# Patient Record
Sex: FEMALE | Race: WHITE | NOT HISPANIC OR LATINO
[De-identification: names, ages, dates, MRNs, and addresses within clinical notes are randomized per-mention and may not be internally consistent; named-entity substitution may affect disease eponyms.]

---

## 2017-01-11 ENCOUNTER — CLINICAL ADVICE (OUTPATIENT)
Age: 62
End: 2017-01-11

## 2017-01-12 ENCOUNTER — APPOINTMENT (OUTPATIENT)
Dept: INFECTIOUS DISEASE | Facility: CLINIC | Age: 62
End: 2017-01-12

## 2017-01-12 VITALS
WEIGHT: 293 LBS | SYSTOLIC BLOOD PRESSURE: 184 MMHG | TEMPERATURE: 98.1 F | HEIGHT: 69 IN | DIASTOLIC BLOOD PRESSURE: 90 MMHG | BODY MASS INDEX: 43.4 KG/M2 | HEART RATE: 79 BPM | OXYGEN SATURATION: 95 %

## 2017-03-23 ENCOUNTER — APPOINTMENT (OUTPATIENT)
Dept: INFECTIOUS DISEASE | Facility: CLINIC | Age: 62
End: 2017-03-23

## 2017-03-23 VITALS
OXYGEN SATURATION: 96 % | DIASTOLIC BLOOD PRESSURE: 86 MMHG | WEIGHT: 293 LBS | HEART RATE: 82 BPM | SYSTOLIC BLOOD PRESSURE: 191 MMHG | BODY MASS INDEX: 43.4 KG/M2 | TEMPERATURE: 97.8 F | HEIGHT: 69 IN

## 2017-03-23 RX ORDER — INSULIN GLARGINE 100 [IU]/ML
100 INJECTION, SOLUTION SUBCUTANEOUS
Qty: 45 | Refills: 0 | Status: COMPLETED | COMMUNITY
Start: 2016-12-02 | End: 2017-03-23

## 2017-06-07 ENCOUNTER — APPOINTMENT (OUTPATIENT)
Dept: INFECTIOUS DISEASE | Facility: CLINIC | Age: 62
End: 2017-06-07

## 2018-11-20 ENCOUNTER — APPOINTMENT (OUTPATIENT)
Dept: NEPHROLOGY | Facility: CLINIC | Age: 63
End: 2018-11-20
Payer: COMMERCIAL

## 2018-11-20 ENCOUNTER — LABORATORY RESULT (OUTPATIENT)
Age: 63
End: 2018-11-20

## 2018-11-20 VITALS
OXYGEN SATURATION: 98 % | HEART RATE: 55 BPM | WEIGHT: 275.57 LBS | HEIGHT: 69 IN | DIASTOLIC BLOOD PRESSURE: 82 MMHG | BODY MASS INDEX: 40.82 KG/M2 | SYSTOLIC BLOOD PRESSURE: 173 MMHG

## 2018-11-20 DIAGNOSIS — R80.9 PROTEINURIA, UNSPECIFIED: ICD-10-CM

## 2018-11-20 PROCEDURE — 99244 OFF/OP CNSLTJ NEW/EST MOD 40: CPT

## 2018-11-20 RX ORDER — INSULIN DEGLUDEC INJECTION 100 U/ML
100 INJECTION, SOLUTION SUBCUTANEOUS
Refills: 0 | Status: DISCONTINUED | COMMUNITY
Start: 2017-03-23 | End: 2018-11-20

## 2018-11-20 RX ORDER — INSULIN DEGLUDEC INJECTION 100 U/ML
100 INJECTION, SOLUTION SUBCUTANEOUS
Qty: 90 | Refills: 0 | Status: DISCONTINUED | COMMUNITY
Start: 2017-01-20 | End: 2018-11-20

## 2018-11-21 LAB
ALBUMIN SERPL ELPH-MCNC: 4.2 G/DL
ALDOSTERONE SERUM: 8.5 NG/DL
ANION GAP SERPL CALC-SCNC: 14 MMOL/L
BASOPHILS # BLD AUTO: 0.02 K/UL
BASOPHILS NFR BLD AUTO: 0.3 %
BUN SERPL-MCNC: 15 MG/DL
C3 SERPL-MCNC: 158 MG/DL
C4 SERPL-MCNC: 28 MG/DL
CALCIUM SERPL-MCNC: 9.9 MG/DL
CHLORIDE SERPL-SCNC: 106 MMOL/L
CO2 SERPL-SCNC: 24 MMOL/L
CREAT SERPL-MCNC: 0.76 MG/DL
CREAT SPEC-SCNC: 147 MG/DL
CREAT/PROT UR: 0.9 RATIO
DEPRECATED KAPPA LC FREE/LAMBDA SER: 1.43 RATIO
DSDNA AB SER-ACNC: 12 IU/ML
ENA RNP AB SER IA-ACNC: <0.2 AL
ENA SM AB SER IA-ACNC: <0.2 AL
EOSINOPHIL # BLD AUTO: 0.25 K/UL
EOSINOPHIL NFR BLD AUTO: 3.4 %
GLUCOSE SERPL-MCNC: 139 MG/DL
HBV CORE IGG+IGM SER QL: NONREACTIVE
HBV CORE IGM SER QL: NONREACTIVE
HBV SURFACE AB SER QL: NONREACTIVE
HBV SURFACE AB SERPL IA-ACNC: <3 MIU/ML
HBV SURFACE AG SER QL: NONREACTIVE
HCT VFR BLD CALC: 34.8 %
HGB BLD-MCNC: 11.2 G/DL
IMM GRANULOCYTES NFR BLD AUTO: 0.3 %
KAPPA LC CSF-MCNC: 1.58 MG/DL
KAPPA LC SERPL-MCNC: 2.26 MG/DL
LYMPHOCYTES # BLD AUTO: 1.94 K/UL
LYMPHOCYTES NFR BLD AUTO: 26.6 %
MAN DIFF?: NORMAL
MCHC RBC-ENTMCNC: 27.8 PG
MCHC RBC-ENTMCNC: 32.2 GM/DL
MCV RBC AUTO: 86.4 FL
MONOCYTES # BLD AUTO: 0.47 K/UL
MONOCYTES NFR BLD AUTO: 6.4 %
MPO AB + PR3 PNL SER: NORMAL
NEUTROPHILS # BLD AUTO: 4.59 K/UL
NEUTROPHILS NFR BLD AUTO: 63 %
PHOSPHATE SERPL-MCNC: 3 MG/DL
PLATELET # BLD AUTO: 249 K/UL
POTASSIUM SERPL-SCNC: 4.6 MMOL/L
PROT UR-MCNC: 132 MG/DL
RBC # BLD: 4.03 M/UL
RBC # FLD: 14 %
SODIUM SERPL-SCNC: 144 MMOL/L
T PALLIDUM AB SER QL IA: NEGATIVE
WBC # FLD AUTO: 7.29 K/UL

## 2018-11-22 LAB — M PROTEIN SPEC IFE-MCNC: NORMAL

## 2018-11-24 LAB
PHOSPHOLIPASE A2 RECEPTOR ELISA: <2 RU/ML
PHOSPHOLIPASE A2 RECEPTOR IFA: NEGATIVE

## 2018-11-27 LAB — ANA SER IF-ACNC: NEGATIVE

## 2018-11-28 LAB — RENIN ACTIVITY, PLASMA: 0.65 NG/ML/HR

## 2019-04-09 ENCOUNTER — APPOINTMENT (OUTPATIENT)
Dept: NEPHROLOGY | Facility: CLINIC | Age: 64
End: 2019-04-09

## 2019-11-11 ENCOUNTER — APPOINTMENT (OUTPATIENT)
Dept: MRI IMAGING | Facility: HOSPITAL | Age: 64
End: 2019-11-11
Payer: COMMERCIAL

## 2019-11-11 ENCOUNTER — OUTPATIENT (OUTPATIENT)
Dept: OUTPATIENT SERVICES | Facility: HOSPITAL | Age: 64
LOS: 1 days | End: 2019-11-11
Payer: COMMERCIAL

## 2019-11-11 DIAGNOSIS — Z00.8 ENCOUNTER FOR OTHER GENERAL EXAMINATION: ICD-10-CM

## 2019-11-11 PROCEDURE — 73720 MRI LWR EXTREMITY W/O&W/DYE: CPT | Mod: 26,RT

## 2019-11-11 PROCEDURE — A9579: CPT

## 2019-11-11 PROCEDURE — 73720 MRI LWR EXTREMITY W/O&W/DYE: CPT

## 2019-11-15 ENCOUNTER — OUTPATIENT (OUTPATIENT)
Dept: OUTPATIENT SERVICES | Facility: HOSPITAL | Age: 64
LOS: 1 days | End: 2019-11-15
Payer: COMMERCIAL

## 2019-11-15 ENCOUNTER — APPOINTMENT (OUTPATIENT)
Dept: MRI IMAGING | Facility: HOSPITAL | Age: 64
End: 2019-11-15
Payer: COMMERCIAL

## 2019-11-15 DIAGNOSIS — Z00.8 ENCOUNTER FOR OTHER GENERAL EXAMINATION: ICD-10-CM

## 2019-11-15 PROCEDURE — 73720 MRI LWR EXTREMITY W/O&W/DYE: CPT

## 2019-11-15 PROCEDURE — A9579: CPT

## 2019-11-15 PROCEDURE — 73720 MRI LWR EXTREMITY W/O&W/DYE: CPT | Mod: 26,LT

## 2020-01-01 ENCOUNTER — APPOINTMENT (OUTPATIENT)
Dept: INFUSION THERAPY | Facility: HOSPITAL | Age: 65
End: 2020-01-01

## 2020-01-01 ENCOUNTER — OUTPATIENT (OUTPATIENT)
Dept: OUTPATIENT SERVICES | Facility: HOSPITAL | Age: 65
LOS: 1 days | Discharge: HOME | End: 2020-01-01

## 2020-01-01 ENCOUNTER — OUTPATIENT (OUTPATIENT)
Dept: OUTPATIENT SERVICES | Facility: HOSPITAL | Age: 65
LOS: 1 days | End: 2020-01-01
Payer: MEDICARE

## 2020-01-01 ENCOUNTER — RESULT REVIEW (OUTPATIENT)
Age: 65
End: 2020-01-01

## 2020-01-01 ENCOUNTER — APPOINTMENT (OUTPATIENT)
Dept: HEMATOLOGY ONCOLOGY | Facility: CLINIC | Age: 65
End: 2020-01-01
Payer: MEDICARE

## 2020-01-01 ENCOUNTER — TRANSCRIPTION ENCOUNTER (OUTPATIENT)
Age: 65
End: 2020-01-01

## 2020-01-01 ENCOUNTER — LABORATORY RESULT (OUTPATIENT)
Age: 65
End: 2020-01-01

## 2020-01-01 ENCOUNTER — APPOINTMENT (OUTPATIENT)
Dept: CT IMAGING | Facility: IMAGING CENTER | Age: 65
End: 2020-01-01
Payer: MEDICARE

## 2020-01-01 VITALS
TEMPERATURE: 97.2 F | HEIGHT: 66.54 IN | BODY MASS INDEX: 42.23 KG/M2 | WEIGHT: 265.88 LBS | OXYGEN SATURATION: 100 % | RESPIRATION RATE: 14 BRPM | DIASTOLIC BLOOD PRESSURE: 80 MMHG | SYSTOLIC BLOOD PRESSURE: 136 MMHG | HEART RATE: 72 BPM

## 2020-01-01 VITALS
HEART RATE: 96 BPM | HEIGHT: 67.91 IN | TEMPERATURE: 97.5 F | SYSTOLIC BLOOD PRESSURE: 138 MMHG | OXYGEN SATURATION: 95 % | DIASTOLIC BLOOD PRESSURE: 81 MMHG | WEIGHT: 266.98 LBS | RESPIRATION RATE: 14 BRPM | BODY MASS INDEX: 40.93 KG/M2

## 2020-01-01 DIAGNOSIS — C50.919 MALIGNANT NEOPLASM OF UNSPECIFIED SITE OF UNSPECIFIED FEMALE BREAST: ICD-10-CM

## 2020-01-01 DIAGNOSIS — Z15.09 PERSONAL HISTORY OF MALIGNANT NEOPLASM OF BREAST: ICD-10-CM

## 2020-01-01 DIAGNOSIS — Z85.3 PERSONAL HISTORY OF MALIGNANT NEOPLASM OF BREAST: ICD-10-CM

## 2020-01-01 DIAGNOSIS — I73.9 PERIPHERAL VASCULAR DISEASE, UNSPECIFIED: ICD-10-CM

## 2020-01-01 LAB
ALBUMIN SERPL ELPH-MCNC: 4.4 G/DL
ALBUMIN SERPL ELPH-MCNC: 4.6 G/DL
ALBUMIN SERPL ELPH-MCNC: 4.8 G/DL
ALP BLD-CCNC: 192 U/L
ALP BLD-CCNC: 255 U/L
ALP BLD-CCNC: 277 U/L
ALT SERPL-CCNC: 8 U/L
ALT SERPL-CCNC: 8 U/L
ALT SERPL-CCNC: 9 U/L
ANION GAP SERPL CALC-SCNC: 13 MMOL/L
ANION GAP SERPL CALC-SCNC: 13 MMOL/L
ANION GAP SERPL CALC-SCNC: 14 MMOL/L
AST SERPL-CCNC: 12 U/L
AST SERPL-CCNC: 12 U/L
AST SERPL-CCNC: 13 U/L
BASOPHILS # BLD AUTO: 0.04 K/UL — SIGNIFICANT CHANGE UP (ref 0–0.2)
BASOPHILS # BLD AUTO: 0.06 K/UL — SIGNIFICANT CHANGE UP (ref 0–0.2)
BASOPHILS NFR BLD AUTO: 1.4 % — SIGNIFICANT CHANGE UP (ref 0–2)
BASOPHILS NFR BLD AUTO: 2 % — SIGNIFICANT CHANGE UP (ref 0–2)
BILIRUB SERPL-MCNC: 0.2 MG/DL
BUN SERPL-MCNC: 18 MG/DL
BUN SERPL-MCNC: 19 MG/DL
BUN SERPL-MCNC: 23 MG/DL
CALCIUM SERPL-MCNC: 10 MG/DL
CALCIUM SERPL-MCNC: 9.2 MG/DL
CALCIUM SERPL-MCNC: 9.8 MG/DL
CANCER AG27-29 SERPL-ACNC: 178 U/ML
CANCER AG27-29 SERPL-ACNC: 231.8 U/ML
CEA SERPL-MCNC: 2.7 NG/ML
CEA SERPL-MCNC: 2.9 NG/ML
CEA SERPL-MCNC: 3.2 NG/ML
CHLORIDE SERPL-SCNC: 107 MMOL/L
CHLORIDE SERPL-SCNC: 107 MMOL/L
CHLORIDE SERPL-SCNC: 108 MMOL/L
CO2 SERPL-SCNC: 21 MMOL/L
CO2 SERPL-SCNC: 22 MMOL/L
CO2 SERPL-SCNC: 23 MMOL/L
CREAT SERPL-MCNC: 0.85 MG/DL
CREAT SERPL-MCNC: 0.87 MG/DL
CREAT SERPL-MCNC: 1.09 MG/DL
EOSINOPHIL # BLD AUTO: 0.09 K/UL — SIGNIFICANT CHANGE UP (ref 0–0.5)
EOSINOPHIL # BLD AUTO: 0.11 K/UL — SIGNIFICANT CHANGE UP (ref 0–0.5)
EOSINOPHIL NFR BLD AUTO: 3 % — SIGNIFICANT CHANGE UP (ref 0–6)
EOSINOPHIL NFR BLD AUTO: 4 % — SIGNIFICANT CHANGE UP (ref 0–6)
GLUCOSE SERPL-MCNC: 124 MG/DL
GLUCOSE SERPL-MCNC: 159 MG/DL
GLUCOSE SERPL-MCNC: 171 MG/DL
HCT VFR BLD CALC: 29.1 % — LOW (ref 34.5–45)
HCT VFR BLD CALC: 32.2 % — LOW (ref 34.5–45)
HGB BLD-MCNC: 10.8 G/DL — LOW (ref 11.5–15.5)
HGB BLD-MCNC: 9.7 G/DL — LOW (ref 11.5–15.5)
IMM GRANULOCYTES NFR BLD AUTO: 0.4 % — SIGNIFICANT CHANGE UP (ref 0–1.5)
LYMPHOCYTES # BLD AUTO: 1.19 K/UL — SIGNIFICANT CHANGE UP (ref 1–3.3)
LYMPHOCYTES # BLD AUTO: 1.32 K/UL — SIGNIFICANT CHANGE UP (ref 1–3.3)
LYMPHOCYTES # BLD AUTO: 41 % — SIGNIFICANT CHANGE UP (ref 13–44)
LYMPHOCYTES # BLD AUTO: 47.7 % — HIGH (ref 13–44)
MCHC RBC-ENTMCNC: 31.8 PG — SIGNIFICANT CHANGE UP (ref 27–34)
MCHC RBC-ENTMCNC: 31.8 PG — SIGNIFICANT CHANGE UP (ref 27–34)
MCHC RBC-ENTMCNC: 33.3 G/DL — SIGNIFICANT CHANGE UP (ref 32–36)
MCHC RBC-ENTMCNC: 33.5 G/DL — SIGNIFICANT CHANGE UP (ref 32–36)
MCV RBC AUTO: 94.7 FL — SIGNIFICANT CHANGE UP (ref 80–100)
MCV RBC AUTO: 95.4 FL — SIGNIFICANT CHANGE UP (ref 80–100)
MISCELLANEOUS TEST: NORMAL
MONOCYTES # BLD AUTO: 0.2 K/UL — SIGNIFICANT CHANGE UP (ref 0–0.9)
MONOCYTES # BLD AUTO: 0.23 K/UL — SIGNIFICANT CHANGE UP (ref 0–0.9)
MONOCYTES NFR BLD AUTO: 7.2 % — SIGNIFICANT CHANGE UP (ref 2–14)
MONOCYTES NFR BLD AUTO: 8 % — SIGNIFICANT CHANGE UP (ref 2–14)
NEUTROPHILS # BLD AUTO: 1.09 K/UL — LOW (ref 1.8–7.4)
NEUTROPHILS # BLD AUTO: 1.33 K/UL — LOW (ref 1.8–7.4)
NEUTROPHILS NFR BLD AUTO: 39.3 % — LOW (ref 43–77)
NEUTROPHILS NFR BLD AUTO: 46 % — SIGNIFICANT CHANGE UP (ref 43–77)
NRBC # BLD: 0 /100 WBCS — SIGNIFICANT CHANGE UP (ref 0–0)
NRBC # BLD: 0 /100 — SIGNIFICANT CHANGE UP (ref 0–0)
NRBC # BLD: SIGNIFICANT CHANGE UP /100 WBCS (ref 0–0)
PLAT MORPH BLD: NORMAL — SIGNIFICANT CHANGE UP
PLATELET # BLD AUTO: 222 K/UL — SIGNIFICANT CHANGE UP (ref 150–400)
PLATELET # BLD AUTO: 291 K/UL — SIGNIFICANT CHANGE UP (ref 150–400)
POTASSIUM SERPL-SCNC: 4.7 MMOL/L
POTASSIUM SERPL-SCNC: 4.8 MMOL/L
POTASSIUM SERPL-SCNC: 5 MMOL/L
PROC NAME: NORMAL
PROT SERPL-MCNC: 6.8 G/DL
PROT SERPL-MCNC: 7 G/DL
PROT SERPL-MCNC: 7.4 G/DL
RBC # BLD: 3.05 M/UL — LOW (ref 3.8–5.2)
RBC # BLD: 3.4 M/UL — LOW (ref 3.8–5.2)
RBC # FLD: 13.3 % — SIGNIFICANT CHANGE UP (ref 10.3–14.5)
RBC # FLD: 13.7 % — SIGNIFICANT CHANGE UP (ref 10.3–14.5)
RBC BLD AUTO: SIGNIFICANT CHANGE UP
SODIUM SERPL-SCNC: 142 MMOL/L
SODIUM SERPL-SCNC: 143 MMOL/L
SODIUM SERPL-SCNC: 143 MMOL/L
WBC # BLD: 2.77 K/UL — LOW (ref 3.8–10.5)
WBC # BLD: 2.9 K/UL — LOW (ref 3.8–10.5)
WBC # FLD AUTO: 2.77 K/UL — LOW (ref 3.8–10.5)
WBC # FLD AUTO: 2.9 K/UL — LOW (ref 3.8–10.5)

## 2020-01-01 PROCEDURE — 74177 CT ABD & PELVIS W/CONTRAST: CPT | Mod: 26

## 2020-01-01 PROCEDURE — 99215 OFFICE O/P EST HI 40 MIN: CPT

## 2020-01-01 PROCEDURE — 99214 OFFICE O/P EST MOD 30 MIN: CPT | Mod: 95

## 2020-01-01 PROCEDURE — 71260 CT THORAX DX C+: CPT | Mod: 26

## 2020-01-01 PROCEDURE — 71260 CT THORAX DX C+: CPT

## 2020-01-01 PROCEDURE — 74177 CT ABD & PELVIS W/CONTRAST: CPT

## 2020-01-01 RX ORDER — TRAMADOL HYDROCHLORIDE AND ACETAMINOPHEN 37.5; 325 MG/1; MG/1
37.5-325 TABLET, FILM COATED ORAL 3 TIMES DAILY
Qty: 90 | Refills: 0 | Status: ACTIVE | COMMUNITY
Start: 2020-09-10 | End: 1900-01-01

## 2020-01-01 RX ORDER — PALBOCICLIB 100 MG/1
100 CAPSULE ORAL
Qty: 21 | Refills: 2 | Status: DISCONTINUED | COMMUNITY
Start: 2020-04-15 | End: 2020-01-01

## 2020-01-01 RX ORDER — METFORMIN HYDROCHLORIDE 500 MG/1
500 TABLET, COATED ORAL
Qty: 360 | Refills: 0 | Status: ACTIVE | COMMUNITY
Start: 2020-01-01

## 2020-02-19 ENCOUNTER — TRANSCRIPTION ENCOUNTER (OUTPATIENT)
Age: 65
End: 2020-02-19

## 2020-04-06 ENCOUNTER — APPOINTMENT (OUTPATIENT)
Dept: SURGICAL ONCOLOGY | Facility: CLINIC | Age: 65
End: 2020-04-06
Payer: MEDICARE

## 2020-04-06 PROCEDURE — 99205 OFFICE O/P NEW HI 60 MIN: CPT | Mod: 95

## 2020-04-08 ENCOUNTER — RESULT REVIEW (OUTPATIENT)
Age: 65
End: 2020-04-08

## 2020-04-10 NOTE — CONSULT LETTER
[Dear  ___] : Dear  [unfilled], [Courtesy Letter:] : I had the pleasure of seeing your patient, [unfilled], in my office today. [FreeTextEntry2] : Urbano Sherwood MD [FreeTextEntry1] : Roman Mccullough M.D.\par

## 2020-04-10 NOTE — HISTORY OF PRESENT ILLNESS
[de-identified] : 65 yo woman with fam hx. breast ca in her mother age 70 who underwent recent chest CT for pain identified left axillary adenopathy and findings c/w diffuse lytic osseous metasses. This was thought to be c/w left sided breast cancer. MM/ssono performed jan 2020 were negative. Pt. states pain started in the rib cages below both breasts. She does not feel and breast masses or notice any nipple discharge.

## 2020-04-10 NOTE — ASSESSMENT
[FreeTextEntry1] : Impression;\par \par Chest CT for rib pain reveals diffuse osseous lytic lesions and enlarged left axillary nodes. There are some right rib fractures. Also small non-specific lung nodules bilaterally.\par \par Plan : recommend repeat left breast imaging with possible Left axillary us guided core biopsy as first step. \par

## 2020-04-10 NOTE — PHYSICAL EXAM
[FreeTextEntry1] : This consultation was done as a telehealth consultation. No PE could be performed at this fabrizio

## 2020-04-14 ENCOUNTER — OUTPATIENT (OUTPATIENT)
Dept: OUTPATIENT SERVICES | Facility: HOSPITAL | Age: 65
LOS: 1 days | Discharge: HOME | End: 2020-04-14

## 2020-04-14 DIAGNOSIS — C50.919 MALIGNANT NEOPLASM OF UNSPECIFIED SITE OF UNSPECIFIED FEMALE BREAST: ICD-10-CM

## 2020-04-15 ENCOUNTER — APPOINTMENT (OUTPATIENT)
Dept: HEMATOLOGY ONCOLOGY | Facility: CLINIC | Age: 65
End: 2020-04-15
Payer: MEDICARE

## 2020-04-15 VITALS — BODY MASS INDEX: 35.44 KG/M2 | WEIGHT: 240 LBS

## 2020-04-15 DIAGNOSIS — L97.509 NON-PRESSURE CHRONIC ULCER OF OTHER PART OF UNSPECIFIED FOOT WITH UNSPECIFIED SEVERITY: ICD-10-CM

## 2020-04-15 DIAGNOSIS — M20.40 OTHER HAMMER TOE(S) (ACQUIRED), UNSPECIFIED FOOT: ICD-10-CM

## 2020-04-15 DIAGNOSIS — E78.5 HYPERLIPIDEMIA, UNSPECIFIED: ICD-10-CM

## 2020-04-15 DIAGNOSIS — I10 ESSENTIAL (PRIMARY) HYPERTENSION: ICD-10-CM

## 2020-04-15 DIAGNOSIS — Z87.898 PERSONAL HISTORY OF OTHER SPECIFIED CONDITIONS: ICD-10-CM

## 2020-04-15 DIAGNOSIS — H40.9 UNSPECIFIED GLAUCOMA: ICD-10-CM

## 2020-04-15 PROCEDURE — 99205 OFFICE O/P NEW HI 60 MIN: CPT | Mod: 95

## 2020-04-15 RX ORDER — LOSARTAN POTASSIUM 50 MG/1
50 TABLET, FILM COATED ORAL
Qty: 90 | Refills: 3 | Status: DISCONTINUED | COMMUNITY
Start: 2018-11-20 | End: 2020-04-15

## 2020-04-15 RX ORDER — DICLOFENAC SODIUM 50 MG/1
50 TABLET, DELAYED RELEASE ORAL
Refills: 0 | Status: ACTIVE | COMMUNITY
Start: 2020-04-15

## 2020-04-15 RX ORDER — GLIMEPIRIDE 2 MG/1
2 TABLET ORAL DAILY
Refills: 0 | Status: ACTIVE | COMMUNITY
Start: 2020-04-15

## 2020-04-15 NOTE — HISTORY OF PRESENT ILLNESS
[Disease: _____________________] : Disease: [unfilled] [AJCC Stage: ____] : AJCC Stage: [unfilled] [Home] : at home, [unfilled] , at the time of the visit. [Medical Office: ([facility])___] : at the medical office located in  [Patient] : the patient [Self] : self [T: ___] : T[unfilled] [N: ___] : N[unfilled] [M: ___] : M[unfilled] [Spouse] : spouse [FreeTextEntry4] : Gabe Roberts [de-identified] : Left breast cancer diagnosed at the age of 64\par 01/19/20 Screening mammogram and breast ultrasound [facility] aside from scattered fibroglandular densities bilaterally.\par In mid February the patient developed pain her ribs and her PCP ordered a CXR and then a CT scan\par 04/02/20 CT scan of the chest showed a large mediastinal LN measuring 0.7 x 1.4 cm in the prevascular space. The heart is normal in size with trace pericardial effusion. Enlarged main pulmonary artery possibly due to pulmonary hypertension with the main artery measuring 3.9 cm, left pulmonary artery measures 2 cm and the right pulmonary artery measures 2.7 cm. There are multiple 1 to 3 mm nodules scattered throughout both lungs, which are nonspecific. Representative nodules include a 3 mm nodule in the left lower lobe and a 2 mm nodule in the right lower lobe. A few small calcified granulomas are also present in the right lung. Small lobulated left pleural effusion. There are diffuse lytic osseous metastases throughout the chest. Pathologic non displaced rib fracture present in the right seventh through ninth lateral ribs. Several enlarged left axillary lymph nodes, the largest of which demonstrate a fatty hilum but is enlarged, measuring 3.5 x 3 cm\par 04/08/20 Left breast mammogram showed a new ill defined mass like area in the outer central left breast extending over approximately 2.0 cm. \par                Left breast ultrasound showed a vague 2.0 cm ill defined mass in 1 o'clock axis of the left breast 5 cm FN. \par 04/08/20 Left breast 1 o'clock axis 5 cm FN sonoguided core biopsy showed a 0.9 cm invasive lobular carcinoma, SBR 6/9, ER >90%, [place] >90% and HER-2 negative.\par                Left axillary sonoguided core biopsy showed a lymph node with metastatic carcinoma with similar histology to the core biopsy of the breast. [de-identified] : Invasive lobular carcinoma, SBR 6/9, ER >90%, [place] >90%, HER-2 negative [de-identified] : Sylvia is being seen via Telehealth due to the COVID-19 pandemic, to discuss the medical management of her newly diagnosed metastatic breast cancer. \par She was in her usual state of health until 2020 when she developed rib pain which has increasingly worsened and prevents her from sleeping.  She has tried Advil and diclofenac which have given her minimal relief.  Her  has Tramadol which she will try.\par More recently she has developed significant fatigue for the last 3-4 weeks.\par She had a lap band about 10 years ago which did not help with weight loss but she has lost about 100 pounds since 2018 on Weight [employer]. Current weight 239 lbs.\par Her mother had breast cancer and her identical twin sister  from gastric cancer at 47 so will discuss genetic testing in the future as she has two children who could benefit.\par \par \par \par

## 2020-04-15 NOTE — OB HISTORY
[Menarche Age: ____] : age at menarche was [unfilled] [Menopause Age: ____] : age at menopause was [unfilled] [___] :  Induced: [unfilled]

## 2020-04-15 NOTE — CONSULT LETTER
[Dear  ___] : Dear  [unfilled], [( Thank you for referring [unfilled] for consultation for _____ )] : Thank you for referring [unfilled] for consultation for [unfilled] [Please see my note below.] : Please see my note below. [Consult Closing:] : Thank you very much for allowing me to participate in the care of this patient.  If you have any questions, please do not hesitate to contact me. [Sincerely,] : Sincerely, [DrThao  ___] : Dr. COBIAN [DrThao ___] : Dr. COBIAN [FreeTextEntry2] : Roman Mccullough MD\[address]\[place] [address] [FreeTextEntry3] : Kay Blanc MD\par Associate Chief \par [facility]\par [facility]\par  of Medicine\par [employer] [facility] [employer]\par \par

## 2020-04-15 NOTE — REASON FOR VISIT
[Initial Consultation] : an initial consultation [Spouse] : spouse [FreeTextEntry2] : Metastatic Breast Cancer

## 2020-04-17 LAB
24R-OH-CALCIDIOL SERPL-MCNC: 10.4 PG/ML
ALBUMIN SERPL ELPH-MCNC: 4.2 G/DL
ALP BLD-CCNC: 277 U/L
ALT SERPL-CCNC: 19 U/L
ANION GAP SERPL CALC-SCNC: 22 MMOL/L
AST SERPL-CCNC: 24 U/L
BASOPHILS # BLD AUTO: 0.04 K/UL
BASOPHILS NFR BLD AUTO: 0.7 %
BILIRUB SERPL-MCNC: 0.2 MG/DL
BUN SERPL-MCNC: 26 MG/DL
CALCIUM SERPL-MCNC: 10.4 MG/DL
CANCER AG27-29 SERPL-ACNC: 341.8 U/ML
CEA SERPL-MCNC: 7.6 NG/ML
CHLORIDE SERPL-SCNC: 102 MMOL/L
CO2 SERPL-SCNC: 20 MMOL/L
CREAT SERPL-MCNC: 0.93 MG/DL
EOSINOPHIL # BLD AUTO: 0.18 K/UL
EOSINOPHIL NFR BLD AUTO: 3 %
GLUCOSE SERPL-MCNC: 114 MG/DL
HCT VFR BLD CALC: 31.9 %
HGB BLD-MCNC: 9.9 G/DL
IMM GRANULOCYTES NFR BLD AUTO: 1.3 %
LYMPHOCYTES # BLD AUTO: 2.44 K/UL
LYMPHOCYTES NFR BLD AUTO: 40.7 %
MAN DIFF?: NORMAL
MCHC RBC-ENTMCNC: 26.3 PG
MCHC RBC-ENTMCNC: 31 GM/DL
MCV RBC AUTO: 84.8 FL
MONOCYTES # BLD AUTO: 0.56 K/UL
MONOCYTES NFR BLD AUTO: 9.3 %
NEUTROPHILS # BLD AUTO: 2.7 K/UL
NEUTROPHILS NFR BLD AUTO: 45 %
PLATELET # BLD AUTO: 344 K/UL
POTASSIUM SERPL-SCNC: 4.8 MMOL/L
PROT SERPL-MCNC: 6.9 G/DL
RBC # BLD: 3.76 M/UL
RBC # FLD: 14.8 %
SODIUM SERPL-SCNC: 144 MMOL/L
WBC # FLD AUTO: 6 K/UL

## 2020-05-01 ENCOUNTER — RESULT REVIEW (OUTPATIENT)
Age: 65
End: 2020-05-01

## 2020-05-01 ENCOUNTER — APPOINTMENT (OUTPATIENT)
Dept: INFUSION THERAPY | Facility: HOSPITAL | Age: 65
End: 2020-05-01

## 2020-05-01 ENCOUNTER — APPOINTMENT (OUTPATIENT)
Dept: HEMATOLOGY ONCOLOGY | Facility: CLINIC | Age: 65
End: 2020-05-01
Payer: MEDICARE

## 2020-05-01 VITALS
OXYGEN SATURATION: 98 % | TEMPERATURE: 98 F | HEART RATE: 82 BPM | SYSTOLIC BLOOD PRESSURE: 140 MMHG | WEIGHT: 249.12 LBS | DIASTOLIC BLOOD PRESSURE: 88 MMHG | BODY MASS INDEX: 36.79 KG/M2 | RESPIRATION RATE: 18 BRPM

## 2020-05-01 DIAGNOSIS — E66.01 MORBID (SEVERE) OBESITY DUE TO EXCESS CALORIES: ICD-10-CM

## 2020-05-01 LAB
BASOPHILS # BLD AUTO: 0.03 K/UL — SIGNIFICANT CHANGE UP (ref 0–0.2)
BASOPHILS NFR BLD AUTO: 1 % — SIGNIFICANT CHANGE UP (ref 0–2)
EOSINOPHIL # BLD AUTO: 0.03 K/UL — SIGNIFICANT CHANGE UP (ref 0–0.5)
EOSINOPHIL NFR BLD AUTO: 1 % — SIGNIFICANT CHANGE UP (ref 0–6)
HCT VFR BLD CALC: 29.3 % — LOW (ref 34.5–45)
HGB BLD-MCNC: 9.2 G/DL — LOW (ref 11.5–15.5)
LYMPHOCYTES # BLD AUTO: 2.17 K/UL — SIGNIFICANT CHANGE UP (ref 1–3.3)
LYMPHOCYTES # BLD AUTO: 71 % — HIGH (ref 13–44)
MCHC RBC-ENTMCNC: 26.7 PG — LOW (ref 27–34)
MCHC RBC-ENTMCNC: 31.4 GM/DL — LOW (ref 32–36)
MCV RBC AUTO: 85.2 FL — SIGNIFICANT CHANGE UP (ref 80–100)
MONOCYTES # BLD AUTO: 0.12 K/UL — SIGNIFICANT CHANGE UP (ref 0–0.9)
MONOCYTES NFR BLD AUTO: 4 % — SIGNIFICANT CHANGE UP (ref 2–14)
NEUTROPHILS # BLD AUTO: 0.7 K/UL — LOW (ref 1.8–7.4)
NEUTROPHILS NFR BLD AUTO: 23 % — LOW (ref 43–77)
NRBC # BLD: 0 /100 — SIGNIFICANT CHANGE UP (ref 0–0)
NRBC # BLD: SIGNIFICANT CHANGE UP /100 WBCS (ref 0–0)
PLAT MORPH BLD: NORMAL — SIGNIFICANT CHANGE UP
PLATELET # BLD AUTO: 252 K/UL — SIGNIFICANT CHANGE UP (ref 150–400)
RBC # BLD: 3.44 M/UL — LOW (ref 3.8–5.2)
RBC # FLD: 15.3 % — HIGH (ref 10.3–14.5)
RBC BLD AUTO: SIGNIFICANT CHANGE UP
WBC # BLD: 3.06 K/UL — LOW (ref 3.8–10.5)
WBC # FLD AUTO: 3.06 K/UL — LOW (ref 3.8–10.5)

## 2020-05-01 PROCEDURE — 99215 OFFICE O/P EST HI 40 MIN: CPT

## 2020-05-04 DIAGNOSIS — C79.51 SECONDARY MALIGNANT NEOPLASM OF BONE: ICD-10-CM

## 2020-05-05 ENCOUNTER — RESULT REVIEW (OUTPATIENT)
Age: 65
End: 2020-05-05

## 2020-05-05 ENCOUNTER — APPOINTMENT (OUTPATIENT)
Dept: NUCLEAR MEDICINE | Facility: CLINIC | Age: 65
End: 2020-05-05
Payer: MEDICARE

## 2020-05-05 ENCOUNTER — APPOINTMENT (OUTPATIENT)
Dept: CT IMAGING | Facility: CLINIC | Age: 65
End: 2020-05-05
Payer: MEDICARE

## 2020-05-05 ENCOUNTER — OUTPATIENT (OUTPATIENT)
Dept: OUTPATIENT SERVICES | Facility: HOSPITAL | Age: 65
LOS: 1 days | End: 2020-05-05

## 2020-05-05 DIAGNOSIS — C50.919 MALIGNANT NEOPLASM OF UNSPECIFIED SITE OF UNSPECIFIED FEMALE BREAST: ICD-10-CM

## 2020-05-05 PROBLEM — E66.01 MORBID OBESITY WITH BMI OF 40.0-44.9, ADULT: Status: RESOLVED | Noted: 2020-04-15 | Resolved: 2020-05-05

## 2020-05-05 PROCEDURE — 78306 BONE IMAGING WHOLE BODY: CPT | Mod: 26

## 2020-05-05 PROCEDURE — 78830 RP LOCLZJ TUM SPECT W/CT 1: CPT | Mod: 26

## 2020-05-05 PROCEDURE — 74177 CT ABD & PELVIS W/CONTRAST: CPT | Mod: 26

## 2020-05-05 NOTE — HISTORY OF PRESENT ILLNESS
[Disease: _____________________] : Disease: [unfilled] [N: ___] : N[unfilled] [T: ___] : T[unfilled] [M: ___] : M[unfilled] [AJCC Stage: ____] : AJCC Stage: [unfilled] [de-identified] : Left breast cancer diagnosed at the age of 64\par 01/19/20 Screening mammogram and breast ultrasound [facility] aside from scattered fibroglandular densities bilaterally.\par In mid February the patient developed pain her ribs and her PCP ordered a CXR and then a CT scan\par 04/02/20 CT scan of the chest showed a large mediastinal LN measuring 0.7 x 1.4 cm in the prevascular space. The heart is normal in size with trace pericardial effusion. Enlarged main pulmonary artery possibly due to pulmonary hypertension with the main artery measuring 3.9 cm, left pulmonary artery measures 2 cm and the right pulmonary artery measures 2.7 cm. There are multiple 1 to 3 mm nodules scattered throughout both lungs, which are nonspecific. Representative nodules include a 3 mm nodule in the left lower lobe and a 2 mm nodule in the right lower lobe. A few small calcified granulomas are also present in the right lung. Small lobulated left pleural effusion. There are diffuse lytic osseous metastases throughout the chest. Pathologic non displaced rib fracture present in the right seventh through ninth lateral ribs. Several enlarged left axillary lymph nodes, the largest of which demonstrate a fatty hilum but is enlarged, measuring 3.5 x 3 cm\par 04/08/20 Left breast mammogram showed a new ill defined mass like area in the outer central left breast extending over approximately 2.0 cm. \par                Left breast ultrasound showed a vague 2.0 cm ill defined mass in 1 o'clock axis of the left breast 5 cm FN. \par 04/08/20 Left breast 1 o'clock axis 5 cm FN sonoguided core biopsy showed invasive lobular carcinoma, SBR 6/9, ER >90%, [place] >90% and HER-2 negative.\par                Left axillary sonoguided core biopsy showed a lymph node with metastatic carcinoma with similar histology to the core biopsy of the breast.\par 4/17/20 Letrozole and Ibrance started\par 5/1/20 Zometa started\par  [de-identified] : Invasive lobular carcinoma, SBR 6/9, ER >90%, [place] >90%, HER-2 negative [de-identified] : Sylvia presents to the office for an evaluation s/p starting Ibrance and Letrozole on 20. \par Sylvia is overall doing well on letrozole and Ibrance with no significant side effects but has been experiencing persistent rib pain with pain scale of 10/10.\par She has been very fatigued for a number of months but this has not worsened since starting treatment.\par She has been taking Tramadol with very little improvement in her pain and finds the side effects are troublesome as she feels dizzy and nauseous.\par She denies any fever, chills, night sweats, CP, SOB, vomiting, diarrhea, constipation, mucositis or GERD\par She had a lap band about 10 years ago which did not help with weight loss but she has lost about 100 pounds since 2018 on Weight Watchers. Current weight 248 lbs.\par Her mother had breast cancer and her identical twin sister  from gastric cancer at 47 so will discuss genetic testing in the future as she has two children who could benefit.\par \par \par \par

## 2020-05-05 NOTE — PHYSICAL EXAM
[Fully active, able to carry on all pre-disease performance without restriction] : Status 0 - Fully active, able to carry on all pre-disease performance without restriction [Obese] : obese [Normal] : normal appearance, no rash, nodules, vesicles, ulcers, erythema

## 2020-05-19 ENCOUNTER — OUTPATIENT (OUTPATIENT)
Dept: OUTPATIENT SERVICES | Facility: HOSPITAL | Age: 65
LOS: 1 days | Discharge: HOME | End: 2020-05-19

## 2020-05-19 DIAGNOSIS — C50.919 MALIGNANT NEOPLASM OF UNSPECIFIED SITE OF UNSPECIFIED FEMALE BREAST: ICD-10-CM

## 2020-05-22 ENCOUNTER — RESULT REVIEW (OUTPATIENT)
Age: 65
End: 2020-05-22

## 2020-05-22 ENCOUNTER — APPOINTMENT (OUTPATIENT)
Dept: HEMATOLOGY ONCOLOGY | Facility: CLINIC | Age: 65
End: 2020-05-22
Payer: MEDICARE

## 2020-05-22 VITALS
OXYGEN SATURATION: 98 % | BODY MASS INDEX: 36.14 KG/M2 | RESPIRATION RATE: 18 BRPM | HEART RATE: 88 BPM | SYSTOLIC BLOOD PRESSURE: 147 MMHG | TEMPERATURE: 99.3 F | WEIGHT: 244.71 LBS | DIASTOLIC BLOOD PRESSURE: 80 MMHG

## 2020-05-22 LAB
ALBUMIN SERPL ELPH-MCNC: 4.5 G/DL
ALP BLD-CCNC: 460 U/L
ALT SERPL-CCNC: 10 U/L
ANION GAP SERPL CALC-SCNC: 13 MMOL/L
AST SERPL-CCNC: 15 U/L
BASOPHILS # BLD AUTO: 0.06 K/UL — SIGNIFICANT CHANGE UP (ref 0–0.2)
BASOPHILS NFR BLD AUTO: 1.8 % — SIGNIFICANT CHANGE UP (ref 0–2)
BILIRUB SERPL-MCNC: 0.2 MG/DL
BUN SERPL-MCNC: 16 MG/DL
CALCIUM SERPL-MCNC: 9.6 MG/DL
CEA SERPL-MCNC: 6.4 NG/ML
CHLORIDE SERPL-SCNC: 108 MMOL/L
CO2 SERPL-SCNC: 22 MMOL/L
CREAT SERPL-MCNC: 0.8 MG/DL
EOSINOPHIL # BLD AUTO: 0.08 K/UL — SIGNIFICANT CHANGE UP (ref 0–0.5)
EOSINOPHIL NFR BLD AUTO: 2.4 % — SIGNIFICANT CHANGE UP (ref 0–6)
GLUCOSE SERPL-MCNC: 125 MG/DL
HCT VFR BLD CALC: 27.8 % — LOW (ref 34.5–45)
HGB BLD-MCNC: 9 G/DL — LOW (ref 11.5–15.5)
IMM GRANULOCYTES NFR BLD AUTO: 0.3 % — SIGNIFICANT CHANGE UP (ref 0–1.5)
LYMPHOCYTES # BLD AUTO: 1.48 K/UL — SIGNIFICANT CHANGE UP (ref 1–3.3)
LYMPHOCYTES # BLD AUTO: 44.4 % — HIGH (ref 13–44)
MCHC RBC-ENTMCNC: 27.5 PG — SIGNIFICANT CHANGE UP (ref 27–34)
MCHC RBC-ENTMCNC: 32.4 GM/DL — SIGNIFICANT CHANGE UP (ref 32–36)
MCV RBC AUTO: 85 FL — SIGNIFICANT CHANGE UP (ref 80–100)
MONOCYTES # BLD AUTO: 0.25 K/UL — SIGNIFICANT CHANGE UP (ref 0–0.9)
MONOCYTES NFR BLD AUTO: 7.5 % — SIGNIFICANT CHANGE UP (ref 2–14)
NEUTROPHILS # BLD AUTO: 1.45 K/UL — LOW (ref 1.8–7.4)
NEUTROPHILS NFR BLD AUTO: 43.6 % — SIGNIFICANT CHANGE UP (ref 43–77)
NRBC # BLD: 0 /100 WBCS — SIGNIFICANT CHANGE UP (ref 0–0)
PLATELET # BLD AUTO: 453 K/UL — HIGH (ref 150–400)
POTASSIUM SERPL-SCNC: 5.2 MMOL/L
PROT SERPL-MCNC: 7 G/DL
RBC # BLD: 3.27 M/UL — LOW (ref 3.8–5.2)
RBC # FLD: 17.4 % — HIGH (ref 10.3–14.5)
SODIUM SERPL-SCNC: 142 MMOL/L
WBC # BLD: 3.33 K/UL — LOW (ref 3.8–10.5)
WBC # FLD AUTO: 3.33 K/UL — LOW (ref 3.8–10.5)

## 2020-05-22 PROCEDURE — 99215 OFFICE O/P EST HI 40 MIN: CPT

## 2020-05-22 NOTE — HISTORY OF PRESENT ILLNESS
[Disease: _____________________] : Disease: [unfilled] [T: ___] : T[unfilled] [N: ___] : N[unfilled] [M: ___] : M[unfilled] [AJCC Stage: ____] : AJCC Stage: [unfilled] [de-identified] : Invasive lobular carcinoma, SBR 6/9, ER >90%, [place] >90%, HER-2 negative [de-identified] : Left breast cancer diagnosed at the age of 64\par 01/19/20 Screening mammogram and breast ultrasound [facility] aside from scattered fibroglandular densities bilaterally.\par In mid February the patient developed pain her ribs and her PCP ordered a CXR and then a CT scan\par 04/02/20 CT scan of the chest showed a large mediastinal LN measuring 0.7 x 1.4 cm in the prevascular space. The heart is normal in size with trace pericardial effusion. Enlarged main pulmonary artery possibly due to pulmonary hypertension with the main artery measuring 3.9 cm, left pulmonary artery measures 2 cm and the right pulmonary artery measures 2.7 cm. There are multiple 1 to 3 mm nodules scattered throughout both lungs, which are nonspecific. Representative nodules include a 3 mm nodule in the left lower lobe and a 2 mm nodule in the right lower lobe. A few small calcified granulomas are also present in the right lung. Small lobulated left pleural effusion. There are diffuse lytic osseous metastases throughout the chest. Pathologic non displaced rib fracture present in the right seventh through ninth lateral ribs. Several enlarged left axillary lymph nodes, the largest of which demonstrate a fatty hilum but is enlarged, measuring 3.5 x 3 cm\par 04/08/20 Left breast mammogram showed a new ill defined mass like area in the outer central left breast extending over approximately 2.0 cm. \par                Left breast ultrasound showed a vague 2.0 cm ill defined mass in 1 o'clock axis of the left breast 5 cm FN. \par 04/08/20 Left breast 1 o'clock axis 5 cm FN sonoguided core biopsy showed invasive lobular carcinoma, SBR 6/9, ER >90%, [place] >90% and HER-2 negative.\par                Left axillary sonoguided core biopsy showed a lymph node with metastatic carcinoma with similar histology to the core biopsy of the breast.\par 4/17/20 Letrozole and Ibrance started\par 5/1/20 Zometa started\par 5/1/20 Zometa started\par  [de-identified] : Sylvia started Ibrance and Letrozole on 20 and had her first cycle of Zometa on .\par She is overall doing well on letrozole and Ibrance with no significant side effects, including no fever, chills, mucositis, N/V, CP, SOB, diarrhea or constipation.\par On  her WBC was 3.06 and ANC was 0.7. She held the Ibrance for one week then completed cycle 1 taking it every other day. She is now in her week off\par She has been very fatigued for a number of months but this has started to improve since her pain came under control and she is now able to do more.\par After the first cycle of Zometa on  she had flu like symptoms for several days and a flare in her pain, but then the pain completely resolved within a week.\par This week she felt so much better she did some vacuuming and is now having some discomfort in her right chest and thinks she overexerted.\par Her mother had breast cancer and her identical twin sister  from gastric cancer at 47 so will discuss genetic testing in the future as she has two children who could benefit.\par \par 20 Bone scan showed focal abnormalities in the posteromedial aspect of the right parietal bone, anterior aspect of the left parietal bone parasagittally, sternum, anterior and posterior ribs bilaterally, and both femoral shafts. On the SPECT/CT component of the examination there is evidence of cortical thinning involving the left femoral lesions but not the right femoral lesions. IMPRESSION: Extensive osseous metastases in the axial and appendicular skeletons. Cortical thinning in the left femoral lesions, indicates that there maybe potential for pathologic fracture. \par 20 CT scan showed widespread bony metastases but no other sites of metastatic disease identified\par \par \par \par

## 2020-05-22 NOTE — HISTORY OF PRESENT ILLNESS
[Disease: _____________________] : Disease: [unfilled] [T: ___] : T[unfilled] [N: ___] : N[unfilled] [M: ___] : M[unfilled] [AJCC Stage: ____] : AJCC Stage: [unfilled] [de-identified] : Invasive lobular carcinoma, SBR 6/9, ER >90%, [place] >90%, HER-2 negative [de-identified] : Left breast cancer diagnosed at the age of 64\par 01/19/20 Screening mammogram and breast ultrasound [facility] aside from scattered fibroglandular densities bilaterally.\par In mid February the patient developed pain her ribs and her PCP ordered a CXR and then a CT scan\par 04/02/20 CT scan of the chest showed a large mediastinal LN measuring 0.7 x 1.4 cm in the prevascular space. The heart is normal in size with trace pericardial effusion. Enlarged main pulmonary artery possibly due to pulmonary hypertension with the main artery measuring 3.9 cm, left pulmonary artery measures 2 cm and the right pulmonary artery measures 2.7 cm. There are multiple 1 to 3 mm nodules scattered throughout both lungs, which are nonspecific. Representative nodules include a 3 mm nodule in the left lower lobe and a 2 mm nodule in the right lower lobe. A few small calcified granulomas are also present in the right lung. Small lobulated left pleural effusion. There are diffuse lytic osseous metastases throughout the chest. Pathologic non displaced rib fracture present in the right seventh through ninth lateral ribs. Several enlarged left axillary lymph nodes, the largest of which demonstrate a fatty hilum but is enlarged, measuring 3.5 x 3 cm\par 04/08/20 Left breast mammogram showed a new ill defined mass like area in the outer central left breast extending over approximately 2.0 cm. \par                Left breast ultrasound showed a vague 2.0 cm ill defined mass in 1 o'clock axis of the left breast 5 cm FN. \par 04/08/20 Left breast 1 o'clock axis 5 cm FN sonoguided core biopsy showed invasive lobular carcinoma, SBR 6/9, ER >90%, [place] >90% and HER-2 negative.\par                Left axillary sonoguided core biopsy showed a lymph node with metastatic carcinoma with similar histology to the core biopsy of the breast.\par 4/17/20 Letrozole and Ibrance started\par 5/1/20 Zometa started\par 5/1/20 Zometa started\par  [de-identified] : Sylvia started Ibrance and Letrozole on 20 and had her first cycle of Zometa on .\par She is overall doing well on letrozole and Ibrance with no significant side effects, including no fever, chills, mucositis, N/V, CP, SOB, diarrhea or constipation.\par On  her WBC was 3.06 and ANC was 0.7. She held the Ibrance for one week then completed cycle 1 taking it every other day. She is now in her week off\par She has been very fatigued for a number of months but this has started to improve since her pain came under control and she is now able to do more.\par After the first cycle of Zometa on  she had flu like symptoms for several days and a flare in her pain, but then the pain completely resolved within a week.\par This week she felt so much better she did some vacuuming and is now having some discomfort in her right chest and thinks she overexerted.\par Her mother had breast cancer and her identical twin sister  from gastric cancer at 47 so will discuss genetic testing in the future as she has two children who could benefit.\par \par 20 Bone scan showed focal abnormalities in the posteromedial aspect of the right parietal bone, anterior aspect of the left parietal bone parasagittally, sternum, anterior and posterior ribs bilaterally, and both femoral shafts. On the SPECT/CT component of the examination there is evidence of cortical thinning involving the left femoral lesions but not the right femoral lesions. IMPRESSION: Extensive osseous metastases in the axial and appendicular skeletons. Cortical thinning in the left femoral lesions, indicates that there maybe potential for pathologic fracture. \par 20 CT scan showed widespread bony metastases but no other sites of metastatic disease identified\par \par \par \par

## 2020-05-26 LAB — CANCER AG27-29 SERPL-ACNC: 432.7 U/ML

## 2020-06-05 ENCOUNTER — RESULT REVIEW (OUTPATIENT)
Age: 65
End: 2020-06-05

## 2020-06-05 ENCOUNTER — APPOINTMENT (OUTPATIENT)
Dept: HEMATOLOGY ONCOLOGY | Facility: CLINIC | Age: 65
End: 2020-06-05
Payer: MEDICARE

## 2020-06-05 VITALS
BODY MASS INDEX: 36.66 KG/M2 | DIASTOLIC BLOOD PRESSURE: 79 MMHG | TEMPERATURE: 99.6 F | HEART RATE: 83 BPM | WEIGHT: 248.24 LBS | OXYGEN SATURATION: 99 % | RESPIRATION RATE: 17 BRPM | SYSTOLIC BLOOD PRESSURE: 123 MMHG

## 2020-06-05 LAB
BASOPHILS # BLD AUTO: 0.06 K/UL — SIGNIFICANT CHANGE UP (ref 0–0.2)
BASOPHILS NFR BLD AUTO: 2 % — SIGNIFICANT CHANGE UP (ref 0–2)
EOSINOPHIL # BLD AUTO: 0.06 K/UL — SIGNIFICANT CHANGE UP (ref 0–0.5)
EOSINOPHIL NFR BLD AUTO: 2 % — SIGNIFICANT CHANGE UP (ref 0–6)
HCT VFR BLD CALC: 28.4 % — LOW (ref 34.5–45)
HGB BLD-MCNC: 8.9 G/DL — LOW (ref 11.5–15.5)
LYMPHOCYTES # BLD AUTO: 1.57 K/UL — SIGNIFICANT CHANGE UP (ref 1–3.3)
LYMPHOCYTES # BLD AUTO: 51 % — HIGH (ref 13–44)
MCHC RBC-ENTMCNC: 27.8 PG — SIGNIFICANT CHANGE UP (ref 27–34)
MCHC RBC-ENTMCNC: 31.3 GM/DL — LOW (ref 32–36)
MCV RBC AUTO: 88.8 FL — SIGNIFICANT CHANGE UP (ref 80–100)
MONOCYTES # BLD AUTO: 0.03 K/UL — SIGNIFICANT CHANGE UP (ref 0–0.9)
MONOCYTES NFR BLD AUTO: 1 % — LOW (ref 2–14)
NEUTROPHILS # BLD AUTO: 1.35 K/UL — LOW (ref 1.8–7.4)
NEUTROPHILS NFR BLD AUTO: 44 % — SIGNIFICANT CHANGE UP (ref 43–77)
NRBC # BLD: 0 /100 — SIGNIFICANT CHANGE UP (ref 0–0)
NRBC # BLD: SIGNIFICANT CHANGE UP /100 WBCS (ref 0–0)
PLAT MORPH BLD: NORMAL — SIGNIFICANT CHANGE UP
PLATELET # BLD AUTO: 307 K/UL — SIGNIFICANT CHANGE UP (ref 150–400)
RBC # BLD: 3.2 M/UL — LOW (ref 3.8–5.2)
RBC # FLD: 18.3 % — HIGH (ref 10.3–14.5)
RBC BLD AUTO: SIGNIFICANT CHANGE UP
WBC # BLD: 3.07 K/UL — LOW (ref 3.8–10.5)
WBC # FLD AUTO: 3.07 K/UL — LOW (ref 3.8–10.5)

## 2020-06-05 PROCEDURE — 99215 OFFICE O/P EST HI 40 MIN: CPT

## 2020-06-05 NOTE — HISTORY OF PRESENT ILLNESS
[Disease: _____________________] : Disease: [unfilled] [T: ___] : T[unfilled] [N: ___] : N[unfilled] [M: ___] : M[unfilled] [AJCC Stage: ____] : AJCC Stage: [unfilled] [de-identified] : Left breast cancer diagnosed at the age of 64\par 01/19/20 Screening mammogram and breast ultrasound [facility] aside from scattered fibroglandular densities bilaterally.\par In mid February the patient developed pain her ribs and her PCP ordered a CXR and then a CT scan\par 04/02/20 CT scan of the chest showed a large mediastinal LN measuring 0.7 x 1.4 cm in the prevascular space. The heart is normal in size with trace pericardial effusion. Enlarged main pulmonary artery possibly due to pulmonary hypertension with the main artery measuring 3.9 cm, left pulmonary artery measures 2 cm and the right pulmonary artery measures 2.7 cm. There are multiple 1 to 3 mm nodules scattered throughout both lungs, which are nonspecific. Representative nodules include a 3 mm nodule in the left lower lobe and a 2 mm nodule in the right lower lobe. A few small calcified granulomas are also present in the right lung. Small lobulated left pleural effusion. There are diffuse lytic osseous metastases throughout the chest. Pathologic non displaced rib fracture present in the right seventh through ninth lateral ribs. Several enlarged left axillary lymph nodes, the largest of which demonstrate a fatty hilum but is enlarged, measuring 3.5 x 3 cm\par 04/08/20 Left breast mammogram showed a new ill defined mass like area in the outer central left breast extending over approximately 2.0 cm. \par                Left breast ultrasound showed a vague 2.0 cm ill defined mass in 1 o'clock axis of the left breast 5 cm FN. \par 04/08/20 Left breast 1 o'clock axis 5 cm FN sonoguided core biopsy showed invasive lobular carcinoma, SBR 6/9, ER >90%, [place] >90% and HER-2 negative.\par                Left axillary sonoguided core biopsy showed a lymph node with metastatic carcinoma with similar histology to the core biopsy of the breast.\par 4/17/20 Letrozole and Ibrance started with dose reduction from 125 mg to 100 mg starting cycle 2 due to neutropenia\par 5/1/20 Zometa started\par \par  [de-identified] : Invasive lobular carcinoma, SBR 6/9, ER >90%, [place] >90%, HER-2 negative [de-identified] : Sylvia started Ibrance and Letrozole on 20 and had her first cycle of Zometa on . She is in her second week of her second cycle of Ibrance at 100 mg.\par She is overall doing well on letrozole and Ibrance with no significant side effects, including no fever, chills, mucositis, N/V, CP, SOB, diarrhea or constipation.\par She continues to feel better with more strength, no chest pain, and able to do much more. She is sleeping well with Advil PM.\par The only thing she has noticed with the treatment is that she gets tired in the late afternoon and early evening.\par Her mother had breast cancer and her identical twin sister  from gastric cancer at 47 so will discuss genetic testing in the future as she has two children who could benefit.\par \par 20 Bone scan showed focal abnormalities in the posteromedial aspect of the right parietal bone, anterior aspect of the left parietal bone parasagittally, sternum, anterior and posterior ribs bilaterally, and both femoral shafts. On the SPECT/CT component of the examination there is evidence of cortical thinning involving the left femoral lesions but not the right femoral lesions. IMPRESSION: Extensive osseous metastases in the axial and appendicular skeletons. Cortical thinning in the left femoral lesions, indicates that there maybe potential for pathologic fracture. \par 20 CT scan showed widespread bony metastases but no other sites of metastatic disease identified\par \par \par \par

## 2020-06-15 ENCOUNTER — OUTPATIENT (OUTPATIENT)
Dept: OUTPATIENT SERVICES | Facility: HOSPITAL | Age: 65
LOS: 1 days | Discharge: HOME | End: 2020-06-15

## 2020-06-15 DIAGNOSIS — C50.919 MALIGNANT NEOPLASM OF UNSPECIFIED SITE OF UNSPECIFIED FEMALE BREAST: ICD-10-CM

## 2020-06-19 ENCOUNTER — APPOINTMENT (OUTPATIENT)
Dept: INFUSION THERAPY | Facility: HOSPITAL | Age: 65
End: 2020-06-19

## 2020-06-19 ENCOUNTER — APPOINTMENT (OUTPATIENT)
Dept: HEMATOLOGY ONCOLOGY | Facility: CLINIC | Age: 65
End: 2020-06-19
Payer: MEDICARE

## 2020-06-19 ENCOUNTER — RESULT REVIEW (OUTPATIENT)
Age: 65
End: 2020-06-19

## 2020-06-19 VITALS
RESPIRATION RATE: 17 BRPM | SYSTOLIC BLOOD PRESSURE: 145 MMHG | WEIGHT: 251.32 LBS | OXYGEN SATURATION: 98 % | BODY MASS INDEX: 37.11 KG/M2 | HEART RATE: 75 BPM | TEMPERATURE: 98 F | DIASTOLIC BLOOD PRESSURE: 83 MMHG

## 2020-06-19 LAB
ALBUMIN SERPL ELPH-MCNC: 4.4 G/DL
ALP BLD-CCNC: 427 U/L
ALT SERPL-CCNC: 9 U/L
ANION GAP SERPL CALC-SCNC: 15 MMOL/L
ANISOCYTOSIS BLD QL: SLIGHT — SIGNIFICANT CHANGE UP
AST SERPL-CCNC: 19 U/L
BASOPHILS # BLD AUTO: 0 K/UL — SIGNIFICANT CHANGE UP (ref 0–0.2)
BASOPHILS NFR BLD AUTO: 0 % — SIGNIFICANT CHANGE UP (ref 0–2)
BILIRUB SERPL-MCNC: 0.2 MG/DL
BUN SERPL-MCNC: 22 MG/DL
CALCIUM SERPL-MCNC: 9.8 MG/DL
CANCER AG27-29 SERPL-ACNC: 348.6 U/ML
CEA SERPL-MCNC: 3.7 NG/ML
CHLORIDE SERPL-SCNC: 107 MMOL/L
CO2 SERPL-SCNC: 19 MMOL/L
CREAT SERPL-MCNC: 0.86 MG/DL
ELLIPTOCYTES BLD QL SMEAR: SLIGHT — SIGNIFICANT CHANGE UP
EOSINOPHIL # BLD AUTO: 0.06 K/UL — SIGNIFICANT CHANGE UP (ref 0–0.5)
EOSINOPHIL NFR BLD AUTO: 2 % — SIGNIFICANT CHANGE UP (ref 0–6)
GLUCOSE SERPL-MCNC: 115 MG/DL
HCT VFR BLD CALC: 26.4 % — LOW (ref 34.5–45)
HGB BLD-MCNC: 8.4 G/DL — LOW (ref 11.5–15.5)
HYPOCHROMIA BLD QL: SLIGHT — SIGNIFICANT CHANGE UP
LYMPHOCYTES # BLD AUTO: 1.09 K/UL — SIGNIFICANT CHANGE UP (ref 1–3.3)
LYMPHOCYTES # BLD AUTO: 39 % — SIGNIFICANT CHANGE UP (ref 13–44)
MACROCYTES BLD QL: SLIGHT — SIGNIFICANT CHANGE UP
MCHC RBC-ENTMCNC: 29 PG — SIGNIFICANT CHANGE UP (ref 27–34)
MCHC RBC-ENTMCNC: 31.8 GM/DL — LOW (ref 32–36)
MCV RBC AUTO: 91 FL — SIGNIFICANT CHANGE UP (ref 80–100)
MONOCYTES # BLD AUTO: 0.14 K/UL — SIGNIFICANT CHANGE UP (ref 0–0.9)
MONOCYTES NFR BLD AUTO: 5 % — SIGNIFICANT CHANGE UP (ref 2–14)
NEUTROPHILS # BLD AUTO: 1.51 K/UL — LOW (ref 1.8–7.4)
NEUTROPHILS NFR BLD AUTO: 54 % — SIGNIFICANT CHANGE UP (ref 43–77)
NRBC # BLD: 0 /100 — SIGNIFICANT CHANGE UP (ref 0–0)
NRBC # BLD: SIGNIFICANT CHANGE UP /100 WBCS (ref 0–0)
PLAT MORPH BLD: NORMAL — SIGNIFICANT CHANGE UP
PLATELET # BLD AUTO: 298 K/UL — SIGNIFICANT CHANGE UP (ref 150–400)
POIKILOCYTOSIS BLD QL AUTO: SLIGHT — SIGNIFICANT CHANGE UP
POTASSIUM SERPL-SCNC: 5.5 MMOL/L
PROT SERPL-MCNC: 6.7 G/DL
RBC # BLD: 2.9 M/UL — LOW (ref 3.8–5.2)
RBC # FLD: 19 % — HIGH (ref 10.3–14.5)
RBC BLD AUTO: ABNORMAL
SODIUM SERPL-SCNC: 142 MMOL/L
WBC # BLD: 2.79 K/UL — LOW (ref 3.8–10.5)
WBC # FLD AUTO: 2.79 K/UL — LOW (ref 3.8–10.5)

## 2020-06-19 PROCEDURE — 99215 OFFICE O/P EST HI 40 MIN: CPT

## 2020-06-19 RX ORDER — OXYCODONE 5 MG/1
5 TABLET ORAL
Qty: 20 | Refills: 0 | Status: DISCONTINUED | COMMUNITY
Start: 2020-04-27 | End: 2020-06-19

## 2020-06-19 NOTE — HISTORY OF PRESENT ILLNESS
[de-identified] : Left breast cancer diagnosed at the age of 64\par 01/19/20 Screening mammogram and breast ultrasound [facility] aside from scattered fibroglandular densities bilaterally.\par In mid February the patient developed pain her ribs and her PCP ordered a CXR and then a CT scan\par 04/02/20 CT scan of the chest showed a large mediastinal LN measuring 0.7 x 1.4 cm in the prevascular space. The heart is normal in size with trace pericardial effusion. Enlarged main pulmonary artery possibly due to pulmonary hypertension with the main artery measuring 3.9 cm, left pulmonary artery measures 2 cm and the right pulmonary artery measures 2.7 cm. There are multiple 1 to 3 mm nodules scattered throughout both lungs, which are nonspecific. Representative nodules include a 3 mm nodule in the left lower lobe and a 2 mm nodule in the right lower lobe. A few small calcified granulomas are also present in the right lung. Small lobulated left pleural effusion. There are diffuse lytic osseous metastases throughout the chest. Pathologic non displaced rib fracture present in the right seventh through ninth lateral ribs. Several enlarged left axillary lymph nodes, the largest of which demonstrate a fatty hilum but is enlarged, measuring 3.5 x 3 cm\par 04/08/20 Left breast mammogram showed a new ill defined mass like area in the outer central left breast extending over approximately 2.0 cm. \par                Left breast ultrasound showed a vague 2.0 cm ill defined mass in 1 o'clock axis of the left breast 5 cm FN. \par 04/08/20 Left breast 1 o'clock axis 5 cm FN sonoguided core biopsy showed invasive lobular carcinoma, SBR 6/9, ER >90%, [place] >90% and HER-2 negative.\par                Left axillary sonoguided core biopsy showed a lymph node with metastatic carcinoma with similar histology to the core biopsy of the breast.\par 4/17/20 Letrozole and Ibrance started with dose reduction from 125 mg to 100 mg starting cycle 2 due to neutropenia\par 5/1/20 Zometa started\par \par  [de-identified] : Sylvia started Ibrance and Letrozole on 20 and had her first cycle of Zometa on . \par She had a DR of Ibrance to 100 mg daily, which she started on 20 due to neutropenia and is expected to start cycle # 3 of Ibrance on 20.\par She reports feeling full of energy the first 2-3 weeks while on Ibrance and a little more fatigued on her week off the Ibrance.\par She has been feeling well overall except for she has been noticing more fatigue when walking. She mentions taking about 2-3 breaks/stop after walking for one block, which was not the case before. She has not noticed shortness of breath with walking but is aware of the fatigue.\par She had one mouth sore a few weeks ago which she managed with OTC ETOH free mouth wash.\par The bone pain is significantly better since she has been on Letrozole and Ibrance, she still gets some pain but overall very manageable with tramadol which she uses as needed.\par Sleeping is still an issue but taking Advil PM or Melatonin helps.\par She denies  fever, chills, mucositis, N/V, CP, SOB, diarrhea or constipation. \par Her mother had breast cancer and her identical twin sister  from gastric cancer at 47 so will discuss genetic testing in the future as she has two children who could benefit.\par \par 20 Bone scan showed focal abnormalities in the posteromedial aspect of the right parietal bone, anterior aspect of the left parietal bone parasagittally, sternum, anterior and posterior ribs bilaterally, and both femoral shafts. On the SPECT/CT component of the examination there is evidence of cortical thinning involving the left femoral lesions but not the right femoral lesions. \par IMPRESSION: Extensive osseous metastases in the axial and appendicular skeletons. Cortical thinning in the left femoral lesions, indicates that there maybe potential for pathologic fracture. \par 20 CT scan showed widespread bony metastases but no other sites of metastatic disease identified\par \par \par \par  [de-identified] : Invasive lobular carcinoma, SBR 6/9, ER >90%, [place] >90%, HER-2 negative

## 2020-07-09 ENCOUNTER — APPOINTMENT (OUTPATIENT)
Dept: NUCLEAR MEDICINE | Facility: IMAGING CENTER | Age: 65
End: 2020-07-09
Payer: MEDICARE

## 2020-07-09 ENCOUNTER — OUTPATIENT (OUTPATIENT)
Dept: OUTPATIENT SERVICES | Facility: HOSPITAL | Age: 65
LOS: 1 days | End: 2020-07-09
Payer: MEDICARE

## 2020-07-09 DIAGNOSIS — C50.919 MALIGNANT NEOPLASM OF UNSPECIFIED SITE OF UNSPECIFIED FEMALE BREAST: ICD-10-CM

## 2020-07-09 PROCEDURE — 78306 BONE IMAGING WHOLE BODY: CPT

## 2020-07-09 PROCEDURE — A9561: CPT

## 2020-07-09 PROCEDURE — 78306 BONE IMAGING WHOLE BODY: CPT | Mod: 26

## 2020-07-14 ENCOUNTER — OUTPATIENT (OUTPATIENT)
Dept: OUTPATIENT SERVICES | Facility: HOSPITAL | Age: 65
LOS: 1 days | Discharge: HOME | End: 2020-07-14

## 2020-07-14 DIAGNOSIS — C50.919 MALIGNANT NEOPLASM OF UNSPECIFIED SITE OF UNSPECIFIED FEMALE BREAST: ICD-10-CM

## 2020-07-16 ENCOUNTER — RESULT REVIEW (OUTPATIENT)
Age: 65
End: 2020-07-16

## 2020-07-16 ENCOUNTER — APPOINTMENT (OUTPATIENT)
Dept: HEMATOLOGY ONCOLOGY | Facility: CLINIC | Age: 65
End: 2020-07-16
Payer: MEDICARE

## 2020-07-16 VITALS
OXYGEN SATURATION: 100 % | DIASTOLIC BLOOD PRESSURE: 80 MMHG | BODY MASS INDEX: 37.44 KG/M2 | TEMPERATURE: 98.4 F | WEIGHT: 253.53 LBS | SYSTOLIC BLOOD PRESSURE: 145 MMHG | RESPIRATION RATE: 18 BRPM | HEART RATE: 75 BPM

## 2020-07-16 LAB
BASOPHILS # BLD AUTO: 0.05 K/UL — SIGNIFICANT CHANGE UP (ref 0–0.2)
BASOPHILS NFR BLD AUTO: 2 % — SIGNIFICANT CHANGE UP (ref 0–2)
EOSINOPHIL # BLD AUTO: 0.1 K/UL — SIGNIFICANT CHANGE UP (ref 0–0.5)
EOSINOPHIL NFR BLD AUTO: 4 % — SIGNIFICANT CHANGE UP (ref 0–6)
HCT VFR BLD CALC: 27.2 % — LOW (ref 34.5–45)
HGB BLD-MCNC: 8.7 G/DL — LOW (ref 11.5–15.5)
IMM GRANULOCYTES NFR BLD AUTO: 0.4 % — SIGNIFICANT CHANGE UP (ref 0–1.5)
LYMPHOCYTES # BLD AUTO: 1.1 K/UL — SIGNIFICANT CHANGE UP (ref 1–3.3)
LYMPHOCYTES # BLD AUTO: 43.8 % — SIGNIFICANT CHANGE UP (ref 13–44)
MCHC RBC-ENTMCNC: 29.9 PG — SIGNIFICANT CHANGE UP (ref 27–34)
MCHC RBC-ENTMCNC: 32 GM/DL — SIGNIFICANT CHANGE UP (ref 32–36)
MCV RBC AUTO: 93.5 FL — SIGNIFICANT CHANGE UP (ref 80–100)
MONOCYTES # BLD AUTO: 0.22 K/UL — SIGNIFICANT CHANGE UP (ref 0–0.9)
MONOCYTES NFR BLD AUTO: 8.8 % — SIGNIFICANT CHANGE UP (ref 2–14)
NEUTROPHILS # BLD AUTO: 1.03 K/UL — LOW (ref 1.8–7.4)
NEUTROPHILS NFR BLD AUTO: 41 % — LOW (ref 43–77)
NRBC # BLD: 0 /100 WBCS — SIGNIFICANT CHANGE UP (ref 0–0)
PLATELET # BLD AUTO: 252 K/UL — SIGNIFICANT CHANGE UP (ref 150–400)
RBC # BLD: 2.91 M/UL — LOW (ref 3.8–5.2)
RBC # FLD: 17.9 % — HIGH (ref 10.3–14.5)
WBC # BLD: 2.51 K/UL — LOW (ref 3.8–10.5)
WBC # FLD AUTO: 2.51 K/UL — LOW (ref 3.8–10.5)

## 2020-07-16 PROCEDURE — 99215 OFFICE O/P EST HI 40 MIN: CPT

## 2020-07-16 NOTE — HISTORY OF PRESENT ILLNESS
[Disease: _____________________] : Disease: [unfilled] [N: ___] : N[unfilled] [T: ___] : T[unfilled] [AJCC Stage: ____] : AJCC Stage: [unfilled] [M: ___] : M[unfilled] [de-identified] : Left breast cancer diagnosed at the age of 64\par 01/19/20 Screening mammogram and breast ultrasound [facility] aside from scattered fibroglandular densities bilaterally.\par In mid February the patient developed pain her ribs and her PCP ordered a CXR and then a CT scan\par 04/02/20 CT scan of the chest showed a large mediastinal LN measuring 0.7 x 1.4 cm in the prevascular space. The heart is normal in size with trace pericardial effusion. Enlarged main pulmonary artery possibly due to pulmonary hypertension with the main artery measuring 3.9 cm, left pulmonary artery measures 2 cm and the right pulmonary artery measures 2.7 cm. There are multiple 1 to 3 mm nodules scattered throughout both lungs, which are nonspecific. Representative nodules include a 3 mm nodule in the left lower lobe and a 2 mm nodule in the right lower lobe. A few small calcified granulomas are also present in the right lung. Small lobulated left pleural effusion. There are diffuse lytic osseous metastases throughout the chest. Pathologic non displaced rib fracture present in the right seventh through ninth lateral ribs. Several enlarged left axillary lymph nodes, the largest of which demonstrate a fatty hilum but is enlarged, measuring 3.5 x 3 cm\par 04/08/20 Left breast mammogram showed a new ill defined mass like area in the outer central left breast extending over approximately 2.0 cm. \par                Left breast ultrasound showed a vague 2.0 cm ill defined mass in 1 o'clock axis of the left breast 5 cm FN. \par 04/08/20 Left breast 1 o'clock axis 5 cm FN sonoguided core biopsy showed invasive lobular carcinoma, SBR 6/9, ER >90%, [place] >90% and HER-2 negative.\par                Left axillary sonoguided core biopsy showed a lymph node with metastatic carcinoma with similar histology to the core biopsy of the breast.\par 4/17/20 Letrozole and Ibrance started with dose reduction from 125 mg to 100 mg starting cycle 2 due to neutropenia\par 5/1/20 Zometa started and given monthly x 3 then continue every 3 months\par \par  [de-identified] : Invasive lobular carcinoma, SBR 6/9, ER >90%, [place] >90%, HER-2 negative [de-identified] : Sylvia started Ibrance and Letrozole on 20 and had her first cycle of Zometa on . \par She had a DR of Ibrance to 100 mg daily, which she started on 20 due to neutropenia and is expected to start cycle # 4 of Ibrance on 20\par She is overall doing well aside from mild to moderate nausea in the mornings, which resolves a few hours later.\par She continues to experience worsening fatigue and overall weakness. She has also been experiencing SOB with ambulating, leading her to stop 2-3 times of a one block radius, but feels that the fatigue/weakness in the legs makes her stop more frequently than the SOB.\par The bone pain is significantly better since she has been on Letrozole and Ibrance, she still gets some pain but overall very manageable with tramadol which she uses as needed.\par Sleeping is still an issue but taking Advil PM and  Melatonin helps. She has ag good appetite and has been eating well with no major issues.\par She denies  fever, chills, mucositis, N/V, CP, SOB, diarrhea , constipation or mouth sores.\par Her mother had breast cancer and her identical twin sister  from gastric cancer at 47 so will discuss genetic testing in the future as she has two children who could benefit.\par \par 20 Bone scan showed  numerous foci of increased tracer uptake in the left anterior and right posteromedial parietal calvarium, sternum, multiple ribs bilaterally, and femoral shafts. There is also heterogenous tracer distribution throughout the spine and pelvis. Tracer distribution and activity of osseous lesions are not significantly changed compared to the previous study. There is a left hip arthroplasty, unchanged. There are degenerative changes in the major joints. No new osseous lesions are identified. Both kidneys are visualized. IMPRESSION: Abnormal bone scan.Findings compatible with extensive osseous metastases in the axial and appendicular skeleton, not significantly changed from study of 2020.\par -------------------------------------------------------------------------------------------------------------------------------------------------------------------------------------------------------------------\par 20 Bone scan showed focal abnormalities in the posteromedial aspect of the right parietal bone, anterior aspect of the left parietal bone parasagittally, sternum, anterior and posterior ribs bilaterally, and both femoral shafts. On the SPECT/CT component of the examination there is evidence of cortical thinning involving the left femoral lesions but not the right femoral lesions. \par IMPRESSION: Extensive osseous metastases in the axial and appendicular skeletons. Cortical thinning in the left femoral lesions, indicates that there maybe potential for pathologic fracture. \par 20 CT scan showed widespread bony metastases but no other sites of metastatic disease identified\par \par \par \par

## 2020-07-17 ENCOUNTER — APPOINTMENT (OUTPATIENT)
Dept: INFUSION THERAPY | Facility: HOSPITAL | Age: 65
End: 2020-07-17

## 2020-07-20 DIAGNOSIS — C79.51 SECONDARY MALIGNANT NEOPLASM OF BONE: ICD-10-CM

## 2020-08-07 ENCOUNTER — RESULT REVIEW (OUTPATIENT)
Age: 65
End: 2020-08-07

## 2020-08-07 ENCOUNTER — APPOINTMENT (OUTPATIENT)
Dept: HEMATOLOGY ONCOLOGY | Facility: CLINIC | Age: 65
End: 2020-08-07
Payer: MEDICARE

## 2020-08-07 VITALS
SYSTOLIC BLOOD PRESSURE: 140 MMHG | TEMPERATURE: 98.5 F | RESPIRATION RATE: 18 BRPM | OXYGEN SATURATION: 99 % | DIASTOLIC BLOOD PRESSURE: 71 MMHG | BODY MASS INDEX: 38.55 KG/M2 | HEART RATE: 74 BPM | WEIGHT: 261.02 LBS

## 2020-08-07 DIAGNOSIS — E11.9 TYPE 2 DIABETES MELLITUS W/OUT COMPLICATIONS: ICD-10-CM

## 2020-08-07 DIAGNOSIS — Z80.0 FAMILY HISTORY OF MALIGNANT NEOPLASM OF DIGESTIVE ORGANS: ICD-10-CM

## 2020-08-07 LAB
ALBUMIN SERPL ELPH-MCNC: 4.7 G/DL
ALP BLD-CCNC: 337 U/L
ALT SERPL-CCNC: 8 U/L
ANION GAP SERPL CALC-SCNC: 15 MMOL/L
AST SERPL-CCNC: 14 U/L
BASOPHILS # BLD AUTO: 0.03 K/UL — SIGNIFICANT CHANGE UP (ref 0–0.2)
BASOPHILS NFR BLD AUTO: 1 % — SIGNIFICANT CHANGE UP (ref 0–2)
BILIRUB SERPL-MCNC: 0.2 MG/DL
BUN SERPL-MCNC: 20 MG/DL
CALCIUM SERPL-MCNC: 9.4 MG/DL
CEA SERPL-MCNC: 3.2 NG/ML
CHLORIDE SERPL-SCNC: 106 MMOL/L
CO2 SERPL-SCNC: 22 MMOL/L
CREAT SERPL-MCNC: 0.99 MG/DL
EOSINOPHIL # BLD AUTO: 0.14 K/UL — SIGNIFICANT CHANGE UP (ref 0–0.5)
EOSINOPHIL NFR BLD AUTO: 5 % — SIGNIFICANT CHANGE UP (ref 0–6)
GLUCOSE SERPL-MCNC: 128 MG/DL
HCT VFR BLD CALC: 27.3 % — LOW (ref 34.5–45)
HGB BLD-MCNC: 8.7 G/DL — LOW (ref 11.5–15.5)
LYMPHOCYTES # BLD AUTO: 1.39 K/UL — SIGNIFICANT CHANGE UP (ref 1–3.3)
LYMPHOCYTES # BLD AUTO: 49 % — HIGH (ref 13–44)
MCHC RBC-ENTMCNC: 30.3 PG — SIGNIFICANT CHANGE UP (ref 27–34)
MCHC RBC-ENTMCNC: 31.9 GM/DL — LOW (ref 32–36)
MCV RBC AUTO: 95.1 FL — SIGNIFICANT CHANGE UP (ref 80–100)
MONOCYTES # BLD AUTO: 0.11 K/UL — SIGNIFICANT CHANGE UP (ref 0–0.9)
MONOCYTES NFR BLD AUTO: 4 % — SIGNIFICANT CHANGE UP (ref 2–14)
NEUTROPHILS # BLD AUTO: 1.16 K/UL — LOW (ref 1.8–7.4)
NEUTROPHILS NFR BLD AUTO: 41 % — LOW (ref 43–77)
NRBC # BLD: 0 /100 — SIGNIFICANT CHANGE UP (ref 0–0)
NRBC # BLD: SIGNIFICANT CHANGE UP /100 WBCS (ref 0–0)
PLAT MORPH BLD: NORMAL — SIGNIFICANT CHANGE UP
PLATELET # BLD AUTO: 278 K/UL — SIGNIFICANT CHANGE UP (ref 150–400)
POTASSIUM SERPL-SCNC: 5.5 MMOL/L
PROT SERPL-MCNC: 6.9 G/DL
RBC # BLD: 2.87 M/UL — LOW (ref 3.8–5.2)
RBC # FLD: 16.3 % — HIGH (ref 10.3–14.5)
RBC BLD AUTO: SIGNIFICANT CHANGE UP
SODIUM SERPL-SCNC: 143 MMOL/L
WBC # BLD: 2.83 K/UL — LOW (ref 3.8–10.5)
WBC # FLD AUTO: 2.83 K/UL — LOW (ref 3.8–10.5)

## 2020-08-07 PROCEDURE — 99215 OFFICE O/P EST HI 40 MIN: CPT

## 2020-08-07 RX ORDER — ERGOCALCIFEROL 1.25 MG/1
1.25 MG CAPSULE, LIQUID FILLED ORAL
Qty: 12 | Refills: 0 | Status: DISCONTINUED | COMMUNITY
Start: 2020-05-01 | End: 2020-08-07

## 2020-08-07 RX ORDER — FERROUS GLUCONATE 324(37.5)
324 (37.5 FE) TABLET ORAL
Refills: 0 | Status: ACTIVE | COMMUNITY
Start: 2020-08-07

## 2020-08-07 NOTE — HISTORY OF PRESENT ILLNESS
[Disease: _____________________] : Disease: [unfilled] [T: ___] : T[unfilled] [N: ___] : N[unfilled] [AJCC Stage: ____] : AJCC Stage: [unfilled] [M: ___] : M[unfilled] [de-identified] : Left breast cancer diagnosed at the age of 64\par 01/19/20 Screening mammogram and breast ultrasound [facility] aside from scattered fibroglandular densities bilaterally.\par In mid February the patient developed pain her ribs and her PCP ordered a CXR and then a CT scan\par 04/02/20 CT scan of the chest showed a large mediastinal LN measuring 0.7 x 1.4 cm in the prevascular space. The heart is normal in size with trace pericardial effusion. Enlarged main pulmonary artery possibly due to pulmonary hypertension with the main artery measuring 3.9 cm, left pulmonary artery measures 2 cm and the right pulmonary artery measures 2.7 cm. There are multiple 1 to 3 mm nodules scattered throughout both lungs, which are nonspecific. Representative nodules include a 3 mm nodule in the left lower lobe and a 2 mm nodule in the right lower lobe. A few small calcified granulomas are also present in the right lung. Small lobulated left pleural effusion. There are diffuse lytic osseous metastases throughout the chest. Pathologic non displaced rib fracture present in the right seventh through ninth lateral ribs. Several enlarged left axillary lymph nodes, the largest of which demonstrate a fatty hilum but is enlarged, measuring 3.5 x 3 cm\par 04/08/20 Left breast mammogram showed a new ill defined mass like area in the outer central left breast extending over approximately 2.0 cm. \par                Left breast ultrasound showed a vague 2.0 cm ill defined mass in 1 o'clock axis of the left breast 5 cm FN. \par 04/08/20 Left breast 1 o'clock axis 5 cm FN sonoguided core biopsy showed invasive lobular carcinoma, SBR 6/9, ER >90%, [place] >90% and HER-2 negative.\par                Left axillary sonoguided core biopsy showed a lymph node with metastatic carcinoma with similar histology to the core biopsy of the breast.\par 4/17/20 Letrozole and Ibrance started with dose reduction from 125 mg to 100 mg starting cycle 2 due to neutropenia\par 5/1/20 Zometa started and given monthly x 3 then continue every 3 months\par \par  [de-identified] : Invasive lobular carcinoma, SBR 6/9, ER >90%, [place] >90%, HER-2 negative [de-identified] : Sylvia started Ibrance and Letrozole on 20 and had her first cycle of Zometa on . \par She had a DR of Ibrance to 100 mg daily, which she started on 20 due to neutropenia. She is in cycle 4 day 19.\par She is taking Iron and vitamin C in the am and vitamin D after dinner.\par She continues to note fatigue and mild dyspnea but this is not preventing her from doing things as she paces herself. She is not really feeling weakness in the legs any longer.\par She gets mild nausea in the mornings, which resolves a few hours later. She only occasionally need to take the Reglan.\par The bone pain is significantly better since she got the Zometa. She occasionally gets pain in her right back at the level of her waist when she lies down at night. It cracks and hurts a bit and then resolves. She occasionally takes Tramadol but does not need it often. \par Most of the time she is now out of bed all day, sitting up and doing things. She no longer sleeps during the day and is sleeping well at night now. She still takes Advil PM and melatonin 3 mg and then sleeps though the night. She feels great in the AM but gets tired late morning. She rarely naps now and is able to push though.\par She denies  fever, chills, mucositis, N/V, CP, SOB, diarrhea , constipation or mouth sores.\par Her mother had breast cancer and her identical twin sister  from gastric cancer at 47 so will discuss genetic testing in the future as she has two children who could benefit.\par \par 20 Bone scan showed  numerous foci of increased tracer uptake in the left anterior and right posteromedial parietal calvarium, sternum, multiple ribs bilaterally, and femoral shafts. There is also heterogenous tracer distribution throughout the spine and pelvis. Tracer distribution and activity of osseous lesions are not significantly changed compared to the previous study. There is a left hip arthroplasty, unchanged. There are degenerative changes in the major joints. No new osseous lesions are identified. Both kidneys are visualized. IMPRESSION: Abnormal bone scan.Findings compatible with extensive osseous metastases in the axial and appendicular skeleton, not significantly changed from study of 2020.\par -------------------------------------------------------------------------------------------------------------------------------------------------------------------------------------------------------------------\par 20 Bone scan showed focal abnormalities in the posteromedial aspect of the right parietal bone, anterior aspect of the left parietal bone parasagittally, sternum, anterior and posterior ribs bilaterally, and both femoral shafts. On the SPECT/CT component of the examination there is evidence of cortical thinning involving the left femoral lesions but not the right femoral lesions. \par IMPRESSION: Extensive osseous metastases in the axial and appendicular skeletons. Cortical thinning in the left femoral lesions, indicates that there maybe potential for pathologic fracture. \par 20 CT scan showed widespread bony metastases but no other sites of metastatic disease identified\par \par \par \par

## 2020-08-13 ENCOUNTER — APPOINTMENT (OUTPATIENT)
Dept: INFUSION THERAPY | Facility: HOSPITAL | Age: 65
End: 2020-08-13

## 2020-09-05 ENCOUNTER — OUTPATIENT (OUTPATIENT)
Dept: OUTPATIENT SERVICES | Facility: HOSPITAL | Age: 65
LOS: 1 days | Discharge: HOME | End: 2020-09-05

## 2020-09-05 DIAGNOSIS — C50.919 MALIGNANT NEOPLASM OF UNSPECIFIED SITE OF UNSPECIFIED FEMALE BREAST: ICD-10-CM

## 2020-09-10 ENCOUNTER — RESULT REVIEW (OUTPATIENT)
Age: 65
End: 2020-09-10

## 2020-09-10 ENCOUNTER — APPOINTMENT (OUTPATIENT)
Dept: HEMATOLOGY ONCOLOGY | Facility: CLINIC | Age: 65
End: 2020-09-10
Payer: MEDICARE

## 2020-09-10 VITALS
OXYGEN SATURATION: 97 % | DIASTOLIC BLOOD PRESSURE: 80 MMHG | RESPIRATION RATE: 19 BRPM | BODY MASS INDEX: 41.38 KG/M2 | HEIGHT: 67.32 IN | WEIGHT: 266.75 LBS | HEART RATE: 90 BPM | TEMPERATURE: 98.3 F | SYSTOLIC BLOOD PRESSURE: 144 MMHG

## 2020-09-10 DIAGNOSIS — B36.9 SUPERFICIAL MYCOSIS, UNSPECIFIED: ICD-10-CM

## 2020-09-10 LAB
BASOPHILS # BLD AUTO: 0.04 K/UL — SIGNIFICANT CHANGE UP (ref 0–0.2)
BASOPHILS NFR BLD AUTO: 1.6 % — SIGNIFICANT CHANGE UP (ref 0–2)
EOSINOPHIL # BLD AUTO: 0.09 K/UL — SIGNIFICANT CHANGE UP (ref 0–0.5)
EOSINOPHIL NFR BLD AUTO: 3.5 % — SIGNIFICANT CHANGE UP (ref 0–6)
HCT VFR BLD CALC: 26.6 % — LOW (ref 34.5–45)
HGB BLD-MCNC: 8.7 G/DL — LOW (ref 11.5–15.5)
IMM GRANULOCYTES NFR BLD AUTO: 0.4 % — SIGNIFICANT CHANGE UP (ref 0–1.5)
LYMPHOCYTES # BLD AUTO: 1.22 K/UL — SIGNIFICANT CHANGE UP (ref 1–3.3)
LYMPHOCYTES # BLD AUTO: 47.5 % — HIGH (ref 13–44)
MCHC RBC-ENTMCNC: 31 PG — SIGNIFICANT CHANGE UP (ref 27–34)
MCHC RBC-ENTMCNC: 32.7 G/DL — SIGNIFICANT CHANGE UP (ref 32–36)
MCV RBC AUTO: 94.7 FL — SIGNIFICANT CHANGE UP (ref 80–100)
MONOCYTES # BLD AUTO: 0.19 K/UL — SIGNIFICANT CHANGE UP (ref 0–0.9)
MONOCYTES NFR BLD AUTO: 7.4 % — SIGNIFICANT CHANGE UP (ref 2–14)
NEUTROPHILS # BLD AUTO: 1.02 K/UL — LOW (ref 1.8–7.4)
NEUTROPHILS NFR BLD AUTO: 39.6 % — LOW (ref 43–77)
NRBC # BLD: 0 /100 WBCS — SIGNIFICANT CHANGE UP (ref 0–0)
PLATELET # BLD AUTO: 247 K/UL — SIGNIFICANT CHANGE UP (ref 150–400)
RBC # BLD: 2.81 M/UL — LOW (ref 3.8–5.2)
RBC # FLD: 14.7 % — HIGH (ref 10.3–14.5)
WBC # BLD: 2.57 K/UL — LOW (ref 3.8–10.5)
WBC # FLD AUTO: 2.57 K/UL — LOW (ref 3.8–10.5)

## 2020-09-10 PROCEDURE — 99215 OFFICE O/P EST HI 40 MIN: CPT

## 2020-09-10 RX ORDER — CLOTRIMAZOLE AND BETAMETHASONE DIPROPIONATE 10; .5 MG/ML; MG/ML
1-0.05 LOTION TOPICAL TWICE DAILY
Qty: 1 | Refills: 1 | Status: ACTIVE | COMMUNITY
Start: 2020-09-10 | End: 1900-01-01

## 2020-09-10 RX ORDER — TRAMADOL HYDROCHLORIDE 50 MG/1
50 TABLET, COATED ORAL
Qty: 90 | Refills: 0 | Status: DISCONTINUED | COMMUNITY
Start: 2020-06-19 | End: 2020-09-10

## 2020-09-10 NOTE — PHYSICAL EXAM
[Obese] : obese [Fully active, able to carry on all pre-disease performance without restriction] : Status 0 - Fully active, able to carry on all pre-disease performance without restriction [Normal] : affect appropriate [de-identified] : + Right inframammary fold fungal infection with some skin peeling.

## 2020-09-10 NOTE — HISTORY OF PRESENT ILLNESS
[Disease: _____________________] : Disease: [unfilled] [T: ___] : T[unfilled] [N: ___] : N[unfilled] [M: ___] : M[unfilled] [AJCC Stage: ____] : AJCC Stage: [unfilled] [de-identified] : Left breast cancer diagnosed at the age of 64\par 01/19/20 Screening mammogram and breast ultrasound [facility] aside from scattered fibroglandular densities bilaterally.\par In mid February the patient developed pain her ribs and her PCP ordered a CXR and then a CT scan\par 04/02/20 CT scan of the chest showed a large mediastinal LN measuring 0.7 x 1.4 cm in the prevascular space. The heart is normal in size with trace pericardial effusion. Enlarged main pulmonary artery possibly due to pulmonary hypertension with the main artery measuring 3.9 cm, left pulmonary artery measures 2 cm and the right pulmonary artery measures 2.7 cm. There are multiple 1 to 3 mm nodules scattered throughout both lungs, which are nonspecific. Representative nodules include a 3 mm nodule in the left lower lobe and a 2 mm nodule in the right lower lobe. A few small calcified granulomas are also present in the right lung. Small lobulated left pleural effusion. There are diffuse lytic osseous metastases throughout the chest. Pathologic non displaced rib fracture present in the right seventh through ninth lateral ribs. Several enlarged left axillary lymph nodes, the largest of which demonstrate a fatty hilum but is enlarged, measuring 3.5 x 3 cm\par 04/08/20 Left breast mammogram showed a new ill defined mass like area in the outer central left breast extending over approximately 2.0 cm. \par                Left breast ultrasound showed a vague 2.0 cm ill defined mass in 1 o'clock axis of the left breast 5 cm FN. \par 04/08/20 Left breast 1 o'clock axis 5 cm FN sonoguided core biopsy showed invasive lobular carcinoma, SBR 6/9, ER >90%, [place] >90% and HER-2 negative.\par                Left axillary sonoguided core biopsy showed a lymph node with metastatic carcinoma with similar histology to the core biopsy of the breast.\par 4/17/20 Letrozole and Ibrance started with dose reduction from 125 mg to 100 mg starting cycle 2 due to neutropenia\par 5/1/20 Zometa started and given monthly x 3 then continue every 3 months\par \par  [de-identified] : Invasive lobular carcinoma, SBR 6/9, ER >90%, [place] >90%, HER-2 negative [de-identified] : Sylvia started Ibrance and Letrozole on 20 and had her third cycle of Zometa on 20. \par She had a DR of Ibrance to 100 mg daily, which she started on 20 due to neutropenia. She is on cycle 5 of Ibrance on her week off\par Starting cycle 6 on 20. She is overall doing well with slight improvement inn her fatigue over the past few weeks, but tends to experience more fatigue on the week she is off the Ibrance. She is taking Iron every other day with vitamin C in the am and vitamin D after dinner.\par She has mild dyspnea but this is not preventing her from doing things as she paces herself. She is not really feeling weakness in the legs any longer.\par She gets mild nausea in the mornings, which resolves a few hours later. She only occasionally needs to take the Reglan.\par She occasionally gets pain in her right back at the level of her waist when she lies down at night. It cracks and hurts a bit and then resolves. She occasionally takes Ultracet but does not need it often. She has also been taking Advil PM and Melatonin 3 mg to help her sleep at night .\par She denies  fever, chills, mucositis, N/V, CP, SOB, diarrhea , constipation or mouth sores.\par Her mother had breast cancer and her identical twin sister  from gastric cancer at 47 so discussed getting genetic testing today as she has two children who could benefit.\par \par 20 Bone scan showed  numerous foci of increased tracer uptake in the left anterior and right posteromedial parietal calvarium, sternum, multiple ribs bilaterally, and femoral shafts. There is also heterogenous tracer distribution throughout the spine and pelvis. Tracer distribution and activity of osseous lesions are not significantly changed compared to the previous study. There is a left hip arthroplasty, unchanged. There are degenerative changes in the major joints. No new osseous lesions are identified. Both kidneys are visualized. IMPRESSION: Abnormal bone scan.Findings compatible with extensive osseous metastases in the axial and appendicular skeleton, not significantly changed from study of 2020.\par -------------------------------------------------------------------------------------------------------------------------------------------------------------------------------------------------------------------\par 20 Bone scan showed focal abnormalities in the posteromedial aspect of the right parietal bone, anterior aspect of the left parietal bone parasagittally, sternum, anterior and posterior ribs bilaterally, and both femoral shafts. On the SPECT/CT component of the examination there is evidence of cortical thinning involving the left femoral lesions but not the right femoral lesions. \par IMPRESSION: Extensive osseous metastases in the axial and appendicular skeletons. Cortical thinning in the left femoral lesions, indicates that there maybe potential for pathologic fracture. \par 20 CT scan showed widespread bony metastases but no other sites of metastatic disease identified\par \par \par \par

## 2020-10-08 PROBLEM — Z85.3: Status: RESOLVED | Noted: 2020-01-01 | Resolved: 2020-01-01

## 2020-10-08 NOTE — PHYSICAL EXAM
[Fully active, able to carry on all pre-disease performance without restriction] : Status 0 - Fully active, able to carry on all pre-disease performance without restriction [Obese] : obese [Normal] : affect appropriate [de-identified] : + Right inframammary fold fungal infection with some skin peeling.

## 2020-10-08 NOTE — HISTORY OF PRESENT ILLNESS
[Disease: _____________________] : Disease: [unfilled] [T: ___] : T[unfilled] [N: ___] : N[unfilled] [M: ___] : M[unfilled] [AJCC Stage: ____] : AJCC Stage: [unfilled] [de-identified] : Left breast cancer diagnosed at the age of 64\par 01/19/20 Screening mammogram and breast ultrasound [facility] aside from scattered fibroglandular densities bilaterally.\par In mid February the patient developed pain her ribs and her PCP ordered a CXR and then a CT scan\par 04/02/20 CT scan of the chest showed a large mediastinal LN measuring 0.7 x 1.4 cm in the prevascular space. The heart is normal in size with trace pericardial effusion. Enlarged main pulmonary artery possibly due to pulmonary hypertension with the main artery measuring 3.9 cm, left pulmonary artery measures 2 cm and the right pulmonary artery measures 2.7 cm. There are multiple 1 to 3 mm nodules scattered throughout both lungs, which are nonspecific. Representative nodules include a 3 mm nodule in the left lower lobe and a 2 mm nodule in the right lower lobe. A few small calcified granulomas are also present in the right lung. Small lobulated left pleural effusion. There are diffuse lytic osseous metastases throughout the chest. Pathologic non displaced rib fracture present in the right seventh through ninth lateral ribs. Several enlarged left axillary lymph nodes, the largest of which demonstrate a fatty hilum but is enlarged, measuring 3.5 x 3 cm\par 04/08/20 Left breast mammogram showed a new ill defined mass like area in the outer central left breast extending over approximately 2.0 cm. \par                Left breast ultrasound showed a vague 2.0 cm ill defined mass in 1 o'clock axis of the left breast 5 cm FN. \par 04/08/20 Left breast 1 o'clock axis 5 cm FN sonoguided core biopsy showed invasive lobular carcinoma, SBR 6/9, ER >90%, [place] >90% and HER-2 negative.\par                Left axillary sonoguided core biopsy showed a lymph node with metastatic carcinoma with similar histology to the core biopsy of the breast.\par 4/17/20 Letrozole and Ibrance started with dose reduction from 125 mg to 100 mg starting cycle 2 due to neutropenia\par 5/1/20 Zometa started and given monthly x 3 then continue every 3 months\par 9/10/20 Genetic testing with [employer] gene panel showed a pathogenic mutation in BRCA2: c.5946del (p.[id number]), which is associated with an increased risk for breast cancer, ovarian cancer, pancreatic cancer, melanoma, prostate cancer and male breast cancer; and also CDH1: 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 \par She had a DR of Ibrance to 100 mg daily, which she started on 20 due to neutropenia. \par She continues to have anxiety and some trouble sleeping. She has trouble falling asleep and wakes up a few times.\par She has mild dyspnea but this is not preventing her from doing things as she paces herself. She is not really feeling weakness in the legs any longer.\par She gets mild nausea in the mornings, which resolves a few hours later. She only occasionally needs to take the Reglan.\par She occasionally gets pain in her right back at the level of her waist when she lies down at night. It cracks and hurts a bit and then resolves. She occasionally takes Ultracet but does not need it often. She has also been taking Advil PM and Melatonin 3 mg to help her sleep at night .\par She denies  fever, chills, mucositis, N/V, CP, SOB, diarrhea , constipation or mouth sores.\par Her mother had breast cancer and her identical twin sister  from gastric cancer at 47 so discussed getting genetic testing today as she has two children who could benefit.\par \par 10//74062 CT scan showed extensive osseous metastatic disease with interval increase in sclerosis compared with 20, consistent with treatment effect.\par 20 Bone scan showed numerous foci of increased tracer uptake in the left anterior and right posteromedial parietal calvarium, sternum, multiple ribs bilaterally, and femoral shafts. There is also heterogenous tracer distribution throughout the spine and pelvis. Tracer distribution and activity of osseous lesions are not significantly changed compared to the previous study. There is a left hip arthroplasty, unchanged. There are degenerative changes in the major joints. No new osseous lesions are identified. Both kidneys are visualized. IMPRESSION: Abnormal bone scan.Findings compatible with extensive osseous metastases in the axial and appendicular skeleton, not significantly changed from study of 2020.\par -------------------------------------------------------------------------------------------------------------------------------------------------------------------------------------------------------------------\par 20 Bone scan showed focal abnormalities in the posteromedial aspect of the right parietal bone, anterior aspect of the left parietal bone parasagittally, sternum, anterior and posterior ribs bilaterally, and both femoral shafts. On the SPECT/CT component of the examination there is evidence of cortical thinning involving the left femoral lesions but not the right femoral lesions. \par IMPRESSION: Extensive osseous metastases in the axial and appendicular skeletons. Cortical thinning in the left femoral lesions, indicates that there maybe potential for pathologic fracture. \par 20 CT scan showed widespread bony metastases but no other sites of metastatic disease identified\par \par \par \par

## 2020-11-05 NOTE — HISTORY OF PRESENT ILLNESS
[Disease: _____________________] : Disease: [unfilled] [T: ___] : T[unfilled] [N: ___] : N[unfilled] [M: ___] : M[unfilled] [AJCC Stage: ____] : AJCC Stage: [unfilled] [de-identified] : Left breast cancer diagnosed at the age of 64\par 01/19/20 Screening mammogram and breast ultrasound [facility] aside from scattered fibroglandular densities bilaterally.\par In mid February the patient developed pain her ribs and her PCP ordered a CXR and then a CT scan\par 04/02/20 CT scan of the chest showed a large mediastinal LN measuring 0.7 x 1.4 cm in the prevascular space. The heart is normal in size with trace pericardial effusion. Enlarged main pulmonary artery possibly due to pulmonary hypertension with the main artery measuring 3.9 cm, left pulmonary artery measures 2 cm and the right pulmonary artery measures 2.7 cm. There are multiple 1 to 3 mm nodules scattered throughout both lungs, which are nonspecific. Representative nodules include a 3 mm nodule in the left lower lobe and a 2 mm nodule in the right lower lobe. A few small calcified granulomas are also present in the right lung. Small lobulated left pleural effusion. There are diffuse lytic osseous metastases throughout the chest. Pathologic non displaced rib fracture present in the right seventh through ninth lateral ribs. Several enlarged left axillary lymph nodes, the largest of which demonstrate a fatty hilum but is enlarged, measuring 3.5 x 3 cm\par 04/08/20 Left breast mammogram showed a new ill defined mass like area in the outer central left breast extending over approximately 2.0 cm. \par                Left breast ultrasound showed a vague 2.0 cm ill defined mass in 1 o'clock axis of the left breast 5 cm FN. \par 04/08/20 Left breast 1 o'clock axis 5 cm FN sonoguided core biopsy showed invasive lobular carcinoma, SBR 6/9, ER >90%, [place] >90% and HER-2 negative.\par                Left axillary sonoguided core biopsy showed a lymph node with metastatic carcinoma with similar histology to the core biopsy of the breast.\par 4/17/20 Letrozole and Ibrance started with dose reduction from 125 mg to 100 mg starting cycle 2 due to neutropenia\par 5/1/20 Zometa started and given monthly x 3 then continue every 3 months\par 9/10/20 Genetic testing with [employer] gene panel showed a pathogenic mutation in BRCA2: c.5946del (p.[id number]), which is associated with an increased risk for breast cancer, ovarian cancer, pancreatic cancer, melanoma, prostate cancer and male breast cancer; and also CDH1: deletion of exons 15-16, which is associated with an increased risk of breast cancer and gastric cancer\par  [de-identified] : Invasive lobular carcinoma, SBR 6/9, ER >90%, [place] >90%, HER-2 negative [de-identified] : Sylvia started Ibrance and Letrozole on 4/17/20 and had her third cycle of Zometa on 07/17/20. \par She had a DR of Ibrance to 100 mg daily, which she started on 5/25/20 due to neutropenia. She will start cycle 8 on Monday 11/9/20.\par She continues to have anxiety and some trouble sleeping. This is better since started Xanax at bedtime.\par She has mild dyspnea but this is not preventing her from doing things as she paces herself. She is not really feeling weakness in the legs any longer.\par She gets mild nausea in the mornings, which resolves a few hours later. She only occasionally needs to take the Reglan.\par She occasionally gets pain in her right back at the level of her waist when she lies down at night. It cracks and hurts a bit and then resolves. She occasionally takes Ultracet but does not need it often. She has also been taking Advil PM and Melatonin 3 mg to help her sleep at night .\par She denies  fever, chills, mucositis, N/V, CP, SOB, diarrhea , constipation or mouth sores.\par \par [id number]//[id number] CT scan showed extensive osseous metastatic disease with interval increase in sclerosis compared with 5/5/20, consistent with treatment effect.\par 07/09/20 Bone scan showed numerous foci of increased tracer uptake in the left anterior and right posteromedial parietal calvarium, sternum, multiple ribs bilaterally, and femoral shafts. There is also heterogenous tracer distribution throughout the spine and pelvis. Tracer distribution and activity of osseous lesions are not significantly changed compared to the previous study. There is a left hip arthroplasty, unchanged. There are degenerative changes in the major joints. No new osseous lesions are identified. Both kidneys are visualized. IMPRESSION: Abnormal bone scan.Findings compatible with extensive osseous metastases in the axial and appendicular skeleton, not significantly changed from study of 5/5/2020.\par -------------------------------------------------------------------------------------------------------------------------------------------------------------------------------------------------------------------\par 5/5/20 Bone scan showed focal abnormalities in the posteromedial aspect of the right parietal bone, anterior aspect of the left parietal bone parasagittally, sternum, anterior and posterior ribs bilaterally, and both femoral shafts. On the SPECT/CT component of the examination there is evidence of cortical thinning involving the left femoral lesions but not the right femoral lesions. \par IMPRESSION: Extensive osseous metastases in the axial and appendicular skeletons. Cortical thinning in the left femoral lesions, indicates that there maybe potential for pathologic fracture. \par 5/5/20 CT scan showed widespread bony metastases but no other sites of metastatic disease identified\par \par \par \par

## 2020-12-01 NOTE — HISTORY OF PRESENT ILLNESS
[Home] : at home, [unfilled] , at the time of the visit. [Medical Office: ([facility])___] : at the medical office located in  [Verbal consent obtained from patient] : the patient, [unfilled] [Disease: _____________________] : Disease: [unfilled] [T: ___] : T[unfilled] [N: ___] : N[unfilled] [M: ___] : M[unfilled] [AJCC Stage: ____] : AJCC Stage: [unfilled] [Spouse] : spouse [de-identified] : Left breast cancer diagnosed at the age of 64\par 01/19/20 Screening mammogram and breast ultrasound [facility] aside from scattered fibroglandular densities bilaterally.\par In mid February the patient developed pain her ribs and her PCP ordered a CXR and then a CT scan\par 04/02/20 CT scan of the chest showed a large mediastinal LN measuring 0.7 x 1.4 cm in the prevascular space. The heart is normal in size with trace pericardial effusion. Enlarged main pulmonary artery possibly due to pulmonary hypertension with the main artery measuring 3.9 cm, left pulmonary artery measures 2 cm and the right pulmonary artery measures 2.7 cm. There are multiple 1 to 3 mm nodules scattered throughout both lungs, which are nonspecific. Representative nodules include a 3 mm nodule in the left lower lobe and a 2 mm nodule in the right lower lobe. A few small calcified granulomas are also present in the right lung. Small lobulated left pleural effusion. There are diffuse lytic osseous metastases throughout the chest. Pathologic non displaced rib fracture present in the right seventh through ninth lateral ribs. Several enlarged left axillary lymph nodes, the largest of which demonstrate a fatty hilum but is enlarged, measuring 3.5 x 3 cm\par 04/08/20 Left breast mammogram showed a new ill defined mass like area in the outer central left breast extending over approximately 2.0 cm. \par                Left breast ultrasound showed a vague 2.0 cm ill defined mass in 1 o'clock axis of the left breast 5 cm FN. \par 04/08/20 Left breast 1 o'clock axis 5 cm FN sonoguided core biopsy showed invasive lobular carcinoma, SBR 6/9, ER >90%, [place] >90% and HER-2 negative.\par                Left axillary sonoguided core biopsy showed a lymph node with metastatic carcinoma with similar histology to the core biopsy of the breast.\par 4/17/20 Letrozole and Ibrance started with dose reduction from 125 mg to 100 mg starting cycle 2 due to neutropenia\par 5/1/20 Zometa started and given monthly x 3 then continue every 3 months\par 9/10/20 Genetic testing with [employer] gene panel showed a pathogenic mutation in BRCA2: c.5946del (p.[id number]), which is associated with an increased risk for breast cancer, ovarian cancer, pancreatic cancer, melanoma, prostate cancer and male breast cancer; and also CDH1: deletion of exons 15-16, which is associated with an increased risk of breast cancer and gastric cancer\par  [de-identified] : Invasive lobular carcinoma, SBR 6/9, ER >90%, [place] >90%, HER-2 negative [de-identified] : Sylvia is seen today for a virtual follow up visit, in light of the COVID-19 pandemic crisis\par She continues to do well on Ibrance and Letrozole  which she started on  4/17/20 and had her third cycle of Zometa on 07/17/20. \par She had a DR of Ibrance to 100 mg daily, which she started on 5/25/20 due to neutropenia. She will start cycle 9 on Monday 12/7/20.\par She is overall doing well with no major complaints on Ibrance. She occasionally gets diffuse joint pain and stiffness for which she takes Ultracet and Voltaren pill as needed with significant improvement. \par She continues to have anxiety and some trouble sleeping. This is better since started Xanax at bedtime.\par She gets mild nausea in the mornings, which resolves a few hours later with intake of  Reglan.\par She denies  fever, chills, mucositis, N/V, CP, SOB, diarrhea , constipation or mouth sores.\par She is currently down in [place], but is planning to return by [place]. \par Patient states she gave the Lab a rx from her PCP to have her HgbA1c done but it was never done, so would like to have them done with her upcoming appt in 01/2021.\par \par [id number] CT scan showed extensive osseous metastatic disease with interval increase in sclerosis compared with 5/5/20, consistent with treatment effect.\par 07/09/20 Bone scan showed numerous foci of increased tracer uptake in the left anterior and right posteromedial parietal calvarium, sternum, multiple ribs bilaterally, and femoral shafts. There is also heterogenous tracer distribution throughout the spine and pelvis. Tracer distribution and activity of osseous lesions are not significantly changed compared to the previous study. There is a left hip arthroplasty, unchanged. There are degenerative changes in the major joints. No new osseous lesions are identified. Both kidneys are visualized. IMPRESSION: Abnormal bone scan.Findings compatible with extensive osseous metastases in the axial and appendicular skeleton, not significantly changed from study of 5/5/2020.\par -------------------------------------------------------------------------------------------------------------------------------------------------------------------------------------------------------------------\par 5/5/20 Bone scan showed focal abnormalities in the posteromedial aspect of the right parietal bone, anterior aspect of the left parietal bone parasagittally, sternum, anterior and posterior ribs bilaterally, and both femoral shafts. On the SPECT/CT component of the examination there is evidence of cortical thinning involving the left femoral lesions but not the right femoral lesions. \par IMPRESSION: Extensive osseous metastases in the axial and appendicular skeletons. Cortical thinning in the left femoral lesions, indicates that there maybe potential for pathologic fracture. \par 5/5/20 CT scan showed widespread bony metastases but no other sites of metastatic disease identified\par \par \par \par

## 2020-12-01 NOTE — PHYSICAL EXAM
[Fully active, able to carry on all pre-disease performance without restriction] : Status 0 - Fully active, able to carry on all pre-disease performance without restriction [Obese] : obese [Normal] : normal appearance, no rash, nodules, vesicles, ulcers, erythema [de-identified] : Normal respiratory effort. Speaking in full sentences without difficulty

## 2021-01-01 ENCOUNTER — APPOINTMENT (OUTPATIENT)
Dept: CARDIOLOGY | Facility: CLINIC | Age: 66
End: 2021-01-01
Payer: MEDICARE

## 2021-01-01 ENCOUNTER — LABORATORY RESULT (OUTPATIENT)
Age: 66
End: 2021-01-01

## 2021-01-01 ENCOUNTER — APPOINTMENT (OUTPATIENT)
Dept: CT IMAGING | Facility: IMAGING CENTER | Age: 66
End: 2021-01-01
Payer: MEDICARE

## 2021-01-01 ENCOUNTER — RESULT REVIEW (OUTPATIENT)
Age: 66
End: 2021-01-01

## 2021-01-01 ENCOUNTER — APPOINTMENT (OUTPATIENT)
Dept: INFUSION THERAPY | Facility: HOSPITAL | Age: 66
End: 2021-01-01

## 2021-01-01 ENCOUNTER — APPOINTMENT (OUTPATIENT)
Dept: PULMONOLOGY | Facility: CLINIC | Age: 66
End: 2021-01-01
Payer: MEDICARE

## 2021-01-01 ENCOUNTER — OUTPATIENT (OUTPATIENT)
Dept: OUTPATIENT SERVICES | Facility: HOSPITAL | Age: 66
LOS: 1 days | End: 2021-01-01
Payer: MEDICARE

## 2021-01-01 ENCOUNTER — OUTPATIENT (OUTPATIENT)
Dept: OUTPATIENT SERVICES | Facility: HOSPITAL | Age: 66
LOS: 1 days | Discharge: HOME | End: 2021-01-01

## 2021-01-01 ENCOUNTER — NON-APPOINTMENT (OUTPATIENT)
Age: 66
End: 2021-01-01

## 2021-01-01 ENCOUNTER — APPOINTMENT (OUTPATIENT)
Dept: DISASTER EMERGENCY | Facility: CLINIC | Age: 66
End: 2021-01-01

## 2021-01-01 ENCOUNTER — APPOINTMENT (OUTPATIENT)
Dept: GASTROENTEROLOGY | Facility: CLINIC | Age: 66
End: 2021-01-01

## 2021-01-01 ENCOUNTER — INPATIENT (INPATIENT)
Facility: HOSPITAL | Age: 66
LOS: 10 days | DRG: 871 | End: 2021-09-17
Attending: FAMILY MEDICINE | Admitting: HOSPITALIST
Payer: MEDICARE

## 2021-01-01 ENCOUNTER — APPOINTMENT (OUTPATIENT)
Dept: HEMATOLOGY ONCOLOGY | Facility: CLINIC | Age: 66
End: 2021-01-01
Payer: MEDICARE

## 2021-01-01 ENCOUNTER — APPOINTMENT (OUTPATIENT)
Dept: HEMATOLOGY ONCOLOGY | Facility: CLINIC | Age: 66
End: 2021-01-01

## 2021-01-01 ENCOUNTER — APPOINTMENT (OUTPATIENT)
Dept: ULTRASOUND IMAGING | Facility: IMAGING CENTER | Age: 66
End: 2021-01-01
Payer: MEDICARE

## 2021-01-01 ENCOUNTER — APPOINTMENT (OUTPATIENT)
Dept: NUCLEAR MEDICINE | Facility: IMAGING CENTER | Age: 66
End: 2021-01-01
Payer: MEDICARE

## 2021-01-01 ENCOUNTER — APPOINTMENT (OUTPATIENT)
Dept: GASTROENTEROLOGY | Facility: CLINIC | Age: 66
End: 2021-01-01
Payer: MEDICARE

## 2021-01-01 ENCOUNTER — APPOINTMENT (OUTPATIENT)
Dept: PULMONOLOGY | Facility: CLINIC | Age: 66
End: 2021-01-01

## 2021-01-01 ENCOUNTER — EMERGENCY (EMERGENCY)
Facility: HOSPITAL | Age: 66
LOS: 1 days | Discharge: HOME | End: 2021-01-01
Attending: STUDENT IN AN ORGANIZED HEALTH CARE EDUCATION/TRAINING PROGRAM
Payer: MEDICARE

## 2021-01-01 VITALS
BODY MASS INDEX: 43.26 KG/M2 | HEART RATE: 82 BPM | OXYGEN SATURATION: 98 % | SYSTOLIC BLOOD PRESSURE: 141 MMHG | HEIGHT: 67.32 IN | TEMPERATURE: 97.2 F | RESPIRATION RATE: 17 BRPM | DIASTOLIC BLOOD PRESSURE: 76 MMHG | WEIGHT: 278.88 LBS

## 2021-01-01 VITALS
RESPIRATION RATE: 17 BRPM | TEMPERATURE: 99 F | OXYGEN SATURATION: 100 % | HEART RATE: 78 BPM | DIASTOLIC BLOOD PRESSURE: 94 MMHG | SYSTOLIC BLOOD PRESSURE: 140 MMHG

## 2021-01-01 VITALS
DIASTOLIC BLOOD PRESSURE: 74 MMHG | SYSTOLIC BLOOD PRESSURE: 105 MMHG | TEMPERATURE: 97.2 F | RESPIRATION RATE: 18 BRPM | BODY MASS INDEX: 42.41 KG/M2 | HEIGHT: 67.32 IN | HEART RATE: 73 BPM | OXYGEN SATURATION: 96 % | WEIGHT: 273.37 LBS

## 2021-01-01 VITALS
DIASTOLIC BLOOD PRESSURE: 78 MMHG | HEIGHT: 67.72 IN | TEMPERATURE: 96.6 F | RESPIRATION RATE: 18 BRPM | OXYGEN SATURATION: 97 % | BODY MASS INDEX: 43.1 KG/M2 | SYSTOLIC BLOOD PRESSURE: 138 MMHG | HEART RATE: 86 BPM | WEIGHT: 281.08 LBS

## 2021-01-01 VITALS
HEIGHT: 68.11 IN | OXYGEN SATURATION: 98 % | RESPIRATION RATE: 20 BRPM | WEIGHT: 274.03 LBS | SYSTOLIC BLOOD PRESSURE: 167 MMHG | BODY MASS INDEX: 41.53 KG/M2 | DIASTOLIC BLOOD PRESSURE: 82 MMHG | HEART RATE: 95 BPM | TEMPERATURE: 97.9 F

## 2021-01-01 VITALS
HEART RATE: 134 BPM | WEIGHT: 199.96 LBS | DIASTOLIC BLOOD PRESSURE: 86 MMHG | OXYGEN SATURATION: 97 % | RESPIRATION RATE: 22 BRPM | TEMPERATURE: 98 F | SYSTOLIC BLOOD PRESSURE: 115 MMHG | HEIGHT: 67 IN

## 2021-01-01 VITALS
TEMPERATURE: 97.2 F | SYSTOLIC BLOOD PRESSURE: 151 MMHG | OXYGEN SATURATION: 99 % | RESPIRATION RATE: 16 BRPM | HEIGHT: 67.72 IN | DIASTOLIC BLOOD PRESSURE: 80 MMHG | HEART RATE: 76 BPM | WEIGHT: 283.73 LBS | BODY MASS INDEX: 43.5 KG/M2

## 2021-01-01 VITALS
OXYGEN SATURATION: 92 % | TEMPERATURE: 99 F | SYSTOLIC BLOOD PRESSURE: 68 MMHG | HEART RATE: 114 BPM | DIASTOLIC BLOOD PRESSURE: 51 MMHG | RESPIRATION RATE: 20 BRPM

## 2021-01-01 VITALS
HEIGHT: 67.32 IN | OXYGEN SATURATION: 98 % | RESPIRATION RATE: 16 BRPM | SYSTOLIC BLOOD PRESSURE: 110 MMHG | WEIGHT: 265 LBS | BODY MASS INDEX: 41.11 KG/M2 | HEART RATE: 119 BPM | TEMPERATURE: 97 F | DIASTOLIC BLOOD PRESSURE: 70 MMHG

## 2021-01-01 VITALS
BODY MASS INDEX: 43.44 KG/M2 | WEIGHT: 283.29 LBS | TEMPERATURE: 97 F | HEIGHT: 67.72 IN | RESPIRATION RATE: 18 BRPM | SYSTOLIC BLOOD PRESSURE: 139 MMHG | OXYGEN SATURATION: 97 % | DIASTOLIC BLOOD PRESSURE: 79 MMHG | HEART RATE: 91 BPM

## 2021-01-01 VITALS
HEART RATE: 91 BPM | SYSTOLIC BLOOD PRESSURE: 92 MMHG | RESPIRATION RATE: 19 BRPM | TEMPERATURE: 96.6 F | OXYGEN SATURATION: 98 % | WEIGHT: 271.16 LBS | DIASTOLIC BLOOD PRESSURE: 64 MMHG | BODY MASS INDEX: 42.06 KG/M2

## 2021-01-01 VITALS
RESPIRATION RATE: 19 BRPM | HEIGHT: 68.11 IN | DIASTOLIC BLOOD PRESSURE: 76 MMHG | OXYGEN SATURATION: 98 % | TEMPERATURE: 97.1 F | WEIGHT: 285.27 LBS | BODY MASS INDEX: 43.24 KG/M2 | SYSTOLIC BLOOD PRESSURE: 133 MMHG | HEART RATE: 91 BPM

## 2021-01-01 VITALS
TEMPERATURE: 97 F | HEIGHT: 68.11 IN | RESPIRATION RATE: 20 BRPM | SYSTOLIC BLOOD PRESSURE: 135 MMHG | OXYGEN SATURATION: 96 % | WEIGHT: 275.57 LBS | HEART RATE: 101 BPM | DIASTOLIC BLOOD PRESSURE: 82 MMHG | BODY MASS INDEX: 41.77 KG/M2

## 2021-01-01 VITALS
SYSTOLIC BLOOD PRESSURE: 148 MMHG | HEART RATE: 78 BPM | DIASTOLIC BLOOD PRESSURE: 69 MMHG | OXYGEN SATURATION: 97 % | TEMPERATURE: 99 F | RESPIRATION RATE: 18 BRPM | HEIGHT: 69 IN

## 2021-01-01 VITALS
DIASTOLIC BLOOD PRESSURE: 80 MMHG | RESPIRATION RATE: 18 BRPM | TEMPERATURE: 97.8 F | WEIGHT: 283 LBS | HEIGHT: 60 IN | BODY MASS INDEX: 55.56 KG/M2 | OXYGEN SATURATION: 98 % | HEART RATE: 95 BPM | SYSTOLIC BLOOD PRESSURE: 136 MMHG

## 2021-01-01 DIAGNOSIS — D64.9 ANEMIA, UNSPECIFIED: ICD-10-CM

## 2021-01-01 DIAGNOSIS — F41.9 ANXIETY DISORDER, UNSPECIFIED: ICD-10-CM

## 2021-01-01 DIAGNOSIS — Z01.812 ENCOUNTER FOR PREPROCEDURAL LABORATORY EXAMINATION: ICD-10-CM

## 2021-01-01 DIAGNOSIS — C50.919 MALIGNANT NEOPLASM OF UNSPECIFIED SITE OF UNSPECIFIED FEMALE BREAST: ICD-10-CM

## 2021-01-01 DIAGNOSIS — R06.83 SNORING: ICD-10-CM

## 2021-01-01 DIAGNOSIS — G93.41 METABOLIC ENCEPHALOPATHY: ICD-10-CM

## 2021-01-01 DIAGNOSIS — Z51.5 ENCOUNTER FOR PALLIATIVE CARE: ICD-10-CM

## 2021-01-01 DIAGNOSIS — G89.3 NEOPLASM RELATED PAIN (ACUTE) (CHRONIC): ICD-10-CM

## 2021-01-01 DIAGNOSIS — Z51.11 ENCOUNTER FOR ANTINEOPLASTIC CHEMOTHERAPY: ICD-10-CM

## 2021-01-01 DIAGNOSIS — Z15.89 GENETIC SUSCEPTIBILITY TO OTHER DISEASE: ICD-10-CM

## 2021-01-01 DIAGNOSIS — Z15.01 GENETIC SUSCEPTIBILITY TO MALIGNANT NEOPLASM OF BREAST: ICD-10-CM

## 2021-01-01 DIAGNOSIS — Z71.89 OTHER SPECIFIED COUNSELING: ICD-10-CM

## 2021-01-01 DIAGNOSIS — N17.9 ACUTE KIDNEY FAILURE, UNSPECIFIED: ICD-10-CM

## 2021-01-01 DIAGNOSIS — E66.9 OBESITY, UNSPECIFIED: ICD-10-CM

## 2021-01-01 DIAGNOSIS — Z29.9 ENCOUNTER FOR PROPHYLACTIC MEASURES, UNSPECIFIED: ICD-10-CM

## 2021-01-01 DIAGNOSIS — J45.30 MILD PERSISTENT ASTHMA, UNCOMPLICATED: ICD-10-CM

## 2021-01-01 DIAGNOSIS — E55.9 VITAMIN D DEFICIENCY, UNSPECIFIED: ICD-10-CM

## 2021-01-01 DIAGNOSIS — R74.8 ABNORMAL LEVELS OF OTHER SERUM ENZYMES: ICD-10-CM

## 2021-01-01 DIAGNOSIS — R06.02 SHORTNESS OF BREATH: ICD-10-CM

## 2021-01-01 DIAGNOSIS — R06.00 DYSPNEA, UNSPECIFIED: ICD-10-CM

## 2021-01-01 DIAGNOSIS — E11.40 TYPE 2 DIABETES MELLITUS WITH DIABETIC NEUROPATHY, UNSPECIFIED: ICD-10-CM

## 2021-01-01 DIAGNOSIS — R11.2 NAUSEA WITH VOMITING, UNSPECIFIED: ICD-10-CM

## 2021-01-01 DIAGNOSIS — A41.9 SEPSIS, UNSPECIFIED ORGANISM: ICD-10-CM

## 2021-01-01 DIAGNOSIS — E87.2 ACIDOSIS: ICD-10-CM

## 2021-01-01 DIAGNOSIS — Z87.09 PERSONAL HISTORY OF OTHER DISEASES OF THE RESPIRATORY SYSTEM: ICD-10-CM

## 2021-01-01 DIAGNOSIS — T45.1X5A AGRANULOCYTOSIS SECONDARY TO CANCER CHEMOTHERAPY: ICD-10-CM

## 2021-01-01 DIAGNOSIS — L89.309 PRESSURE ULCER OF UNSPECIFIED BUTTOCK, UNSPECIFIED STAGE: ICD-10-CM

## 2021-01-01 DIAGNOSIS — R26.89 OTHER ABNORMALITIES OF GAIT AND MOBILITY: ICD-10-CM

## 2021-01-01 DIAGNOSIS — Z15.01 GENETIC SUSCEPTIBILITY TO OTHER DISEASE: ICD-10-CM

## 2021-01-01 DIAGNOSIS — R65.10 SYSTEMIC INFLAMMATORY RESPONSE SYNDROME (SIRS) OF NON-INFECTIOUS ORIGIN WITHOUT ACUTE ORGAN DYSFUNCTION: ICD-10-CM

## 2021-01-01 DIAGNOSIS — K21.9 GASTRO-ESOPHAGEAL REFLUX DISEASE W/OUT ESOPHAGITIS: ICD-10-CM

## 2021-01-01 DIAGNOSIS — R53.81 OTHER MALAISE: ICD-10-CM

## 2021-01-01 DIAGNOSIS — R52 PAIN, UNSPECIFIED: ICD-10-CM

## 2021-01-01 DIAGNOSIS — Z83.3 FAMILY HISTORY OF DIABETES MELLITUS: ICD-10-CM

## 2021-01-01 DIAGNOSIS — R79.89 OTHER SPECIFIED ABNORMAL FINDINGS OF BLOOD CHEMISTRY: ICD-10-CM

## 2021-01-01 DIAGNOSIS — Z15.09 GENETIC SUSCEPTIBILITY TO OTHER DISEASE: ICD-10-CM

## 2021-01-01 DIAGNOSIS — K12.30 ORAL MUCOSITIS (ULCERATIVE), UNSPECIFIED: ICD-10-CM

## 2021-01-01 DIAGNOSIS — Z15.09 GENETIC SUSCEPTIBILITY TO MALIGNANT NEOPLASM OF BREAST: ICD-10-CM

## 2021-01-01 DIAGNOSIS — K72.00 ACUTE AND SUBACUTE HEPATIC FAILURE WITHOUT COMA: ICD-10-CM

## 2021-01-01 DIAGNOSIS — R07.0 PAIN IN THROAT: ICD-10-CM

## 2021-01-01 DIAGNOSIS — E11.9 TYPE 2 DIABETES MELLITUS W/OUT COMPLICATIONS: ICD-10-CM

## 2021-01-01 DIAGNOSIS — D70.1 AGRANULOCYTOSIS SECONDARY TO CANCER CHEMOTHERAPY: ICD-10-CM

## 2021-01-01 DIAGNOSIS — K83.09 OTHER CHOLANGITIS: ICD-10-CM

## 2021-01-01 LAB
A1C WITH ESTIMATED AVERAGE GLUCOSE RESULT: 7.6 % — HIGH (ref 4–5.6)
ALBUMIN SERPL ELPH-MCNC: 2.2 G/DL — LOW (ref 3.3–5)
ALBUMIN SERPL ELPH-MCNC: 2.4 G/DL — LOW (ref 3.3–5)
ALBUMIN SERPL ELPH-MCNC: 2.4 G/DL — LOW (ref 3.3–5)
ALBUMIN SERPL ELPH-MCNC: 2.7 G/DL — LOW (ref 3.3–5)
ALBUMIN SERPL ELPH-MCNC: 2.7 G/DL — LOW (ref 3.3–5)
ALBUMIN SERPL ELPH-MCNC: 2.9 G/DL — LOW (ref 3.3–5)
ALBUMIN SERPL ELPH-MCNC: 3.8 G/DL — SIGNIFICANT CHANGE UP (ref 3.3–5)
ALBUMIN SERPL ELPH-MCNC: 3.9 G/DL — SIGNIFICANT CHANGE UP (ref 3.3–5)
ALBUMIN SERPL ELPH-MCNC: 4 G/DL
ALBUMIN SERPL ELPH-MCNC: 4.1 G/DL
ALBUMIN SERPL ELPH-MCNC: 4.2 G/DL
ALBUMIN SERPL ELPH-MCNC: 4.3 G/DL
ALBUMIN SERPL ELPH-MCNC: 4.4 G/DL
ALBUMIN SERPL ELPH-MCNC: 4.5 G/DL
ALP BLD-CCNC: 104 U/L
ALP BLD-CCNC: 115 U/L
ALP BLD-CCNC: 151 U/L
ALP BLD-CCNC: 368 U/L
ALP BLD-CCNC: 646 U/L
ALP BLD-CCNC: 93 U/L
ALP SERPL-CCNC: 1148 U/L — HIGH (ref 40–120)
ALP SERPL-CCNC: 1285 U/L — HIGH (ref 40–120)
ALP SERPL-CCNC: 1295 U/L — HIGH (ref 40–120)
ALP SERPL-CCNC: 1304 U/L — HIGH (ref 40–120)
ALP SERPL-CCNC: 336 U/L — HIGH (ref 40–120)
ALP SERPL-CCNC: 359 U/L — HIGH (ref 40–120)
ALP SERPL-CCNC: 744 U/L — HIGH (ref 40–120)
ALP SERPL-CCNC: 753 U/L — HIGH (ref 40–120)
ALP SERPL-CCNC: 762 U/L — HIGH (ref 40–120)
ALP SERPL-CCNC: 897 U/L — HIGH (ref 40–120)
ALT FLD-CCNC: 104 U/L — HIGH (ref 10–45)
ALT FLD-CCNC: 117 U/L — HIGH (ref 10–45)
ALT FLD-CCNC: 54 U/L — HIGH (ref 10–45)
ALT FLD-CCNC: 55 U/L — HIGH (ref 10–45)
ALT FLD-CCNC: 57 U/L — HIGH (ref 10–45)
ALT FLD-CCNC: 64 U/L — HIGH (ref 10–45)
ALT FLD-CCNC: 68 U/L — HIGH (ref 10–45)
ALT FLD-CCNC: 75 U/L — HIGH (ref 10–45)
ALT FLD-CCNC: 84 U/L — HIGH (ref 10–45)
ALT FLD-CCNC: 91 U/L — HIGH (ref 10–45)
ALT SERPL-CCNC: 112 U/L
ALT SERPL-CCNC: 116 U/L
ALT SERPL-CCNC: 12 U/L
ALT SERPL-CCNC: 12 U/L
ALT SERPL-CCNC: 15 U/L
ALT SERPL-CCNC: 25 U/L
AMMONIA BLD-MCNC: 53 UMOL/L — SIGNIFICANT CHANGE UP (ref 11–55)
ANION GAP SERPL CALC-SCNC: 10 MMOL/L — SIGNIFICANT CHANGE UP (ref 5–17)
ANION GAP SERPL CALC-SCNC: 11 MMOL/L
ANION GAP SERPL CALC-SCNC: 13 MMOL/L — SIGNIFICANT CHANGE UP (ref 5–17)
ANION GAP SERPL CALC-SCNC: 15 MMOL/L — SIGNIFICANT CHANGE UP (ref 5–17)
ANION GAP SERPL CALC-SCNC: 16 MMOL/L
ANION GAP SERPL CALC-SCNC: 17 MMOL/L — SIGNIFICANT CHANGE UP (ref 5–17)
ANION GAP SERPL CALC-SCNC: 18 MMOL/L
ANION GAP SERPL CALC-SCNC: 18 MMOL/L — HIGH (ref 5–17)
ANION GAP SERPL CALC-SCNC: 19 MMOL/L
ANISOCYTOSIS BLD QL: SIGNIFICANT CHANGE UP
ANISOCYTOSIS BLD QL: SLIGHT — SIGNIFICANT CHANGE UP
ANISOCYTOSIS BLD QL: SLIGHT — SIGNIFICANT CHANGE UP
APPEARANCE UR: ABNORMAL
APTT BLD: 30 SEC — SIGNIFICANT CHANGE UP (ref 27.5–35.5)
APTT BLD: 38.4 SEC — HIGH (ref 27.5–35.5)
AST SERPL-CCNC: 118 U/L
AST SERPL-CCNC: 118 U/L — HIGH (ref 10–40)
AST SERPL-CCNC: 14 U/L
AST SERPL-CCNC: 15 U/L
AST SERPL-CCNC: 150 U/L
AST SERPL-CCNC: 19 U/L
AST SERPL-CCNC: 215 U/L — HIGH (ref 10–40)
AST SERPL-CCNC: 216 U/L — HIGH (ref 10–40)
AST SERPL-CCNC: 229 U/L — HIGH (ref 10–40)
AST SERPL-CCNC: 23 U/L
AST SERPL-CCNC: 233 U/L — HIGH (ref 10–40)
AST SERPL-CCNC: 243 U/L — HIGH (ref 10–40)
AST SERPL-CCNC: 243 U/L — HIGH (ref 10–40)
AST SERPL-CCNC: 244 U/L — HIGH (ref 10–40)
AST SERPL-CCNC: 248 U/L — HIGH (ref 10–40)
AST SERPL-CCNC: 72 U/L — HIGH (ref 10–40)
BACTERIA # UR AUTO: ABNORMAL
BACTERIA # UR AUTO: NEGATIVE — SIGNIFICANT CHANGE UP
BACTERIA # UR AUTO: NEGATIVE — SIGNIFICANT CHANGE UP
BASE EXCESS BLDV CALC-SCNC: -2.4 MMOL/L — LOW (ref -2–2)
BASE EXCESS BLDV CALC-SCNC: -2.9 MMOL/L — LOW (ref -2–2)
BASOPHILS # BLD AUTO: 0 K/UL — SIGNIFICANT CHANGE UP (ref 0–0.2)
BASOPHILS # BLD AUTO: 0.01 K/UL — SIGNIFICANT CHANGE UP (ref 0–0.2)
BASOPHILS # BLD AUTO: 0.03 K/UL
BASOPHILS # BLD AUTO: 0.03 K/UL — SIGNIFICANT CHANGE UP (ref 0–0.2)
BASOPHILS # BLD AUTO: 0.04 K/UL — SIGNIFICANT CHANGE UP (ref 0–0.2)
BASOPHILS # BLD AUTO: 0.05 K/UL
BASOPHILS # BLD AUTO: 0.05 K/UL — SIGNIFICANT CHANGE UP (ref 0–0.2)
BASOPHILS # BLD AUTO: 0.06 K/UL — SIGNIFICANT CHANGE UP (ref 0–0.2)
BASOPHILS # BLD AUTO: 0.06 K/UL — SIGNIFICANT CHANGE UP (ref 0–0.2)
BASOPHILS # BLD AUTO: 0.17 K/UL — SIGNIFICANT CHANGE UP (ref 0–0.2)
BASOPHILS NFR BLD AUTO: 0 % — SIGNIFICANT CHANGE UP (ref 0–2)
BASOPHILS NFR BLD AUTO: 0.7 % — SIGNIFICANT CHANGE UP (ref 0–2)
BASOPHILS NFR BLD AUTO: 0.9 % — SIGNIFICANT CHANGE UP (ref 0–2)
BASOPHILS NFR BLD AUTO: 0.9 % — SIGNIFICANT CHANGE UP (ref 0–2)
BASOPHILS NFR BLD AUTO: 1 % — SIGNIFICANT CHANGE UP (ref 0–2)
BASOPHILS NFR BLD AUTO: 1.2 % — SIGNIFICANT CHANGE UP (ref 0–2)
BASOPHILS NFR BLD AUTO: 1.3 %
BASOPHILS NFR BLD AUTO: 1.3 % — SIGNIFICANT CHANGE UP (ref 0–2)
BASOPHILS NFR BLD AUTO: 1.8 %
BASOPHILS NFR BLD AUTO: 2 % — SIGNIFICANT CHANGE UP (ref 0–2)
BASOPHILS NFR BLD AUTO: 2 % — SIGNIFICANT CHANGE UP (ref 0–2)
BASOPHILS NFR BLD AUTO: 5.2 % — HIGH (ref 0–2)
BILIRUB DIRECT SERPL-MCNC: 7.9 MG/DL — HIGH (ref 0–0.2)
BILIRUB DIRECT SERPL-MCNC: 9.2 MG/DL — HIGH (ref 0–0.2)
BILIRUB INDIRECT FLD-MCNC: 2.1 MG/DL — HIGH (ref 0.2–1)
BILIRUB INDIRECT FLD-MCNC: 2.3 MG/DL — HIGH (ref 0.2–1)
BILIRUB SERPL-MCNC: 0.2 MG/DL
BILIRUB SERPL-MCNC: 0.5 MG/DL
BILIRUB SERPL-MCNC: 0.6 MG/DL — SIGNIFICANT CHANGE UP (ref 0.2–1.2)
BILIRUB SERPL-MCNC: 0.7 MG/DL — SIGNIFICANT CHANGE UP (ref 0.2–1.2)
BILIRUB SERPL-MCNC: 1.9 MG/DL
BILIRUB SERPL-MCNC: 10 MG/DL — HIGH (ref 0.2–1.2)
BILIRUB SERPL-MCNC: 10 MG/DL — HIGH (ref 0.2–1.2)
BILIRUB SERPL-MCNC: 11.5 MG/DL — HIGH (ref 0.2–1.2)
BILIRUB SERPL-MCNC: 11.5 MG/DL — HIGH (ref 0.2–1.2)
BILIRUB SERPL-MCNC: 5.3 MG/DL — HIGH (ref 0.2–1.2)
BILIRUB SERPL-MCNC: 5.4 MG/DL — HIGH (ref 0.2–1.2)
BILIRUB SERPL-MCNC: 5.4 MG/DL — HIGH (ref 0.2–1.2)
BILIRUB SERPL-MCNC: 6.4 MG/DL — HIGH (ref 0.2–1.2)
BILIRUB SERPL-MCNC: 7.9 MG/DL — HIGH (ref 0.2–1.2)
BILIRUB SERPL-MCNC: 9.5 MG/DL — HIGH (ref 0.2–1.2)
BILIRUB UR-MCNC: ABNORMAL
BILIRUB UR-MCNC: ABNORMAL
BILIRUB UR-MCNC: NEGATIVE — SIGNIFICANT CHANGE UP
BLASTS # FLD: 1 % — HIGH (ref 0–0)
BLD GP AB SCN SERPL QL: NEGATIVE — SIGNIFICANT CHANGE UP
BUN SERPL-MCNC: 17 MG/DL — SIGNIFICANT CHANGE UP (ref 7–23)
BUN SERPL-MCNC: 17 MG/DL — SIGNIFICANT CHANGE UP (ref 7–23)
BUN SERPL-MCNC: 20 MG/DL — SIGNIFICANT CHANGE UP (ref 7–23)
BUN SERPL-MCNC: 20 MG/DL — SIGNIFICANT CHANGE UP (ref 7–23)
BUN SERPL-MCNC: 21 MG/DL
BUN SERPL-MCNC: 22 MG/DL
BUN SERPL-MCNC: 24 MG/DL
BUN SERPL-MCNC: 24 MG/DL — HIGH (ref 7–23)
BUN SERPL-MCNC: 24 MG/DL — HIGH (ref 7–23)
BUN SERPL-MCNC: 26 MG/DL — HIGH (ref 7–23)
BUN SERPL-MCNC: 27 MG/DL
BUN SERPL-MCNC: 29 MG/DL
BUN SERPL-MCNC: 29 MG/DL
BUN SERPL-MCNC: 36 MG/DL — HIGH (ref 7–23)
BUN SERPL-MCNC: 40 MG/DL — HIGH (ref 7–23)
BUN SERPL-MCNC: 41 MG/DL — HIGH (ref 7–23)
C DIFF BY PCR RESULT: SIGNIFICANT CHANGE UP
C DIFF GDH STL QL: SIGNIFICANT CHANGE UP
C DIFF GDH STL QL: SIGNIFICANT CHANGE UP
C DIFF TOX GENS STL QL NAA+PROBE: SIGNIFICANT CHANGE UP
CA-I SERPL-SCNC: 1.27 MMOL/L — SIGNIFICANT CHANGE UP (ref 1.15–1.33)
CA-I SERPL-SCNC: 1.29 MMOL/L — SIGNIFICANT CHANGE UP (ref 1.15–1.33)
CALCIUM SERPL-MCNC: 10 MG/DL — SIGNIFICANT CHANGE UP (ref 8.4–10.5)
CALCIUM SERPL-MCNC: 10.1 MG/DL
CALCIUM SERPL-MCNC: 10.1 MG/DL — SIGNIFICANT CHANGE UP (ref 8.4–10.5)
CALCIUM SERPL-MCNC: 10.6 MG/DL
CALCIUM SERPL-MCNC: 10.9 MG/DL — HIGH (ref 8.4–10.5)
CALCIUM SERPL-MCNC: 8.8 MG/DL — SIGNIFICANT CHANGE UP (ref 8.4–10.5)
CALCIUM SERPL-MCNC: 9 MG/DL — SIGNIFICANT CHANGE UP (ref 8.4–10.5)
CALCIUM SERPL-MCNC: 9.1 MG/DL — SIGNIFICANT CHANGE UP (ref 8.4–10.5)
CALCIUM SERPL-MCNC: 9.2 MG/DL — SIGNIFICANT CHANGE UP (ref 8.4–10.5)
CALCIUM SERPL-MCNC: 9.3 MG/DL — SIGNIFICANT CHANGE UP (ref 8.4–10.5)
CALCIUM SERPL-MCNC: 9.5 MG/DL
CALCIUM SERPL-MCNC: 9.5 MG/DL
CALCIUM SERPL-MCNC: 9.7 MG/DL — SIGNIFICANT CHANGE UP (ref 8.4–10.5)
CALCIUM SERPL-MCNC: 9.8 MG/DL
CALCIUM SERPL-MCNC: 9.8 MG/DL — SIGNIFICANT CHANGE UP (ref 8.4–10.5)
CALCIUM SERPL-MCNC: 9.9 MG/DL
CANCER AG27-29 SERPL-ACNC: 131.2 U/ML
CANCER AG27-29 SERPL-ACNC: 139.9 U/ML
CANCER AG27-29 SERPL-ACNC: 143.1 U/ML
CANCER AG27-29 SERPL-ACNC: 145.7 U/ML
CANCER AG27-29 SERPL-ACNC: 167 U/ML
CANCER AG27-29 SERPL-ACNC: 187.5 U/ML
CEA SERPL-MCNC: 2.7 NG/ML
CEA SERPL-MCNC: 2.7 NG/ML
CEA SERPL-MCNC: 2.8 NG/ML
CEA SERPL-MCNC: 2.9 NG/ML
CEA SERPL-MCNC: 2.9 NG/ML
CEA SERPL-MCNC: 3.6 NG/ML
CHLORIDE BLDV-SCNC: 108 MMOL/L — SIGNIFICANT CHANGE UP (ref 96–108)
CHLORIDE BLDV-SCNC: 109 MMOL/L — HIGH (ref 96–108)
CHLORIDE SERPL-SCNC: 100 MMOL/L — SIGNIFICANT CHANGE UP (ref 96–108)
CHLORIDE SERPL-SCNC: 103 MMOL/L
CHLORIDE SERPL-SCNC: 103 MMOL/L — SIGNIFICANT CHANGE UP (ref 96–108)
CHLORIDE SERPL-SCNC: 103 MMOL/L — SIGNIFICANT CHANGE UP (ref 96–108)
CHLORIDE SERPL-SCNC: 104 MMOL/L
CHLORIDE SERPL-SCNC: 104 MMOL/L — SIGNIFICANT CHANGE UP (ref 96–108)
CHLORIDE SERPL-SCNC: 104 MMOL/L — SIGNIFICANT CHANGE UP (ref 96–108)
CHLORIDE SERPL-SCNC: 105 MMOL/L — SIGNIFICANT CHANGE UP (ref 96–108)
CHLORIDE SERPL-SCNC: 107 MMOL/L
CHLORIDE SERPL-SCNC: 107 MMOL/L
CHLORIDE SERPL-SCNC: 108 MMOL/L
CHLORIDE SERPL-SCNC: 108 MMOL/L — SIGNIFICANT CHANGE UP (ref 96–108)
CHLORIDE SERPL-SCNC: 110 MMOL/L
CHLORIDE SERPL-SCNC: 111 MMOL/L — HIGH (ref 96–108)
CHLORIDE SERPL-SCNC: 99 MMOL/L — SIGNIFICANT CHANGE UP (ref 96–108)
CHLORIDE SERPL-SCNC: 99 MMOL/L — SIGNIFICANT CHANGE UP (ref 96–108)
CMV IGG AVIDITY SERPL IA-RTO: SIGNIFICANT CHANGE UP
CMV IGM FLD-ACNC: <8 AU/ML — SIGNIFICANT CHANGE UP
CMV IGM SERPL QL: NEGATIVE — SIGNIFICANT CHANGE UP
CO2 BLDV-SCNC: 22 MMOL/L — SIGNIFICANT CHANGE UP (ref 22–26)
CO2 BLDV-SCNC: 22 MMOL/L — SIGNIFICANT CHANGE UP (ref 22–26)
CO2 SERPL-SCNC: 15 MMOL/L — LOW (ref 22–31)
CO2 SERPL-SCNC: 16 MMOL/L — LOW (ref 22–31)
CO2 SERPL-SCNC: 16 MMOL/L — LOW (ref 22–31)
CO2 SERPL-SCNC: 17 MMOL/L — LOW (ref 22–31)
CO2 SERPL-SCNC: 18 MMOL/L
CO2 SERPL-SCNC: 18 MMOL/L
CO2 SERPL-SCNC: 18 MMOL/L — LOW (ref 22–31)
CO2 SERPL-SCNC: 19 MMOL/L
CO2 SERPL-SCNC: 19 MMOL/L — LOW (ref 22–31)
CO2 SERPL-SCNC: 19 MMOL/L — LOW (ref 22–31)
CO2 SERPL-SCNC: 20 MMOL/L — LOW (ref 22–31)
CO2 SERPL-SCNC: 23 MMOL/L
COLOR SPEC: ABNORMAL
COLOR SPEC: ABNORMAL
COLOR SPEC: YELLOW — SIGNIFICANT CHANGE UP
COMMENT - URINE: SIGNIFICANT CHANGE UP
COVID-19 SPIKE DOMAIN AB INTERP: POSITIVE
COVID-19 SPIKE DOMAIN ANTIBODY INTERPRETATION: POSITIVE
COVID-19 SPIKE DOMAIN ANTIBODY RESULT: >250 U/ML — HIGH
CREAT SERPL-MCNC: 0.99 MG/DL — SIGNIFICANT CHANGE UP (ref 0.5–1.3)
CREAT SERPL-MCNC: 1.02 MG/DL — SIGNIFICANT CHANGE UP (ref 0.5–1.3)
CREAT SERPL-MCNC: 1.06 MG/DL — SIGNIFICANT CHANGE UP (ref 0.5–1.3)
CREAT SERPL-MCNC: 1.09 MG/DL
CREAT SERPL-MCNC: 1.12 MG/DL — SIGNIFICANT CHANGE UP (ref 0.5–1.3)
CREAT SERPL-MCNC: 1.13 MG/DL
CREAT SERPL-MCNC: 1.21 MG/DL — SIGNIFICANT CHANGE UP (ref 0.5–1.3)
CREAT SERPL-MCNC: 1.22 MG/DL
CREAT SERPL-MCNC: 1.23 MG/DL
CREAT SERPL-MCNC: 1.39 MG/DL — HIGH (ref 0.5–1.3)
CREAT SERPL-MCNC: 1.45 MG/DL — HIGH (ref 0.5–1.3)
CREAT SERPL-MCNC: 1.48 MG/DL — HIGH (ref 0.5–1.3)
CREAT SERPL-MCNC: 1.6 MG/DL
CREAT SERPL-MCNC: 1.74 MG/DL
CREAT SERPL-MCNC: 1.8 MG/DL — HIGH (ref 0.5–1.3)
CREAT SERPL-MCNC: 1.8 MG/DL — HIGH (ref 0.5–1.3)
CRP SERPL-MCNC: 290 MG/L — HIGH (ref 0–4)
CULTURE RESULTS: SIGNIFICANT CHANGE UP
DACRYOCYTES BLD QL SMEAR: SLIGHT — SIGNIFICANT CHANGE UP
DACRYOCYTES BLD QL SMEAR: SLIGHT — SIGNIFICANT CHANGE UP
DIFF PNL FLD: ABNORMAL
EBV EA AB SER IA-ACNC: 83.7 U/ML — HIGH
EBV EA AB TITR SER IF: POSITIVE
EBV EA IGG SER-ACNC: POSITIVE
EBV NA IGG SER IA-ACNC: 131 U/ML — HIGH
EBV PATRN SPEC IB-IMP: SIGNIFICANT CHANGE UP
EBV VCA IGG AVIDITY SER QL IA: POSITIVE
EBV VCA IGM SER IA-ACNC: <10 U/ML — SIGNIFICANT CHANGE UP
EBV VCA IGM SER IA-ACNC: >750 U/ML — HIGH
EBV VCA IGM TITR FLD: NEGATIVE — SIGNIFICANT CHANGE UP
ELLIPTOCYTES BLD QL SMEAR: SLIGHT — SIGNIFICANT CHANGE UP
EOSINOPHIL # BLD AUTO: 0 K/UL — SIGNIFICANT CHANGE UP (ref 0–0.5)
EOSINOPHIL # BLD AUTO: 0 K/UL — SIGNIFICANT CHANGE UP (ref 0–0.5)
EOSINOPHIL # BLD AUTO: 0.02 K/UL — SIGNIFICANT CHANGE UP (ref 0–0.5)
EOSINOPHIL # BLD AUTO: 0.03 K/UL — SIGNIFICANT CHANGE UP (ref 0–0.5)
EOSINOPHIL # BLD AUTO: 0.03 K/UL — SIGNIFICANT CHANGE UP (ref 0–0.5)
EOSINOPHIL # BLD AUTO: 0.04 K/UL — SIGNIFICANT CHANGE UP (ref 0–0.5)
EOSINOPHIL # BLD AUTO: 0.05 K/UL
EOSINOPHIL # BLD AUTO: 0.05 K/UL — SIGNIFICANT CHANGE UP (ref 0–0.5)
EOSINOPHIL # BLD AUTO: 0.06 K/UL — SIGNIFICANT CHANGE UP (ref 0–0.5)
EOSINOPHIL # BLD AUTO: 0.07 K/UL
EOSINOPHIL # BLD AUTO: 0.07 K/UL — SIGNIFICANT CHANGE UP (ref 0–0.5)
EOSINOPHIL # BLD AUTO: 0.07 K/UL — SIGNIFICANT CHANGE UP (ref 0–0.5)
EOSINOPHIL # BLD AUTO: 0.08 K/UL — SIGNIFICANT CHANGE UP (ref 0–0.5)
EOSINOPHIL # BLD AUTO: 0.11 K/UL — SIGNIFICANT CHANGE UP (ref 0–0.5)
EOSINOPHIL # BLD AUTO: 0.12 K/UL — SIGNIFICANT CHANGE UP (ref 0–0.5)
EOSINOPHIL NFR BLD AUTO: 0 % — SIGNIFICANT CHANGE UP (ref 0–6)
EOSINOPHIL NFR BLD AUTO: 0 % — SIGNIFICANT CHANGE UP (ref 0–6)
EOSINOPHIL NFR BLD AUTO: 0.5 % — SIGNIFICANT CHANGE UP (ref 0–6)
EOSINOPHIL NFR BLD AUTO: 1 % — SIGNIFICANT CHANGE UP (ref 0–6)
EOSINOPHIL NFR BLD AUTO: 1 % — SIGNIFICANT CHANGE UP (ref 0–6)
EOSINOPHIL NFR BLD AUTO: 2 % — SIGNIFICANT CHANGE UP (ref 0–6)
EOSINOPHIL NFR BLD AUTO: 2.1 %
EOSINOPHIL NFR BLD AUTO: 2.2 % — SIGNIFICANT CHANGE UP (ref 0–6)
EOSINOPHIL NFR BLD AUTO: 2.6 %
EOSINOPHIL NFR BLD AUTO: 2.6 % — SIGNIFICANT CHANGE UP (ref 0–6)
EOSINOPHIL NFR BLD AUTO: 2.8 % — SIGNIFICANT CHANGE UP (ref 0–6)
EOSINOPHIL NFR BLD AUTO: 2.9 % — SIGNIFICANT CHANGE UP (ref 0–6)
EOSINOPHIL NFR BLD AUTO: 3 % — SIGNIFICANT CHANGE UP (ref 0–6)
EOSINOPHIL NFR BLD AUTO: 3.5 % — SIGNIFICANT CHANGE UP (ref 0–6)
EPI CELLS # UR: 1 /HPF — SIGNIFICANT CHANGE UP
EPI CELLS # UR: 11 /HPF — HIGH
EPI CELLS # UR: 2 /HPF — SIGNIFICANT CHANGE UP
ESTIMATED AVERAGE GLUCOSE: 171 MG/DL — HIGH (ref 68–114)
ESTIMATED AVERAGE GLUCOSE: 203 MG/DL
ESTIMATED AVERAGE GLUCOSE: 206 MG/DL
ESTIMATED AVERAGE GLUCOSE: 223 MG/DL
FERRITIN SERPL-MCNC: HIGH NG/ML (ref 15–150)
FIBRINOGEN PPP-MCNC: 874 MG/DL — HIGH (ref 290–520)
FOLATE SERPL-MCNC: 18.1 NG/ML — SIGNIFICANT CHANGE UP
GAS PNL BLDV: 135 MMOL/L — LOW (ref 136–145)
GAS PNL BLDV: 138 MMOL/L — SIGNIFICANT CHANGE UP (ref 136–145)
GAS PNL BLDV: SIGNIFICANT CHANGE UP
GIANT PLATELETS BLD QL SMEAR: PRESENT — SIGNIFICANT CHANGE UP
GLUCOSE BLDC GLUCOMTR-MCNC: 116 MG/DL — HIGH (ref 70–99)
GLUCOSE BLDC GLUCOMTR-MCNC: 120 MG/DL — HIGH (ref 70–99)
GLUCOSE BLDC GLUCOMTR-MCNC: 125 MG/DL — HIGH (ref 70–99)
GLUCOSE BLDC GLUCOMTR-MCNC: 128 MG/DL — HIGH (ref 70–99)
GLUCOSE BLDC GLUCOMTR-MCNC: 135 MG/DL — HIGH (ref 70–99)
GLUCOSE BLDC GLUCOMTR-MCNC: 137 MG/DL — HIGH (ref 70–99)
GLUCOSE BLDC GLUCOMTR-MCNC: 138 MG/DL — HIGH (ref 70–99)
GLUCOSE BLDC GLUCOMTR-MCNC: 139 MG/DL — HIGH (ref 70–99)
GLUCOSE BLDC GLUCOMTR-MCNC: 142 MG/DL — HIGH (ref 70–99)
GLUCOSE BLDC GLUCOMTR-MCNC: 142 MG/DL — HIGH (ref 70–99)
GLUCOSE BLDC GLUCOMTR-MCNC: 145 MG/DL — HIGH (ref 70–99)
GLUCOSE BLDC GLUCOMTR-MCNC: 148 MG/DL — HIGH (ref 70–99)
GLUCOSE BLDC GLUCOMTR-MCNC: 150 MG/DL — HIGH (ref 70–99)
GLUCOSE BLDC GLUCOMTR-MCNC: 151 MG/DL — HIGH (ref 70–99)
GLUCOSE BLDC GLUCOMTR-MCNC: 153 MG/DL — HIGH (ref 70–99)
GLUCOSE BLDC GLUCOMTR-MCNC: 153 MG/DL — HIGH (ref 70–99)
GLUCOSE BLDC GLUCOMTR-MCNC: 157 MG/DL — HIGH (ref 70–99)
GLUCOSE BLDC GLUCOMTR-MCNC: 163 MG/DL — HIGH (ref 70–99)
GLUCOSE BLDC GLUCOMTR-MCNC: 166 MG/DL — HIGH (ref 70–99)
GLUCOSE BLDC GLUCOMTR-MCNC: 170 MG/DL — HIGH (ref 70–99)
GLUCOSE BLDC GLUCOMTR-MCNC: 174 MG/DL — HIGH (ref 70–99)
GLUCOSE BLDC GLUCOMTR-MCNC: 187 MG/DL — HIGH (ref 70–99)
GLUCOSE BLDC GLUCOMTR-MCNC: 192 MG/DL — HIGH (ref 70–99)
GLUCOSE BLDC GLUCOMTR-MCNC: 216 MG/DL — HIGH (ref 70–99)
GLUCOSE BLDC GLUCOMTR-MCNC: 222 MG/DL — HIGH (ref 70–99)
GLUCOSE BLDC GLUCOMTR-MCNC: 248 MG/DL — HIGH (ref 70–99)
GLUCOSE BLDC GLUCOMTR-MCNC: 262 MG/DL — HIGH (ref 70–99)
GLUCOSE BLDC GLUCOMTR-MCNC: 95 MG/DL — SIGNIFICANT CHANGE UP (ref 70–99)
GLUCOSE BLDV-MCNC: 188 MG/DL — HIGH (ref 70–99)
GLUCOSE BLDV-MCNC: 210 MG/DL — HIGH (ref 70–99)
GLUCOSE SERPL-MCNC: 102 MG/DL
GLUCOSE SERPL-MCNC: 106 MG/DL — HIGH (ref 70–99)
GLUCOSE SERPL-MCNC: 110 MG/DL — HIGH (ref 70–99)
GLUCOSE SERPL-MCNC: 137 MG/DL — HIGH (ref 70–99)
GLUCOSE SERPL-MCNC: 139 MG/DL — HIGH (ref 70–99)
GLUCOSE SERPL-MCNC: 139 MG/DL — HIGH (ref 70–99)
GLUCOSE SERPL-MCNC: 143 MG/DL
GLUCOSE SERPL-MCNC: 155 MG/DL
GLUCOSE SERPL-MCNC: 158 MG/DL — HIGH (ref 70–99)
GLUCOSE SERPL-MCNC: 179 MG/DL
GLUCOSE SERPL-MCNC: 191 MG/DL
GLUCOSE SERPL-MCNC: 203 MG/DL — HIGH (ref 70–99)
GLUCOSE SERPL-MCNC: 215 MG/DL — HIGH (ref 70–99)
GLUCOSE SERPL-MCNC: 245 MG/DL
GLUCOSE SERPL-MCNC: 47 MG/DL — CRITICAL LOW (ref 70–99)
GLUCOSE SERPL-MCNC: 78 MG/DL — SIGNIFICANT CHANGE UP (ref 70–99)
GLUCOSE UR QL: NEGATIVE — SIGNIFICANT CHANGE UP
HAPTOGLOB SERPL-MCNC: 251 MG/DL — HIGH (ref 34–200)
HAV IGM SER-ACNC: SIGNIFICANT CHANGE UP
HBA1C MFR BLD HPLC: 8.7 %
HBA1C MFR BLD HPLC: 8.8 %
HBA1C MFR BLD HPLC: 9.4 %
HBV CORE IGM SER-ACNC: SIGNIFICANT CHANGE UP
HBV SURFACE AG SER-ACNC: SIGNIFICANT CHANGE UP
HCO3 BLDV-SCNC: 21 MMOL/L — LOW (ref 22–29)
HCO3 BLDV-SCNC: 21 MMOL/L — LOW (ref 22–29)
HCT VFR BLD CALC: 26.5 % — LOW (ref 34.5–45)
HCT VFR BLD CALC: 26.6 % — LOW (ref 34.5–45)
HCT VFR BLD CALC: 27.1 %
HCT VFR BLD CALC: 27.9 % — LOW (ref 34.5–45)
HCT VFR BLD CALC: 28 % — LOW (ref 34.5–45)
HCT VFR BLD CALC: 28 % — LOW (ref 34.5–45)
HCT VFR BLD CALC: 28.2 % — LOW (ref 34.5–45)
HCT VFR BLD CALC: 28.3 % — LOW (ref 34.5–45)
HCT VFR BLD CALC: 28.5 % — LOW (ref 34.5–45)
HCT VFR BLD CALC: 28.6 %
HCT VFR BLD CALC: 28.6 % — LOW (ref 34.5–45)
HCT VFR BLD CALC: 28.6 % — LOW (ref 34.5–45)
HCT VFR BLD CALC: 28.7 % — LOW (ref 34.5–45)
HCT VFR BLD CALC: 28.8 % — LOW (ref 34.5–45)
HCT VFR BLD CALC: 28.9 % — LOW (ref 34.5–45)
HCT VFR BLD CALC: 29.4 % — LOW (ref 34.5–45)
HCT VFR BLD CALC: 29.8 % — LOW (ref 34.5–45)
HCT VFR BLD CALC: 29.8 % — LOW (ref 34.5–45)
HCT VFR BLD CALC: 29.9 % — LOW (ref 34.5–45)
HCT VFR BLD CALC: 30 % — LOW (ref 34.5–45)
HCT VFR BLD CALC: 30.2 % — LOW (ref 34.5–45)
HCT VFR BLD CALC: 31.8 % — LOW (ref 34.5–45)
HCT VFR BLDA CALC: 30 % — LOW (ref 34.5–46.5)
HCT VFR BLDA CALC: SIGNIFICANT CHANGE UP % (ref 34.5–46.5)
HCV AB S/CO SERPL IA: 0.14 S/CO — SIGNIFICANT CHANGE UP (ref 0–0.99)
HCV AB SERPL-IMP: SIGNIFICANT CHANGE UP
HERPES SIMPLEX VIRUS 1/2 SURVEILLANCE PCR RESULT: SIGNIFICANT CHANGE UP
HERPES SIMPLEX VIRUS 1/2 SURVEILLANCE PCR SOURCE: SIGNIFICANT CHANGE UP
HGB BLD CALC-MCNC: 10.1 G/DL — LOW (ref 11.7–16.1)
HGB BLD CALC-MCNC: SIGNIFICANT CHANGE UP G/DL (ref 11.7–16.1)
HGB BLD-MCNC: 10 G/DL — LOW (ref 11.5–15.5)
HGB BLD-MCNC: 10 G/DL — LOW (ref 11.5–15.5)
HGB BLD-MCNC: 10.1 G/DL — LOW (ref 11.5–15.5)
HGB BLD-MCNC: 10.3 G/DL — LOW (ref 11.5–15.5)
HGB BLD-MCNC: 8.6 G/DL — LOW (ref 11.5–15.5)
HGB BLD-MCNC: 8.7 G/DL — LOW (ref 11.5–15.5)
HGB BLD-MCNC: 9 G/DL
HGB BLD-MCNC: 9 G/DL — LOW (ref 11.5–15.5)
HGB BLD-MCNC: 9.2 G/DL — LOW (ref 11.5–15.5)
HGB BLD-MCNC: 9.2 G/DL — LOW (ref 11.5–15.5)
HGB BLD-MCNC: 9.3 G/DL — LOW (ref 11.5–15.5)
HGB BLD-MCNC: 9.4 G/DL — LOW (ref 11.5–15.5)
HGB BLD-MCNC: 9.4 G/DL — LOW (ref 11.5–15.5)
HGB BLD-MCNC: 9.5 G/DL
HGB BLD-MCNC: 9.5 G/DL — LOW (ref 11.5–15.5)
HGB BLD-MCNC: 9.6 G/DL — LOW (ref 11.5–15.5)
HGB BLD-MCNC: 9.7 G/DL — LOW (ref 11.5–15.5)
HIV 1+2 AB+HIV1 P24 AG SERPL QL IA: SIGNIFICANT CHANGE UP
HSV+VZV DNA SPEC QL NAA+PROBE: SIGNIFICANT CHANGE UP
HSV1 AB FLD QL: NEGATIVE — SIGNIFICANT CHANGE UP
HSV1+2 DNA SPEC QL NAA+PROBE: SIGNIFICANT CHANGE UP
HSV2 AB FLD-ACNC: NEGATIVE — SIGNIFICANT CHANGE UP
HYALINE CASTS # UR AUTO: 0 /LPF — SIGNIFICANT CHANGE UP (ref 0–2)
HYALINE CASTS # UR AUTO: 7 /LPF — HIGH (ref 0–2)
HYPOCHROMIA BLD QL: SLIGHT — SIGNIFICANT CHANGE UP
HYPOSEGMENTATION: PRESENT — SIGNIFICANT CHANGE UP
HYPOSEGMENTATION: PRESENT — SIGNIFICANT CHANGE UP
IMM GRANULOCYTES NFR BLD AUTO: 0.4 %
IMM GRANULOCYTES NFR BLD AUTO: 0.4 %
IMM GRANULOCYTES NFR BLD AUTO: 0.4 % — SIGNIFICANT CHANGE UP (ref 0–1.5)
IMM GRANULOCYTES NFR BLD AUTO: 0.6 % — SIGNIFICANT CHANGE UP (ref 0–1.5)
IMM GRANULOCYTES NFR BLD AUTO: 1.4 % — SIGNIFICANT CHANGE UP (ref 0–1.5)
IMM GRANULOCYTES NFR BLD AUTO: 7.6 % — HIGH (ref 0–1.5)
INR BLD: 1.04 RATIO — SIGNIFICANT CHANGE UP (ref 0.88–1.16)
INR BLD: 1.51 RATIO — HIGH (ref 0.88–1.16)
INR BLD: 1.53 RATIO — HIGH (ref 0.88–1.16)
IRON SATN MFR SERPL: 36 % — SIGNIFICANT CHANGE UP (ref 14–50)
IRON SATN MFR SERPL: 76 UG/DL — SIGNIFICANT CHANGE UP (ref 30–160)
KETONES UR-MCNC: NEGATIVE — SIGNIFICANT CHANGE UP
LACTATE BLDV-MCNC: 3.5 MMOL/L — HIGH (ref 0.7–2)
LACTATE BLDV-MCNC: 5.3 MMOL/L — CRITICAL HIGH (ref 0.7–2)
LACTATE SERPL-SCNC: 2.4 MMOL/L — HIGH (ref 0.7–2)
LACTATE SERPL-SCNC: 2.6 MMOL/L — HIGH (ref 0.7–2)
LACTATE SERPL-SCNC: 2.7 MMOL/L — HIGH (ref 0.7–2)
LACTATE SERPL-SCNC: 3 MMOL/L — HIGH (ref 0.7–2)
LACTATE SERPL-SCNC: 3.6 MMOL/L — HIGH (ref 0.7–2)
LDH SERPL L TO P-CCNC: 842 U/L — HIGH (ref 50–242)
LEGIONELLA AG UR QL: NEGATIVE — SIGNIFICANT CHANGE UP
LEUKOCYTE ESTERASE UR-ACNC: ABNORMAL
LEUKOCYTE ESTERASE UR-ACNC: ABNORMAL
LEUKOCYTE ESTERASE UR-ACNC: NEGATIVE — SIGNIFICANT CHANGE UP
LIDOCAIN IGE QN: 22 U/L — SIGNIFICANT CHANGE UP (ref 7–60)
LIDOCAIN IGE QN: 29 U/L — SIGNIFICANT CHANGE UP (ref 7–60)
LYMPHOCYTES # BLD AUTO: 0.54 K/UL — LOW (ref 1–3.3)
LYMPHOCYTES # BLD AUTO: 0.56 K/UL — LOW (ref 1–3.3)
LYMPHOCYTES # BLD AUTO: 0.79 K/UL — LOW (ref 1–3.3)
LYMPHOCYTES # BLD AUTO: 0.83 K/UL — LOW (ref 1–3.3)
LYMPHOCYTES # BLD AUTO: 0.88 K/UL — LOW (ref 1–3.3)
LYMPHOCYTES # BLD AUTO: 0.92 K/UL
LYMPHOCYTES # BLD AUTO: 0.92 K/UL — LOW (ref 1–3.3)
LYMPHOCYTES # BLD AUTO: 0.94 K/UL — LOW (ref 1–3.3)
LYMPHOCYTES # BLD AUTO: 0.95 K/UL — LOW (ref 1–3.3)
LYMPHOCYTES # BLD AUTO: 1.02 K/UL — SIGNIFICANT CHANGE UP (ref 1–3.3)
LYMPHOCYTES # BLD AUTO: 1.04 K/UL — SIGNIFICANT CHANGE UP (ref 1–3.3)
LYMPHOCYTES # BLD AUTO: 1.06 K/UL — SIGNIFICANT CHANGE UP (ref 1–3.3)
LYMPHOCYTES # BLD AUTO: 1.14 K/UL — SIGNIFICANT CHANGE UP (ref 1–3.3)
LYMPHOCYTES # BLD AUTO: 1.15 K/UL
LYMPHOCYTES # BLD AUTO: 1.19 K/UL — SIGNIFICANT CHANGE UP (ref 1–3.3)
LYMPHOCYTES # BLD AUTO: 1.23 K/UL — SIGNIFICANT CHANGE UP (ref 1–3.3)
LYMPHOCYTES # BLD AUTO: 1.26 K/UL — SIGNIFICANT CHANGE UP (ref 1–3.3)
LYMPHOCYTES # BLD AUTO: 1.53 K/UL — SIGNIFICANT CHANGE UP (ref 1–3.3)
LYMPHOCYTES # BLD AUTO: 1.72 K/UL — SIGNIFICANT CHANGE UP (ref 1–3.3)
LYMPHOCYTES # BLD AUTO: 19 % — SIGNIFICANT CHANGE UP (ref 13–44)
LYMPHOCYTES # BLD AUTO: 20.4 % — SIGNIFICANT CHANGE UP (ref 13–44)
LYMPHOCYTES # BLD AUTO: 24 % — SIGNIFICANT CHANGE UP (ref 13–44)
LYMPHOCYTES # BLD AUTO: 26 % — SIGNIFICANT CHANGE UP (ref 13–44)
LYMPHOCYTES # BLD AUTO: 29.5 % — SIGNIFICANT CHANGE UP (ref 13–44)
LYMPHOCYTES # BLD AUTO: 30 % — SIGNIFICANT CHANGE UP (ref 13–44)
LYMPHOCYTES # BLD AUTO: 32 % — SIGNIFICANT CHANGE UP (ref 13–44)
LYMPHOCYTES # BLD AUTO: 36 % — SIGNIFICANT CHANGE UP (ref 13–44)
LYMPHOCYTES # BLD AUTO: 36.2 % — SIGNIFICANT CHANGE UP (ref 13–44)
LYMPHOCYTES # BLD AUTO: 36.3 % — SIGNIFICANT CHANGE UP (ref 13–44)
LYMPHOCYTES # BLD AUTO: 37 % — SIGNIFICANT CHANGE UP (ref 13–44)
LYMPHOCYTES # BLD AUTO: 38 % — SIGNIFICANT CHANGE UP (ref 13–44)
LYMPHOCYTES # BLD AUTO: 39.5 % — SIGNIFICANT CHANGE UP (ref 13–44)
LYMPHOCYTES # BLD AUTO: 48 % — HIGH (ref 13–44)
LYMPHOCYTES # BLD AUTO: 48 % — HIGH (ref 13–44)
LYMPHOCYTES # BLD AUTO: 56 % — HIGH (ref 13–44)
LYMPHOCYTES # BLD AUTO: 56 % — HIGH (ref 13–44)
LYMPHOCYTES # SPEC AUTO: 1 % — HIGH (ref 0–0)
LYMPHOCYTES NFR BLD AUTO: 38.5 %
LYMPHOCYTES NFR BLD AUTO: 42 %
MACROCYTES BLD QL: SIGNIFICANT CHANGE UP
MACROCYTES BLD QL: SLIGHT — SIGNIFICANT CHANGE UP
MACROCYTES OVAL BLD QL SMEAR: SLIGHT — SIGNIFICANT CHANGE UP
MAGNESIUM SERPL-MCNC: 1.6 MG/DL — SIGNIFICANT CHANGE UP (ref 1.6–2.6)
MAGNESIUM SERPL-MCNC: 1.7 MG/DL — SIGNIFICANT CHANGE UP (ref 1.6–2.6)
MAGNESIUM SERPL-MCNC: 1.8 MG/DL — SIGNIFICANT CHANGE UP (ref 1.6–2.6)
MAGNESIUM SERPL-MCNC: 1.9 MG/DL — SIGNIFICANT CHANGE UP (ref 1.6–2.6)
MAGNESIUM SERPL-MCNC: 1.9 MG/DL — SIGNIFICANT CHANGE UP (ref 1.6–2.6)
MAN DIFF?: NORMAL
MAN DIFF?: NORMAL
MANUAL DIF COMMENT BLD-IMP: SIGNIFICANT CHANGE UP
MANUAL SMEAR VERIFICATION: SIGNIFICANT CHANGE UP
MCHC RBC-ENTMCNC: 31.1 PG — SIGNIFICANT CHANGE UP (ref 27–34)
MCHC RBC-ENTMCNC: 31.5 PG — SIGNIFICANT CHANGE UP (ref 27–34)
MCHC RBC-ENTMCNC: 31.6 PG — SIGNIFICANT CHANGE UP (ref 27–34)
MCHC RBC-ENTMCNC: 31.7 PG — SIGNIFICANT CHANGE UP (ref 27–34)
MCHC RBC-ENTMCNC: 31.8 G/DL — LOW (ref 32–36)
MCHC RBC-ENTMCNC: 31.8 PG — SIGNIFICANT CHANGE UP (ref 27–34)
MCHC RBC-ENTMCNC: 31.9 PG — SIGNIFICANT CHANGE UP (ref 27–34)
MCHC RBC-ENTMCNC: 32 PG — SIGNIFICANT CHANGE UP (ref 27–34)
MCHC RBC-ENTMCNC: 32.1 PG — SIGNIFICANT CHANGE UP (ref 27–34)
MCHC RBC-ENTMCNC: 32.1 PG — SIGNIFICANT CHANGE UP (ref 27–34)
MCHC RBC-ENTMCNC: 32.3 G/DL — SIGNIFICANT CHANGE UP (ref 32–36)
MCHC RBC-ENTMCNC: 32.4 G/DL — SIGNIFICANT CHANGE UP (ref 32–36)
MCHC RBC-ENTMCNC: 32.4 PG — SIGNIFICANT CHANGE UP (ref 27–34)
MCHC RBC-ENTMCNC: 32.5 G/DL — SIGNIFICANT CHANGE UP (ref 32–36)
MCHC RBC-ENTMCNC: 32.5 GM/DL — SIGNIFICANT CHANGE UP (ref 32–36)
MCHC RBC-ENTMCNC: 32.5 PG — SIGNIFICANT CHANGE UP (ref 27–34)
MCHC RBC-ENTMCNC: 32.6 G/DL — SIGNIFICANT CHANGE UP (ref 32–36)
MCHC RBC-ENTMCNC: 32.6 GM/DL — SIGNIFICANT CHANGE UP (ref 32–36)
MCHC RBC-ENTMCNC: 32.6 PG — SIGNIFICANT CHANGE UP (ref 27–34)
MCHC RBC-ENTMCNC: 32.7 GM/DL — SIGNIFICANT CHANGE UP (ref 32–36)
MCHC RBC-ENTMCNC: 32.7 PG — SIGNIFICANT CHANGE UP (ref 27–34)
MCHC RBC-ENTMCNC: 32.8 PG — SIGNIFICANT CHANGE UP (ref 27–34)
MCHC RBC-ENTMCNC: 32.9 GM/DL — SIGNIFICANT CHANGE UP (ref 32–36)
MCHC RBC-ENTMCNC: 32.9 GM/DL — SIGNIFICANT CHANGE UP (ref 32–36)
MCHC RBC-ENTMCNC: 32.9 PG
MCHC RBC-ENTMCNC: 33 GM/DL — SIGNIFICANT CHANGE UP (ref 32–36)
MCHC RBC-ENTMCNC: 33 GM/DL — SIGNIFICANT CHANGE UP (ref 32–36)
MCHC RBC-ENTMCNC: 33 PG
MCHC RBC-ENTMCNC: 33.2 GM/DL
MCHC RBC-ENTMCNC: 33.2 GM/DL
MCHC RBC-ENTMCNC: 33.2 PG — SIGNIFICANT CHANGE UP (ref 27–34)
MCHC RBC-ENTMCNC: 33.3 GM/DL — SIGNIFICANT CHANGE UP (ref 32–36)
MCHC RBC-ENTMCNC: 33.3 PG — SIGNIFICANT CHANGE UP (ref 27–34)
MCHC RBC-ENTMCNC: 33.3 PG — SIGNIFICANT CHANGE UP (ref 27–34)
MCHC RBC-ENTMCNC: 33.4 G/DL — SIGNIFICANT CHANGE UP (ref 32–36)
MCHC RBC-ENTMCNC: 33.4 GM/DL — SIGNIFICANT CHANGE UP (ref 32–36)
MCHC RBC-ENTMCNC: 33.6 G/DL — SIGNIFICANT CHANGE UP (ref 32–36)
MCHC RBC-ENTMCNC: 33.6 G/DL — SIGNIFICANT CHANGE UP (ref 32–36)
MCHC RBC-ENTMCNC: 33.7 G/DL — SIGNIFICANT CHANGE UP (ref 32–36)
MCHC RBC-ENTMCNC: 33.7 G/DL — SIGNIFICANT CHANGE UP (ref 32–36)
MCHC RBC-ENTMCNC: 34.6 G/DL — SIGNIFICANT CHANGE UP (ref 32–36)
MCV RBC AUTO: 100 FL — SIGNIFICANT CHANGE UP (ref 80–100)
MCV RBC AUTO: 100 FL — SIGNIFICANT CHANGE UP (ref 80–100)
MCV RBC AUTO: 100.4 FL — HIGH (ref 80–100)
MCV RBC AUTO: 101 FL — HIGH (ref 80–100)
MCV RBC AUTO: 93.8 FL — SIGNIFICANT CHANGE UP (ref 80–100)
MCV RBC AUTO: 95.2 FL — SIGNIFICANT CHANGE UP (ref 80–100)
MCV RBC AUTO: 95.3 FL — SIGNIFICANT CHANGE UP (ref 80–100)
MCV RBC AUTO: 95.5 FL — SIGNIFICANT CHANGE UP (ref 80–100)
MCV RBC AUTO: 95.6 FL — SIGNIFICANT CHANGE UP (ref 80–100)
MCV RBC AUTO: 96.3 FL — SIGNIFICANT CHANGE UP (ref 80–100)
MCV RBC AUTO: 96.4 FL — SIGNIFICANT CHANGE UP (ref 80–100)
MCV RBC AUTO: 97.1 FL — SIGNIFICANT CHANGE UP (ref 80–100)
MCV RBC AUTO: 97.3 FL — SIGNIFICANT CHANGE UP (ref 80–100)
MCV RBC AUTO: 98.2 FL — SIGNIFICANT CHANGE UP (ref 80–100)
MCV RBC AUTO: 98.3 FL — SIGNIFICANT CHANGE UP (ref 80–100)
MCV RBC AUTO: 99 FL
MCV RBC AUTO: 99 FL — SIGNIFICANT CHANGE UP (ref 80–100)
MCV RBC AUTO: 99 FL — SIGNIFICANT CHANGE UP (ref 80–100)
MCV RBC AUTO: 99.3 FL
MCV RBC AUTO: 99.3 FL — SIGNIFICANT CHANGE UP (ref 80–100)
MCV RBC AUTO: 99.6 FL — SIGNIFICANT CHANGE UP (ref 80–100)
MCV RBC AUTO: 99.6 FL — SIGNIFICANT CHANGE UP (ref 80–100)
METAMYELOCYTES # FLD: 0.9 % — HIGH (ref 0–0)
METAMYELOCYTES # FLD: 1 % — HIGH (ref 0–0)
METAMYELOCYTES # FLD: 2 % — HIGH (ref 0–0)
METAMYELOCYTES # FLD: 2.7 % — HIGH (ref 0–0)
METAMYELOCYTES # FLD: 4 % — HIGH (ref 0–0)
MICROCYTES BLD QL: SLIGHT — SIGNIFICANT CHANGE UP
MONOCYTES # BLD AUTO: 0.08 K/UL — SIGNIFICANT CHANGE UP (ref 0–0.9)
MONOCYTES # BLD AUTO: 0.08 K/UL — SIGNIFICANT CHANGE UP (ref 0–0.9)
MONOCYTES # BLD AUTO: 0.11 K/UL — SIGNIFICANT CHANGE UP (ref 0–0.9)
MONOCYTES # BLD AUTO: 0.16 K/UL — SIGNIFICANT CHANGE UP (ref 0–0.9)
MONOCYTES # BLD AUTO: 0.18 K/UL — SIGNIFICANT CHANGE UP (ref 0–0.9)
MONOCYTES # BLD AUTO: 0.19 K/UL
MONOCYTES # BLD AUTO: 0.2 K/UL — SIGNIFICANT CHANGE UP (ref 0–0.9)
MONOCYTES # BLD AUTO: 0.21 K/UL — SIGNIFICANT CHANGE UP (ref 0–0.9)
MONOCYTES # BLD AUTO: 0.22 K/UL — SIGNIFICANT CHANGE UP (ref 0–0.9)
MONOCYTES # BLD AUTO: 0.25 K/UL — SIGNIFICANT CHANGE UP (ref 0–0.9)
MONOCYTES # BLD AUTO: 0.26 K/UL — SIGNIFICANT CHANGE UP (ref 0–0.9)
MONOCYTES # BLD AUTO: 0.28 K/UL
MONOCYTES # BLD AUTO: 0.29 K/UL — SIGNIFICANT CHANGE UP (ref 0–0.9)
MONOCYTES # BLD AUTO: 0.29 K/UL — SIGNIFICANT CHANGE UP (ref 0–0.9)
MONOCYTES # BLD AUTO: 0.35 K/UL — SIGNIFICANT CHANGE UP (ref 0–0.9)
MONOCYTES # BLD AUTO: 0.36 K/UL — SIGNIFICANT CHANGE UP (ref 0–0.9)
MONOCYTES # BLD AUTO: 0.4 K/UL — SIGNIFICANT CHANGE UP (ref 0–0.9)
MONOCYTES # BLD AUTO: 0.86 K/UL — SIGNIFICANT CHANGE UP (ref 0–0.9)
MONOCYTES # BLD AUTO: 1.07 K/UL — HIGH (ref 0–0.9)
MONOCYTES NFR BLD AUTO: 10 % — SIGNIFICANT CHANGE UP (ref 2–14)
MONOCYTES NFR BLD AUTO: 10.7 % — SIGNIFICANT CHANGE UP (ref 2–14)
MONOCYTES NFR BLD AUTO: 11 % — SIGNIFICANT CHANGE UP (ref 2–14)
MONOCYTES NFR BLD AUTO: 11.3 % — SIGNIFICANT CHANGE UP (ref 2–14)
MONOCYTES NFR BLD AUTO: 11.7 %
MONOCYTES NFR BLD AUTO: 12.7 % — SIGNIFICANT CHANGE UP (ref 2–14)
MONOCYTES NFR BLD AUTO: 27 % — HIGH (ref 2–14)
MONOCYTES NFR BLD AUTO: 5 % — SIGNIFICANT CHANGE UP (ref 2–14)
MONOCYTES NFR BLD AUTO: 5.3 % — SIGNIFICANT CHANGE UP (ref 2–14)
MONOCYTES NFR BLD AUTO: 6 % — SIGNIFICANT CHANGE UP (ref 2–14)
MONOCYTES NFR BLD AUTO: 6.1 % — SIGNIFICANT CHANGE UP (ref 2–14)
MONOCYTES NFR BLD AUTO: 6.9 %
MONOCYTES NFR BLD AUTO: 8 % — SIGNIFICANT CHANGE UP (ref 2–14)
MONOCYTES NFR BLD AUTO: 8 % — SIGNIFICANT CHANGE UP (ref 2–14)
MONOCYTES NFR BLD AUTO: 9 % — SIGNIFICANT CHANGE UP (ref 2–14)
MONOCYTES NFR BLD AUTO: 9 % — SIGNIFICANT CHANGE UP (ref 2–14)
MONOCYTES NFR BLD AUTO: 9.2 % — SIGNIFICANT CHANGE UP (ref 2–14)
MYELOCYTES NFR BLD: 0.9 % — HIGH (ref 0–0)
MYELOCYTES NFR BLD: 1 % — HIGH (ref 0–0)
MYELOCYTES NFR BLD: 2 % — HIGH (ref 0–0)
MYELOCYTES NFR BLD: 3 % — HIGH (ref 0–0)
NEUTROPHILS # BLD AUTO: 0.43 K/UL — LOW (ref 1.8–7.4)
NEUTROPHILS # BLD AUTO: 0.52 K/UL — LOW (ref 1.8–7.4)
NEUTROPHILS # BLD AUTO: 0.78 K/UL — LOW (ref 1.8–7.4)
NEUTROPHILS # BLD AUTO: 0.88 K/UL — LOW (ref 1.8–7.4)
NEUTROPHILS # BLD AUTO: 1.08 K/UL — LOW (ref 1.8–7.4)
NEUTROPHILS # BLD AUTO: 1.1 K/UL
NEUTROPHILS # BLD AUTO: 1.18 K/UL — LOW (ref 1.8–7.4)
NEUTROPHILS # BLD AUTO: 1.27 K/UL
NEUTROPHILS # BLD AUTO: 1.31 K/UL — LOW (ref 1.8–7.4)
NEUTROPHILS # BLD AUTO: 1.4 K/UL — LOW (ref 1.8–7.4)
NEUTROPHILS # BLD AUTO: 1.44 K/UL — LOW (ref 1.8–7.4)
NEUTROPHILS # BLD AUTO: 1.46 K/UL — LOW (ref 1.8–7.4)
NEUTROPHILS # BLD AUTO: 1.64 K/UL — LOW (ref 1.8–7.4)
NEUTROPHILS # BLD AUTO: 1.65 K/UL — LOW (ref 1.8–7.4)
NEUTROPHILS # BLD AUTO: 1.78 K/UL — LOW (ref 1.8–7.4)
NEUTROPHILS # BLD AUTO: 1.84 K/UL — SIGNIFICANT CHANGE UP (ref 1.8–7.4)
NEUTROPHILS # BLD AUTO: 2.67 K/UL — SIGNIFICANT CHANGE UP (ref 1.8–7.4)
NEUTROPHILS # BLD AUTO: 2.88 K/UL — SIGNIFICANT CHANGE UP (ref 1.8–7.4)
NEUTROPHILS # BLD AUTO: 4.9 K/UL — SIGNIFICANT CHANGE UP (ref 1.8–7.4)
NEUTROPHILS NFR BLD AUTO: 30 % — LOW (ref 43–77)
NEUTROPHILS NFR BLD AUTO: 32 % — LOW (ref 43–77)
NEUTROPHILS NFR BLD AUTO: 34 % — LOW (ref 43–77)
NEUTROPHILS NFR BLD AUTO: 38 % — LOW (ref 43–77)
NEUTROPHILS NFR BLD AUTO: 42 % — LOW (ref 43–77)
NEUTROPHILS NFR BLD AUTO: 43.5 % — SIGNIFICANT CHANGE UP (ref 43–77)
NEUTROPHILS NFR BLD AUTO: 45.1 % — SIGNIFICANT CHANGE UP (ref 43–77)
NEUTROPHILS NFR BLD AUTO: 46 %
NEUTROPHILS NFR BLD AUTO: 46.3 %
NEUTROPHILS NFR BLD AUTO: 48.6 % — SIGNIFICANT CHANGE UP (ref 43–77)
NEUTROPHILS NFR BLD AUTO: 51 % — SIGNIFICANT CHANGE UP (ref 43–77)
NEUTROPHILS NFR BLD AUTO: 51.3 % — SIGNIFICANT CHANGE UP (ref 43–77)
NEUTROPHILS NFR BLD AUTO: 52 % — SIGNIFICANT CHANGE UP (ref 43–77)
NEUTROPHILS NFR BLD AUTO: 52 % — SIGNIFICANT CHANGE UP (ref 43–77)
NEUTROPHILS NFR BLD AUTO: 56 % — SIGNIFICANT CHANGE UP (ref 43–77)
NEUTROPHILS NFR BLD AUTO: 58.1 % — SIGNIFICANT CHANGE UP (ref 43–77)
NEUTROPHILS NFR BLD AUTO: 60 % — SIGNIFICANT CHANGE UP (ref 43–77)
NEUTROPHILS NFR BLD AUTO: 61.7 % — SIGNIFICANT CHANGE UP (ref 43–77)
NEUTROPHILS NFR BLD AUTO: 69 % — SIGNIFICANT CHANGE UP (ref 43–77)
NEUTS BAND # BLD: 1 % — SIGNIFICANT CHANGE UP (ref 0–8)
NEUTS BAND # BLD: 2 % — SIGNIFICANT CHANGE UP (ref 0–8)
NEUTS BAND # BLD: 3 % — SIGNIFICANT CHANGE UP (ref 0–8)
NITRITE UR-MCNC: NEGATIVE — SIGNIFICANT CHANGE UP
NRBC # BLD: 0 /100 WBCS — SIGNIFICANT CHANGE UP (ref 0–0)
NRBC # BLD: 0 /100 — SIGNIFICANT CHANGE UP (ref 0–0)
NRBC # BLD: 1 /100 — HIGH (ref 0–0)
NRBC # BLD: 12 /100 — HIGH (ref 0–0)
NRBC # BLD: 14 /100 WBCS — HIGH (ref 0–0)
NRBC # BLD: 18 /100 WBCS — HIGH (ref 0–0)
NRBC # BLD: 201 /100 — HIGH (ref 0–0)
NRBC # BLD: 26 /100 WBCS — HIGH (ref 0–0)
NRBC # BLD: 30 /100 — HIGH (ref 0–0)
NRBC # BLD: 34 /100 WBCS — HIGH (ref 0–0)
NRBC # BLD: 53 /100 — HIGH (ref 0–0)
NRBC # BLD: 66 /100 WBCS — HIGH (ref 0–0)
NRBC # BLD: 68 /100 WBCS — HIGH (ref 0–0)
NRBC # BLD: SIGNIFICANT CHANGE UP /100 WBCS (ref 0–0)
NT-PROBNP SERPL-SCNC: 717 PG/ML — HIGH (ref 0–300)
PCO2 BLDV: 31 MMHG — LOW (ref 39–42)
PCO2 BLDV: 33 MMHG — LOW (ref 39–42)
PH BLDV: 7.41 — SIGNIFICANT CHANGE UP (ref 7.32–7.43)
PH BLDV: 7.44 — HIGH (ref 7.32–7.43)
PH UR: 6 — SIGNIFICANT CHANGE UP (ref 5–8)
PHOSPHATE SERPL-MCNC: 1.8 MG/DL — LOW (ref 2.5–4.5)
PHOSPHATE SERPL-MCNC: 1.9 MG/DL — LOW (ref 2.5–4.5)
PHOSPHATE SERPL-MCNC: 2.2 MG/DL — LOW (ref 2.5–4.5)
PHOSPHATE SERPL-MCNC: 2.7 MG/DL — SIGNIFICANT CHANGE UP (ref 2.5–4.5)
PHOSPHATE SERPL-MCNC: 2.9 MG/DL — SIGNIFICANT CHANGE UP (ref 2.5–4.5)
PLAT MORPH BLD: NORMAL — SIGNIFICANT CHANGE UP
PLATELET # BLD AUTO: 103 K/UL — LOW (ref 150–400)
PLATELET # BLD AUTO: 103 K/UL — LOW (ref 150–400)
PLATELET # BLD AUTO: 109 K/UL — LOW (ref 150–400)
PLATELET # BLD AUTO: 156 K/UL — SIGNIFICANT CHANGE UP (ref 150–400)
PLATELET # BLD AUTO: 218 K/UL — SIGNIFICANT CHANGE UP (ref 150–400)
PLATELET # BLD AUTO: 221 K/UL
PLATELET # BLD AUTO: 225 K/UL — SIGNIFICANT CHANGE UP (ref 150–400)
PLATELET # BLD AUTO: 227 K/UL — SIGNIFICANT CHANGE UP (ref 150–400)
PLATELET # BLD AUTO: 229 K/UL — SIGNIFICANT CHANGE UP (ref 150–400)
PLATELET # BLD AUTO: 232 K/UL
PLATELET # BLD AUTO: 241 K/UL — SIGNIFICANT CHANGE UP (ref 150–400)
PLATELET # BLD AUTO: 242 K/UL — SIGNIFICANT CHANGE UP (ref 150–400)
PLATELET # BLD AUTO: 245 K/UL — SIGNIFICANT CHANGE UP (ref 150–400)
PLATELET # BLD AUTO: 252 K/UL — SIGNIFICANT CHANGE UP (ref 150–400)
PLATELET # BLD AUTO: 258 K/UL — SIGNIFICANT CHANGE UP (ref 150–400)
PLATELET # BLD AUTO: 282 K/UL — SIGNIFICANT CHANGE UP (ref 150–400)
PLATELET # BLD AUTO: 297 K/UL — SIGNIFICANT CHANGE UP (ref 150–400)
PLATELET # BLD AUTO: 315 K/UL — SIGNIFICANT CHANGE UP (ref 150–400)
PLATELET # BLD AUTO: 62 K/UL — LOW (ref 150–400)
PLATELET # BLD AUTO: 69 K/UL — LOW (ref 150–400)
PLATELET # BLD AUTO: 81 K/UL — LOW (ref 150–400)
PLATELET # BLD AUTO: 84 K/UL — LOW (ref 150–400)
PO2 BLDV: 53 MMHG — HIGH (ref 25–45)
PO2 BLDV: 55 MMHG — HIGH (ref 25–45)
POIKILOCYTOSIS BLD QL AUTO: SLIGHT — SIGNIFICANT CHANGE UP
POTASSIUM BLDV-SCNC: 4.7 MMOL/L — SIGNIFICANT CHANGE UP (ref 3.5–5.1)
POTASSIUM BLDV-SCNC: 6 MMOL/L — HIGH (ref 3.5–5.1)
POTASSIUM SERPL-MCNC: 3.8 MMOL/L — SIGNIFICANT CHANGE UP (ref 3.5–5.3)
POTASSIUM SERPL-MCNC: 3.9 MMOL/L — SIGNIFICANT CHANGE UP (ref 3.5–5.3)
POTASSIUM SERPL-MCNC: 3.9 MMOL/L — SIGNIFICANT CHANGE UP (ref 3.5–5.3)
POTASSIUM SERPL-MCNC: 4 MMOL/L — SIGNIFICANT CHANGE UP (ref 3.5–5.3)
POTASSIUM SERPL-MCNC: 4.1 MMOL/L — SIGNIFICANT CHANGE UP (ref 3.5–5.3)
POTASSIUM SERPL-MCNC: 4.2 MMOL/L — SIGNIFICANT CHANGE UP (ref 3.5–5.3)
POTASSIUM SERPL-MCNC: 5.5 MMOL/L — HIGH (ref 3.5–5.3)
POTASSIUM SERPL-MCNC: >9 MMOL/L — CRITICAL HIGH (ref 3.5–5.3)
POTASSIUM SERPL-SCNC: 3.8 MMOL/L — SIGNIFICANT CHANGE UP (ref 3.5–5.3)
POTASSIUM SERPL-SCNC: 3.9 MMOL/L — SIGNIFICANT CHANGE UP (ref 3.5–5.3)
POTASSIUM SERPL-SCNC: 3.9 MMOL/L — SIGNIFICANT CHANGE UP (ref 3.5–5.3)
POTASSIUM SERPL-SCNC: 4 MMOL/L
POTASSIUM SERPL-SCNC: 4 MMOL/L — SIGNIFICANT CHANGE UP (ref 3.5–5.3)
POTASSIUM SERPL-SCNC: 4.1 MMOL/L — SIGNIFICANT CHANGE UP (ref 3.5–5.3)
POTASSIUM SERPL-SCNC: 4.2 MMOL/L — SIGNIFICANT CHANGE UP (ref 3.5–5.3)
POTASSIUM SERPL-SCNC: 4.5 MMOL/L
POTASSIUM SERPL-SCNC: 4.6 MMOL/L
POTASSIUM SERPL-SCNC: 4.9 MMOL/L
POTASSIUM SERPL-SCNC: 5.1 MMOL/L
POTASSIUM SERPL-SCNC: 5.4 MMOL/L
POTASSIUM SERPL-SCNC: 5.5 MMOL/L — HIGH (ref 3.5–5.3)
POTASSIUM SERPL-SCNC: >9 MMOL/L — CRITICAL HIGH (ref 3.5–5.3)
PROCALCITONIN SERPL-MCNC: 1.29 NG/ML — HIGH (ref 0.02–0.1)
PROT SERPL-MCNC: 5.5 G/DL — LOW (ref 6–8.3)
PROT SERPL-MCNC: 5.5 G/DL — LOW (ref 6–8.3)
PROT SERPL-MCNC: 5.6 G/DL — LOW (ref 6–8.3)
PROT SERPL-MCNC: 5.7 G/DL — LOW (ref 6–8.3)
PROT SERPL-MCNC: 5.7 G/DL — LOW (ref 6–8.3)
PROT SERPL-MCNC: 5.9 G/DL — LOW (ref 6–8.3)
PROT SERPL-MCNC: 6.4 G/DL — SIGNIFICANT CHANGE UP (ref 6–8.3)
PROT SERPL-MCNC: 6.5 G/DL
PROT SERPL-MCNC: 6.7 G/DL
PROT SERPL-MCNC: 6.8 G/DL
PROT SERPL-MCNC: 6.9 G/DL
PROT SERPL-MCNC: 6.9 G/DL — SIGNIFICANT CHANGE UP (ref 6–8.3)
PROT SERPL-MCNC: 7.2 G/DL — SIGNIFICANT CHANGE UP (ref 6–8.3)
PROT SERPL-MCNC: 8.3 G/DL — SIGNIFICANT CHANGE UP (ref 6–8.3)
PROT UR-MCNC: 100 — SIGNIFICANT CHANGE UP
PROT UR-MCNC: ABNORMAL
PROT UR-MCNC: ABNORMAL
PROTHROM AB SERPL-ACNC: 12.5 SEC — SIGNIFICANT CHANGE UP (ref 10.6–13.6)
PROTHROM AB SERPL-ACNC: 17.8 SEC — HIGH (ref 10.6–13.6)
PROTHROM AB SERPL-ACNC: 18 SEC — HIGH (ref 10.6–13.6)
RBC # BLD: 2.71 M/UL — LOW (ref 3.8–5.2)
RBC # BLD: 2.73 M/UL
RBC # BLD: 2.73 M/UL — LOW (ref 3.8–5.2)
RBC # BLD: 2.78 M/UL — LOW (ref 3.8–5.2)
RBC # BLD: 2.81 M/UL — LOW (ref 3.8–5.2)
RBC # BLD: 2.82 M/UL — LOW (ref 3.8–5.2)
RBC # BLD: 2.84 M/UL — LOW (ref 3.8–5.2)
RBC # BLD: 2.87 M/UL — LOW (ref 3.8–5.2)
RBC # BLD: 2.89 M/UL
RBC # BLD: 2.89 M/UL — LOW (ref 3.8–5.2)
RBC # BLD: 2.91 M/UL — LOW (ref 3.8–5.2)
RBC # BLD: 2.93 M/UL — LOW (ref 3.8–5.2)
RBC # BLD: 2.96 M/UL — LOW (ref 3.8–5.2)
RBC # BLD: 2.97 M/UL — LOW (ref 3.8–5.2)
RBC # BLD: 2.98 M/UL — LOW (ref 3.8–5.2)
RBC # BLD: 3.01 M/UL — LOW (ref 3.8–5.2)
RBC # BLD: 3.05 M/UL — LOW (ref 3.8–5.2)
RBC # BLD: 3.08 M/UL — LOW (ref 3.8–5.2)
RBC # BLD: 3.12 M/UL — LOW (ref 3.8–5.2)
RBC # BLD: 3.15 M/UL — LOW (ref 3.8–5.2)
RBC # BLD: 3.16 M/UL — LOW (ref 3.8–5.2)
RBC # BLD: 3.18 M/UL — LOW (ref 3.8–5.2)
RBC # FLD: 13.2 % — SIGNIFICANT CHANGE UP (ref 10.3–14.5)
RBC # FLD: 13.4 %
RBC # FLD: 13.4 % — SIGNIFICANT CHANGE UP (ref 10.3–14.5)
RBC # FLD: 13.5 % — SIGNIFICANT CHANGE UP (ref 10.3–14.5)
RBC # FLD: 14.2 % — SIGNIFICANT CHANGE UP (ref 10.3–14.5)
RBC # FLD: 14.6 %
RBC # FLD: 14.7 % — HIGH (ref 10.3–14.5)
RBC # FLD: 15 % — HIGH (ref 10.3–14.5)
RBC # FLD: 15.2 % — HIGH (ref 10.3–14.5)
RBC # FLD: 15.3 % — HIGH (ref 10.3–14.5)
RBC # FLD: 15.9 % — HIGH (ref 10.3–14.5)
RBC # FLD: 16.8 % — HIGH (ref 10.3–14.5)
RBC # FLD: 16.8 % — HIGH (ref 10.3–14.5)
RBC # FLD: 17.2 % — HIGH (ref 10.3–14.5)
RBC # FLD: 17.5 % — HIGH (ref 10.3–14.5)
RBC # FLD: 17.5 % — HIGH (ref 10.3–14.5)
RBC # FLD: 18.2 % — HIGH (ref 10.3–14.5)
RBC # FLD: 19 % — HIGH (ref 10.3–14.5)
RBC BLD AUTO: ABNORMAL
RBC BLD AUTO: SIGNIFICANT CHANGE UP
RBC CASTS # UR COMP ASSIST: 13 /HPF — HIGH (ref 0–4)
RBC CASTS # UR COMP ASSIST: 47 /HPF — HIGH (ref 0–4)
RBC CASTS # UR COMP ASSIST: 6 /HPF — HIGH (ref 0–4)
RH IG SCN BLD-IMP: POSITIVE — SIGNIFICANT CHANGE UP
SAO2 % BLDV: 87.2 % — SIGNIFICANT CHANGE UP (ref 67–88)
SAO2 % BLDV: SIGNIFICANT CHANGE UP % (ref 67–88)
SARS-COV-2 AB SERPL IA-ACNC: >250 U/ML
SARS-COV-2 IGG+IGM SERPL QL IA: >250 U/ML — HIGH
SARS-COV-2 IGG+IGM SERPL QL IA: POSITIVE
SARS-COV-2 N GENE NPH QL NAA+PROBE: NOT DETECTED
SARS-COV-2 RNA SPEC QL NAA+PROBE: SIGNIFICANT CHANGE UP
SODIUM SERPL-SCNC: 131 MMOL/L — LOW (ref 135–145)
SODIUM SERPL-SCNC: 133 MMOL/L — LOW (ref 135–145)
SODIUM SERPL-SCNC: 133 MMOL/L — LOW (ref 135–145)
SODIUM SERPL-SCNC: 134 MMOL/L — LOW (ref 135–145)
SODIUM SERPL-SCNC: 137 MMOL/L — SIGNIFICANT CHANGE UP (ref 135–145)
SODIUM SERPL-SCNC: 138 MMOL/L
SODIUM SERPL-SCNC: 138 MMOL/L — SIGNIFICANT CHANGE UP (ref 135–145)
SODIUM SERPL-SCNC: 139 MMOL/L — SIGNIFICANT CHANGE UP (ref 135–145)
SODIUM SERPL-SCNC: 140 MMOL/L — SIGNIFICANT CHANGE UP (ref 135–145)
SODIUM SERPL-SCNC: 141 MMOL/L
SODIUM SERPL-SCNC: 141 MMOL/L
SODIUM SERPL-SCNC: 141 MMOL/L — SIGNIFICANT CHANGE UP (ref 135–145)
SODIUM SERPL-SCNC: 143 MMOL/L
SODIUM SERPL-SCNC: 144 MMOL/L
SODIUM SERPL-SCNC: 144 MMOL/L
SODIUM SERPL-SCNC: 146 MMOL/L — HIGH (ref 135–145)
SP GR SPEC: 1.02 — SIGNIFICANT CHANGE UP (ref 1.01–1.02)
SPECIMEN SOURCE: SIGNIFICANT CHANGE UP
SURGICAL PATHOLOGY STUDY: SIGNIFICANT CHANGE UP
TARGETS BLD QL SMEAR: SLIGHT — SIGNIFICANT CHANGE UP
TIBC SERPL-MCNC: 214 UG/DL — LOW (ref 220–430)
TROPONIN T, HIGH SENSITIVITY RESULT: 16 NG/L — SIGNIFICANT CHANGE UP (ref 0–51)
TROPONIN T, HIGH SENSITIVITY RESULT: 21 NG/L — SIGNIFICANT CHANGE UP (ref 0–51)
TROPONIN T, HIGH SENSITIVITY RESULT: 22 NG/L — SIGNIFICANT CHANGE UP (ref 0–51)
TROPONIN T, HIGH SENSITIVITY RESULT: 38 NG/L — SIGNIFICANT CHANGE UP (ref 0–51)
TROPONIN T, HIGH SENSITIVITY RESULT: 43 NG/L — SIGNIFICANT CHANGE UP (ref 0–51)
UIBC SERPL-MCNC: 138 UG/DL — SIGNIFICANT CHANGE UP (ref 110–370)
URATE SERPL-MCNC: 3.4 MG/DL — SIGNIFICANT CHANGE UP (ref 2.5–7)
UROBILINOGEN FLD QL: NEGATIVE — SIGNIFICANT CHANGE UP
VARIANT LYMPHS # BLD: 11.3 % — HIGH (ref 0–6)
VARIANT LYMPHS # BLD: 4 % — SIGNIFICANT CHANGE UP (ref 0–6)
VIT B12 SERPL-MCNC: >2000 PG/ML — HIGH (ref 232–1245)
VZV DNA, PCR RESULT: NEGATIVE — SIGNIFICANT CHANGE UP
VZV IGM SER-ACNC: <0.91 INDEX — SIGNIFICANT CHANGE UP (ref 0–0.9)
WBC # BLD: 1.13 K/UL — LOW (ref 3.8–10.5)
WBC # BLD: 1.53 K/UL — LOW (ref 3.8–10.5)
WBC # BLD: 1.68 K/UL — LOW (ref 3.8–10.5)
WBC # BLD: 13.11 K/UL — HIGH (ref 3.8–10.5)
WBC # BLD: 18.19 K/UL — HIGH (ref 3.8–10.5)
WBC # BLD: 2.43 K/UL — LOW (ref 3.8–10.5)
WBC # BLD: 2.57 K/UL — LOW (ref 3.8–10.5)
WBC # BLD: 2.74 K/UL — LOW (ref 3.8–10.5)
WBC # BLD: 2.8 K/UL — LOW (ref 3.8–10.5)
WBC # BLD: 25.38 K/UL — HIGH (ref 3.8–10.5)
WBC # BLD: 3.07 K/UL — LOW (ref 3.8–10.5)
WBC # BLD: 3.17 K/UL — LOW (ref 3.8–10.5)
WBC # BLD: 3.18 K/UL — LOW (ref 3.8–10.5)
WBC # BLD: 3.19 K/UL — LOW (ref 3.8–10.5)
WBC # BLD: 3.2 K/UL — LOW (ref 3.8–10.5)
WBC # BLD: 3.21 K/UL — LOW (ref 3.8–10.5)
WBC # BLD: 3.21 K/UL — LOW (ref 3.8–10.5)
WBC # BLD: 4.17 K/UL — SIGNIFICANT CHANGE UP (ref 3.8–10.5)
WBC # BLD: 4.33 K/UL — SIGNIFICANT CHANGE UP (ref 3.8–10.5)
WBC # BLD: 8.43 K/UL — SIGNIFICANT CHANGE UP (ref 3.8–10.5)
WBC # FLD AUTO: 1.13 K/UL — LOW (ref 3.8–10.5)
WBC # FLD AUTO: 1.53 K/UL — LOW (ref 3.8–10.5)
WBC # FLD AUTO: 1.68 K/UL — LOW (ref 3.8–10.5)
WBC # FLD AUTO: 13.11 K/UL — HIGH (ref 3.8–10.5)
WBC # FLD AUTO: 18.19 K/UL — HIGH (ref 3.8–10.5)
WBC # FLD AUTO: 2.39 K/UL
WBC # FLD AUTO: 2.43 K/UL — LOW (ref 3.8–10.5)
WBC # FLD AUTO: 2.57 K/UL — LOW (ref 3.8–10.5)
WBC # FLD AUTO: 2.74 K/UL
WBC # FLD AUTO: 2.74 K/UL — LOW (ref 3.8–10.5)
WBC # FLD AUTO: 2.8 K/UL — LOW (ref 3.8–10.5)
WBC # FLD AUTO: 25.38 K/UL — HIGH (ref 3.8–10.5)
WBC # FLD AUTO: 3.07 K/UL — LOW (ref 3.8–10.5)
WBC # FLD AUTO: 3.17 K/UL — LOW (ref 3.8–10.5)
WBC # FLD AUTO: 3.18 K/UL — LOW (ref 3.8–10.5)
WBC # FLD AUTO: 3.19 K/UL — LOW (ref 3.8–10.5)
WBC # FLD AUTO: 3.2 K/UL — LOW (ref 3.8–10.5)
WBC # FLD AUTO: 3.21 K/UL — LOW (ref 3.8–10.5)
WBC # FLD AUTO: 3.21 K/UL — LOW (ref 3.8–10.5)
WBC # FLD AUTO: 4.17 K/UL — SIGNIFICANT CHANGE UP (ref 3.8–10.5)
WBC # FLD AUTO: 4.33 K/UL — SIGNIFICANT CHANGE UP (ref 3.8–10.5)
WBC # FLD AUTO: 8.43 K/UL — SIGNIFICANT CHANGE UP (ref 3.8–10.5)
WBC UR QL: 10 /HPF — HIGH (ref 0–5)
WBC UR QL: 6 /HPF — HIGH (ref 0–5)
WBC UR QL: 66 /HPF — HIGH (ref 0–5)

## 2021-01-01 PROCEDURE — 76705 ECHO EXAM OF ABDOMEN: CPT | Mod: 26,RT

## 2021-01-01 PROCEDURE — 99238 HOSP IP/OBS DSCHRG MGMT 30/<: CPT

## 2021-01-01 PROCEDURE — 99223 1ST HOSP IP/OBS HIGH 75: CPT | Mod: GC

## 2021-01-01 PROCEDURE — 99232 SBSQ HOSP IP/OBS MODERATE 35: CPT | Mod: GC

## 2021-01-01 PROCEDURE — 99215 OFFICE O/P EST HI 40 MIN: CPT

## 2021-01-01 PROCEDURE — 84145 PROCALCITONIN (PCT): CPT

## 2021-01-01 PROCEDURE — 47000 NEEDLE BIOPSY OF LIVER PERQ: CPT

## 2021-01-01 PROCEDURE — 85610 PROTHROMBIN TIME: CPT

## 2021-01-01 PROCEDURE — 85025 COMPLETE CBC W/AUTO DIFF WBC: CPT

## 2021-01-01 PROCEDURE — 31575 DIAGNOSTIC LARYNGOSCOPY: CPT

## 2021-01-01 PROCEDURE — 99214 OFFICE O/P EST MOD 30 MIN: CPT

## 2021-01-01 PROCEDURE — 99442: CPT | Mod: 95

## 2021-01-01 PROCEDURE — 99233 SBSQ HOSP IP/OBS HIGH 50: CPT | Mod: GC

## 2021-01-01 PROCEDURE — 99223 1ST HOSP IP/OBS HIGH 75: CPT

## 2021-01-01 PROCEDURE — 83010 ASSAY OF HAPTOGLOBIN QUANT: CPT

## 2021-01-01 PROCEDURE — 36415 COLL VENOUS BLD VENIPUNCTURE: CPT

## 2021-01-01 PROCEDURE — 85730 THROMBOPLASTIN TIME PARTIAL: CPT

## 2021-01-01 PROCEDURE — 71275 CT ANGIOGRAPHY CHEST: CPT | Mod: 26,MH

## 2021-01-01 PROCEDURE — 86644 CMV ANTIBODY: CPT

## 2021-01-01 PROCEDURE — 86665 EPSTEIN-BARR CAPSID VCA: CPT

## 2021-01-01 PROCEDURE — 78306 BONE IMAGING WHOLE BODY: CPT | Mod: 26,MH

## 2021-01-01 PROCEDURE — 74177 CT ABD & PELVIS W/CONTRAST: CPT | Mod: 26,MH

## 2021-01-01 PROCEDURE — 87086 URINE CULTURE/COLONY COUNT: CPT

## 2021-01-01 PROCEDURE — 99231 SBSQ HOSP IP/OBS SF/LOW 25: CPT | Mod: 25

## 2021-01-01 PROCEDURE — 76700 US EXAM ABDOM COMPLETE: CPT | Mod: 26

## 2021-01-01 PROCEDURE — 97597 DBRDMT OPN WND 1ST 20 CM/<: CPT

## 2021-01-01 PROCEDURE — 84484 ASSAY OF TROPONIN QUANT: CPT

## 2021-01-01 PROCEDURE — 82565 ASSAY OF CREATININE: CPT

## 2021-01-01 PROCEDURE — 80074 ACUTE HEPATITIS PANEL: CPT

## 2021-01-01 PROCEDURE — 87102 FUNGUS ISOLATION CULTURE: CPT

## 2021-01-01 PROCEDURE — 71275 CT ANGIOGRAPHY CHEST: CPT | Mod: MA

## 2021-01-01 PROCEDURE — 83036 HEMOGLOBIN GLYCOSYLATED A1C: CPT

## 2021-01-01 PROCEDURE — 97161 PT EVAL LOW COMPLEX 20 MIN: CPT

## 2021-01-01 PROCEDURE — 99233 SBSQ HOSP IP/OBS HIGH 50: CPT

## 2021-01-01 PROCEDURE — 99285 EMERGENCY DEPT VISIT HI MDM: CPT

## 2021-01-01 PROCEDURE — 94640 AIRWAY INHALATION TREATMENT: CPT

## 2021-01-01 PROCEDURE — 87798 DETECT AGENT NOS DNA AMP: CPT

## 2021-01-01 PROCEDURE — A9561: CPT

## 2021-01-01 PROCEDURE — 71275 CT ANGIOGRAPHY CHEST: CPT | Mod: 26,MA

## 2021-01-01 PROCEDURE — 88307 TISSUE EXAM BY PATHOLOGIST: CPT

## 2021-01-01 PROCEDURE — 99203 OFFICE O/P NEW LOW 30 MIN: CPT

## 2021-01-01 PROCEDURE — 83880 ASSAY OF NATRIURETIC PEPTIDE: CPT

## 2021-01-01 PROCEDURE — 99498 ADVNCD CARE PLAN ADDL 30 MIN: CPT | Mod: 25

## 2021-01-01 PROCEDURE — 74177 CT ABD & PELVIS W/CONTRAST: CPT

## 2021-01-01 PROCEDURE — 71046 X-RAY EXAM CHEST 2 VIEWS: CPT

## 2021-01-01 PROCEDURE — 85014 HEMATOCRIT: CPT

## 2021-01-01 PROCEDURE — 82962 GLUCOSE BLOOD TEST: CPT

## 2021-01-01 PROCEDURE — 80053 COMPREHEN METABOLIC PANEL: CPT

## 2021-01-01 PROCEDURE — 82746 ASSAY OF FOLIC ACID SERUM: CPT

## 2021-01-01 PROCEDURE — U0003: CPT

## 2021-01-01 PROCEDURE — 93005 ELECTROCARDIOGRAM TRACING: CPT

## 2021-01-01 PROCEDURE — 87389 HIV-1 AG W/HIV-1&-2 AB AG IA: CPT

## 2021-01-01 PROCEDURE — 97110 THERAPEUTIC EXERCISES: CPT

## 2021-01-01 PROCEDURE — 87529 HSV DNA AMP PROBE: CPT

## 2021-01-01 PROCEDURE — 81001 URINALYSIS AUTO W/SCOPE: CPT

## 2021-01-01 PROCEDURE — 87493 C DIFF AMPLIFIED PROBE: CPT

## 2021-01-01 PROCEDURE — 82728 ASSAY OF FERRITIN: CPT

## 2021-01-01 PROCEDURE — 71046 X-RAY EXAM CHEST 2 VIEWS: CPT | Mod: 26

## 2021-01-01 PROCEDURE — 74177 CT ABD & PELVIS W/CONTRAST: CPT | Mod: 26,MA

## 2021-01-01 PROCEDURE — 84295 ASSAY OF SERUM SODIUM: CPT

## 2021-01-01 PROCEDURE — 85384 FIBRINOGEN ACTIVITY: CPT

## 2021-01-01 PROCEDURE — 74177 CT ABD & PELVIS W/CONTRAST: CPT | Mod: 26,MG

## 2021-01-01 PROCEDURE — 86645 CMV ANTIBODY IGM: CPT

## 2021-01-01 PROCEDURE — 71045 X-RAY EXAM CHEST 1 VIEW: CPT

## 2021-01-01 PROCEDURE — 86850 RBC ANTIBODY SCREEN: CPT

## 2021-01-01 PROCEDURE — 80048 BASIC METABOLIC PNL TOTAL CA: CPT

## 2021-01-01 PROCEDURE — 99231 SBSQ HOSP IP/OBS SF/LOW 25: CPT | Mod: GC

## 2021-01-01 PROCEDURE — U0005: CPT

## 2021-01-01 PROCEDURE — 96374 THER/PROPH/DIAG INJ IV PUSH: CPT

## 2021-01-01 PROCEDURE — 71260 CT THORAX DX C+: CPT | Mod: 26,MG

## 2021-01-01 PROCEDURE — 82803 BLOOD GASES ANY COMBINATION: CPT

## 2021-01-01 PROCEDURE — 99204 OFFICE O/P NEW MOD 45 MIN: CPT | Mod: 25

## 2021-01-01 PROCEDURE — 85018 HEMOGLOBIN: CPT

## 2021-01-01 PROCEDURE — 84132 ASSAY OF SERUM POTASSIUM: CPT

## 2021-01-01 PROCEDURE — 83735 ASSAY OF MAGNESIUM: CPT

## 2021-01-01 PROCEDURE — 78306 BONE IMAGING WHOLE BODY: CPT

## 2021-01-01 PROCEDURE — 84100 ASSAY OF PHOSPHORUS: CPT

## 2021-01-01 PROCEDURE — 99232 SBSQ HOSP IP/OBS MODERATE 35: CPT

## 2021-01-01 PROCEDURE — 78306 BONE IMAGING WHOLE BODY: CPT | Mod: 26

## 2021-01-01 PROCEDURE — 82607 VITAMIN B-12: CPT

## 2021-01-01 PROCEDURE — 82248 BILIRUBIN DIRECT: CPT

## 2021-01-01 PROCEDURE — 87324 CLOSTRIDIUM AG IA: CPT

## 2021-01-01 PROCEDURE — 83690 ASSAY OF LIPASE: CPT

## 2021-01-01 PROCEDURE — 99284 EMERGENCY DEPT VISIT MOD MDM: CPT | Mod: GC

## 2021-01-01 PROCEDURE — 86663 EPSTEIN-BARR ANTIBODY: CPT

## 2021-01-01 PROCEDURE — 99222 1ST HOSP IP/OBS MODERATE 55: CPT | Mod: GC

## 2021-01-01 PROCEDURE — 83540 ASSAY OF IRON: CPT

## 2021-01-01 PROCEDURE — 93306 TTE W/DOPPLER COMPLETE: CPT

## 2021-01-01 PROCEDURE — 88307 TISSUE EXAM BY PATHOLOGIST: CPT | Mod: 26

## 2021-01-01 PROCEDURE — 93010 ELECTROCARDIOGRAM REPORT: CPT

## 2021-01-01 PROCEDURE — 99497 ADVNCD CARE PLAN 30 MIN: CPT

## 2021-01-01 PROCEDURE — 76705 ECHO EXAM OF ABDOMEN: CPT

## 2021-01-01 PROCEDURE — 82947 ASSAY GLUCOSE BLOOD QUANT: CPT

## 2021-01-01 PROCEDURE — 86900 BLOOD TYPING SEROLOGIC ABO: CPT

## 2021-01-01 PROCEDURE — 82247 BILIRUBIN TOTAL: CPT

## 2021-01-01 PROCEDURE — 71260 CT THORAX DX C+: CPT

## 2021-01-01 PROCEDURE — 99497 ADVNCD CARE PLAN 30 MIN: CPT | Mod: 25

## 2021-01-01 PROCEDURE — 82140 ASSAY OF AMMONIA: CPT

## 2021-01-01 PROCEDURE — 86664 EPSTEIN-BARR NUCLEAR ANTIGEN: CPT

## 2021-01-01 PROCEDURE — 71275 CT ANGIOGRAPHY CHEST: CPT

## 2021-01-01 PROCEDURE — 87507 IADNA-DNA/RNA PROBE TQ 12-25: CPT

## 2021-01-01 PROCEDURE — 76700 US EXAM ABDOM COMPLETE: CPT

## 2021-01-01 PROCEDURE — 87070 CULTURE OTHR SPECIMN AEROBIC: CPT

## 2021-01-01 PROCEDURE — 86140 C-REACTIVE PROTEIN: CPT

## 2021-01-01 PROCEDURE — 83615 LACTATE (LD) (LDH) ENZYME: CPT

## 2021-01-01 PROCEDURE — 83550 IRON BINDING TEST: CPT

## 2021-01-01 PROCEDURE — 86787 VARICELLA-ZOSTER ANTIBODY: CPT

## 2021-01-01 PROCEDURE — 84550 ASSAY OF BLOOD/URIC ACID: CPT

## 2021-01-01 PROCEDURE — 86769 SARS-COV-2 COVID-19 ANTIBODY: CPT

## 2021-01-01 PROCEDURE — 86695 HERPES SIMPLEX TYPE 1 TEST: CPT

## 2021-01-01 PROCEDURE — 76942 ECHO GUIDE FOR BIOPSY: CPT

## 2021-01-01 PROCEDURE — 87449 NOS EACH ORGANISM AG IA: CPT

## 2021-01-01 PROCEDURE — 87040 BLOOD CULTURE FOR BACTERIA: CPT

## 2021-01-01 PROCEDURE — 76942 ECHO GUIDE FOR BIOPSY: CPT | Mod: 26

## 2021-01-01 PROCEDURE — 99284 EMERGENCY DEPT VISIT MOD MDM: CPT | Mod: 25

## 2021-01-01 PROCEDURE — 86901 BLOOD TYPING SEROLOGIC RH(D): CPT

## 2021-01-01 PROCEDURE — 83605 ASSAY OF LACTIC ACID: CPT

## 2021-01-01 PROCEDURE — 97116 GAIT TRAINING THERAPY: CPT

## 2021-01-01 PROCEDURE — G1004: CPT

## 2021-01-01 PROCEDURE — 94618 PULMONARY STRESS TESTING: CPT

## 2021-01-01 PROCEDURE — 97165 OT EVAL LOW COMPLEX 30 MIN: CPT

## 2021-01-01 PROCEDURE — 71045 X-RAY EXAM CHEST 1 VIEW: CPT | Mod: 26

## 2021-01-01 PROCEDURE — 82330 ASSAY OF CALCIUM: CPT

## 2021-01-01 PROCEDURE — 83020 HEMOGLOBIN ELECTROPHORESIS: CPT | Mod: 26

## 2021-01-01 PROCEDURE — 87077 CULTURE AEROBIC IDENTIFY: CPT

## 2021-01-01 PROCEDURE — 99222 1ST HOSP IP/OBS MODERATE 55: CPT | Mod: 25

## 2021-01-01 PROCEDURE — 74177 CT ABD & PELVIS W/CONTRAST: CPT | Mod: MA

## 2021-01-01 PROCEDURE — 85027 COMPLETE CBC AUTOMATED: CPT

## 2021-01-01 PROCEDURE — 82435 ASSAY OF BLOOD CHLORIDE: CPT

## 2021-01-01 RX ORDER — CEFEPIME 1 G/1
2000 INJECTION, POWDER, FOR SOLUTION INTRAMUSCULAR; INTRAVENOUS EVERY 8 HOURS
Refills: 0 | Status: DISCONTINUED | OUTPATIENT
Start: 2021-01-01 | End: 2021-01-01

## 2021-01-01 RX ORDER — HYDROMORPHONE HYDROCHLORIDE 2 MG/ML
1.5 INJECTION INTRAMUSCULAR; INTRAVENOUS; SUBCUTANEOUS
Refills: 0 | Status: DISCONTINUED | OUTPATIENT
Start: 2021-01-01 | End: 2021-01-01

## 2021-01-01 RX ORDER — HYDROMORPHONE HYDROCHLORIDE 2 MG/ML
1 INJECTION INTRAMUSCULAR; INTRAVENOUS; SUBCUTANEOUS
Refills: 0 | Status: DISCONTINUED | OUTPATIENT
Start: 2021-01-01 | End: 2021-01-01

## 2021-01-01 RX ORDER — HYDROMORPHONE HYDROCHLORIDE 2 MG/ML
0.25 INJECTION INTRAMUSCULAR; INTRAVENOUS; SUBCUTANEOUS
Refills: 0 | Status: DISCONTINUED | OUTPATIENT
Start: 2021-01-01 | End: 2021-01-01

## 2021-01-01 RX ORDER — POTASSIUM PHOSPHATE, MONOBASIC POTASSIUM PHOSPHATE, DIBASIC 236; 224 MG/ML; MG/ML
30 INJECTION, SOLUTION INTRAVENOUS ONCE
Refills: 0 | Status: COMPLETED | OUTPATIENT
Start: 2021-01-01 | End: 2021-01-01

## 2021-01-01 RX ORDER — CEFEPIME 1 G/1
1000 INJECTION, POWDER, FOR SOLUTION INTRAMUSCULAR; INTRAVENOUS ONCE
Refills: 0 | Status: COMPLETED | OUTPATIENT
Start: 2021-01-01 | End: 2021-01-01

## 2021-01-01 RX ORDER — INSULIN LISPRO 100/ML
VIAL (ML) SUBCUTANEOUS
Refills: 0 | Status: DISCONTINUED | OUTPATIENT
Start: 2021-01-01 | End: 2021-01-01

## 2021-01-01 RX ORDER — SODIUM CHLORIDE 9 MG/ML
1000 INJECTION, SOLUTION INTRAVENOUS
Refills: 0 | Status: DISCONTINUED | OUTPATIENT
Start: 2021-01-01 | End: 2021-01-01

## 2021-01-01 RX ORDER — TIOTROPIUM BROMIDE 18 UG/1
1 CAPSULE ORAL; RESPIRATORY (INHALATION) DAILY
Refills: 0 | Status: DISCONTINUED | OUTPATIENT
Start: 2021-01-01 | End: 2021-01-01

## 2021-01-01 RX ORDER — SODIUM,POTASSIUM PHOSPHATES 278-250MG
1 POWDER IN PACKET (EA) ORAL ONCE
Refills: 0 | Status: COMPLETED | OUTPATIENT
Start: 2021-01-01 | End: 2021-01-01

## 2021-01-01 RX ORDER — LACTULOSE 10 G/15ML
10 SOLUTION ORAL THREE TIMES A DAY
Refills: 0 | Status: DISCONTINUED | OUTPATIENT
Start: 2021-01-01 | End: 2021-01-01

## 2021-01-01 RX ORDER — ACETAMINOPHEN 500 MG
650 TABLET ORAL ONCE
Refills: 0 | Status: COMPLETED | OUTPATIENT
Start: 2021-01-01 | End: 2021-01-01

## 2021-01-01 RX ORDER — METRONIDAZOLE 500 MG
TABLET ORAL
Refills: 0 | Status: DISCONTINUED | OUTPATIENT
Start: 2021-01-01 | End: 2021-01-01

## 2021-01-01 RX ORDER — ALBUTEROL 90 UG/1
1 AEROSOL, METERED ORAL EVERY 4 HOURS
Refills: 0 | Status: DISCONTINUED | OUTPATIENT
Start: 2021-01-01 | End: 2021-01-01

## 2021-01-01 RX ORDER — GABAPENTIN 400 MG/1
300 CAPSULE ORAL THREE TIMES A DAY
Refills: 0 | Status: DISCONTINUED | OUTPATIENT
Start: 2021-01-01 | End: 2021-01-01

## 2021-01-01 RX ORDER — GLUCAGON INJECTION, SOLUTION 0.5 MG/.1ML
1 INJECTION, SOLUTION SUBCUTANEOUS ONCE
Refills: 0 | Status: DISCONTINUED | OUTPATIENT
Start: 2021-01-01 | End: 2021-01-01

## 2021-01-01 RX ORDER — DIPHENHYDRAMINE HYDROCHLORIDE AND LIDOCAINE HYDROCHLORIDE AND ALUMINUM HYDROXIDE AND MAGNESIUM HYDRO
5 KIT EVERY 4 HOURS
Refills: 0 | Status: DISCONTINUED | OUTPATIENT
Start: 2021-01-01 | End: 2021-01-01

## 2021-01-01 RX ORDER — CEPHALEXIN 500 MG
500 CAPSULE ORAL ONCE
Refills: 0 | Status: COMPLETED | OUTPATIENT
Start: 2021-01-01 | End: 2021-01-01

## 2021-01-01 RX ORDER — FLUTICASONE FUROATE, UMECLIDINIUM BROMIDE AND VILANTEROL TRIFENATATE 200; 62.5; 25 UG/1; UG/1; UG/1
200-62.5-25 POWDER RESPIRATORY (INHALATION)
Qty: 3 | Refills: 1 | Status: ACTIVE | COMMUNITY
Start: 2021-01-01 | End: 1900-01-01

## 2021-01-01 RX ORDER — CEPHALEXIN 500 MG
1 CAPSULE ORAL
Qty: 20 | Refills: 0
Start: 2021-01-01 | End: 2021-01-01

## 2021-01-01 RX ORDER — TRAMADOL HYDROCHLORIDE 50 MG/1
25 TABLET ORAL ONCE
Refills: 0 | Status: DISCONTINUED | OUTPATIENT
Start: 2021-01-01 | End: 2021-01-01

## 2021-01-01 RX ORDER — FLUCONAZOLE 150 MG/1
200 TABLET ORAL ONCE
Refills: 0 | Status: COMPLETED | OUTPATIENT
Start: 2021-01-01 | End: 2021-01-01

## 2021-01-01 RX ORDER — LANOLIN ALCOHOL/MO/W.PET/CERES
3 CREAM (GRAM) TOPICAL AT BEDTIME
Refills: 0 | Status: DISCONTINUED | OUTPATIENT
Start: 2021-01-01 | End: 2021-01-01

## 2021-01-01 RX ORDER — HYDROMORPHONE HYDROCHLORIDE 2 MG/ML
0.3 INJECTION INTRAMUSCULAR; INTRAVENOUS; SUBCUTANEOUS
Qty: 100 | Refills: 0 | Status: DISCONTINUED | OUTPATIENT
Start: 2021-01-01 | End: 2021-01-01

## 2021-01-01 RX ORDER — FLUTICASONE FUROATE, UMECLIDINIUM BROMIDE AND VILANTEROL TRIFENATATE 200; 62.5; 25 UG/1; UG/1; UG/1
1 POWDER RESPIRATORY (INHALATION)
Qty: 0 | Refills: 0 | DISCHARGE

## 2021-01-01 RX ORDER — SODIUM CHLORIDE 9 MG/ML
1000 INJECTION, SOLUTION INTRAVENOUS ONCE
Refills: 0 | Status: COMPLETED | OUTPATIENT
Start: 2021-01-01 | End: 2021-01-01

## 2021-01-01 RX ORDER — ONDANSETRON 8 MG/1
4 TABLET, FILM COATED ORAL ONCE
Refills: 0 | Status: COMPLETED | OUTPATIENT
Start: 2021-01-01 | End: 2021-01-01

## 2021-01-01 RX ORDER — SODIUM CHLORIDE 9 MG/ML
1000 INJECTION INTRAMUSCULAR; INTRAVENOUS; SUBCUTANEOUS ONCE
Refills: 0 | Status: COMPLETED | OUTPATIENT
Start: 2021-01-01 | End: 2021-01-01

## 2021-01-01 RX ORDER — OMEPRAZOLE 40 MG/1
40 CAPSULE, DELAYED RELEASE ORAL
Qty: 90 | Refills: 1 | Status: ACTIVE | COMMUNITY
Start: 2020-09-10 | End: 1900-01-01

## 2021-01-01 RX ORDER — BENZOCAINE AND MENTHOL 5; 1 G/100ML; G/100ML
1 LIQUID ORAL EVERY 6 HOURS
Refills: 0 | Status: DISCONTINUED | OUTPATIENT
Start: 2021-01-01 | End: 2021-01-01

## 2021-01-01 RX ORDER — HYDROMORPHONE HYDROCHLORIDE 2 MG/ML
0.5 INJECTION INTRAMUSCULAR; INTRAVENOUS; SUBCUTANEOUS ONCE
Refills: 0 | Status: DISCONTINUED | OUTPATIENT
Start: 2021-01-01 | End: 2021-01-01

## 2021-01-01 RX ORDER — METRONIDAZOLE 500 MG
500 TABLET ORAL ONCE
Refills: 0 | Status: COMPLETED | OUTPATIENT
Start: 2021-01-01 | End: 2021-01-01

## 2021-01-01 RX ORDER — HYDROMORPHONE HYDROCHLORIDE 2 MG/ML
0.5 INJECTION INTRAMUSCULAR; INTRAVENOUS; SUBCUTANEOUS
Refills: 0 | Status: DISCONTINUED | OUTPATIENT
Start: 2021-01-01 | End: 2021-01-01

## 2021-01-01 RX ORDER — LABETALOL HYDROCHLORIDE 100 MG/1
100 TABLET, FILM COATED ORAL
Refills: 0 | Status: DISCONTINUED | COMMUNITY
Start: 2020-04-15 | End: 2021-01-01

## 2021-01-01 RX ORDER — MOMETASONE FUROATE 220 UG/1
1 INHALANT RESPIRATORY (INHALATION) DAILY
Refills: 0 | Status: DISCONTINUED | OUTPATIENT
Start: 2021-01-01 | End: 2021-01-01

## 2021-01-01 RX ORDER — DEXTROSE 50 % IN WATER 50 %
25 SYRINGE (ML) INTRAVENOUS ONCE
Refills: 0 | Status: DISCONTINUED | OUTPATIENT
Start: 2021-01-01 | End: 2021-01-01

## 2021-01-01 RX ORDER — CHOLECALCIFEROL (VITAMIN D3) 125 MCG
1000 CAPSULE ORAL DAILY
Refills: 0 | Status: DISCONTINUED | OUTPATIENT
Start: 2021-01-01 | End: 2021-01-01

## 2021-01-01 RX ORDER — METRONIDAZOLE 500 MG
500 TABLET ORAL EVERY 8 HOURS
Refills: 0 | Status: DISCONTINUED | OUTPATIENT
Start: 2021-01-01 | End: 2021-01-01

## 2021-01-01 RX ORDER — DEXTROSE 50 % IN WATER 50 %
12.5 SYRINGE (ML) INTRAVENOUS ONCE
Refills: 0 | Status: DISCONTINUED | OUTPATIENT
Start: 2021-01-01 | End: 2021-01-01

## 2021-01-01 RX ORDER — MUPIROCIN 20 MG/G
1 OINTMENT TOPICAL
Refills: 0 | Status: DISCONTINUED | OUTPATIENT
Start: 2021-01-01 | End: 2021-01-01

## 2021-01-01 RX ORDER — LIDOCAINE 4 G/100G
5 CREAM TOPICAL EVERY 6 HOURS
Refills: 0 | Status: DISCONTINUED | OUTPATIENT
Start: 2021-01-01 | End: 2021-01-01

## 2021-01-01 RX ORDER — CEPHALEXIN 500 MG/1
500 CAPSULE ORAL
Qty: 10 | Refills: 0 | Status: DISCONTINUED | COMMUNITY
Start: 2021-01-01 | End: 2021-01-01

## 2021-01-01 RX ORDER — FUROSEMIDE 20 MG/1
20 TABLET ORAL
Qty: 10 | Refills: 0 | Status: ACTIVE | COMMUNITY
Start: 2021-01-01

## 2021-01-01 RX ORDER — TETRACAINE/BENZOCAINE/BUTAMBEN 2%-14%-2%
1 OINTMENT (GRAM) TOPICAL EVERY 6 HOURS
Refills: 0 | Status: DISCONTINUED | OUTPATIENT
Start: 2021-01-01 | End: 2021-01-01

## 2021-01-01 RX ORDER — HYDROMORPHONE HYDROCHLORIDE 2 MG/ML
0.5 INJECTION INTRAMUSCULAR; INTRAVENOUS; SUBCUTANEOUS EVERY 4 HOURS
Refills: 0 | Status: DISCONTINUED | OUTPATIENT
Start: 2021-01-01 | End: 2021-01-01

## 2021-01-01 RX ORDER — CASPOFUNGIN ACETATE 7 MG/ML
50 INJECTION, POWDER, LYOPHILIZED, FOR SOLUTION INTRAVENOUS EVERY 24 HOURS
Refills: 0 | Status: DISCONTINUED | OUTPATIENT
Start: 2021-01-01 | End: 2021-01-01

## 2021-01-01 RX ORDER — CEFEPIME 1 G/1
INJECTION, POWDER, FOR SOLUTION INTRAMUSCULAR; INTRAVENOUS
Refills: 0 | Status: DISCONTINUED | OUTPATIENT
Start: 2021-01-01 | End: 2021-01-01

## 2021-01-01 RX ORDER — SODIUM CHLORIDE 9 MG/ML
1000 INJECTION INTRAMUSCULAR; INTRAVENOUS; SUBCUTANEOUS
Refills: 0 | Status: DISCONTINUED | OUTPATIENT
Start: 2021-01-01 | End: 2021-01-01

## 2021-01-01 RX ORDER — HYDROMORPHONE HYDROCHLORIDE 2 MG/ML
0.2 INJECTION INTRAMUSCULAR; INTRAVENOUS; SUBCUTANEOUS EVERY 6 HOURS
Refills: 0 | Status: DISCONTINUED | OUTPATIENT
Start: 2021-01-01 | End: 2021-01-01

## 2021-01-01 RX ORDER — METOCLOPRAMIDE 10 MG/1
10 TABLET ORAL
Qty: 30 | Refills: 5 | Status: ACTIVE | COMMUNITY
Start: 2020-05-01 | End: 1900-01-01

## 2021-01-01 RX ORDER — CEFEPIME 1 G/1
1000 INJECTION, POWDER, FOR SOLUTION INTRAMUSCULAR; INTRAVENOUS EVERY 8 HOURS
Refills: 0 | Status: DISCONTINUED | OUTPATIENT
Start: 2021-01-01 | End: 2021-01-01

## 2021-01-01 RX ORDER — FERROUS SULFATE 325(65) MG
325 TABLET ORAL DAILY
Refills: 0 | Status: DISCONTINUED | OUTPATIENT
Start: 2021-01-01 | End: 2021-01-01

## 2021-01-01 RX ORDER — FILGRASTIM 480MCG/1.6
480 VIAL (ML) INJECTION DAILY
Refills: 0 | Status: DISCONTINUED | OUTPATIENT
Start: 2021-01-01 | End: 2021-01-01

## 2021-01-01 RX ORDER — MORPHINE SULFATE 50 MG/1
2 CAPSULE, EXTENDED RELEASE ORAL EVERY 4 HOURS
Refills: 0 | Status: DISCONTINUED | OUTPATIENT
Start: 2021-01-01 | End: 2021-01-01

## 2021-01-01 RX ORDER — HYDROMORPHONE HYDROCHLORIDE 2 MG/ML
1 INJECTION INTRAMUSCULAR; INTRAVENOUS; SUBCUTANEOUS EVERY 6 HOURS
Refills: 0 | Status: DISCONTINUED | OUTPATIENT
Start: 2021-01-01 | End: 2021-01-01

## 2021-01-01 RX ORDER — MONTELUKAST 4 MG/1
10 TABLET, CHEWABLE ORAL DAILY
Refills: 0 | Status: DISCONTINUED | OUTPATIENT
Start: 2021-01-01 | End: 2021-01-01

## 2021-01-01 RX ORDER — ALPRAZOLAM 0.25 MG
1 TABLET ORAL
Qty: 0 | Refills: 0 | DISCHARGE

## 2021-01-01 RX ORDER — PANTOPRAZOLE SODIUM 20 MG/1
40 TABLET, DELAYED RELEASE ORAL
Refills: 0 | Status: DISCONTINUED | OUTPATIENT
Start: 2021-01-01 | End: 2021-01-01

## 2021-01-01 RX ORDER — NYSTATIN CREAM 100000 [USP'U]/G
1 CREAM TOPICAL
Refills: 0 | Status: DISCONTINUED | OUTPATIENT
Start: 2021-01-01 | End: 2021-01-01

## 2021-01-01 RX ORDER — LETROZOLE 2.5 MG/1
2.5 TABLET, FILM COATED ORAL DAILY
Refills: 0 | Status: DISCONTINUED | OUTPATIENT
Start: 2021-01-01 | End: 2021-01-01

## 2021-01-01 RX ORDER — OMEPRAZOLE 10 MG/1
1 CAPSULE, DELAYED RELEASE ORAL
Qty: 0 | Refills: 0 | DISCHARGE

## 2021-01-01 RX ORDER — ALPRAZOLAM 0.5 MG/1
0.5 TABLET ORAL
Qty: 60 | Refills: 0 | Status: ACTIVE | COMMUNITY
Start: 2020-01-01 | End: 1900-01-01

## 2021-01-01 RX ORDER — VANCOMYCIN HCL 1 G
1000 VIAL (EA) INTRAVENOUS ONCE
Refills: 0 | Status: COMPLETED | OUTPATIENT
Start: 2021-01-01 | End: 2021-01-01

## 2021-01-01 RX ORDER — LETROZOLE TABLETS 2.5 MG/1
2.5 TABLET, FILM COATED ORAL
Qty: 90 | Refills: 1 | Status: ACTIVE | COMMUNITY
Start: 2020-04-15 | End: 1900-01-01

## 2021-01-01 RX ORDER — PALBOCICLIB 100 MG/1
100 TABLET, FILM COATED ORAL
Qty: 21 | Refills: 2 | Status: DISCONTINUED | COMMUNITY
Start: 2020-01-01 | End: 2021-01-01

## 2021-01-01 RX ORDER — FERROUS SULFATE 325(65) MG
1 TABLET ORAL
Qty: 0 | Refills: 0 | DISCHARGE

## 2021-01-01 RX ORDER — HYDROMORPHONE HYDROCHLORIDE 2 MG/ML
0.25 INJECTION INTRAMUSCULAR; INTRAVENOUS; SUBCUTANEOUS EVERY 4 HOURS
Refills: 0 | Status: DISCONTINUED | OUTPATIENT
Start: 2021-01-01 | End: 2021-01-01

## 2021-01-01 RX ORDER — DEXTROSE 50 % IN WATER 50 %
50 SYRINGE (ML) INTRAVENOUS ONCE
Refills: 0 | Status: COMPLETED | OUTPATIENT
Start: 2021-01-01 | End: 2021-01-01

## 2021-01-01 RX ORDER — METRONIDAZOLE 500 MG
500 TABLET ORAL EVERY 12 HOURS
Refills: 0 | Status: DISCONTINUED | OUTPATIENT
Start: 2021-01-01 | End: 2021-01-01

## 2021-01-01 RX ORDER — INFLUENZA VIRUS VACCINE 15; 15; 15; 15 UG/.5ML; UG/.5ML; UG/.5ML; UG/.5ML
0.5 SUSPENSION INTRAMUSCULAR ONCE
Refills: 0 | Status: DISCONTINUED | OUTPATIENT
Start: 2021-01-01 | End: 2021-01-01

## 2021-01-01 RX ORDER — DEXTROSE 50 % IN WATER 50 %
15 SYRINGE (ML) INTRAVENOUS ONCE
Refills: 0 | Status: DISCONTINUED | OUTPATIENT
Start: 2021-01-01 | End: 2021-01-01

## 2021-01-01 RX ORDER — INSULIN LISPRO 100/ML
VIAL (ML) SUBCUTANEOUS AT BEDTIME
Refills: 0 | Status: DISCONTINUED | OUTPATIENT
Start: 2021-01-01 | End: 2021-01-01

## 2021-01-01 RX ORDER — ENOXAPARIN SODIUM 100 MG/ML
40 INJECTION SUBCUTANEOUS DAILY
Refills: 0 | Status: DISCONTINUED | OUTPATIENT
Start: 2021-01-01 | End: 2021-01-01

## 2021-01-01 RX ORDER — ACETAMINOPHEN 500 MG
650 TABLET ORAL EVERY 6 HOURS
Refills: 0 | Status: DISCONTINUED | OUTPATIENT
Start: 2021-01-01 | End: 2021-01-01

## 2021-01-01 RX ADMIN — MUPIROCIN 1 APPLICATION(S): 20 OINTMENT TOPICAL at 17:06

## 2021-01-01 RX ADMIN — Medication 1000 UNIT(S): at 12:22

## 2021-01-01 RX ADMIN — HYDROMORPHONE HYDROCHLORIDE 0.5 MILLIGRAM(S): 2 INJECTION INTRAMUSCULAR; INTRAVENOUS; SUBCUTANEOUS at 09:17

## 2021-01-01 RX ADMIN — Medication 1: at 17:00

## 2021-01-01 RX ADMIN — MONTELUKAST 10 MILLIGRAM(S): 4 TABLET, CHEWABLE ORAL at 13:29

## 2021-01-01 RX ADMIN — PANTOPRAZOLE SODIUM 40 MILLIGRAM(S): 20 TABLET, DELAYED RELEASE ORAL at 05:20

## 2021-01-01 RX ADMIN — SODIUM CHLORIDE 1000 MILLILITER(S): 9 INJECTION INTRAMUSCULAR; INTRAVENOUS; SUBCUTANEOUS at 14:08

## 2021-01-01 RX ADMIN — HYDROMORPHONE HYDROCHLORIDE 0.5 MILLIGRAM(S): 2 INJECTION INTRAMUSCULAR; INTRAVENOUS; SUBCUTANEOUS at 09:02

## 2021-01-01 RX ADMIN — ENOXAPARIN SODIUM 40 MILLIGRAM(S): 100 INJECTION SUBCUTANEOUS at 13:28

## 2021-01-01 RX ADMIN — Medication 100 MILLIGRAM(S): at 13:37

## 2021-01-01 RX ADMIN — LACTULOSE 10 GRAM(S): 10 SOLUTION ORAL at 13:32

## 2021-01-01 RX ADMIN — Medication 0.25 MILLIGRAM(S): at 11:53

## 2021-01-01 RX ADMIN — ONDANSETRON 4 MILLIGRAM(S): 8 TABLET, FILM COATED ORAL at 14:08

## 2021-01-01 RX ADMIN — HYDROMORPHONE HYDROCHLORIDE 1.5 MILLIGRAM(S): 2 INJECTION INTRAMUSCULAR; INTRAVENOUS; SUBCUTANEOUS at 06:21

## 2021-01-01 RX ADMIN — Medication 480 MICROGRAM(S): at 13:28

## 2021-01-01 RX ADMIN — Medication 3 MILLIGRAM(S): at 21:31

## 2021-01-01 RX ADMIN — Medication 0.25 MILLIGRAM(S): at 21:28

## 2021-01-01 RX ADMIN — NYSTATIN CREAM 1 APPLICATION(S): 100000 CREAM TOPICAL at 17:06

## 2021-01-01 RX ADMIN — Medication 3 MILLIGRAM(S): at 22:17

## 2021-01-01 RX ADMIN — HYDROMORPHONE HYDROCHLORIDE 0.2 MILLIGRAM(S): 2 INJECTION INTRAMUSCULAR; INTRAVENOUS; SUBCUTANEOUS at 14:45

## 2021-01-01 RX ADMIN — HYDROMORPHONE HYDROCHLORIDE 0.2 MILLIGRAM(S): 2 INJECTION INTRAMUSCULAR; INTRAVENOUS; SUBCUTANEOUS at 17:06

## 2021-01-01 RX ADMIN — Medication 1000 UNIT(S): at 12:27

## 2021-01-01 RX ADMIN — CEFEPIME 100 MILLIGRAM(S): 1 INJECTION, POWDER, FOR SOLUTION INTRAMUSCULAR; INTRAVENOUS at 06:38

## 2021-01-01 RX ADMIN — Medication 1 SPRAY(S): at 06:42

## 2021-01-01 RX ADMIN — NYSTATIN CREAM 1 APPLICATION(S): 100000 CREAM TOPICAL at 05:31

## 2021-01-01 RX ADMIN — Medication 100 MILLIGRAM(S): at 23:04

## 2021-01-01 RX ADMIN — Medication 1: at 21:51

## 2021-01-01 RX ADMIN — Medication 500 MILLIGRAM(S): at 17:10

## 2021-01-01 RX ADMIN — Medication 2: at 13:10

## 2021-01-01 RX ADMIN — Medication 100 MILLIGRAM(S): at 05:20

## 2021-01-01 RX ADMIN — HYDROMORPHONE HYDROCHLORIDE 1 MILLIGRAM(S): 2 INJECTION INTRAMUSCULAR; INTRAVENOUS; SUBCUTANEOUS at 17:33

## 2021-01-01 RX ADMIN — MONTELUKAST 10 MILLIGRAM(S): 4 TABLET, CHEWABLE ORAL at 12:31

## 2021-01-01 RX ADMIN — MOMETASONE FUROATE 1 PUFF(S): 220 INHALANT RESPIRATORY (INHALATION) at 12:45

## 2021-01-01 RX ADMIN — ENOXAPARIN SODIUM 40 MILLIGRAM(S): 100 INJECTION SUBCUTANEOUS at 12:44

## 2021-01-01 RX ADMIN — LETROZOLE 2.5 MILLIGRAM(S): 2.5 TABLET, FILM COATED ORAL at 13:29

## 2021-01-01 RX ADMIN — LETROZOLE 2.5 MILLIGRAM(S): 2.5 TABLET, FILM COATED ORAL at 12:44

## 2021-01-01 RX ADMIN — Medication 480 MICROGRAM(S): at 12:44

## 2021-01-01 RX ADMIN — GABAPENTIN 300 MILLIGRAM(S): 400 CAPSULE ORAL at 06:28

## 2021-01-01 RX ADMIN — NYSTATIN CREAM 1 APPLICATION(S): 100000 CREAM TOPICAL at 16:46

## 2021-01-01 RX ADMIN — Medication 1: at 07:59

## 2021-01-01 RX ADMIN — PANTOPRAZOLE SODIUM 40 MILLIGRAM(S): 20 TABLET, DELAYED RELEASE ORAL at 05:33

## 2021-01-01 RX ADMIN — Medication 1: at 19:01

## 2021-01-01 RX ADMIN — Medication 250 MILLIGRAM(S): at 19:58

## 2021-01-01 RX ADMIN — GABAPENTIN 300 MILLIGRAM(S): 400 CAPSULE ORAL at 18:33

## 2021-01-01 RX ADMIN — SODIUM CHLORIDE 75 MILLILITER(S): 9 INJECTION INTRAMUSCULAR; INTRAVENOUS; SUBCUTANEOUS at 16:53

## 2021-01-01 RX ADMIN — MONTELUKAST 10 MILLIGRAM(S): 4 TABLET, CHEWABLE ORAL at 12:27

## 2021-01-01 RX ADMIN — LIDOCAINE 5 MILLILITER(S): 4 CREAM TOPICAL at 16:24

## 2021-01-01 RX ADMIN — Medication 1000 UNIT(S): at 12:44

## 2021-01-01 RX ADMIN — MONTELUKAST 10 MILLIGRAM(S): 4 TABLET, CHEWABLE ORAL at 13:06

## 2021-01-01 RX ADMIN — Medication 0.25 MILLIGRAM(S): at 05:11

## 2021-01-01 RX ADMIN — Medication 650 MILLIGRAM(S): at 18:52

## 2021-01-01 RX ADMIN — CEFEPIME 100 MILLIGRAM(S): 1 INJECTION, POWDER, FOR SOLUTION INTRAMUSCULAR; INTRAVENOUS at 12:55

## 2021-01-01 RX ADMIN — HYDROMORPHONE HYDROCHLORIDE 0.5 MILLIGRAM(S): 2 INJECTION INTRAMUSCULAR; INTRAVENOUS; SUBCUTANEOUS at 23:31

## 2021-01-01 RX ADMIN — DIPHENHYDRAMINE HYDROCHLORIDE AND LIDOCAINE HYDROCHLORIDE AND ALUMINUM HYDROXIDE AND MAGNESIUM HYDRO 5 MILLILITER(S): KIT at 05:39

## 2021-01-01 RX ADMIN — Medication 100 MILLIGRAM(S): at 22:59

## 2021-01-01 RX ADMIN — HYDROMORPHONE HYDROCHLORIDE 0.2 MILLIGRAM(S): 2 INJECTION INTRAMUSCULAR; INTRAVENOUS; SUBCUTANEOUS at 05:30

## 2021-01-01 RX ADMIN — GABAPENTIN 300 MILLIGRAM(S): 400 CAPSULE ORAL at 08:39

## 2021-01-01 RX ADMIN — NYSTATIN CREAM 1 APPLICATION(S): 100000 CREAM TOPICAL at 05:37

## 2021-01-01 RX ADMIN — Medication 100 MILLIGRAM(S): at 18:27

## 2021-01-01 RX ADMIN — NYSTATIN CREAM 1 APPLICATION(S): 100000 CREAM TOPICAL at 05:29

## 2021-01-01 RX ADMIN — Medication 1 SPRAY(S): at 18:29

## 2021-01-01 RX ADMIN — Medication 50 MILLILITER(S): at 16:29

## 2021-01-01 RX ADMIN — HYDROMORPHONE HYDROCHLORIDE 1.5 MILLIGRAM(S): 2 INJECTION INTRAMUSCULAR; INTRAVENOUS; SUBCUTANEOUS at 16:43

## 2021-01-01 RX ADMIN — Medication 1: at 18:27

## 2021-01-01 RX ADMIN — DIPHENHYDRAMINE HYDROCHLORIDE AND LIDOCAINE HYDROCHLORIDE AND ALUMINUM HYDROXIDE AND MAGNESIUM HYDRO 5 MILLILITER(S): KIT at 05:38

## 2021-01-01 RX ADMIN — ENOXAPARIN SODIUM 40 MILLIGRAM(S): 100 INJECTION SUBCUTANEOUS at 13:06

## 2021-01-01 RX ADMIN — CEFEPIME 100 MILLIGRAM(S): 1 INJECTION, POWDER, FOR SOLUTION INTRAMUSCULAR; INTRAVENOUS at 17:43

## 2021-01-01 RX ADMIN — DIPHENHYDRAMINE HYDROCHLORIDE AND LIDOCAINE HYDROCHLORIDE AND ALUMINUM HYDROXIDE AND MAGNESIUM HYDRO 5 MILLILITER(S): KIT at 12:22

## 2021-01-01 RX ADMIN — MOMETASONE FUROATE 1 PUFF(S): 220 INHALANT RESPIRATORY (INHALATION) at 14:35

## 2021-01-01 RX ADMIN — ENOXAPARIN SODIUM 40 MILLIGRAM(S): 100 INJECTION SUBCUTANEOUS at 12:06

## 2021-01-01 RX ADMIN — MOMETASONE FUROATE 1 PUFF(S): 220 INHALANT RESPIRATORY (INHALATION) at 12:07

## 2021-01-01 RX ADMIN — PANTOPRAZOLE SODIUM 40 MILLIGRAM(S): 20 TABLET, DELAYED RELEASE ORAL at 05:30

## 2021-01-01 RX ADMIN — NYSTATIN CREAM 1 APPLICATION(S): 100000 CREAM TOPICAL at 05:40

## 2021-01-01 RX ADMIN — NYSTATIN CREAM 1 APPLICATION(S): 100000 CREAM TOPICAL at 06:47

## 2021-01-01 RX ADMIN — DIPHENHYDRAMINE HYDROCHLORIDE AND LIDOCAINE HYDROCHLORIDE AND ALUMINUM HYDROXIDE AND MAGNESIUM HYDRO 5 MILLILITER(S): KIT at 12:36

## 2021-01-01 RX ADMIN — HYDROMORPHONE HYDROCHLORIDE 0.25 MILLIGRAM(S): 2 INJECTION INTRAMUSCULAR; INTRAVENOUS; SUBCUTANEOUS at 08:11

## 2021-01-01 RX ADMIN — HYDROMORPHONE HYDROCHLORIDE 0.2 MILLIGRAM(S): 2 INJECTION INTRAMUSCULAR; INTRAVENOUS; SUBCUTANEOUS at 20:49

## 2021-01-01 RX ADMIN — MOMETASONE FUROATE 1 PUFF(S): 220 INHALANT RESPIRATORY (INHALATION) at 12:30

## 2021-01-01 RX ADMIN — Medication 3 MILLIGRAM(S): at 23:04

## 2021-01-01 RX ADMIN — PANTOPRAZOLE SODIUM 40 MILLIGRAM(S): 20 TABLET, DELAYED RELEASE ORAL at 06:27

## 2021-01-01 RX ADMIN — Medication 1000 UNIT(S): at 13:29

## 2021-01-01 RX ADMIN — HYDROMORPHONE HYDROCHLORIDE 0.2 MILLIGRAM(S): 2 INJECTION INTRAMUSCULAR; INTRAVENOUS; SUBCUTANEOUS at 11:53

## 2021-01-01 RX ADMIN — Medication 100 MILLIGRAM(S): at 18:42

## 2021-01-01 RX ADMIN — CEFEPIME 100 MILLIGRAM(S): 1 INJECTION, POWDER, FOR SOLUTION INTRAMUSCULAR; INTRAVENOUS at 05:54

## 2021-01-01 RX ADMIN — CEFEPIME 100 MILLIGRAM(S): 1 INJECTION, POWDER, FOR SOLUTION INTRAMUSCULAR; INTRAVENOUS at 14:46

## 2021-01-01 RX ADMIN — DIPHENHYDRAMINE HYDROCHLORIDE AND LIDOCAINE HYDROCHLORIDE AND ALUMINUM HYDROXIDE AND MAGNESIUM HYDRO 5 MILLILITER(S): KIT at 16:47

## 2021-01-01 RX ADMIN — CEFEPIME 100 MILLIGRAM(S): 1 INJECTION, POWDER, FOR SOLUTION INTRAMUSCULAR; INTRAVENOUS at 05:39

## 2021-01-01 RX ADMIN — Medication 100 MILLIGRAM(S): at 06:28

## 2021-01-01 RX ADMIN — Medication 1000 UNIT(S): at 12:31

## 2021-01-01 RX ADMIN — SODIUM CHLORIDE 1000 MILLILITER(S): 9 INJECTION, SOLUTION INTRAVENOUS at 16:15

## 2021-01-01 RX ADMIN — CEFEPIME 100 MILLIGRAM(S): 1 INJECTION, POWDER, FOR SOLUTION INTRAMUSCULAR; INTRAVENOUS at 13:27

## 2021-01-01 RX ADMIN — Medication 1000 UNIT(S): at 12:06

## 2021-01-01 RX ADMIN — MOMETASONE FUROATE 1 PUFF(S): 220 INHALANT RESPIRATORY (INHALATION) at 16:55

## 2021-01-01 RX ADMIN — GABAPENTIN 300 MILLIGRAM(S): 400 CAPSULE ORAL at 23:20

## 2021-01-01 RX ADMIN — HYDROMORPHONE HYDROCHLORIDE 1 MILLIGRAM(S): 2 INJECTION INTRAMUSCULAR; INTRAVENOUS; SUBCUTANEOUS at 00:09

## 2021-01-01 RX ADMIN — ENOXAPARIN SODIUM 40 MILLIGRAM(S): 100 INJECTION SUBCUTANEOUS at 12:31

## 2021-01-01 RX ADMIN — MOMETASONE FUROATE 1 PUFF(S): 220 INHALANT RESPIRATORY (INHALATION) at 12:22

## 2021-01-01 RX ADMIN — MONTELUKAST 10 MILLIGRAM(S): 4 TABLET, CHEWABLE ORAL at 11:52

## 2021-01-01 RX ADMIN — Medication 0.25 MILLIGRAM(S): at 05:30

## 2021-01-01 RX ADMIN — HYDROMORPHONE HYDROCHLORIDE 0.2 MILLIGRAM(S): 2 INJECTION INTRAMUSCULAR; INTRAVENOUS; SUBCUTANEOUS at 15:15

## 2021-01-01 RX ADMIN — Medication 0.25 MILLIGRAM(S): at 05:35

## 2021-01-01 RX ADMIN — LACTULOSE 10 GRAM(S): 10 SOLUTION ORAL at 23:37

## 2021-01-01 RX ADMIN — HYDROMORPHONE HYDROCHLORIDE 0.5 MILLIGRAM(S): 2 INJECTION INTRAMUSCULAR; INTRAVENOUS; SUBCUTANEOUS at 20:13

## 2021-01-01 RX ADMIN — CEFEPIME 100 MILLIGRAM(S): 1 INJECTION, POWDER, FOR SOLUTION INTRAMUSCULAR; INTRAVENOUS at 14:35

## 2021-01-01 RX ADMIN — Medication 100 MILLIGRAM(S): at 06:46

## 2021-01-01 RX ADMIN — HYDROMORPHONE HYDROCHLORIDE 0.2 MILLIGRAM(S): 2 INJECTION INTRAMUSCULAR; INTRAVENOUS; SUBCUTANEOUS at 06:46

## 2021-01-01 RX ADMIN — DIPHENHYDRAMINE HYDROCHLORIDE AND LIDOCAINE HYDROCHLORIDE AND ALUMINUM HYDROXIDE AND MAGNESIUM HYDRO 5 MILLILITER(S): KIT at 17:00

## 2021-01-01 RX ADMIN — CEFEPIME 100 MILLIGRAM(S): 1 INJECTION, POWDER, FOR SOLUTION INTRAMUSCULAR; INTRAVENOUS at 06:46

## 2021-01-01 RX ADMIN — DIPHENHYDRAMINE HYDROCHLORIDE AND LIDOCAINE HYDROCHLORIDE AND ALUMINUM HYDROXIDE AND MAGNESIUM HYDRO 5 MILLILITER(S): KIT at 12:27

## 2021-01-01 RX ADMIN — MONTELUKAST 10 MILLIGRAM(S): 4 TABLET, CHEWABLE ORAL at 12:21

## 2021-01-01 RX ADMIN — Medication 100 MILLIGRAM(S): at 05:48

## 2021-01-01 RX ADMIN — HYDROMORPHONE HYDROCHLORIDE 0.5 MILLIGRAM(S): 2 INJECTION INTRAMUSCULAR; INTRAVENOUS; SUBCUTANEOUS at 16:53

## 2021-01-01 RX ADMIN — Medication 1: at 16:46

## 2021-01-01 RX ADMIN — GABAPENTIN 300 MILLIGRAM(S): 400 CAPSULE ORAL at 17:42

## 2021-01-01 RX ADMIN — HYDROMORPHONE HYDROCHLORIDE 0.2 MILLIGRAM(S): 2 INJECTION INTRAMUSCULAR; INTRAVENOUS; SUBCUTANEOUS at 00:25

## 2021-01-01 RX ADMIN — Medication 1 PACKET(S): at 16:15

## 2021-01-01 RX ADMIN — LIDOCAINE 5 MILLILITER(S): 4 CREAM TOPICAL at 05:56

## 2021-01-01 RX ADMIN — SODIUM CHLORIDE 1000 MILLILITER(S): 9 INJECTION, SOLUTION INTRAVENOUS at 17:10

## 2021-01-01 RX ADMIN — HYDROMORPHONE HYDROCHLORIDE 1 MILLIGRAM(S): 2 INJECTION INTRAMUSCULAR; INTRAVENOUS; SUBCUTANEOUS at 05:36

## 2021-01-01 RX ADMIN — HYDROMORPHONE HYDROCHLORIDE 0.25 MILLIGRAM(S): 2 INJECTION INTRAMUSCULAR; INTRAVENOUS; SUBCUTANEOUS at 10:35

## 2021-01-01 RX ADMIN — TRAMADOL HYDROCHLORIDE 25 MILLIGRAM(S): 50 TABLET ORAL at 01:43

## 2021-01-01 RX ADMIN — CEFEPIME 100 MILLIGRAM(S): 1 INJECTION, POWDER, FOR SOLUTION INTRAMUSCULAR; INTRAVENOUS at 00:20

## 2021-01-01 RX ADMIN — HYDROMORPHONE HYDROCHLORIDE 0.2 MILLIGRAM(S): 2 INJECTION INTRAMUSCULAR; INTRAVENOUS; SUBCUTANEOUS at 00:15

## 2021-01-01 RX ADMIN — DIPHENHYDRAMINE HYDROCHLORIDE AND LIDOCAINE HYDROCHLORIDE AND ALUMINUM HYDROXIDE AND MAGNESIUM HYDRO 30 MILLILITER(S): KIT at 16:00

## 2021-01-01 RX ADMIN — ENOXAPARIN SODIUM 40 MILLIGRAM(S): 100 INJECTION SUBCUTANEOUS at 12:27

## 2021-01-01 RX ADMIN — HYDROMORPHONE HYDROCHLORIDE 0.3 MG/HR: 2 INJECTION INTRAMUSCULAR; INTRAVENOUS; SUBCUTANEOUS at 09:48

## 2021-01-01 RX ADMIN — CEFEPIME 100 MILLIGRAM(S): 1 INJECTION, POWDER, FOR SOLUTION INTRAMUSCULAR; INTRAVENOUS at 08:40

## 2021-01-01 RX ADMIN — NYSTATIN CREAM 1 APPLICATION(S): 100000 CREAM TOPICAL at 17:33

## 2021-01-01 RX ADMIN — MUPIROCIN 1 APPLICATION(S): 20 OINTMENT TOPICAL at 18:42

## 2021-01-01 RX ADMIN — SODIUM CHLORIDE 75 MILLILITER(S): 9 INJECTION INTRAMUSCULAR; INTRAVENOUS; SUBCUTANEOUS at 07:59

## 2021-01-01 RX ADMIN — CEFEPIME 100 MILLIGRAM(S): 1 INJECTION, POWDER, FOR SOLUTION INTRAMUSCULAR; INTRAVENOUS at 18:52

## 2021-01-01 RX ADMIN — Medication 0.25 MILLIGRAM(S): at 16:44

## 2021-01-01 RX ADMIN — HYDROMORPHONE HYDROCHLORIDE 1.5 MILLIGRAM(S): 2 INJECTION INTRAMUSCULAR; INTRAVENOUS; SUBCUTANEOUS at 09:49

## 2021-01-01 RX ADMIN — HYDROMORPHONE HYDROCHLORIDE 0.5 MILLIGRAM(S): 2 INJECTION INTRAMUSCULAR; INTRAVENOUS; SUBCUTANEOUS at 22:48

## 2021-01-01 RX ADMIN — MOMETASONE FUROATE 1 PUFF(S): 220 INHALANT RESPIRATORY (INHALATION) at 11:52

## 2021-01-01 RX ADMIN — HYDROMORPHONE HYDROCHLORIDE 0.2 MILLIGRAM(S): 2 INJECTION INTRAMUSCULAR; INTRAVENOUS; SUBCUTANEOUS at 07:50

## 2021-01-01 RX ADMIN — LACTULOSE 10 GRAM(S): 10 SOLUTION ORAL at 13:06

## 2021-01-01 RX ADMIN — DIPHENHYDRAMINE HYDROCHLORIDE AND LIDOCAINE HYDROCHLORIDE AND ALUMINUM HYDROXIDE AND MAGNESIUM HYDRO 5 MILLILITER(S): KIT at 20:13

## 2021-01-01 RX ADMIN — CEFEPIME 100 MILLIGRAM(S): 1 INJECTION, POWDER, FOR SOLUTION INTRAMUSCULAR; INTRAVENOUS at 14:44

## 2021-01-01 RX ADMIN — SODIUM CHLORIDE 75 MILLILITER(S): 9 INJECTION, SOLUTION INTRAVENOUS at 17:43

## 2021-01-01 RX ADMIN — Medication 85 MILLIMOLE(S): at 12:42

## 2021-01-01 RX ADMIN — DIPHENHYDRAMINE HYDROCHLORIDE AND LIDOCAINE HYDROCHLORIDE AND ALUMINUM HYDROXIDE AND MAGNESIUM HYDRO 5 MILLILITER(S): KIT at 16:23

## 2021-01-01 RX ADMIN — Medication 1000 UNIT(S): at 13:42

## 2021-01-01 RX ADMIN — CEFEPIME 100 MILLIGRAM(S): 1 INJECTION, POWDER, FOR SOLUTION INTRAMUSCULAR; INTRAVENOUS at 23:30

## 2021-01-01 RX ADMIN — MUPIROCIN 1 APPLICATION(S): 20 OINTMENT TOPICAL at 05:31

## 2021-01-01 RX ADMIN — HYDROMORPHONE HYDROCHLORIDE 0.3 MG/HR: 2 INJECTION INTRAMUSCULAR; INTRAVENOUS; SUBCUTANEOUS at 21:29

## 2021-01-01 RX ADMIN — HYDROMORPHONE HYDROCHLORIDE 0.5 MILLIGRAM(S): 2 INJECTION INTRAMUSCULAR; INTRAVENOUS; SUBCUTANEOUS at 08:55

## 2021-01-01 RX ADMIN — SODIUM CHLORIDE 100 MILLILITER(S): 9 INJECTION, SOLUTION INTRAVENOUS at 12:43

## 2021-01-01 RX ADMIN — Medication 85 MILLIMOLE(S): at 01:06

## 2021-01-01 RX ADMIN — CEFEPIME 100 MILLIGRAM(S): 1 INJECTION, POWDER, FOR SOLUTION INTRAMUSCULAR; INTRAVENOUS at 21:24

## 2021-01-01 RX ADMIN — Medication 0.25 MILLIGRAM(S): at 08:55

## 2021-01-01 RX ADMIN — Medication 1: at 08:57

## 2021-01-01 RX ADMIN — MUPIROCIN 1 APPLICATION(S): 20 OINTMENT TOPICAL at 05:36

## 2021-01-01 RX ADMIN — Medication 1: at 12:27

## 2021-01-01 RX ADMIN — NYSTATIN CREAM 1 APPLICATION(S): 100000 CREAM TOPICAL at 18:04

## 2021-01-01 RX ADMIN — Medication 100 MILLIGRAM(S): at 05:54

## 2021-01-01 RX ADMIN — Medication 100 MILLIGRAM(S): at 05:33

## 2021-01-01 RX ADMIN — LACTULOSE 10 GRAM(S): 10 SOLUTION ORAL at 06:27

## 2021-01-01 RX ADMIN — Medication 100 MILLIGRAM(S): at 16:46

## 2021-01-01 RX ADMIN — DIPHENHYDRAMINE HYDROCHLORIDE AND LIDOCAINE HYDROCHLORIDE AND ALUMINUM HYDROXIDE AND MAGNESIUM HYDRO 5 MILLILITER(S): KIT at 05:32

## 2021-01-01 RX ADMIN — Medication 100 MILLIGRAM(S): at 23:30

## 2021-01-01 RX ADMIN — MONTELUKAST 10 MILLIGRAM(S): 4 TABLET, CHEWABLE ORAL at 12:06

## 2021-01-01 RX ADMIN — DIPHENHYDRAMINE HYDROCHLORIDE AND LIDOCAINE HYDROCHLORIDE AND ALUMINUM HYDROXIDE AND MAGNESIUM HYDRO 5 MILLILITER(S): KIT at 21:24

## 2021-01-01 RX ADMIN — Medication 0.25 MILLIGRAM(S): at 13:14

## 2021-01-01 RX ADMIN — HYDROMORPHONE HYDROCHLORIDE 0.5 MILLIGRAM(S): 2 INJECTION INTRAMUSCULAR; INTRAVENOUS; SUBCUTANEOUS at 19:58

## 2021-01-01 RX ADMIN — Medication 650 MILLIGRAM(S): at 00:57

## 2021-01-01 RX ADMIN — HYDROMORPHONE HYDROCHLORIDE 0.3 MG/HR: 2 INJECTION INTRAMUSCULAR; INTRAVENOUS; SUBCUTANEOUS at 13:04

## 2021-01-01 RX ADMIN — NYSTATIN CREAM 1 APPLICATION(S): 100000 CREAM TOPICAL at 18:54

## 2021-01-01 RX ADMIN — MUPIROCIN 1 APPLICATION(S): 20 OINTMENT TOPICAL at 05:40

## 2021-01-01 RX ADMIN — GABAPENTIN 300 MILLIGRAM(S): 400 CAPSULE ORAL at 09:34

## 2021-01-01 RX ADMIN — NYSTATIN CREAM 1 APPLICATION(S): 100000 CREAM TOPICAL at 05:14

## 2021-01-01 RX ADMIN — Medication 480 MICROGRAM(S): at 20:19

## 2021-01-01 RX ADMIN — CASPOFUNGIN ACETATE 260 MILLIGRAM(S): 7 INJECTION, POWDER, LYOPHILIZED, FOR SOLUTION INTRAVENOUS at 05:29

## 2021-01-01 RX ADMIN — FLUCONAZOLE 200 MILLIGRAM(S): 150 TABLET ORAL at 08:43

## 2021-01-01 RX ADMIN — MUPIROCIN 1 APPLICATION(S): 20 OINTMENT TOPICAL at 05:14

## 2021-01-01 RX ADMIN — ENOXAPARIN SODIUM 40 MILLIGRAM(S): 100 INJECTION SUBCUTANEOUS at 12:22

## 2021-01-01 RX ADMIN — DIPHENHYDRAMINE HYDROCHLORIDE AND LIDOCAINE HYDROCHLORIDE AND ALUMINUM HYDROXIDE AND MAGNESIUM HYDRO 5 MILLILITER(S): KIT at 08:40

## 2021-01-01 RX ADMIN — MUPIROCIN 1 APPLICATION(S): 20 OINTMENT TOPICAL at 18:04

## 2021-01-01 RX ADMIN — MUPIROCIN 1 APPLICATION(S): 20 OINTMENT TOPICAL at 05:29

## 2021-01-01 RX ADMIN — GABAPENTIN 300 MILLIGRAM(S): 400 CAPSULE ORAL at 15:59

## 2021-01-01 RX ADMIN — NYSTATIN CREAM 1 APPLICATION(S): 100000 CREAM TOPICAL at 22:18

## 2021-01-01 RX ADMIN — PANTOPRAZOLE SODIUM 40 MILLIGRAM(S): 20 TABLET, DELAYED RELEASE ORAL at 05:55

## 2021-01-01 RX ADMIN — CEFEPIME 100 MILLIGRAM(S): 1 INJECTION, POWDER, FOR SOLUTION INTRAMUSCULAR; INTRAVENOUS at 22:18

## 2021-01-01 RX ADMIN — POTASSIUM PHOSPHATE, MONOBASIC POTASSIUM PHOSPHATE, DIBASIC 83.33 MILLIMOLE(S): 236; 224 INJECTION, SOLUTION INTRAVENOUS at 09:34

## 2021-01-01 RX ADMIN — MOMETASONE FUROATE 1 PUFF(S): 220 INHALANT RESPIRATORY (INHALATION) at 13:29

## 2021-01-01 RX ADMIN — BENZOCAINE AND MENTHOL 1 LOZENGE: 5; 1 LIQUID ORAL at 14:40

## 2021-01-01 RX ADMIN — Medication 0.25 MILLIGRAM(S): at 21:43

## 2021-01-01 RX ADMIN — DIPHENHYDRAMINE HYDROCHLORIDE AND LIDOCAINE HYDROCHLORIDE AND ALUMINUM HYDROXIDE AND MAGNESIUM HYDRO 5 MILLILITER(S): KIT at 07:58

## 2021-01-01 RX ADMIN — MONTELUKAST 10 MILLIGRAM(S): 4 TABLET, CHEWABLE ORAL at 12:45

## 2021-01-01 RX ADMIN — MUPIROCIN 1 APPLICATION(S): 20 OINTMENT TOPICAL at 18:54

## 2021-01-01 RX ADMIN — HYDROMORPHONE HYDROCHLORIDE 1 MILLIGRAM(S): 2 INJECTION INTRAMUSCULAR; INTRAVENOUS; SUBCUTANEOUS at 11:33

## 2021-01-01 RX ADMIN — LETROZOLE 2.5 MILLIGRAM(S): 2.5 TABLET, FILM COATED ORAL at 12:06

## 2021-01-01 RX ADMIN — SODIUM CHLORIDE 10 MILLILITER(S): 9 INJECTION INTRAMUSCULAR; INTRAVENOUS; SUBCUTANEOUS at 09:48

## 2021-01-01 RX ADMIN — HYDROMORPHONE HYDROCHLORIDE 1.5 MILLIGRAM(S): 2 INJECTION INTRAMUSCULAR; INTRAVENOUS; SUBCUTANEOUS at 16:28

## 2021-01-01 RX ADMIN — Medication 1: at 12:43

## 2021-01-01 RX ADMIN — GABAPENTIN 300 MILLIGRAM(S): 400 CAPSULE ORAL at 17:05

## 2021-01-01 RX ADMIN — CEFEPIME 100 MILLIGRAM(S): 1 INJECTION, POWDER, FOR SOLUTION INTRAMUSCULAR; INTRAVENOUS at 05:00

## 2021-01-01 RX ADMIN — Medication 2: at 09:33

## 2021-01-01 RX ADMIN — Medication 1: at 12:30

## 2021-01-01 RX ADMIN — MUPIROCIN 1 APPLICATION(S): 20 OINTMENT TOPICAL at 17:34

## 2021-01-01 RX ADMIN — DIPHENHYDRAMINE HYDROCHLORIDE AND LIDOCAINE HYDROCHLORIDE AND ALUMINUM HYDROXIDE AND MAGNESIUM HYDRO 5 MILLILITER(S): KIT at 05:53

## 2021-01-01 RX ADMIN — CEFEPIME 100 MILLIGRAM(S): 1 INJECTION, POWDER, FOR SOLUTION INTRAMUSCULAR; INTRAVENOUS at 20:52

## 2021-01-01 RX ADMIN — NYSTATIN CREAM 1 APPLICATION(S): 100000 CREAM TOPICAL at 05:54

## 2021-01-01 RX ADMIN — Medication 2: at 17:47

## 2021-01-01 RX ADMIN — Medication 650 MILLIGRAM(S): at 23:04

## 2021-01-01 RX ADMIN — HYDROMORPHONE HYDROCHLORIDE 0.2 MILLIGRAM(S): 2 INJECTION INTRAMUSCULAR; INTRAVENOUS; SUBCUTANEOUS at 18:03

## 2021-01-01 RX ADMIN — CEFEPIME 100 MILLIGRAM(S): 1 INJECTION, POWDER, FOR SOLUTION INTRAMUSCULAR; INTRAVENOUS at 22:19

## 2021-01-01 RX ADMIN — POTASSIUM PHOSPHATE, MONOBASIC POTASSIUM PHOSPHATE, DIBASIC 83.33 MILLIMOLE(S): 236; 224 INJECTION, SOLUTION INTRAVENOUS at 08:56

## 2021-01-01 RX ADMIN — HYDROMORPHONE HYDROCHLORIDE 0.5 MILLIGRAM(S): 2 INJECTION INTRAMUSCULAR; INTRAVENOUS; SUBCUTANEOUS at 09:10

## 2021-01-01 RX ADMIN — HYDROMORPHONE HYDROCHLORIDE 0.2 MILLIGRAM(S): 2 INJECTION INTRAMUSCULAR; INTRAVENOUS; SUBCUTANEOUS at 13:06

## 2021-01-01 RX ADMIN — MUPIROCIN 1 APPLICATION(S): 20 OINTMENT TOPICAL at 06:46

## 2021-01-01 RX ADMIN — TRAMADOL HYDROCHLORIDE 25 MILLIGRAM(S): 50 TABLET ORAL at 02:10

## 2021-01-01 RX ADMIN — HYDROMORPHONE HYDROCHLORIDE 0.2 MILLIGRAM(S): 2 INJECTION INTRAMUSCULAR; INTRAVENOUS; SUBCUTANEOUS at 00:00

## 2021-01-22 NOTE — PHYSICAL EXAM
[Fully active, able to carry on all pre-disease performance without restriction] : Status 0 - Fully active, able to carry on all pre-disease performance without restriction [Obese] : obese [Normal] : affect appropriate [de-identified] : Normal respiratory effort. Speaking in full sentences without difficulty

## 2021-01-22 NOTE — HISTORY OF PRESENT ILLNESS
[Disease: _____________________] : Disease: [unfilled] [T: ___] : T[unfilled] [N: ___] : N[unfilled] [M: ___] : M[unfilled] [AJCC Stage: ____] : AJCC Stage: [unfilled] [Home] : at home, [unfilled] , at the time of the visit. [Medical Office: ([facility])___] : at the medical office located in  [Spouse] : spouse [Verbal consent obtained from patient] : the patient, [unfilled] [de-identified] : Left breast cancer diagnosed at the age of 64\par 01/19/20 Screening mammogram and breast ultrasound [facility] aside from scattered fibroglandular densities bilaterally.\par In mid February the patient developed pain her ribs and her PCP ordered a CXR and then a CT scan\par 04/02/20 CT scan of the chest showed a large mediastinal LN measuring 0.7 x 1.4 cm in the prevascular space. The heart is normal in size with trace pericardial effusion. Enlarged main pulmonary artery possibly due to pulmonary hypertension with the main artery measuring 3.9 cm, left pulmonary artery measures 2 cm and the right pulmonary artery measures 2.7 cm. There are multiple 1 to 3 mm nodules scattered throughout both lungs, which are nonspecific. Representative nodules include a 3 mm nodule in the left lower lobe and a 2 mm nodule in the right lower lobe. A few small calcified granulomas are also present in the right lung. Small lobulated left pleural effusion. There are diffuse lytic osseous metastases throughout the chest. Pathologic non displaced rib fracture present in the right seventh through ninth lateral ribs. Several enlarged left axillary lymph nodes, the largest of which demonstrate a fatty hilum but is enlarged, measuring 3.5 x 3 cm\par 04/08/20 Left breast mammogram showed a new ill defined mass like area in the outer central left breast extending over approximately 2.0 cm. \par                Left breast ultrasound showed a vague 2.0 cm ill defined mass in 1 o'clock axis of the left breast 5 cm FN. \par 04/08/20 Left breast 1 o'clock axis 5 cm FN sonoguided core biopsy showed invasive lobular carcinoma, SBR 6/9, ER >90%, [place] >90% and HER-2 negative.\par                Left axillary sonoguided core biopsy showed a lymph node with metastatic carcinoma with similar histology to the core biopsy of the breast.\par 4/17/20 Letrozole and Ibrance started with dose reduction from 125 mg to 100 mg starting cycle 2 due to neutropenia\par 5/1/20 Zometa started and given monthly x 3 then continue every 3 months\par 9/10/20 Genetic testing with [employer] gene panel showed a pathogenic mutation in BRCA2: c.5946del (p.[id number]), which is associated with an increased risk for breast cancer, ovarian cancer, pancreatic cancer, melanoma, prostate cancer and male breast cancer; and also CDH1: deletion of exons 15-16, which is associated with an increased risk of breast cancer and gastric cancer\par  [de-identified] : Invasive lobular carcinoma, SBR 6/9, ER >90%, [place] >90%, HER-2 negative [de-identified] : Sylvia continues to do well on Ibrance and Letrozole, which she started on  4/17/20. She is getting her next cycle of Zometa today.\par She had a DR of Ibrance to 100 mg daily, which she started on 5/25/20 due to neutropenia. She started cycle 10 on Monday 1/4/21.\par She notices that she goes into a funk in the off week from Ibrance and cries easily. She does not feel depressed all the time.\par She continues to have anxiety and some trouble sleeping. This is better since she started Xanax at bedtime, but she is still only sleeping 3-4 hours a night.\par She occasionally gets back pain and stiffness for which she takes Ultracet and Voltaren pill as needed with significant improvement. \par She now rarely gets nausea in the morning. Occasionally has it at night after eating dinner.\par She and her  drove to [place] to see his mother and had a good visit. They quarantined after their return.\par She is scheduled for her first COVID vaccine on 2/11/21.\par \par 1/12/21 Bone scan showed no significant change in distribution of metastatic lesions and less intense uptake in most lesions compared with previous scan of 7/9/20. No new osseous lesions identified\par [id number] CT scan showed extensive osseous metastatic disease with interval increase in sclerosis compared with 5/5/20, consistent with treatment effect.\par 07/09/20 Bone scan showed numerous foci of increased tracer uptake in the left anterior and right posteromedial parietal calvarium, sternum, multiple ribs bilaterally, and femoral shafts. There is also heterogenous tracer distribution throughout the spine and pelvis. Tracer distribution and activity of osseous lesions are not significantly changed compared to the previous study. There is a left hip arthroplasty, unchanged. There are degenerative changes in the major joints. No new osseous lesions are identified. Both kidneys are visualized. IMPRESSION: Abnormal bone scan.Findings compatible with extensive osseous metastases in the axial and appendicular skeleton, not significantly changed from study of 5/5/2020.\par -------------------------------------------------------------------------------------------------------------------------------------------------------------------------------------------------------------------\par 5/5/20 Bone scan showed focal abnormalities in the posteromedial aspect of the right parietal bone, anterior aspect of the left parietal bone parasagittally, sternum, anterior and posterior ribs bilaterally, and both femoral shafts. On the SPECT/CT component of the examination there is evidence of cortical thinning involving the left femoral lesions but not the right femoral lesions. \par IMPRESSION: Extensive osseous metastases in the axial and appendicular skeletons. Cortical thinning in the left femoral lesions, indicates that there maybe potential for pathologic fracture. \par 5/5/20 CT scan showed widespread bony metastases but no other sites of metastatic disease identified\par \par \par \par

## 2021-02-22 NOTE — OB HISTORY
BECKY TORREZ    Patient Age: 68 year old    @JIM@  MESSAGE:    Lulu with [facility] calling to find out if antibiotics are needed prior to dental work.    Surgeon: Ganga Urena DPM    Date of Surgery: 02/02/21    Type of Surgery:Tibial sesamoid exostectomy, left foot    Date of Dental Appointment: Now-patient is in office for a cleaning     Routing to Nursing Staff for advice.     Next and Last Visit with Provider and Department  Last visit with Ganga Urena DPM: 2/17/2021  Last visit with this department: 2/17/2021    Next appt with CHUCK BenoitM: 3/18/2021  Next appt with this department: 3/18/2021    WEIGHT AND HEIGHT:        ALLERGIES:   Allergen Reactions   • Bactrim Ds RASH   • Sulfamethoxazole-Trimethoprim RASH     Current Outpatient Medications   Medication Sig   • raloxifene (EVISTA) 60 MG tablet TAKE 1 TABLET BY MOUTH EVERY DAY   • levothyroxine 50 MCG tablet TAKE 1 TABLET BY MOUTH EVERY OTHER DAY, ALTERNATING WITH 1&1/2 TABLETS AS DIRECTED   • celecoxib (CeleBREX) 200 MG capsule TAKE 1 CAPSULE BY MOUTH TWICE A DAY   • hydrochlorothiazide (HYDRODIURIL) 25 MG tablet TAKE 1 TABLET BY MOUTH EVERY DAY AS DIRECTED   • fluocinonide (LIDEX) 0.05 % topical solution Apply to aa of scalp daily for 2 weeks out of 4   • fluocinolone (Synalar) 0.025 % ointment Apply topically 2 times daily.   • traZODone (DESYREL) 100 MG tablet TAKE 1 TABLET BY MOUTH EVERY DAY AT NIGHT   • simvastatin (ZOCOR) 20 MG tablet TAKE 1 TABLET BY MOUTH EVERY DAY AT NIGHT   • fluorouracil (EFUDEX) 5 % cream Apply to the affected areas twice daily for 3 weeks; during first weeks of use the areas will feel irritated and look red and swollen   • lisinopril (ZESTRIL) 10 MG tablet TAKE 1 TABLET BY MOUTH EVERY DAY AS DIRECTED   • fluticasone (FLONASE) 50 MCG/ACT nasal spray Spray 2 sprays in each nostril daily.   • levocetirizine (XYZAL ALLERGY 24HR) 5 MG tablet Take 1 tablet by mouth every evening.   • Omega-3 Fatty Acids  (FISH OIL) 1000 MG capsule Take 2 g by mouth daily.   • Magnesium 500 MG Tab    • Calcium Carbonate-Vitamin D 600-200 MG-UNIT Cap       PHARMACY to use:           Pharmacy preference(s) on file: The requested medication(s) have been refilled.  The refill request(s) was within refill protocol.  Refill(s) were authorized by pharmacist.    CALL BACK INFO: Ok to leave response (including medical information) with family member or on ans. machine  ROUTING: Patient's physician / staff    PCP: Sara Martin MD         INS: Payor: BALJIT/KAMLESH / Plan: [id number] / Product Type: PPO [facility]   ADDRESS:  [address] [place] [address] [place] [address]        [Menarche Age: ____] : age at menarche was [unfilled] [Menopause Age: ____] : age at menopause was [unfilled] [___] :  Induced: [unfilled]

## 2021-02-25 PROBLEM — L89.309 PRESSURE SORE ON BUTTOCKS: Status: ACTIVE | Noted: 2021-01-01

## 2021-02-25 NOTE — HISTORY OF PRESENT ILLNESS
[de-identified] : Left breast cancer diagnosed at the age of 64\par 01/19/20 Screening mammogram and breast ultrasound [facility] aside from scattered fibroglandular densities bilaterally.\par In mid February the patient developed pain her ribs and her PCP ordered a CXR and then a CT scan\par 04/02/20 CT scan of the chest showed a large mediastinal LN measuring 0.7 x 1.4 cm in the prevascular space. The heart is normal in size with trace pericardial effusion. Enlarged main pulmonary artery possibly due to pulmonary hypertension with the main artery measuring 3.9 cm, left pulmonary artery measures 2 cm and the right pulmonary artery measures 2.7 cm. There are multiple 1 to 3 mm nodules scattered throughout both lungs, which are nonspecific. Representative nodules include a 3 mm nodule in the left lower lobe and a 2 mm nodule in the right lower lobe. A few small calcified granulomas are also present in the right lung. Small lobulated left pleural effusion. There are diffuse lytic osseous metastases throughout the chest. Pathologic non displaced rib fracture present in the right seventh through ninth lateral ribs. Several enlarged left axillary lymph nodes, the largest of which demonstrate a fatty hilum but is enlarged, measuring 3.5 x 3 cm\par 04/08/20 Left breast mammogram showed a new ill defined mass like area in the outer central left breast extending over approximately 2.0 cm. \par                Left breast ultrasound showed a vague 2.0 cm ill defined mass in 1 o'clock axis of the left breast 5 cm FN. \par 04/08/20 Left breast 1 o'clock axis 5 cm FN sonoguided core biopsy showed invasive lobular carcinoma, SBR 6/9, ER >90%, LA >90% and HER-2 negative.\par                Left axillary sonoguided core biopsy showed a lymph node with metastatic carcinoma with similar histology to the core biopsy of the breast.\par 4/17/20 Letrozole and Ibrance started with dose reduction from 125 mg to 100 mg starting cycle 2 due to neutropenia\par 5/1/20 Zometa started and given monthly x 3 then continue every 3 months\par 9/10/20 Genetic testing with [employer] 47 gene panel showed a pathogenic mutation in BRCA2: c.5946del ([id number]), which is associated with an increased risk for breast cancer, ovarian cancer, pancreatic cancer, melanoma, prostate cancer and male breast cancer; and also CDH1: deletion of exons 15-16, which is associated with an increased risk of breast cancer and gastric cancer\par  [de-identified] : Invasive lobular carcinoma, SBR 6/9, ER >90%, OK >90%, HER-2 negative [de-identified] : Sylvia continues to do well on Ibrance and Letrozole, which she started on  4/17/20. \par She had a DR of Ibrance to 100 mg daily in 5/20 due to neutropenia. \par She is currently on her week off and is due to start cycle 12 on 3/1/21.\par She is overall doing really well with no acute complaints at this time . She mentions having some left rib pain last month but  seems to have gotten better after her Zometa injection. The back pain is stable and she take Tramadol as needed. She has not really been using the Voltaren gel\par She also c/o left buttock "boil" that was very hard and slightly tender a few days ago, she believe it likely because of her sitting all day\par She continues to have anxiety and some trouble sleeping. This is better since she started Xanax at bedtime, but she is still only sleeping 3-4 hours a night. \par She now rarely gets nausea in the morning. Occasionally has it at night after eating dinner.\par She received her first COVID vaccine on 2/11/21, next on is 3/4/21.\par \par 1/12/21 Bone scan showed no significant change in distribution of metastatic lesions and less intense uptake in most lesions compared with previous scan of 7/9/20. No new osseous lesions identified\par 10//16072 CT scan showed extensive osseous metastatic disease with interval increase in sclerosis compared with 5/5/20, consistent with treatment effect.\par 07/09/20 Bone scan showed numerous foci of increased tracer uptake in the left anterior and right posteromedial parietal calvarium, sternum, multiple ribs bilaterally, and femoral shafts. There is also heterogenous tracer distribution throughout the spine and pelvis. Tracer distribution and activity of osseous lesions are not significantly changed compared to the previous study. There is a left hip arthroplasty, unchanged. There are degenerative changes in the major joints. No new osseous lesions are identified. Both kidneys are visualized. IMPRESSION: Abnormal bone scan.Findings compatible with extensive osseous metastases in the axial and appendicular skeleton, not significantly changed from study of 5/5/2020.\par -------------------------------------------------------------------------------------------------------------------------------------------------------------------------------------------------------------------\par 5/5/20 Bone scan showed focal abnormalities in the posteromedial aspect of the right parietal bone, anterior aspect of the left parietal bone parasagittally, sternum, anterior and posterior ribs bilaterally, and both femoral shafts. On the SPECT/CT component of the examination there is evidence of cortical thinning involving the left femoral lesions but not the right femoral lesions. \par IMPRESSION: Extensive osseous metastases in the axial and appendicular skeletons. Cortical thinning in the left femoral lesions, indicates that there maybe potential for pathologic fracture. \par 5/5/20 CT scan showed widespread bony metastases but no other sites of metastatic disease identified\par \par \par \par

## 2021-02-25 NOTE — PHYSICAL EXAM
[Normal] : normal external exam, normal sphincter tone; no palpable masses; stool guaiac negative [FreeTextEntry1] : Pressure sore along the Ischial tuberosity bilaterally L>>R, no abscess or signs of infection

## 2021-03-25 NOTE — PHYSICAL EXAM
[Fully active, able to carry on all pre-disease performance without restriction] : Status 0 - Fully active, able to carry on all pre-disease performance without restriction [Obese] : obese [Normal] : affect appropriate [FreeTextEntry1] : Pressure sore along the Ischial tuberosity bilaterally L>>R, no abscess or signs of infection

## 2021-03-25 NOTE — HISTORY OF PRESENT ILLNESS
[Disease: _____________________] : Disease: [unfilled] [T: ___] : T[unfilled] [N: ___] : N[unfilled] [M: ___] : M[unfilled] [AJCC Stage: ____] : AJCC Stage: [unfilled] [de-identified] : Left breast cancer diagnosed at the age of 64\par 01/19/20 Screening mammogram and breast ultrasound KEON aside from scattered fibroglandular densities bilaterally.\par In mid February the patient developed pain her ribs and her PCP ordered a CXR and then a CT scan\par 04/02/20 CT scan of the chest showed a large mediastinal LN measuring 0.7 x 1.4 cm in the prevascular space. The heart is normal in size with trace pericardial effusion. Enlarged main pulmonary artery possibly due to pulmonary hypertension with the main artery measuring 3.9 cm, left pulmonary artery measures 2 cm and the right pulmonary artery measures 2.7 cm. There are multiple 1 to 3 mm nodules scattered throughout both lungs, which are nonspecific. Representative nodules include a 3 mm nodule in the left lower lobe and a 2 mm nodule in the right lower lobe. A few small calcified granulomas are also present in the right lung. Small lobulated left pleural effusion. There are diffuse lytic osseous metastases throughout the chest. Pathologic non displaced rib fracture present in the right seventh through ninth lateral ribs. Several enlarged left axillary lymph nodes, the largest of which demonstrate a fatty hilum but is enlarged, measuring 3.5 x 3 cm\par 04/08/20 Left breast mammogram showed a new ill defined mass like area in the outer central left breast extending over approximately 2.0 cm. \par                Left breast ultrasound showed a vague 2.0 cm ill defined mass in 1 o'clock axis of the left breast 5 cm FN. \par 04/08/20 Left breast 1 o'clock axis 5 cm FN sonoguided core biopsy showed invasive lobular carcinoma, SBR 6/9, ER >90%, NM >90% and HER-2 negative.\par                Left axillary sonoguided core biopsy showed a lymph node with metastatic carcinoma with similar histology to the core biopsy of the breast.\par 4/17/20 Letrozole and Ibrance started with dose reduction from 125 mg to 100 mg starting cycle 2 due to neutropenia\par 5/1/20 Zometa started and given monthly x 3 then continue every 3 months\par 9/10/20 Genetic testing with [employer]ae 47 gene panel showed a pathogenic mutation in BRCA2: c.5946del (p.[id number]), which is associated with an increased risk for breast cancer, ovarian cancer, pancreatic cancer, melanoma, prostate cancer and male breast cancer; and also CDH1: deletion of exons 15-16, which is associated with an increased risk of breast cancer and gastric cancer\par  [de-identified] : Invasive lobular carcinoma, SBR 6/9, ER >90%, NM >90%, HER-2 negative [de-identified] : Sylvia continues to do well on Ibrance and Letrozole, which she started on  4/17/20. \par She had a DR of Ibrance to 100 mg daily in 5/20 due to neutropenia. She will start cycle 13 on 3/29/21. She continues to tolerate it well with mild fatigue.\par The back pain is stable and she takes Tramadol as needed. She uses the Voltaren gel occasionally\par She is getting more shortness of breath when walking, but knows that she has gained about 40 pounds over the last year.\par She continues to have anxiety and some trouble sleeping. This is better since she started Xanax at bedtime. She also takes Tylenol PM and melatonin and with the combination she is now getting about 7 hours of sleep a night.\par She occasionally gets nausea in the morning, and sometimes in the evenings as well. Her BS have been better.\par She received her first COVID vaccine on 2/11/21, and second on 3/4/21.\par \par 1/12/21 Bone scan showed no significant change in distribution of metastatic lesions and less intense uptake in most lesions compared with previous scan of 7/9/20. No new osseous lesions identified\par 10//[id number] CT scan showed extensive osseous metastatic disease with interval increase in sclerosis compared with 5/5/20, consistent with treatment effect.\par 07/09/20 Bone scan showed numerous foci of increased tracer uptake in the left anterior and right posteromedial parietal calvarium, sternum, multiple ribs bilaterally, and femoral shafts. There is also heterogenous tracer distribution throughout the spine and pelvis. Tracer distribution and activity of osseous lesions are not significantly changed compared to the previous study. There is a left hip arthroplasty, unchanged. There are degenerative changes in the major joints. No new osseous lesions are identified. Both kidneys are visualized. IMPRESSION: Abnormal bone scan.Findings compatible with extensive osseous metastases in the axial and appendicular skeleton, not significantly changed from study of 5/5/2020.\par -------------------------------------------------------------------------------------------------------------------------------------------------------------------------------------------------------------------\par 5/5/20 Bone scan showed focal abnormalities in the posteromedial aspect of the right parietal bone, anterior aspect of the left parietal bone parasagittally, sternum, anterior and posterior ribs bilaterally, and both femoral shafts. On the SPECT/CT component of the examination there is evidence of cortical thinning involving the left femoral lesions but not the right femoral lesions. \par IMPRESSION: Extensive osseous metastases in the axial and appendicular skeletons. Cortical thinning in the left femoral lesions, indicates that there maybe potential for pathologic fracture. \par 5/5/20 CT scan showed widespread bony metastases but no other sites of metastatic disease identified\par \par \par \par

## 2021-04-24 NOTE — HISTORY OF PRESENT ILLNESS
[de-identified] : Left breast cancer diagnosed at the age of 64\par 01/19/20 Screening mammogram and breast ultrasound [facility] aside from scattered fibroglandular densities bilaterally.\par In mid February the patient developed pain her ribs and her PCP ordered a CXR and then a CT scan\par 04/02/20 CT scan of the chest showed a large mediastinal LN measuring 0.7 x 1.4 cm in the prevascular space. The heart is normal in size with trace pericardial effusion. Enlarged main pulmonary artery possibly due to pulmonary hypertension with the main artery measuring 3.9 cm, left pulmonary artery measures 2 cm and the right pulmonary artery measures 2.7 cm. There are multiple 1 to 3 mm nodules scattered throughout both lungs, which are nonspecific. Representative nodules include a 3 mm nodule in the left lower lobe and a 2 mm nodule in the right lower lobe. A few small calcified granulomas are also present in the right lung. Small lobulated left pleural effusion. There are diffuse lytic osseous metastases throughout the chest. Pathologic non displaced rib fracture present in the right seventh through ninth lateral ribs. Several enlarged left axillary lymph nodes, the largest of which demonstrate a fatty hilum but is enlarged, measuring 3.5 x 3 cm\par 04/08/20 Left breast mammogram showed a new ill defined mass like area in the outer central left breast extending over approximately 2.0 cm. \par                Left breast ultrasound showed a vague 2.0 cm ill defined mass in 1 o'clock axis of the left breast 5 cm FN. \par 04/08/20 Left breast 1 o'clock axis 5 cm FN sonoguided core biopsy showed invasive lobular carcinoma, SBR 6/9, ER >90%, RI >90% and HER-2 negative.\par                Left axillary sonoguided core biopsy showed a lymph node with metastatic carcinoma with similar histology to the core biopsy of the breast.\par 4/17/20 Letrozole and Ibrance started with dose reduction from 125 mg to 100 mg starting cycle 2 due to neutropenia\par 5/1/20 Zometa started and given monthly x 3 then continue every 3 months\par 9/10/20 Genetic testing with [employer] 47 gene panel showed a pathogenic mutation in BRCA2: c.5946del ([id number]), which is associated with an increased risk for breast cancer, ovarian cancer, pancreatic cancer, melanoma, prostate cancer and male breast cancer; and also CDH1: deletion of exons 15-16, which is associated with an increased risk of breast cancer and gastric cancer\par  [de-identified] : Invasive lobular carcinoma, SBR 6/9, ER >90%, DE >90%, HER-2 negative [de-identified] : Sylvia started letrozole and Ibrance on 4/17/20 and continues to do well on this regimen aside from mild fatigue. She got her Zometa today on 4/23/21.\par She had a DR of Ibrance to 100 mg daily in 5/20 due to neutropenia. She will start cycle 14 on 4/26/21.\par Recently she has been having right hip pain when she walks and has to stop after walking about a quarter of a block. She takes diclofenac twice a day on a regular basis and typically takes Tramadol about once a day as needed. She uses the Voltaren gel occasionally\par She is getting more shortness of breath when walking, but knows that she has gained about 40 pounds over the last year.\par She continues to have anxiety and some trouble sleeping. This is better since she started Xanax at bedtime. She also takes Tylenol PM and melatonin and with the combination she is now getting about 7 hours of sleep a night.\par She occasionally gets nausea in the morning, and sometimes in the evenings as well. Her BS have been better.\par She received her first COVID vaccine on 2/11/21, and second on 3/4/21.\par \par 1/12/21 Bone scan showed no significant change in distribution of metastatic lesions and less intense uptake in most lesions compared with previous scan of 7/9/20. No new osseous lesions identified\par 10//[id number] CT scan showed extensive osseous metastatic disease with interval increase in sclerosis compared with 5/5/20, consistent with treatment effect.\par 07/09/20 Bone scan showed numerous foci of increased tracer uptake in the left anterior and right posteromedial parietal calvarium, sternum, multiple ribs bilaterally, and femoral shafts. There is also heterogenous tracer distribution throughout the spine and pelvis. Tracer distribution and activity of osseous lesions are not significantly changed compared to the previous study. There is a left hip arthroplasty, unchanged. There are degenerative changes in the major joints. No new osseous lesions are identified. Both kidneys are visualized. IMPRESSION: Abnormal bone scan.Findings compatible with extensive osseous metastases in the axial and appendicular skeleton, not significantly changed from study of 5/5/2020.\par -------------------------------------------------------------------------------------------------------------------------------------------------------------------------------------------------------------------\par 5/5/20 Bone scan showed focal abnormalities in the posteromedial aspect of the right parietal bone, anterior aspect of the left parietal bone parasagittally, sternum, anterior and posterior ribs bilaterally, and both femoral shafts. On the SPECT/CT component of the examination there is evidence of cortical thinning involving the left femoral lesions but not the right femoral lesions. \par IMPRESSION: Extensive osseous metastases in the axial and appendicular skeletons. Cortical thinning in the left femoral lesions, indicates that there maybe potential for pathologic fracture. \par 5/5/20 CT scan showed widespread bony metastases but no other sites of metastatic disease identified\par \par \par \par

## 2021-04-24 NOTE — PHYSICAL EXAM
[FreeTextEntry1] : Pressure sore along the Ischial tuberosity bilaterally L>>R, no abscess or signs of infection

## 2021-05-12 NOTE — PHYSICAL EXAM
[General Appearance - Alert] : alert [General Appearance - In No Acute Distress] : in no acute distress [General Appearance - Well Nourished] : well nourished [General Appearance - Well Developed] : well developed [Sclera] : the sclera and conjunctiva were normal [Hearing Threshold Finger Rub Not [place]] : hearing was normal [Respiration, Rhythm And Depth] : normal respiratory rhythm and effort [Auscultation Breath Sounds / Voice Sounds] : lungs were clear to auscultation bilaterally [Heart Rate And Rhythm] : heart rate was normal and rhythm regular [Heart Sounds] : normal S1 and S2 [Bowel Sounds] : normal bowel sounds [Abdomen Soft] : soft [Abdomen Tenderness] : non-tender [FreeTextEntry1] : obese [Abnormal Walk] : normal gait [] : no rash [Skin Lesions] : no skin lesions [No Focal Deficits] : no focal deficits [Oriented To Time, Place, And Person] : oriented to person, place, and time

## 2021-05-12 NOTE — REVIEW OF SYSTEMS
[As Noted in HPI] : as noted in HPI [Fever] : no fever [Chills] : no chills [Eyesight Problems] : no eyesight problems [Cough] : no cough [SOB on Exertion] : no shortness of breath during exertion [Pelvic Pain] : no pelvic pain [Anxiety] : no anxiety [Depression] : no depression [Muscle Weakness] : no muscle weakness [Swollen Glands] : no swollen glands [Swollen Glands In The Neck] : no swollen glands in the neck

## 2021-05-12 NOTE — ASSESSMENT
[FreeTextEntry1] : anemia,,positive genetic testing, family h/o gastric cancer, referrred from oncology for egd/colonoscopy, pt with h/o metastatic breast cancer, discussed risks( increase anesthesia risk) and benefits of egd/colonoscopy including alternative no evaluation and ct colonography, she would like to proceed with egd/colonoscopy\par \par Discussed risks including but not limited to bleeding,infection,drug reaction, perforation,missed lesion,benefits and alternatives of colonoscopy/egd with patient  including no\par treatment and patient consents to procedure.\par \par plan egd/colonoscopy to be scheduled in hospital per patient request between august 19 and august 29th

## 2021-05-12 NOTE — HISTORY OF PRESENT ILLNESS
[FreeTextEntry1] : 64 yo  with h/o lap band deflated, with h/o breast cancer metastatic cancer. on chemo therapy, ct scan  recent no change in metastasic disease. Patient referred by oncology for egd/colonoscopy after had postive gene testing.\par Sister  stomach age age 47, mother with h/o breast cancer, father with lung cancer.\par \par Patient reports normal bms, no brbpr, no melena.  weight stable. nausea at times,  rare gerd.

## 2021-05-20 NOTE — HISTORY OF PRESENT ILLNESS
[Disease: _____________________] : Disease: [unfilled] [T: ___] : T[unfilled] [N: ___] : N[unfilled] [M: ___] : M[unfilled] [AJCC Stage: ____] : AJCC Stage: [unfilled] [de-identified] : Left breast cancer diagnosed at the age of 64\par 01/19/20 Screening mammogram and breast ultrasound [facility] aside from scattered fibroglandular densities bilaterally.\par In mid February the patient developed pain her ribs and her PCP ordered a CXR and then a CT scan\par 04/02/20 CT scan of the chest showed a large mediastinal LN measuring 0.7 x 1.4 cm in the prevascular space. The heart is normal in size with trace pericardial effusion. Enlarged main pulmonary artery possibly due to pulmonary hypertension with the main artery measuring 3.9 cm, left pulmonary artery measures 2 cm and the right pulmonary artery measures 2.7 cm. There are multiple 1 to 3 mm nodules scattered throughout both lungs, which are nonspecific. Representative nodules include a 3 mm nodule in the left lower lobe and a 2 mm nodule in the right lower lobe. A few small calcified granulomas are also present in the right lung. Small lobulated left pleural effusion. There are diffuse lytic osseous metastases throughout the chest. Pathologic non displaced rib fracture present in the right seventh through ninth lateral ribs. Several enlarged left axillary lymph nodes, the largest of which demonstrate a fatty hilum but is enlarged, measuring 3.5 x 3 cm\par 04/08/20 Left breast mammogram showed a new ill defined mass like area in the outer central left breast extending over approximately 2.0 cm. \par                Left breast ultrasound showed a vague 2.0 cm ill defined mass in 1 o'clock axis of the left breast 5 cm FN. \par 04/08/20 Left breast 1 o'clock axis 5 cm FN sonoguided core biopsy showed invasive lobular carcinoma, SBR 6/9, ER >90%, ID >90% and HER-2 negative.\par                Left axillary sonoguided core biopsy showed a lymph node with metastatic carcinoma with similar histology to the core biopsy of the breast.\par 4/17/20 Letrozole and Ibrance started with dose reduction from 125 mg to 100 mg starting cycle 2 due to neutropenia\par 5/1/20 Zometa started and given monthly x 3 then continue every 3 months\par 9/10/20 [facility] gene panel showed a pathogenic mutation in BRCA2: c.5946del ([id number]), which is associated with an increased risk for breast cancer, ovarian cancer, pancreatic cancer, melanoma, prostate cancer and male breast cancer; and also CDH1: deletion of exons 15-16, which is associated with an increased risk of breast cancer and gastric cancer\par  [de-identified] : Invasive lobular carcinoma, SBR 6/9, ER >90%, OH >90%, HER-2 negative [de-identified] : Sylvia started letrozole and Ibrance on 4/17/20 and continues to do well on this regimen aside from mild fatigue. She got her Zometa on 4/23/21.\par She had a DR of Ibrance to 100 mg daily in 5/20 due to neutropenia. She will start cycle 15 on 5/24/21.\par She is overall doing well with no acute complaints at this time, aside from having trouble sleeping at nights.\par She attributes her sleeping troubles due to anxiety, usually thinking or stressing about her family and most recently has been stressing about the with pericardial effusion that was noted on a CT scan.  She reports taking the Xanax 0.5mg at nights with mild improvement in her anxiety, but doesn't help too well with her sleeping at times. She is getting more SOB when walking. She still gets some right hip pain when she walks, she has been able to walk about a quarter of a block lately both due to pain and SOB, requiring her to stop frequently when ambulating . She takes diclofenac twice a day on a regular basis and typically takes Tramadol about once a day as needed. She uses the Voltaren gel occasionally. She also takes Tylenol PM and melatonin and with the combination, when she is on her week  off the Ibrance.\par She occasionally gets nausea in the morning, and sometimes in the evenings as well. Her BS have been better. She also mentions developing a few mouth sores that were managed well with non ETOH mouthwash.\par She received her first COVID vaccine on 2/11/21, and second on 3/4/21.\par She is scheduled to have her endoscopy/colonoscopy on 8/26/21 with Dr. Norris (scheduled so that she would in the middle of her Ibrance cycle like day 11)\par \par 04/27/21 Echocardiogram with EF of 55%. There is mild concentric left ventricular hypertrophy and mildly enlarged  left atrium.+ small pericardial effusion  present. Next echocardiogram due next month as her Cardiology\par \par 04/12/21 CT scan showed Extensive osseous metastatic disease without significant change from prior study 10/1/2020.  A small pericardial effusion was noted, slightly increased.\par \par 1/12/21 Bone scan showed no significant change in distribution of metastatic lesions and less intense uptake in most lesions compared with previous scan of 7/9/20. No new osseous lesions identified\par 10//[id number] CT scan showed extensive osseous metastatic disease with interval increase in sclerosis compared with 5/5/20, consistent with treatment effect.\par 07/09/20 Bone scan showed numerous foci of increased tracer uptake in the left anterior and right posteromedial parietal calvarium, sternum, multiple ribs bilaterally, and femoral shafts. There is also heterogenous tracer distribution throughout the spine and pelvis. Tracer distribution and activity of osseous lesions are not significantly changed compared to the previous study. There is a left hip arthroplasty, unchanged. There are degenerative changes in the major joints. No new osseous lesions are identified. Both kidneys are visualized. IMPRESSION: Abnormal bone scan.Findings compatible with extensive osseous metastases in the axial and appendicular skeleton, not significantly changed from study of 5/5/2020.\par -------------------------------------------------------------------------------------------------------------------------------------------------------------------------------------------------------------------\par 5/5/20 Bone scan showed focal abnormalities in the posteromedial aspect of the right parietal bone, anterior aspect of the left parietal bone parasagittally, sternum, anterior and posterior ribs bilaterally, and both femoral shafts. On the SPECT/CT component of the examination there is evidence of cortical thinning involving the left femoral lesions but not the right femoral lesions. \par IMPRESSION: Extensive osseous metastases in the axial and appendicular skeletons. Cortical thinning in the left femoral lesions, indicates that there maybe potential for pathologic fracture. \par 5/5/20 CT scan showed widespread bony metastases but no other sites of metastatic disease identified\par \par \par \par

## 2021-06-18 PROBLEM — E11.40 DIABETIC NEUROPATHY: Status: ACTIVE | Noted: 2020-04-15

## 2021-06-18 NOTE — PHYSICAL EXAM
[Fully active, able to carry on all pre-disease performance without restriction] : Status 0 - Fully active, able to carry on all pre-disease performance without restriction [Obese] : obese [Normal] : affect appropriate [de-identified] : Left 3rd phalanx mallet finger with brace  [de-identified] : Bilateral knees, right elbow and left thumb - scrape s/p fall.

## 2021-06-18 NOTE — HISTORY OF PRESENT ILLNESS
[Disease: _____________________] : Disease: [unfilled] [T: ___] : T[unfilled] [N: ___] : N[unfilled] [M: ___] : M[unfilled] [AJCC Stage: ____] : AJCC Stage: [unfilled] [de-identified] : Left breast cancer diagnosed at the age of 64\par 01/19/20 Screening mammogram and breast ultrasound [facility] aside from scattered fibroglandular densities bilaterally.\par In mid February the patient developed pain her ribs and her PCP ordered a CXR and then a CT scan\par 04/02/20 CT scan of the chest showed a large mediastinal LN measuring 0.7 x 1.4 cm in the prevascular space. The heart is normal in size with trace pericardial effusion. Enlarged main pulmonary artery possibly due to pulmonary hypertension with the main artery measuring 3.9 cm, left pulmonary artery measures 2 cm and the right pulmonary artery measures 2.7 cm. There are multiple 1 to 3 mm nodules scattered throughout both lungs, which are nonspecific. Representative nodules include a 3 mm nodule in the left lower lobe and a 2 mm nodule in the right lower lobe. A few small calcified granulomas are also present in the right lung. Small lobulated left pleural effusion. There are diffuse lytic osseous metastases throughout the chest. Pathologic non displaced rib fracture present in the right seventh through ninth lateral ribs. Several enlarged left axillary lymph nodes, the largest of which demonstrate a fatty hilum but is enlarged, measuring 3.5 x 3 cm\par 04/08/20 Left breast mammogram showed a new ill defined mass like area in the outer central left breast extending over approximately 2.0 cm. \par                Left breast ultrasound showed a vague 2.0 cm ill defined mass in 1 o'clock axis of the left breast 5 cm FN. \par 04/08/20 Left breast 1 o'clock axis 5 cm FN sonoguided core biopsy showed invasive lobular carcinoma, SBR 6/9, ER >90%, DC >90% and HER-2 negative.\par                Left axillary sonoguided core biopsy showed a lymph node with metastatic carcinoma with similar histology to the core biopsy of the breast.\par 4/17/20 Letrozole and Ibrance started with dose reduction from 125 mg to 100 mg starting cycle 2 due to neutropenia\par 5/1/20 Zometa started and given monthly x 3 then continue every 3 months\par 9/10/20 Genetic testing with [facility] gene panel showed a pathogenic mutation in BRCA2: c.5946del ([id number]), which is associated with an increased risk for breast cancer, ovarian cancer, pancreatic cancer, melanoma, prostate cancer and male breast cancer; and also CDH1: deletion of exons 15-16, which is associated with an increased risk of breast cancer and gastric cancer\par  [de-identified] : Sylvia presents to the office accompanied by her  Sebastián for her monthly evaluation and blood work. She got her last dose of Zometa on 4/23/21.\par She  started letrozole and Ibrance on 4/17/20 and continues to do well on this regimen aside from mild fatigue on her week off the medication\par She had a DR of Ibrance to 100 mg daily in 5/20 due to neutropenia. She will start cycle 16 on 6/21/21.\par She occasionally gets nausea in the morning, and sometimes in the evenings as well. She takes Prilosec in the morning 30 min before breakfast but has not seen much of an improvement in her nausea.  She also mentions developing a few mouth sores that were managed well with non ETOH mouthwash.\par Her BS have been better, but her HgbA1c from last month was 8.8, so still high. She is due to follow up Endocrinology for further evaluation and management. \par Sleep continues to be an issue. She has been taking Xanax with some improvement. She does mentions having a lot of anxiety and stressing over things.\par She still gets dyspnea on exertion, but is managed well with rest. She fell last week, when trying to get out of the car sustaining major scrapes in her knees, shins, elbows and mallet finger but no fractures.\par She takes diclofenac twice a day on a regular basis and typically takes Tramadol about once a day as needed. She uses the Voltaren gel occasionally. She also takes Tylenol PM and melatonin and with the combination, when she is on her week off the Ibrance.\par She received her first COVID vaccine on 2/11/21, and second on 3/4/21.\par She is scheduled to have her endoscopy/colonoscopy on 8/26/21 with Dr. Norris (scheduled so that she would in the middle of her Ibrance cycle like day 11)\par \par 05/24/21 Echocardiogram with EF of 55.8%.  Pericardial effusion appears slightly improved when compared to echo from 04/27/21\par 04/27/21 Echocardiogram with EF of 55%. There is mild concentric left ventricular hypertrophy and mildly enlarged  left atrium.+ small pericardial effusion  present. Next echocardiogram due next month as her Cardiology\par \par 04/12/21 CT scan showed Extensive osseous metastatic disease without significant change from prior study 10/1/2020.  A small pericardial effusion was noted, slightly increased.\par \par 1/12/21 Bone scan showed no significant change in distribution of metastatic lesions and less intense uptake in most lesions compared with previous scan of 7/9/20. No new osseous lesions identified\par 10//[id number] CT scan showed extensive osseous metastatic disease with interval increase in sclerosis compared with 5/5/20, consistent with treatment effect.\par 07/09/20 Bone scan showed numerous foci of increased tracer uptake in the left anterior and right posteromedial parietal calvarium, sternum, multiple ribs bilaterally, and femoral shafts. There is also heterogenous tracer distribution throughout the spine and pelvis. Tracer distribution and activity of osseous lesions are not significantly changed compared to the previous study. There is a left hip arthroplasty, unchanged. There are degenerative changes in the major joints. No new osseous lesions are identified. Both kidneys are visualized. IMPRESSION: Abnormal bone scan.Findings compatible with extensive osseous metastases in the axial and appendicular skeleton, not significantly changed from study of 5/5/2020.\par -------------------------------------------------------------------------------------------------------------------------------------------------------------------------------------------------------------------\par 5/5/20 Bone scan showed focal abnormalities in the posteromedial aspect of the right parietal bone, anterior aspect of the left parietal bone parasagittally, sternum, anterior and posterior ribs bilaterally, and both femoral shafts. On the SPECT/CT component of the examination there is evidence of cortical thinning involving the left femoral lesions but not the right femoral lesions. \par IMPRESSION: Extensive osseous metastases in the axial and appendicular skeletons. Cortical thinning in the left femoral lesions, indicates that there maybe potential for pathologic fracture. \par 5/5/20 CT scan showed widespread bony metastases but no other sites of metastatic disease identified\par \par \par \par  [de-identified] : Invasive lobular carcinoma, SBR 6/9, ER >90%, NM >90%, HER-2 negative

## 2021-07-15 PROBLEM — Z15.89 MONOALLELIC MUTATION OF CDH1 GENE: Status: ACTIVE | Noted: 2020-01-01

## 2021-07-15 NOTE — HISTORY OF PRESENT ILLNESS
[Disease: _____________________] : Disease: [unfilled] [T: ___] : T[unfilled] [N: ___] : N[unfilled] [M: ___] : M[unfilled] [AJCC Stage: ____] : AJCC Stage: [unfilled] [de-identified] : Left breast cancer diagnosed at the age of 64\par 01/19/20 Screening mammogram and breast ultrasound [facility] aside from scattered fibroglandular densities bilaterally.\par In mid February the patient developed pain her ribs and her PCP ordered a CXR and then a CT scan\par 04/02/20 CT scan of the chest showed a large mediastinal LN measuring 0.7 x 1.4 cm in the prevascular space. The heart is normal in size with trace pericardial effusion. Enlarged main pulmonary artery possibly due to pulmonary hypertension with the main artery measuring 3.9 cm, left pulmonary artery measures 2 cm and the right pulmonary artery measures 2.7 cm. There are multiple 1 to 3 mm nodules scattered throughout both lungs, which are nonspecific. Representative nodules include a 3 mm nodule in the left lower lobe and a 2 mm nodule in the right lower lobe. A few small calcified granulomas are also present in the right lung. Small lobulated left pleural effusion. There are diffuse lytic osseous metastases throughout the chest. Pathologic non displaced rib fracture present in the right seventh through ninth lateral ribs. Several enlarged left axillary lymph nodes, the largest of which demonstrate a fatty hilum but is enlarged, measuring 3.5 x 3 cm\par 04/08/20 Left breast mammogram showed a new ill defined mass like area in the outer central left breast extending over approximately 2.0 cm. \par                Left breast ultrasound showed a vague 2.0 cm ill defined mass in 1 o'clock axis of the left breast 5 cm FN. \par 04/08/20 Left breast 1 o'clock axis 5 cm FN sonoguided core biopsy showed invasive lobular carcinoma, SBR 6/9, ER >90%, MO >90% and HER-2 negative.\par                Left axillary sonoguided core biopsy showed a lymph node with metastatic carcinoma with similar histology to the core biopsy of the breast.\par 4/17/20 Letrozole and Ibrance started with dose reduction from 125 mg to 100 mg starting cycle 2 due to neutropenia\par 5/1/20 Zometa started and given monthly x 3 then continue every 3 months\par 9/10/20 Genetic testing with [facility] gene panel showed a pathogenic mutation in BRCA2: c.5946del ([id number]), which is associated with an increased risk for breast cancer, ovarian cancer, pancreatic cancer, melanoma, prostate cancer and male breast cancer; and also CDH1: deletion of exons 15-16, which is associated with an increased risk of breast cancer and gastric cancer\par  [de-identified] : Invasive lobular carcinoma, SBR 6/9, ER >90%, NH >90%, HER-2 negative [de-identified] : Sylvia presents to the office accompanied by her  Sebastián for her monthly evaluation and blood work. She is also here today for her every 3 month Zometa infusion.\par She  started letrozole and Ibrance on 4/17/20 and continues to do well on this regimen.\par She had a DR of Ibrance to 100 mg daily in 5/20 due to neutropenia. She will start cycle 17 on 7/21/21.\par She has had fatigue ever since starting treatment but recently notes marked fatigue and SOB with exertion. \par Her lungs are clear and oxygen saturation is 98% and follow up echo showed decreased pericardial effusion.\par Her PCP is also a cardiologist (Dr. Sherwood at [facility]) and did a stress test many years ago but none recently.\par She saw her endocrinologist, Dr. Rylie West, at [facility] and is starting a new medication, Reybelsus (daily pill), and will be monitored.\par \par \par \par She occasionally gets nausea in the morning, and sometimes in the evenings as well. She takes Prilosec in the morning 30 min before breakfast but has not seen much of an improvement in her nausea.  She also mentions developing a few mouth sores that were managed well with non ETOH mouthwash.\par Her BS have been better, but her HgbA1c from last month was 8.8, so still high. She is due to follow up Endocrinology for further evaluation and management. \par Sleep continues to be an issue. She has been taking Xanax with some improvement. She does mentions having a lot of anxiety and stressing over things.\par She still gets dyspnea on exertion, but is managed well with rest. She fell last week, when trying to get out of the car sustaining major scrapes in her knees, shins, elbows and mallet finger but no fractures.\par She takes diclofenac twice a day on a regular basis and typically takes Tramadol about once a day as needed. She uses the Voltaren gel occasionally. She also takes Tylenol PM and melatonin and with the combination, when she is on her week off the Ibrance.\par She received her first COVID vaccine on 2/11/21, and second on 3/4/21.\par She is scheduled to have her endoscopy/colonoscopy on 8/26/21 with Dr. Norris (scheduled so that she would in the middle of her Ibrance cycle like day 11)\par \par 05/24/21 Echocardiogram with EF of 55.8%.  Pericardial effusion appears slightly improved when compared to echo from 04/27/21\par 04/27/21 Echocardiogram with EF of 55%. There is mild concentric left ventricular hypertrophy and mildly enlarged  left atrium.+ small pericardial effusion  present. Next echocardiogram due next month as her Cardiology\par \par 7/8/21 Bone scan showed  again seen are numerous foci of increased tracer uptake in the calvarium, sternum, multiple ribs bilaterally, spine, pelvis and proximal femurs. Tracer distribution and intensity of uptake of metastatic lesions are not significantly changed from prior bone scan. A left hip prosthesis is again noted with unremarkable periprosthetic activity. Degenerative changes in the major joints are again noted. There is physiologic distribution of radiotracer in the visualized osseous structures. No new osseous lesions are identified. Both kidneys are visualized. A ptotic right kidney is again seen.\par IMPRESSION: Abnormal bone scan. Multifocal osseous metastasis in the axial skeleton and proximal femurs, not significantly changed from bone scan on 1/12/2021.\par No new osseous lesions identified.\par \par 04/12/21 CT scan showed Extensive osseous metastatic disease without significant change from prior study 10/1/2020.  A small pericardial effusion was noted, slightly increased.\par \par 1/12/21 Bone scan showed no significant change in distribution of metastatic lesions and less intense uptake in most lesions compared with previous scan of 7/9/20. No new osseous lesions identified\par [id number] CT scan showed extensive osseous metastatic disease with interval increase in sclerosis compared with 5/5/20, consistent with treatment effect.\par 07/09/20 Bone scan showed numerous foci of increased tracer uptake in the left anterior and right posteromedial parietal calvarium, sternum, multiple ribs bilaterally, and femoral shafts. There is also heterogenous tracer distribution throughout the spine and pelvis. Tracer distribution and activity of osseous lesions are not significantly changed compared to the previous study. There is a left hip arthroplasty, unchanged. There are degenerative changes in the major joints. No new osseous lesions are identified. Both kidneys are visualized. IMPRESSION: Abnormal bone scan.Findings compatible with extensive osseous metastases in the axial and appendicular skeleton, not significantly changed from study of 5/5/2020.\par -------------------------------------------------------------------------------------------------------------------------------------------------------------------------------------------------------------------\par 5/5/20 Bone scan showed focal abnormalities in the posteromedial aspect of the right parietal bone, anterior aspect of the left parietal bone parasagittally, sternum, anterior and posterior ribs bilaterally, and both femoral shafts. On the SPECT/CT component of the examination there is evidence of cortical thinning involving the left femoral lesions but not the right femoral lesions. \par IMPRESSION: Extensive osseous metastases in the axial and appendicular skeletons. Cortical thinning in the left femoral lesions, indicates that there maybe potential for pathologic fracture. \par 5/5/20 CT scan showed widespread bony metastases but no other sites of metastatic disease identified\par \par \par \par

## 2021-07-15 NOTE — PHYSICAL EXAM
[Fully active, able to carry on all pre-disease performance without restriction] : Status 0 - Fully active, able to carry on all pre-disease performance without restriction [Obese] : obese [Normal] : affect appropriate [de-identified] : Left 3rd phalanx mallet finger with brace  [de-identified] : Bilateral knees, right elbow and left thumb - scrape s/p fall.

## 2021-08-12 PROBLEM — D64.9 ANEMIA: Status: ACTIVE | Noted: 2020-05-05

## 2021-08-14 NOTE — CONSULT NOTE ADULT - SUBJECTIVE AND OBJECTIVE BOX
Hematology/Oncology Consult Note    HPI:      Allergies    tetanus toxoid (Unknown)    Intolerances        MEDICATIONS  (STANDING):    MEDICATIONS  (PRN):      PAST MEDICAL & SURGICAL HISTORY:  Diabetes mellitus type II  Hypertension  Hyperlipidemia  Osteoarthritis  Toe ulcer, right  Asthma  Glaucoma  Anxiety  Morbidly obese  S/P laparoscopic surgery  GASTRIC LAP BAND ~     S/P hip replacement  LEFT HIP (OXINIUM) ~     H/O:  Section  x2-,     History of tonsillectomy    H/O drainage of abscess  13  S/P biopsy  uterus-2013  H/O rhinoplasty     history          FAMILY HISTORY:  Family history of lung cancer (V16.1) (Z80.1) : Father  Family history of malignant neoplasm of breast (V16.3) (Z80.3) : Mother  Family history of malignant neoplasm of stomach (V16.0) (Z80.0) : Sister    SOCIAL HISTORY: No EtOH, no tobacco    REVIEW OF SYSTEMS:    CONSTITUTIONAL: No weakness, fevers or chills  EYES/ENT: No visual changes;  No vertigo or throat pain   NECK: No pain or stiffness  RESPIRATORY: No cough, wheezing, hemoptysis; No shortness of breath  CARDIOVASCULAR: No chest pain or palpitations  GASTROINTESTINAL: No abdominal or epigastric pain. No nausea, vomiting, or hematemesis; No diarrhea or constipation. No melena or hematochezia.  GENITOURINARY: No dysuria, frequency or hematuria  NEUROLOGICAL: No numbness or weakness  SKIN: No itching, burning, rashes, or lesions   All other review of systems is negative unless indicated above.    Height (cm): 175.3 ( @ 11:12)    T(F): 98.9 (21 @ 11:12), Max: 98.9 (21 @ 11:12)  HR: 78 (21 @ 11:12) (78 - 78)  BP: 148/69 (21 @ 11:12)  RR: 18 (21 @ 11:12)  SpO2: 97% (21 @ 11:12) (97% - 97%)    Physical Exam  GENERAL: NAD, well-developed  HEAD:  Atraumatic, Normocephalic  EYES: EOMI, PERRLA, conjunctiva and sclera clear  NECK: Supple, No JVD  CHEST/LUNG: Clear to auscultation bilaterally; No wheeze  HEART: Regular rate and rhythm; No murmurs, rubs, or gallops  ABDOMEN: Soft, Nontender, Nondistended; Bowel sounds present  EXTREMITIES:  2+ Peripheral Pulses, No clubbing, cyanosis, or edema  NEUROLOGY: non-focal  SKIN: No rashes or lesions                          9.7    3.19  )-----------( 258      ( 13 Aug 2021 17:52 )             29.8                 Imaging: Hematology/Oncology Consult Note    HPI:  65 year old woman with hx of Stage IV invasive lobular breast carcinoma (Dx 2020 and has been on maintenance letrozole/ Ibrance), HTN, DM2, HLD, Anxiety pw transaminitis and JAMAAL. Pt with labs done last month with no abnormalities on kidney function or liver enzymes. Pt mentions increasing SOB of unknown etiology. She recently has a NM study done for evaluation of heart function and ACS. Pt's  concern that dye used during procedure may have causes the recent lab abnormalities given she has not started any new medications since last blood work. They deny recent travel, fevers, sick contact, rashes, HA, CP, AP, edema, or wt loss.     Allergies  tetanus toxoid (Unknown)      MEDICATIONS  (STANDING):    MEDICATIONS  (PRN):      PAST MEDICAL & SURGICAL HISTORY:  Diabetes mellitus type II  Hypertension  Hyperlipidemia  Osteoarthritis  Toe ulcer, right  Asthma  Glaucoma  Anxiety  Morbidly obese  S/P laparoscopic surgery  GASTRIC LAP BAND ~     S/P hip replacement  LEFT HIP (OXINIUM) ~     H/O:  Section  x2-,     History of tonsillectomy    H/O drainage of abscess  13  S/P biopsy  uterus-2013  H/O rhinoplasty  1982   history          FAMILY HISTORY:  Family history of lung cancer (V16.1) (Z80.1) : Father  Family history of malignant neoplasm of breast (V16.3) (Z80.3) : Mother  Family history of malignant neoplasm of stomach (V16.0) (Z80.0) : Sister    SOCIAL HISTORY: No EtOH, no tobacco    REVIEW OF SYSTEMS:    CONSTITUTIONAL: No weakness, fevers or chills  EYES/ENT: No visual changes;  No vertigo or throat pain   NECK: No pain or stiffness  RESPIRATORY: No cough, wheezing, hemoptysis; + shortness of breath  CARDIOVASCULAR: No chest pain or palpitations  GASTROINTESTINAL: No abdominal or epigastric pain. No nausea, vomiting, or hematemesis; No diarrhea or constipation. No melena or hematochezia.  GENITOURINARY: No dysuria, frequency or hematuria  NEUROLOGICAL: No numbness or weakness  SKIN: No itching, burning, rashes, or lesions   All other review of systems is negative unless indicated above.    Height (cm): 175.3 ( @ 11:12)    T(F): 98.9 (21 @ 11:12), Max: 98.9 (21 @ 11:12)  HR: 78 (21 @ 11:12) (78 - 78)  BP: 148/69 (21 @ 11:12)  RR: 18 (21 @ 11:12)  SpO2: 97% (21 @ 11:12) (97% - 97%)    Physical Exam  GENERAL: NAD, well-developed  HEAD:  Atraumatic, Normocephalic  EYES: EOMI, PERRLA, conjunctiva and sclera clear  NECK: Supple, No JVD  CHEST/LUNG: Clear to auscultation bilaterally; No wheeze  HEART: Regular rate and rhythm; No murmurs, rubs, or gallops  ABDOMEN: Soft, Nontender, Nondistended; Bowel sounds present  EXTREMITIES:  2+ Peripheral Pulses, No clubbing, cyanosis, or edema  NEUROLOGY: non-focal  SKIN: No rashes or lesions                          9.7    3.19  )-----------( 258      ( 13 Aug 2021 17:52 )             29.8           Imaging:  < from: Xray Chest 2 Views PA/Lat (21 @ 15:44) >  FINDINGS:    The heart is normal in size.  No florid central congestive changes.  No focal consolidations.  There is no pneumothorax or pleural effusion.  Extensive osseous metastatic disease redemonstrated.    IMPRESSION:  Negative for acute cardiopulmonary process.      < from: US Abdomen Complete (US Abdomen Complete .) (21 @ 15:16) >  IMPRESSION:    Innumerable new hypoechoic liver lesions, suspicious for metastases.    No evidence of biliary obstruction.      < from: NM Bone Imaging Total (21 @ 11:23) >  IMPRESSION: Abnormal bone scan.    Multifocal osseous metastasis in the axial skeleton and proximal femurs, not significantlychanged from bone scan on 2021.    No new osseous lesions identified.

## 2021-08-14 NOTE — ED PROVIDER NOTE - OBJECTIVE STATEMENT
64YO female hx of 66YO female hx of lobular breast CA on chemo w/ mets to the bone, DM, p/w elevated labs on outpatient imaging. creatinine 1.75 1d prior, was 1.2 1m prior, transaminases in the 100s. Pt denies hematuria, dysuria. also denies abdominal pain, flank pain. Pt also endorses FLOOD x2m, had normal EF on echo in May. denies LE edema, cough, did have covid vaccine. had negative stress test within the last few months. denies chest pain, hx of DVT/PE.

## 2021-08-14 NOTE — ED ADULT NURSE NOTE - OBJECTIVE STATEMENT
66y/o female aaox4 h/o breast ca metastasis to the bone stage IV, htn, hld, DM  presents to the ED ambulatory  from home c/o elevated LFT  and , creatinine  . Pt states that 3 days ago had routine monthly blood work done w/ results elevated LFT and creatinine, she had repeat next days w/the same results , oncologist called her and rcc to came to Ed for evaluation./ Ct scan . Pt also c/o for 2 month sob, and lower abdominal pain, pt at this time, Pt reports that she is on pills chemotherapy  ( 3 weeks on and 1 week off, at this time she is off)  Denies fever, chills, n/v, weakness,, diarrhea/constipation, numbness/tingling, urinary symptoms , in no respiratory distress, no CP, PT safety maintained with family at bedside, call bell within reach and bed in the lowest position.

## 2021-08-14 NOTE — ED CLERICAL - NS ED CLERK NOTE PRE-ARRIVAL INFORMATION; ADDITIONAL PRE-ARRIVAL INFORMATION
CC/Reason For referral: patient with Breast Ca with mets. Last month labs okay. Now with elevated creatinine and elevated liver enzymes. Dr. Gan requesting renal evaluation and IV hydration.  Preferred Consultant(if applicable): renal evaluation  Who admits for you (if needed): hospitalist  Do you have documents you would like to fax over? no  Would you still like to speak to an ED attending? call oncology fellow on call please

## 2021-08-14 NOTE — CONSULT NOTE ADULT - ATTENDING COMMENTS
65 year old woman with hx of Stage IV invasive lobular breast carcinoma (Dx 2020 and has been on maintenance letrozole/ Ibrance, Cycle 17 07/21) pw transaminitis and JAMAAL after recent NM study to assess cardiac function and ACS. Oncology consulted for continuity of care. LFT and renal abnormalities both new. Unclear etiology. Needs pan-scan. Will follow up on US results. Avoid IV contrast, other nephrotoxics. A/w above assessment and docs

## 2021-08-14 NOTE — ED PROVIDER NOTE - NSFOLLOWUPINSTRUCTIONS_ED_ALL_ED_FT
1. take tylenol or motrin as needed for pain. 2. today, the lab tests we did include basic labs, the imaging tests we did include ultrasound. results significant for new metastasis to the liver. labwork - kidney labs are stable from prior days we have included these test results in your paperwork. 3. given that you were in the ED today, we recommend a followup visit with your oncologist within 1 days. 4. your diagnosis is: JAMAAL, liver metastasis 5. return to the ED if your current symptoms worsen, if you develop difficulty urinating, blood in urine, or burning with urination.

## 2021-08-14 NOTE — CONSULT NOTE ADULT - ASSESSMENT
65 year old woman with hx of Stage IV invasive lobular breast carcinoma (Dx 2020 and has been on maintenance letrozole/ Ibrance, Cycle 17 07/21) pw transaminitis and JAMAAL after recent NM study to assess cardiac function and ACS. Oncology consulted for continuity of care.    #Stage IV invasive lobular breast carcinoma   -Pt Dx 2020 and has been well on maintenance therapy Letrozole and Ibrance  -Low suspicion for Letrozole and Ibrance causing current symptoms  -Can c/w Letrozole and Ibrance  -Will f/u with Dr. Blanc     #Transaminitis  -new concern for liver lesions on U/S  -Will need to monitor CMP daily  -TB WNL    #JAMAAL  -Unclear cause; f/u urine sodium and creatinine to calculate FENa  -Potentially from dye used in NM study  -Will f/u Kidney U/S and monitor Cr daily for trend  -Avoid nephrotoxic agent at this point  -Recommend IV hydration  -If worsening, consider Nephrology input    Please page with questions or concerns. Will Follow with you.      Iván Kearns M.D.  Hematology/Oncology Fellow PGY4  Pager [phone]  After 5pm, please contact on-call team.

## 2021-08-14 NOTE — ED ADULT NURSE NOTE - CHPI ED NUR SYMPTOMS NEG
no back pain/no chills/no decreased eating/drinking/no dizziness/no fever/no headache/no loss of consciousness/no nausea/no vomiting

## 2021-08-14 NOTE — ED PROVIDER NOTE - PATIENT PORTAL LINK FT
You can access the FollowMyHealth Patient Portal offered by [facility] by registering at the following website: [email or web address]. By joining [facility]’s FollowMyHealth portal, you will also be able to view your health information using other applications (apps) compatible with our system.

## 2021-08-14 NOTE — ED PROVIDER NOTE - NSICDXPASTMEDICALHX_GEN_ALL_CORE_FT
PAST MEDICAL HISTORY:  Anxiety     Asthma     Diabetes mellitus type II     Glaucoma     Hyperlipidemia     Hypertension     Morbidly obese     Osteoarthritis     Toe ulcer, right

## 2021-08-14 NOTE — ED PROVIDER NOTE - PROGRESS NOTE DETAILS
Opal Rasmussen MD, PGY-2: spoke with heme/onc team, they are okay with DC home if pt has similar labs to prior days and no abnormal findings on US Attending Juana Yeh: pt signed out to me. ultrasound concerning for worsening metastatic disease. dw hem/onc who is aware. pt does not want to stay for further work up or ct scans at this time and will follow up with her oncologist. Opal Rasmussen MD, PGY-2: I spoke with Dr. Gan, covering for pt's primary oncologist. given that pt's creatinine is stable, they feel comfortable with DC. pt now has new liver mets, oncology team aware and will reach out on monday for further w/u.

## 2021-08-14 NOTE — ED PROVIDER NOTE - NSICDXPASTSURGICALHX_GEN_ALL_CORE_FT
PAST SURGICAL HISTORY:   history     H/O drainage of abscess 13    H/O rhinoplasty     H/O:  Section x2-1991    History of tonsillectomy     S/P biopsy uterus-2013    S/P hip replacement LEFT HIP (OXINIUM) ~     S/P laparoscopic surgery GASTRIC LAP BAND ~

## 2021-08-14 NOTE — ED ADULT NURSE REASSESSMENT NOTE - NS ED NURSE REASSESS COMMENT FT1
Edison RN, pt verbalizes understanding to f/u with PCP / oncologist and return to ED for any worsening symptoms. Patient d/c home w/ written and verbal instructions. Pt verbalized understanding. IV d/c - No redness or swelling.
Patient being transported to ultrasound  with transport personnel. Patient stable upon leaving floor.
Pt F/s is 47 . MD Becerra made aware . Food was given

## 2021-08-14 NOTE — ED PROVIDER NOTE - PHYSICAL EXAMINATION
Gen: WDWN, NAD  HEENT: EOMI, no nasal discharge, mucous membranes moist  CV: RRR, +S1/S2, no M/R/G  Resp: CTAB, no W/R/R  GI: Abdomen soft non-distended, NTTP, no masses  MSK: No open wounds, no bruising, no LE edema  : No CVA TTP b/l  Neuro: A&Ox4, following commands, moving all four extremities spontaneously  Psych: appropriate mood

## 2021-08-16 PROBLEM — R06.00 DYSPNEA ON EXERTION: Status: ACTIVE | Noted: 2021-01-01

## 2021-08-18 NOTE — ASSESSMENT
[Palliative] : Goals of care discussed with patient: Palliative [FreeTextEntry1] : Metastatic breast cancer (174.9) (C50.919)\par  · Continue letrozole and Ibrance.\par     Zometa given 5/1, then monthly x 3 and then every 3 months. Giving 7/15/21, next in\par     10/21.\par     Continue to monitor monthly labs including tumor markers\par     CT scan a/w bone scans every 3 months CT scan done 4/21 stable, next 10/21.\par     Bone scan stable 7/2021, next due in 1/2022.\par    Next bone scan 1/22 to evaluate bone metastases.\par     [facility]par   \par Chemotherapy-induced neutropenia (288.03,E933.1) (D70.1,T45.1X5A)\par  Continue to monitor CBC monthly\par \par Diabetes mellitus, type 2 (250.00) (E11.9)\par  · Currently on Metformin and glimepiride.\par     A1C was 9.4 on 2/25/21,8.8 on 5/24/21 and 8.7 on 8/12/21\par     Follow up with endocrinology\par 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 Yang\par Dyspnea on exertion (786.09) (R06.00)\par  · Possibly due to deconditioning but given poorly controlled diabetes will have her follow\par     up with her PCP/cardiologist Dr. Sherwood had stress test last week will get report\par  - She will also f/u with pulmonary medicine\par -Elevated LFT's/ elevated serum creatinine\par   D/w Dr. Rylie Gan LFT's elevated possibly 2/2 Ibrance..\par  Repeat CMP.  creat 1.75  most likely 2/2 dye from cardiac stress test \par  Instructed  patient to go to ER for IV hydration and  further eval of JAMAAL.\par  \par RTO 4 weeks or sooner \par

## 2021-08-18 NOTE — HISTORY OF PRESENT ILLNESS
[Disease: _____________________] : Disease: [unfilled] [T: ___] : T[unfilled] [N: ___] : N[unfilled] [M: ___] : M[unfilled] [AJCC Stage: ____] : AJCC Stage: [unfilled] [de-identified] : Left breast cancer diagnosed at the age of 64\par 01/19/20 Screening mammogram and breast ultrasound KEON aside from scattered fibroglandular densities bilaterally.\par In mid February the patient developed pain her ribs and her PCP ordered a CXR and then a CT scan\par 04/02/20 CT scan of the chest showed a large mediastinal LN measuring 0.7 x 1.4 cm in the prevascular space. The heart is normal in size with trace pericardial effusion. Enlarged main pulmonary artery possibly due to pulmonary hypertension with the main artery measuring 3.9 cm, left pulmonary artery measures 2 cm and the right pulmonary artery measures 2.7 cm. There are multiple 1 to 3 mm nodules scattered throughout both lungs, which are nonspecific. Representative nodules include a 3 mm nodule in the left lower lobe and a 2 mm nodule in the right lower lobe. A few small calcified granulomas are also present in the right lung. Small lobulated left pleural effusion. There are diffuse lytic osseous metastases throughout the chest. Pathologic non displaced rib fracture present in the right seventh through ninth lateral ribs. Several enlarged left axillary lymph nodes, the largest of which demonstrate a fatty hilum but is enlarged, measuring 3.5 x 3 cm\par 04/08/20 Left breast mammogram showed a new ill defined mass like area in the outer central left breast extending over approximately 2.0 cm. \par                Left breast ultrasound showed a vague 2.0 cm ill defined mass in 1 o'clock axis of the left breast 5 cm FN. \par 04/08/20 Left breast 1 o'clock axis 5 cm FN sonoguided core biopsy showed invasive lobular carcinoma, SBR 6/9, ER >90%, UT >90% and HER-2 negative.\par                Left axillary sonoguided core biopsy showed a lymph node with metastatic carcinoma with similar histology to the core biopsy of the breast.\par 4/17/20 Letrozole and Ibrance started with dose reduction from 125 mg to 100 mg starting cycle 2 due to neutropenia\par 5/1/20 Zometa started and given monthly x 3 then continue every 3 months\par 9/10/20 Genetic testing with [employer] gene panel showed a pathogenic mutation in BRCA2: c.5946del ([id number]), which is associated with an increased risk for breast cancer, ovarian cancer, pancreatic cancer, melanoma, prostate cancer and male breast cancer; and also CDH1: deletion of exons 15-16, which is associated with an increased risk of breast cancer and gastric cancer\par  [de-identified] : Invasive lobular carcinoma, SBR 6/9, ER >90%, NH >90%, HER-2 negative [de-identified] : Sylvia presents to the office accompanied by her  Sebastián for her monthly evaluation and blood work. She is also here today for her every 3 month Zometa infusion.\par She  started letrozole and Ibrance on 4/17/20 and continues to do well on this regimen.\par She had a DR of Ibrance to 100 mg daily in 5/20 due to neutropenia. She will start cycle 17 on 7/21/21.\par She has had fatigue ever since starting treatment but recently notes marked fatigue and SOB with exertion. \par Her lungs are clear and oxygen saturation is 98% and follow up echo showed decreased pericardial effusion.\par Her PCP is also a cardiologist (Dr. Sherwood at [facility]) and did a stress test many years ago but none recently.\par She saw her endocrinologist, Dr. Rylie West, at [facility] and is starting a new medication, Reybelsus (daily pill), and will be monitored.\par \par She occasionally gets nausea in the morning, and sometimes in the evenings as well. She takes Prilosec in the morning 30 min before breakfast but has not seen much of an improvement in her nausea.  She also mentions developing a few mouth sores that were managed well with non ETOH mouthwash.\par Her BS have been better, but her HgbA1c from last month was 8.8, so still high. She is due to follow up Endocrinology for further evaluation and management. \par Sleep continues to be an issue. She has been taking Xanax with some improvement. She does mentions having a lot of anxiety and stressing over things.\par She still gets dyspnea on exertion, but is managed well with rest. She fell last week, when trying to get out of the car sustaining major scrapes in her knees, shins, elbows and mallet finger but no fractures.\par She takes diclofenac twice a day on a regular basis and typically takes Tramadol about once a day as needed. She uses the Voltaren gel occasionally. She also takes Tylenol PM and melatonin and with the combination, when she is on her week off the Ibrance.\par She received her first COVID vaccine on 2/11/21, and second on 3/4/21.\par She is scheduled to have her endoscopy/colonoscopy on 8/26/21 with Dr. Norris (scheduled so that she would in the middle of her Ibrance cycle like day 11)\par 05/24/21 Echocardiogram with EF of 55.8%.  Pericardial effusion appears slightly improved when compared to echo from 04/27/21\par 04/27/21 Echocardiogram with EF of 55%. There is mild concentric left ventricular hypertrophy and mildly enlarged  left atrium.+ small pericardial effusion  present. Next echocardiogram due next month as her Cardiology\par \par 8/12/21 Reports ongoing SOB on exertion x several months. No CP, fever or cough. She is overweight but remains active lost 2.5 kg intentionally. She had cardiac stress test done on 8/10 /21 everything ok as per patient will get report from Dr. Urbano Sherwood [phone] tel,  [phone]. She will make appointment with pulmonary to f/u. GI on 8/26 21 for colonoscopy /endoscopy no GI issues today.\par This is her week off next cycle starts 8/16/21. WBC 2.43 ANC  1.18 Hgb 9.0 c/o vaginal dryness using Replenz and is able to continue sexual intimacy no pain during intercourse.\par 8/12/21 CMP creat 1.74, LFT's elevated possibly 2/2 Ibrance. d/w Dr. Gan. Patient called to repeat CMP. 8/13 repeat creat 1.75 most likley 2/2 dye from cardiac stress test patient instructed to go to ER for IV hydration and  further eval of JAMAAL.\par 7/8/21 Bone scan showed  again seen are numerous foci of increased tracer uptake in the calvarium, sternum, multiple ribs bilaterally, spine, pelvis and proximal femurs. Tracer distribution and intensity of uptake of metastatic lesions are not significantly changed from prior bone scan. A left hip prosthesis is again noted with unremarkable periprosthetic activity. Degenerative changes in the major joints are again noted. There is physiologic distribution of radiotracer in the visualized osseous structures. No new osseous lesions are identified. Both kidneys are visualized. A ptotic right kidney is again seen.\par IMPRESSION: Abnormal bone scan. Multifocal osseous metastasis in the axial skeleton and proximal femurs, not significantly changed from bone scan on 1/12/2021.\par No new osseous lesions identified.\par \par \par 04/12/21 CT scan showed Extensive osseous metastatic disease without significant change from prior study 10/1/2020.  A small pericardial effusion was noted, slightly increased.\par \par 1/12/21 Bone scan showed no significant change in distribution of metastatic lesions and less intense uptake in most lesions compared with previous scan of 7/9/20. No new osseous lesions identified\par [id number] CT scan showed extensive osseous metastatic disease with interval increase in sclerosis compared with 5/5/20, consistent with treatment effect.\par 07/09/20 Bone scan showed numerous foci of increased tracer uptake in the left anterior and right posteromedial parietal calvarium, sternum, multiple ribs bilaterally, and femoral shafts. There is also heterogenous tracer distribution throughout the spine and pelvis. Tracer distribution and activity of osseous lesions are not significantly changed compared to the previous study. There is a left hip arthroplasty, unchanged. There are degenerative changes in the major joints. No new osseous lesions are identified. Both kidneys are visualized. IMPRESSION: Abnormal bone scan.Findings compatible with extensive osseous metastases in the axial and appendicular skeleton, not significantly changed from study of 5/5/2020.\par -------------------------------------------------------------------------------------------------------------------------------------------------------------------------------------------------------------------\par 5/5/20 Bone scan showed focal abnormalities in the posteromedial aspect of the right parietal bone, anterior aspect of the left parietal bone parasagittally, sternum, anterior and posterior ribs bilaterally, and both femoral shafts. On the SPECT/CT component of the examination there is evidence of cortical thinning involving the left femoral lesions but not the right femoral lesions. \par IMPRESSION: Extensive osseous metastases in the axial and appendicular skeletons. Cortical thinning in the left femoral lesions, indicates that there maybe potential for pathologic fracture. \par 5/5/20 CT scan showed widespread bony metastases but no other sites of metastatic disease identified\par \par \par \par

## 2021-08-18 NOTE — PHYSICAL EXAM
[Fully active, able to carry on all pre-disease performance without restriction] : Status 0 - Fully active, able to carry on all pre-disease performance without restriction [Obese] : obese [Normal] : affect appropriate [de-identified] : Left 3rd phalanx mallet finger with brace  [de-identified] : Bilateral knees, right elbow and left thumb - scrape s/p fall.healing well

## 2021-08-27 PROBLEM — D70.1 CHEMOTHERAPY-INDUCED NEUTROPENIA: Status: ACTIVE | Noted: 2021-01-01

## 2021-08-27 PROBLEM — R79.89 ELEVATED SERUM CREATININE: Status: ACTIVE | Noted: 2021-01-01

## 2021-08-27 PROBLEM — G89.3 CANCER ASSOCIATED PAIN: Status: ACTIVE | Noted: 2020-05-05

## 2021-08-27 PROBLEM — R74.8 ELEVATED LIVER ENZYMES: Status: ACTIVE | Noted: 2021-01-01

## 2021-08-27 NOTE — HISTORY OF PRESENT ILLNESS
[Disease: _____________________] : Disease: [unfilled] [T: ___] : T[unfilled] [N: ___] : N[unfilled] [M: ___] : M[unfilled] [AJCC Stage: ____] : AJCC Stage: [unfilled] [de-identified] : Left breast cancer diagnosed at the age of 64\par 01/19/20 Screening mammogram and breast ultrasound KEON aside from scattered fibroglandular densities bilaterally.\par In mid February the patient developed pain her ribs and her PCP ordered a CXR and then a CT scan\par 04/02/20 CT scan of the chest showed a large mediastinal LN measuring 0.7 x 1.4 cm in the prevascular space. The heart is normal in size with trace pericardial effusion. Enlarged main pulmonary artery possibly due to pulmonary hypertension with the main artery measuring 3.9 cm, left pulmonary artery measures 2 cm and the right pulmonary artery measures 2.7 cm. There are multiple 1 to 3 mm nodules scattered throughout both lungs, which are nonspecific. Representative nodules include a 3 mm nodule in the left lower lobe and a 2 mm nodule in the right lower lobe. A few small calcified granulomas are also present in the right lung. Small lobulated left pleural effusion. There are diffuse lytic osseous metastases throughout the chest. Pathologic non displaced rib fracture present in the right seventh through ninth lateral ribs. Several enlarged left axillary lymph nodes, the largest of which demonstrate a fatty hilum but is enlarged, measuring 3.5 x 3 cm\par 04/08/20 Left breast mammogram showed a new ill defined mass like area in the outer central left breast extending over approximately 2.0 cm. \par                Left breast ultrasound showed a vague 2.0 cm ill defined mass in 1 o'clock axis of the left breast 5 cm FN. \par 04/08/20 Left breast 1 o'clock axis 5 cm FN sonoguided core biopsy showed invasive lobular carcinoma, SBR 6/9, ER >90%, CA >90% and HER-2 negative.\par                Left axillary sonoguided core biopsy showed a lymph node with metastatic carcinoma with similar histology to the core biopsy of the breast.\par 4/17/20 Letrozole and Ibrance started with dose reduction from 125 mg to 100 mg starting cycle 2 due to neutropenia\par 5/1/20 Zometa started and given monthly x 3 then continue every 3 months\par 9/10/20 Genetic testing with [employer] gene panel showed a pathogenic mutation in BRCA2: c.5946del ([id number]), which is associated with an increased risk for breast cancer, ovarian cancer, pancreatic cancer, melanoma, prostate cancer and male breast cancer; and also CDH1: deletion of exons 15-16, which is associated with an increased risk of breast cancer and gastric cancer\par 8/2021 Elevated LFTs noted and sonogram revealed innumerable liver metastases\par 8/20/21 Sonogram guided liver biopsy revealed adenocarcinoma\par  [de-identified] : Invasive lobular carcinoma, SBR 6/9, ER >90%, MA >90%, HER-2 negative [de-identified] : Sylvia presents to the office accompanied by her  Sebastián for an urgent visit due to progressively worsening SOB  x 1 month\par She was in the ED on 8/14/21 for transaminitis and JAMAAL after recent Bone scan study.  She had an abdominal ultrasound on 8/14/21 and findings were consistent with innumerable new hypoechoic liver lesions, suspicious for metastases. No evidence of biliary obstruction. She had a US guided liver biopsy on 8/20/21 and pathology is pending.\par She restarted back on the Ibrance a few days after her liver biopsy and has continued on letrozole.\par Her lungs are clear and oxygen saturation is 98% and follow up echo showed decreased pericardial effusion.\par She takes diclofenac twice a day on a regular basis and typically takes Tramadol about once a day as needed. She uses the Voltaren gel occasionally. She also takes Tylenol PM \par She received her first COVID vaccine on 2/11/21, and second on 3/4/21.\par She was scheduled to have her endoscopy/colonoscopy on 8/26/21 with Dr. Norris but this has been postponed due to the new liver disease.\par 05/24/21 Echocardiogram with EF of 55.8%.  Pericardial effusion appeared slightly improved when compared to echo from 04/27/21\par 04/27/21 Echocardiogram with EF of 55%. There is mild concentric left ventricular hypertrophy and mildly enlarged  left atrium.+ small pericardial effusion  present. \par Her PCP is also a cardiologist (Dr. Sherwood at [facility]) and did a stress test many years ago and recently she had a nuclear stress test at [place] which was negative.\par She saw her endocrinologist, Dr. Rylie West, at [facility] and is starting a new medication, Reybelsus (daily pill), and will be monitored.\par \par 8/14/21 Abdominal sonogram showed innumerable liver lesions consistent with metastases\par 7/8/21 Bone scan showed  again seen are numerous foci of increased tracer uptake in the calvarium, sternum, multiple ribs bilaterally, spine, pelvis and proximal femurs. Tracer distribution and intensity of uptake of metastatic lesions are not significantly changed from prior bone scan. A left hip prosthesis is again noted with unremarkable periprosthetic activity. Degenerative changes in the major joints are again noted. There is physiologic distribution of radiotracer in the visualized osseous structures. No new osseous lesions are identified. Both kidneys are visualized. A ptotic right kidney is again seen.\par IMPRESSION: Abnormal bone scan. Multifocal osseous metastasis in the axial skeleton and proximal femurs, not significantly changed from bone scan on 1/12/2021.\par No new osseous lesions identified.\par \par \par 04/12/21 CT scan showed Extensive osseous metastatic disease without significant change from prior study 10/1/2020.  A small pericardial effusion was noted, slightly increased.\par \par 1/12/21 Bone scan showed no significant change in distribution of metastatic lesions and less intense uptake in most lesions compared with previous scan of 7/9/20. No new osseous lesions identified\par [id number] CT scan showed extensive osseous metastatic disease with interval increase in sclerosis compared with 5/5/20, consistent with treatment effect.\par 07/09/20 Bone scan showed numerous foci of increased tracer uptake in the left anterior and right posteromedial parietal calvarium, sternum, multiple ribs bilaterally, and femoral shafts. There is also heterogenous tracer distribution throughout the spine and pelvis. Tracer distribution and activity of osseous lesions are not significantly changed compared to the previous study. There is a left hip arthroplasty, unchanged. There are degenerative changes in the major joints. No new osseous lesions are identified. Both kidneys are visualized. IMPRESSION: Abnormal bone scan.Findings compatible with extensive osseous metastases in the axial and appendicular skeleton, not significantly changed from study of 5/5/2020.\par -------------------------------------------------------------------------------------------------------------------------------------------------------------------------------------------------------------------\par 5/5/20 Bone scan showed focal abnormalities in the posteromedial aspect of the right parietal bone, anterior aspect of the left parietal bone parasagittally, sternum, anterior and posterior ribs bilaterally, and both femoral shafts. On the SPECT/CT component of the examination there is evidence of cortical thinning involving the left femoral lesions but not the right femoral lesions. \par IMPRESSION: Extensive osseous metastases in the axial and appendicular skeletons. Cortical thinning in the left femoral lesions, indicates that there maybe potential for pathologic fracture. \par 5/5/20 CT scan showed widespread bony metastases but no other sites of metastatic disease identified\par \par \par \par

## 2021-08-27 NOTE — PHYSICAL EXAM
[Fully active, able to carry on all pre-disease performance without restriction] : Status 0 - Fully active, able to carry on all pre-disease performance without restriction [Obese] : obese [Normal] : affect appropriate [de-identified] : Left 3rd phalanx mallet finger with brace  [de-identified] : Bilateral knees, right elbow and left thumb - scrape s/p fall.healing well

## 2021-08-27 NOTE — ASSESSMENT
[Palliative] : Goals of care discussed with patient: Palliative [FreeTextEntry1] : Metastatic breast cancer (174.9) (C50.919)\par  · Continue letrozole and Ibrance.\par     Zometa given 5/1, then monthly x 3 and then every 3 months. 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 Yang\par Dyspnea on exertion (786.09) (R06.00)\par  · Possibly due to deconditioning but given poorly controlled diabetes will have her follow\par     up with her PCP/cardiologist Dr. Sherwood had stress test last week will get report\par  - She will also f/u with pulmonary medicine\par -Elevated LFT's/ elevated serum creatinine\par   D/w Dr. Rylie Gan LFT's elevated possibly 2/2 Ibrance..\par  Repeat CMP.  creat 1.75  most likely 2/2 dye from cardiac stress test \par  Instructed  patient to go to ER for IV hydration and  further eval of JAMAAL.\par  \par RTO 4 weeks or sooner \par

## 2021-08-31 PROBLEM — Z15.01 BRCA GENE POSITIVE: Status: RESOLVED | Noted: 2021-01-01 | Resolved: 2021-01-01

## 2021-08-31 PROBLEM — E55.9 VITAMIN D DEFICIENCY: Status: ACTIVE | Noted: 2020-05-01

## 2021-08-31 PROBLEM — Z01.812 PRE-PROCEDURAL LABORATORY EXAMINATION: Status: ACTIVE | Noted: 2021-01-01

## 2021-08-31 PROBLEM — R26.89 POOR BALANCE: Status: ACTIVE | Noted: 2021-01-01

## 2021-08-31 PROBLEM — Z87.09 HISTORY OF BRONCHITIS: Status: RESOLVED | Noted: 2021-01-01 | Resolved: 2021-01-01

## 2021-08-31 PROBLEM — Z83.3 FAMILY HISTORY OF DIABETES MELLITUS: Status: ACTIVE | Noted: 2021-01-01

## 2021-08-31 PROBLEM — J45.30 MILD PERSISTENT ASTHMA WITHOUT COMPLICATION: Status: ACTIVE | Noted: 2021-01-01

## 2021-08-31 PROBLEM — R06.83 SNORING: Status: ACTIVE | Noted: 2021-01-01

## 2021-08-31 PROBLEM — K21.9 GERD (GASTROESOPHAGEAL REFLUX DISEASE): Status: ACTIVE | Noted: 2020-09-10

## 2021-08-31 PROBLEM — E66.9 OBESITY (BMI 35.0-39.9 WITHOUT COMORBIDITY): Status: ACTIVE | Noted: 2020-05-05

## 2021-08-31 PROBLEM — R06.02 SOB (SHORTNESS OF BREATH): Status: ACTIVE | Noted: 2021-01-01

## 2021-08-31 NOTE — PROCEDURE
[FreeTextEntry1] : CT Abdomen and Pelvis (8/28/2021) revealed Multiple small liver metastases measuring up to 18 mm in the left lobe laterally, corresponding to recently biopsied lesion. Mild ascites. Subtle nodularity of the right paracolic gutter, suspicious for implants.\par \par CT Angio Chest (8/28/2021) revealed No pulmonary embolism. Unchanged diffuse bony metastases. Enlarged main pulmonary artery suggestive of pulmonary hypertension.\par \par 6 minute walk test reveals a low saturation of 98% with moderate dyspnea or fatigue; walked 16.3 meters. Patient stopped before 6 minutes because she felt tired of walking\par  \par ECHO (5/25/2021) revealed \par 1. Normal global left ventricular systolic function\par 2. Spectral Doppler shows impaired relaxation pattern of left ventricular myocardial filling (Grade 1 diastolic dysfunction)\par 3. There is mild concentric left ventricular hypertrophy\par 4. Normal right ventricular size and function\par 5. Mildly enlarged left atrium by 2D measurement (4.32cm). Lipomatous interatrial septum\par 6. The right atrium is normal in size\par 7. There is excessive respiratory variation in the mitral valve spectral Doppler velocities and excessive respiratory variation in the tricuspid valve spectral Doppler velocities but does not meet the criteria for cardiac tamponade.\par 8. Thickening and calcification of the anterior and posterior mitral valve leaflets\par 9. Sclerotic aortic valve with normal opening\par 10. Compared to previous echocardiogram on 4/21/2021: pericardial effusion appears slightly improved\par 11. No evidence of aortic stenosis\par 12. Aortic evidence of mitral stenosis\par \par -Images and procedures reviewed in detail and discussed with patient. \par

## 2021-08-31 NOTE — ASSESSMENT
[FreeTextEntry1] : Ms. MANDEL is a 65 year female with a history of metastatic disease of the breast (April 2020), BRCA gene positive, chemotherapy induced neutropenia, anemia, diabetes, neuropathy, GERD, glaucoma, HLD, HTN, PVD, low vitamin D, overweight, asthma, anxiety,  who now comes in for an initial pulmonary evaluation for SOB, likely asthma, ?allergies, GERD, likely RYAN\par \par \par The patient's SOB is felt to be multifactorial:\par -poor mechanics of breathing\par -out of shape/overweight\par -Pulmonary\par   -restrictive dysfunction (based on body habitus)\par   -obstructive dysfunciton\par   -no evidence of PE based on CT Angio\par -Cardiac\par -Iron deficiency Anemia\par \par Problem 1: Asthma\par -add Trelegy 200 1 inhalation qD\par -add Ventolin 2 puffs Q6H, pre-exercise\par - Inhaler technique reviewed as well as oral hygiene technique reviewed with patient. Avoidance of cold air, extremes of temperature, rescue inhaler should be used before exercise. Order of medication reviewed with patient. Recommended use of a cool mist humidifier in the bedroom.\par - Asthma is believed to be caused by inherited (genetic) and environmental factor, but its exact cause is unknown. asthma may be triggered by allergens, lung infections, or irritants in the air. Asthma triggers are different for each person \par \par Problem 2: Iron deficiency Anemia\par -Complete iron studies\par \par Problem 3: Allergies/sinus\par -get Blood work to include: asthma panel, food IgE panel, IgE level, eosinophil level, vitamin D level\par -Environmental measures for allergies were encouraged including mattress and pillow cover, air purifier, and environmental controls. \par \par Problem 4: GERD\par -continue Omeprazole 40 mg QAM, pre-meal\par -Rule of 2s: avoid eating too much, eating too late, eating too spicy, eating two hours before bed.\par - Things to avoid including overeating, spicy foods, tight clothing, eating within two hours of bed, this list is not all inclusive.\par - For treatments of reflux, possible options discussed including diet control, H2 blockers, PPIs, as well as coating motility agents discussed as treatment options. Timing of meals and proximity of last meal to sleep were discussed. If symptoms persist, a formal gastrointestinal evaluation is needed. \par \par Problem 5: ?RYAN\par -complete home sleep study\par -Sleep apnea is associated with adverse clinical consequences which can affect most organ systems. Cardiovascular disease risk includes arrhythmias, atrial fibrillation, hypertension, coronary artery disease, and stroke. Metabolic disorders include diabetes type 2, non-alcoholic fatty liver disease. Mood disorder especially depression; and cognitive decline especially in the elderly. Associations with chronic reflux/Hooks’s esophagus some but not all inclusive. \par -Reasons include arousal consistent with hypopnea; respiratory events most prominent in REM sleep or supine position; therefore sleep staging and body position are important for accurate diagnosis and estimation of AHI. \par  \par Problem 6: Low Vitamin D\par -On Rx\par - Has been associated with asthma exacerbations and increased allergic symptoms. The goal based on recent information is maintaining levels between 50-70 and low normal is 30. Recommended 50,000 unites every two weeks to once a month depending on the level. \par \par Problem 7: Poor Balance\par -Candidate for balance therapy and script given\par \par Problem :Cardiac\par -Recommend cardiac follow up evaluation with cardiologist Dr. Hercules if needed \par \par Problem :overweight/out of shape\par -Recommend "Muniq" for weight loss\par - Weight loss, exercise and diet control were discussed and are highly encouraged. Treatment options were given such as aqua therapy, and contacting a nutritionist. Recommended to use the elliptical, stationary bike, less use of treadmill. Mindful eating was explained to the patient. Obesity is associated with worsening asthma, SOB, and potential for cardiac disease, diabetes, and other underlying medical conditions.\par \par Problem : Poor mechanics of breathing\par - Proper breathing techniques were reviewed with an emphasis on exhalation. Patient instructed to breath in for 1 second and out for four seconds. Patient was encouraged not to talk while walking.\par \par Problem : Health Maintenance\par -s/p flu shot\par -recommended strep pneumonia vaccines: Prevnar-13 vaccine, follow by Pneumo vaccine 23 one year following\par -recommended early intervention for URIs\par -recommended regular osteoporosis evaluations\par -recommended early dermatological evaluations\par -recommended after the age of 50 to the age of 70, colonoscopy every 5 years\par \par f/u in 6-8 weeks\par pt is encouraged to call or fax the office with any questions or concerns.\par

## 2021-08-31 NOTE — REASON FOR VISIT
[Initial] : an initial visit [Spouse] : spouse [TextBox_44] : SOB, likely asthma, ?allergies, GERD, likely RYAN

## 2021-08-31 NOTE — ADDENDUM
[FreeTextEntry1] : Documented by Iván Garza acting as a scribe for Dr. Fernando Carnes on (08/31/2021).\par \par All medical record entries made by the Scribe were at my, Dr. Fernando Carnes's, direction and personally dictated by me on (08/31/2021). I have reviewed the chart and agree that the record accurately reflects my personal performance of the history, physical exam, assessment and plan. I have also personally directed, reviewed, and agree with the discharge instructions.\par

## 2021-08-31 NOTE — PHYSICAL EXAM
[No Acute Distress] : no acute distress [Normal Oropharynx] : normal oropharynx [III] : Mallampati Class: III [Normal Appearance] : normal appearance [No Neck Mass] : no neck mass [Normal Rate/Rhythm] : normal rate/rhythm [Normal S1, S2] : normal s1, s2 [No Murmurs] : no murmurs [No Resp Distress] : no resp distress [Clear to Auscultation Bilaterally] : clear to auscultation bilaterally [No Abnormalities] : no abnormalities [Benign] : benign [Normal Gait] : normal gait [No Clubbing] : no clubbing [No Cyanosis] : no cyanosis [No Edema] : no edema [FROM] : FROM [Normal Color/ Pigmentation] : normal color/ pigmentation [No Focal Deficits] : no focal deficits [Oriented x3] : oriented x3 [Normal Affect] : normal affect [TextBox_2] : obese [TextBox_68] : I:E 1:3; Clear

## 2021-08-31 NOTE — HISTORY OF PRESENT ILLNESS
[TextBox_4] : Ms. MANDEL is a 65 year female with a history of metastatic disease of the breast (April 2020), BRCA gene positive, chemotherapy induced neutropenia, anemia, diabetes, neuropathy, GERD, glaucoma, HLD, HTN, PVD, low vitamin D, overweight, asthma, anxiety,  who now comes in for an initial pulmonary evaluation. Her chief complaint is\par -she notes she has had FLOOD for months\par -she notes if she walks 3 or 4 feet she starts to get SOB\par -she notes her balance is okay but has wobbled a few times but never fallen\par -she notes she has always had HTN but recently her BP has been low\par -she notes some diarrhea for the last 4 days\par -she notes she has been losing weight (now 265 lbs) but not on purpose\par -she notes sleeping with her mouth open\par -she notes she does not snore a lot\par -she reports being able to fall asleep while watching a boring TV show\par -she reports being able to fall asleep as a passenger in a car for over an hour\par -she notes a nuclear stress test was performed at [place]\par -she notes she was tested for \par -she notes her sinuses have been leaky\par -she notes she has had bronchitis in the past but not for a few years\par -she notes some heartburn and reflux and she is on omeprazole\par -she notes she is having her first chemo treatment tomorrow \par \par - patient denies any headaches, nausea, vomiting, fever, chills, sweats, chest pain, chest pressure, palpitations, coughing, wheezing, fatigue, constipation, dysphagia, myalgias, dizziness, leg swelling, leg pain, itchy eyes, itchy ears, or sour taste in the mouth

## 2021-09-01 PROBLEM — C50.912 MALIGNANT NEOPLASM OF UNSPECIFIED SITE OF LEFT FEMALE BREAST: Chronic | Status: ACTIVE | Noted: 2021-01-01

## 2021-09-01 PROBLEM — K21.9 GASTRO-ESOPHAGEAL REFLUX DISEASE WITHOUT ESOPHAGITIS: Chronic | Status: ACTIVE | Noted: 2021-01-01

## 2021-09-06 NOTE — H&P ADULT - ASSESSMENT
65 year old woman with PMH metastatic breast cancer (to bone and liver), new chemo initiation presents with abdominal pain, diarrhea, and elevated direct bilirubin concerning for sepsis likely 2/2 acute cholangitis.    #Sepsis with mild neutropenia  -SIRS for tachycardia and low WBC  -Source: cholangitis vs infectious colitis  -  -s/p vanco x1 and cefepime 1g  -STAT dose of cefepime 1g followed by cefepime 2g q8hrs  -add on flagyl given intra-abdominal infection  -currently HD stable  -continue to trend lactate (5.3 -> 3.5)  -f/u GI PCR and C. diff  -f/u blood/urine cx  -onc consult    #R/O acute cholangitis  patient has abd pain, cholestatic liver injury, direct hyperbilirubinemia and history of liver mets  -CT abd without mention of biliary disease  -urgent RUQ US  -GI consult for possible ERCP  -cefepime/flagyl as above    #metastatic breast cancer  -per allscripts: BRCA2+, was on ibrance and lestrozole (initially had neutropenia so dose was reduced). unclear if ibrance is still on based on most recent Onc note which includes instructions to both continue and d/c ibrance. patient also on zometa. confirmed by biopsy that liver mets are adenocarcinoma. echo from 5/2021 with EF 55%.  -onc consult as above  -PE ruled out with CT PA  -pain: morphine PRN    #anemia  -2/2 chemotherapy  -currently stable  -check B12/folate (hyposegmented neutrophils)  -transfuse for < 7  -f/u TLS and hemolysis labs    #metabolic acidosis  -AGMA likely 2/2 lactic acidosis with respiratory compensation  -most likely i/s/o sepsis although tumor production is a possibility    #chronic conditions  -asthma: c/w trelegy  -gerd: c/w PPI  -anziety: xanax-> ativan  -nausea: reglan   -DM: ISS    #PPX  -lovenox 65 year old woman with PMH metastatic breast cancer (to bone and liver), new chemo initiation presents with abdominal pain, diarrhea, and elevated direct bilirubin concerning for sepsis likely 2/2 acute cholangitis.    #Sepsis with mild neutropenia  -SIRS for tachycardia and low WBC  -Source: cholangitis vs infectious colitis  -new chemo of abraxane well known to cause neutropenia  -  -s/p vanco x1 and cefepime 1g  -STAT dose of cefepime 1g followed by cefepime 2g q8hrs  -add on flagyl given intra-abdominal infection  -currently HD stable  -continue to trend lactate (5.3 -> 3.5)  -f/u GI PCR and C. diff  -f/u blood/urine cx  -onc consult    #R/O acute cholangitis  patient has abd pain, cholestatic liver injury, direct hyperbilirubinemia and history of liver mets  -CT abd without mention of biliary disease  -urgent RUQ US  -GI consult for possible ERCP  -cefepime/flagyl as above    #R/O acute liver failure  Patient presenting with persistent elevation in transaminases but now with elevations in bilirubin, ALP, and INR. No active bleeding, hemodynamically stable. no clinical findings of ascites.  -will send ammonia level  -start lactulose  -f/u viral hepatitis panel, EBV/CMV/HIV, lipase    #metastatic breast cancer  -per allscripts: BRCA2+, was on ibrance and lestrozole (initially had neutropenia so dose was reduced). unclear if ibrance is still on based on most recent Onc note which includes instructions to both continue and d/c ibrance. patient also on zometa. confirmed by biopsy that liver mets are adenocarcinoma. echo from 5/2021 with EF 55%.  -onc consult as above  -PE ruled out with CT PA  -pain: morphine PRN    #anemia  -2/2 chemotherapy  -currently stable  -check B12/folate (hyposegmented neutrophils)  -transfuse for < 7  -f/u TLS and hemolysis labs    #metabolic acidosis  -AGMA likely 2/2 lactic acidosis with respiratory compensation  -most likely i/s/o sepsis although tumor production is a possibility    #chronic conditions  -asthma: c/w trelegy -> duoneb +mometasone  -gerd: c/w PPI  -anziety: xanax-> ativan  -nausea: reglan PRN  -DM: ISS    #PPX  -lovenox 65 year old woman with PMH metastatic breast cancer (to bone and liver), new chemo initiation presents with abdominal pain, diarrhea, and elevated direct bilirubin concerning for sepsis likely 2/2 acute cholangitis.       65 year old woman with PMH metastatic breast cancer (to bone and liver), new chemo initiation pw acute encephalopathy, cholestasis, SIRS, poss liver failure concerning for sepsis 2/2 poss cholangitis.

## 2021-09-06 NOTE — H&P ADULT - PROBLEM SELECTOR PLAN 5
likely 2/2 chemotherapy. Platelet count is normal but decreased from prior.  -anemia currently stable  -check B12/folate (hyposegmented neutrophils)  -transfuse for < 7  -f/u TLS and hemolysis labs

## 2021-09-06 NOTE — ED PROVIDER NOTE - CLINICAL SUMMARY MEDICAL DECISION MAKING FREE TEXT BOX
Ramone Magdlaeno MD. pt with hx of left breast ca with mets to bones, liver, started on new chemo 9/1/21 for these new discovered liver mets, presents to the ED for watery diarrhea x4 days with abd pain. pt also feels sob,g enerally weak. pt is tachycardic but not hypoxic. lungs otherwise cta. concern for severe dehydration in setting of new chemo, possible PE given tachcyardia,s ob, and cancer dx. will hdyrate, check labs, cta pe study, cxr, likely will require admission. Ramone Magdaleno MD. pt with hx of left breast ca with mets to bones, liver, started on new chemo 9/1/21 for these new discovered liver mets, presents to the ED for watery diarrhea x4 days with abd pain. pt also feels sob,g enerally weak. pt is tachycardic but not hypoxic. lungs otherwise cta. concern for severe dehydration in setting of new chemo, possible PE given tachcyardia,s ob, and cancer dx. will hdyrate, check labs, cta pe study, cxr, likely will require admission.    Attending MD Anderson: 64 yo female with PMH for metastatic breast CA on chemo (bone and liver mets).  Presents with weakness, SOB and watery diarrhea.  On exam pt ill appearing and mild respiratory distress, juandice, abd distention with no tenderness to palpation.  Plan: labs, CTA chest, IVF and likely admission.

## 2021-09-06 NOTE — H&P ADULT - ATTENDING COMMENTS
65 year old woman with PMH metastatic breast cancer (to bone and liver), new chemo initiation pw acute encephalopathy, cholestasis, SIRS, poss liver failure concerning for sepsis 2/2 poss cholangitis. Continue empiric abx. Zosyn allergy. GI and Onc consult. Likely needs MRCP to eval for extrahepatic biliary obstruction. 65 year old woman with PMH metastatic breast cancer (to bone and liver), new chemo initiation pw acute encephalopathy, cholestasis, SIRS, poss liver failure concerning for sepsis 2/2 poss cholangitis. Continue empiric abx. Zosyn allergy. GI and Onc consult. Ordered MRCP to eval for extrahepatic biliary obstruction. Will need CTH if persistent confusion. Check ammonia. May need to start rifaximin vs lactulose in setting of diarrhea. Check diarrhea for infectious causes.

## 2021-09-06 NOTE — H&P ADULT - NSHPLABSRESULTS_GEN_ALL_CORE
.                        9.7    1.53  )-----------( 156      ( 06 Sep 2021 14:22 )             29.4     09-06    139  |  106  |  20  ----------------------------<  196<H>  5.0   |  16<L>  |  1.01    Ca    10.3      06 Sep 2021 17:58  Phos  1.3     09-06  Mg     1.8     09-06    TPro  x   /  Alb  x   /  TBili  5.6<H>  /  DBili  4.1<H>  /  AST  x   /  ALT  x   /  AlkPhos  x   09-06    PT/INR - ( 06 Sep 2021 14:22 )   PT: 17.8 sec;   INR: 1.51 ratio         PTT - ( 06 Sep 2021 14:22 )  PTT:38.4 sec          pH, Venous: 7.41 (09-06-21 @ 17:58)  pCO2, Venous: 33 mmHg (09-06-21 @ 17:58)  pO2, Venous: 55 mmHg (09-06-21 @ 17:58)  HCO3, Venous: 21 mmol/L (09-06-21 @ 17:58)    Blood Gas Venous - Lactate: 3.5 mmol/L (09-06-21 @ 17:58)  Blood Gas Venous - Lactate: 5.3 mmol/L (09-06-21 @ 14:02)    CAPILLARY BLOOD GLUCOSE Personally reviewed imaging, labs.                        9.7    1.53  )-----------( 156      ( 06 Sep 2021 14:22 )             29.4     09-06    139  |  106  |  20  ----------------------------<  196<H>  5.0   |  16<L>  |  1.01    Ca    10.3      06 Sep 2021 17:58  Phos  1.3     09-06  Mg     1.8     09-06    TPro  x   /  Alb  x   /  TBili  5.6<H>  /  DBili  4.1<H>  /  AST  x   /  ALT  x   /  AlkPhos  x   09-06    PT/INR - ( 06 Sep 2021 14:22 )   PT: 17.8 sec;   INR: 1.51 ratio         PTT - ( 06 Sep 2021 14:22 )  PTT:38.4 sec          pH, Venous: 7.41 (09-06-21 @ 17:58)  pCO2, Venous: 33 mmHg (09-06-21 @ 17:58)  pO2, Venous: 55 mmHg (09-06-21 @ 17:58)  HCO3, Venous: 21 mmol/L (09-06-21 @ 17:58)    Blood Gas Venous - Lactate: 3.5 mmol/L (09-06-21 @ 17:58)  Blood Gas Venous - Lactate: 5.3 mmol/L (09-06-21 @ 14:02)    CAPILLARY BLOOD GLUCOSE

## 2021-09-06 NOTE — H&P ADULT - PROBLEM SELECTOR PLAN 7
-per allscripts: BRCA2+, was on ibrance and lestrozole (initially had neutropenia so dose was reduced). unclear if ibrance is still on based on most recent Onc note which includes instructions to both continue and d/c ibrance. patient also on zometa. confirmed by biopsy that liver mets are adenocarcinoma. echo from 5/2021 with EF 55%.  -onc consult as above  -PE ruled out with CT PA  -pain: if needed can give morphine    #chronic conditions  -HTN: home meds d/nicole several weeks ago  -asthma: c/w trelegy -> duoneb +mometasone  -gerd: c/w PPI  -anxiety: hold benzos for now, can give small dose ativan if severe anxiety or benzo withdrawal (unlikely)  -nausea: hold antinausea home meds, can restart if needed  -DM: ISS

## 2021-09-06 NOTE — H&P ADULT - PROBLEM SELECTOR PLAN 3
R/O acute cholangitis: patient has abd pain, cholestatic liver injury, direct hyperbilirubinemia and history of liver mets  -CT abd without mention of biliary disease  -urgent RUQ US  -GI consult for possible MRCP vs ERCP  -cefepime/flagyl as above - cholestasis with SIRS  - R/O acute cholangitis: patient has abd pain, cholestatic liver injury, direct hyperbilirubinemia and history of liver mets  -CT abd without mention of biliary disease  -urgent RUQ US  -GI consult for possible MRCP vs ERCP  -cefepime/flagyl as above  - MRCP ordered. awaiting GI recs.

## 2021-09-06 NOTE — H&P ADULT - PROBLEM SELECTOR PLAN 2
Likely in the setting of sepsis vs hepatic encephalopathy. No focal deficits or history of trauma. BG wnl.  -continue to monitor  -neurochecks q 4hours  -CT head if any acute change in mental status  -f/u ammonia level Likely in the setting of sepsis vs hepatic encephalopathy. No focal deficits or history of trauma. BG wnl.  -continue to monitor  -neurochecks q 4hours  -CT head if any acute change in mental status or persistent confusion  -f/u ammonia level

## 2021-09-06 NOTE — H&P ADULT - NSHPPHYSICALEXAM_GEN_ALL_CORE
Vital Signs Last 24 Hrs  T(C): 37.5 (06 Sep 2021 16:59), Max: 37.5 (06 Sep 2021 16:59)  T(F): 99.5 (06 Sep 2021 16:59), Max: 99.5 (06 Sep 2021 16:59)  HR: 107 (06 Sep 2021 21:00) (107 - 134)  BP: 150/88 (06 Sep 2021 21:00) (115/86 - 150/88)  BP(mean): --  RR: 20 (06 Sep 2021 21:00) (18 - 22)  SpO2: 96% (06 Sep 2021 21:00) (95% - 97%)    CONSTITUTIONAL: Well-groomed, in no apparent distress  EYES: periorbital jaundice. No conjunctival or scleral injection, non-icteric; PERRL and symmetric  ENMT: No external nasal lesions; nasal mucosa not inflamed; normal dentition; no pharyngeal injection or exudates, oral mucosa with moist membranes  RESPIRATORY: Breathing comfortably; lungs CTA without wheeze/rhonchi/rales  CARDIOVASCULAR: +S1S2, RRR, no M/G/R; pedal pulses full and symmetric; no lower extremity edema  GASTROINTESTINAL: No palpable masses or tenderness, +BS throughout, no rebound/guarding; no hepatosplenomegaly  SKIN: +periorbital jaundice  NEURO: A&O1-2, no asterixis; no focal deficits  PSYCHIATRIC: mood and affect appropriate; appropriate insight and judgment Vital Signs Last 24 Hrs  T(C): 37.5 (06 Sep 2021 16:59), Max: 37.5 (06 Sep 2021 16:59)  T(F): 99.5 (06 Sep 2021 16:59), Max: 99.5 (06 Sep 2021 16:59)  HR: 107 (06 Sep 2021 21:00) (107 - 134)  BP: 150/88 (06 Sep 2021 21:00) (115/86 - 150/88)  BP(mean): --  RR: 20 (06 Sep 2021 21:00) (18 - 22)  SpO2: 96% (06 Sep 2021 21:00) (95% - 97%)    CONSTITUTIONAL: Well-groomed, in no apparent distress  EYES: periorbital jaundice. No conjunctival or scleral injection, non-icteric; PERRL and symmetric  ENMT: No external nasal lesions; nasal mucosa not inflamed; normal dentition; no pharyngeal injection or exudates, oral mucosa with moist membranes  RESPIRATORY: Breathing comfortably; lungs CTA without wheeze/rhonchi/rales  CARDIOVASCULAR: +S1S2, RRR, no M/G/R; pedal pulses full and symmetric; no lower extremity edema  GASTROINTESTINAL: No palpable masses or tenderness, +BS throughout, no rebound/guarding; no hepatosplenomegaly  SKIN: +periorbital jaundice  NEURO: A&O1-2, no asterixis; no focal deficits  PSYCHIATRIC: mood and affect appropriate; appropriate insight and judgment  Back: full ROM. no pain.

## 2021-09-06 NOTE — H&P ADULT - PROBLEM SELECTOR PLAN 1
R/O: neutropenic sepsis  -SIRS for tachycardia and low WBC  -Source: cholangitis vs infectious enteritis  -new chemo of abraxane well known to cause neutropenia  -  -s/p vanco x1 and cefepime 1g  -STAT dose of cefepime 1g followed by cefepime 2g q8hrs  -add on flagyl given intra-abdominal infection  -currently HD stable after 2L IVF, c/w maintenance IVF  -continue to trend lactate (5.3 -> 3.5)  -f/u GI PCR and C. diff  -f/u blood/urine cx  -onc consult

## 2021-09-06 NOTE — ED PROVIDER NOTE - PHYSICAL EXAMINATION
PHYSICAL EXAM:  GENERAL: non-toxic appearing; in no respiratory distress  HEAD Atraumatic, Normocephalic  NECK: No JVD; trachea midline  EYES: PERRL, EOMs intact b/l w/out deficits; +scleral icterus  CHEST/LUNG: CTAB no wheezes/rhonchi/rales  HEART: tachycardic; no murmur/gallops/rubs  ABDOMEN: +BS, soft, NT, ND  EXTREMITIES: No LE edema, +2 radial pulses b/l, +2 DP/PT pulses b/l  MUSCULOSKELETAL: FROM of all 4 extremities  NERVOUS SYSTEM:  A&Ox3, No motor deficits or sensory deficits; CNII-XII intact; no focal neurologic deficits  SKIN:  jaundice of skin

## 2021-09-06 NOTE — H&P ADULT - PROBLEM SELECTOR PLAN 6
Mixed acid base disorder  -AGMA likely 2/2 lactic acidosis with respiratory compensation  -most likely i/s/o sepsis although tumor production and decreased hepatic clearance is also a possibility  -trend lactate

## 2021-09-06 NOTE — ED ADULT TRIAGE NOTE - HEART RATE (BEATS/MIN)
In the next few weeks you may receive a Press Ganey survey regarding your most recent clinic visit with us. Please take a few moments to accurately evaluate your visit. We strive for excellence and value your feedback. If we need to contact you regarding any test results, we will make 2 attempts to reach you at the number you have listed during your office visit today. If we are unable to reach you, a letter with your results and any further instructions will be mailed to you home. Please do not hesitate to call our office with any questions or concerns at Dept: [phone].
134

## 2021-09-06 NOTE — ED PROVIDER NOTE - PROGRESS NOTE DETAILS
Ramone Magdaleno MD. pt reassessed and reevaluated. pt HR improved. given oral temp of 99.5 and warm to touch, assume she would be rectally febrile but would hold off on rectal temp due to anc of 700s and on active chemo. rpt lactate 3.5 from 5.5. pt admitted to hospitalist

## 2021-09-06 NOTE — ED ADULT TRIAGE NOTE - BP NONINVASIVE SYSTOLIC (MM HG)
115 Rituxan Pregnancy And Lactation Text: This medication is Pregnancy Category C and it isn't know if it is safe during pregnancy. It is unknown if this medication is excreted in breast milk but similar antibodies are known to be excreted.

## 2021-09-06 NOTE — ED ADULT NURSE NOTE - INTERVENTIONS DEFINITIONS
[place] to call system/Call bell, personal items and telephone within reach/Instruct patient to call for assistance/Physically safe environment: no spills, clutter or unnecessary equipment/Monitor gait and stability/Monitor for mental status changes and reorient to person, place, and time/Reinforce activity limits and safety measures with patient and family

## 2021-09-06 NOTE — ED PROVIDER NOTE - NS ED ROS FT
Constitutional: no fevers; no chills  HEENT: no visual changes, no sore throat, no rhinorrhea  CV: no cp; no palpitations  Resp: sob; no cough  GI: abd pain, nausea, no vomiting, diarrhea, no constipation  : no dysuria, no hematuria  MSK: no myalgais; no arthralgias  skin: no rashes  neuro: no HA, no numbness; no weakness, no tingling  ROS statement: all other ROS negative except as per HPI

## 2021-09-06 NOTE — H&P ADULT - HISTORY OF PRESENT ILLNESS
65 year old woman with PMH of breast cancer with mets to bone and liver with recent initiation of new chemotherapy 6 days ago presents with diarrhea, abdominal pain, jaundice.     In ED: Afebrile, , /80, RR 18, Spo2 95%. Lactate 5.3, WBC 1.5 with , direct bili 4. Given 2L IVF, vanco and cefepime 1g.  65 year old woman with PMH of breast cancer with mets to bone and liver with recent initiation of new chemotherapy - abraxane 6 days ago presents with diarrhea, abdominal pain, jaundice, and confusion. Per son, patient had been experiencing dyspnea, fatigue, and dysuria and had presented to the ED 2 weeeks ago. At that time found to have a UTI, JAMAAL, and liver mets by ultrasound, she subsequently had a biopsy as an outpatient several days later. She has continued to have fatigue and weakness and some SOB. She went to pul who started trelegy ellipta - per patient too soon to tell if it's helping. More recently she started to have some confusion manifested by forgetting what she was saying, answering questions incorrectly, and repeating herself. She has also had some decreased PO intake that has worsened over last few days. Lastly she started having diarrhea this past week, reporting 3-4 BMs per day, patient unable to articulate whether loose or watery. She also reports continued polyuria and polydipsia but her home blood sugars have been normal. She is denying any blood in urine/stool or melena as well as fevers/chills, chest pain, current dyspnea, N/V, and dysuria.     In ED: Afebrile, , /80, RR 18, Spo2 95%. Lactate 5.3, WBC 1.5 with , direct bili 4. Given 2L IVF, vanco and cefepime 1g.  65 year old woman with PMH of breast cancer with mets to bone and liver with recent initiation of new chemotherapy - abraxane 6 days ago, lap-band presents with diarrhea, abdominal pain, jaundice, and confusion. Per son, patient had been experiencing dyspnea, fatigue, and dysuria and had presented to the ED 2 weeeks ago. At that time found to have a UTI, JAMAAL, and liver mets by ultrasound, she subsequently had a biopsy as an outpatient several days later. She has continued to have fatigue and weakness and some SOB. She went to pulm who started trelegy ellipta - per patient too soon to tell if it's helping. More recently she started to have some confusion manifested by forgetting what she was saying, answering questions incorrectly, and repeating herself. She has also had some decreased PO intake that has worsened over last few days. Lastly she started having diarrhea this past week, reporting 3-4 BMs per day, patient unable to articulate whether loose or watery. She also reports continued polyuria and polydipsia but her home blood sugars have been normal. She is denying any blood in urine/stool or melena as well as fevers/chills, chest pain, current dyspnea, N/V, and dysuria.     In ED: Afebrile, , /80, RR 18, Spo2 95%. Lactate 5.3, WBC 1.5 with , direct bili 4. Given 2L IVF, vanco and cefepime 1g.

## 2021-09-06 NOTE — ED ADULT NURSE NOTE - NSICDXPASTMEDICALHX_GEN_ALL_CORE_FT
PAST MEDICAL HISTORY:  Anxiety     Asthma     Breast cancer, left     Diabetes mellitus type II     GERD (gastroesophageal reflux disease)     Glaucoma     Hyperlipidemia     Hypertension     Morbidly obese     Osteoarthritis     Toe ulcer, right

## 2021-09-06 NOTE — ED ADULT NURSE REASSESSMENT NOTE - NS ED NURSE REASSESS COMMENT FT1
Patient updated on plan of care. Breathing spontaneous and unlabored on room air. Patient remains on cardiac monitor. Son at bedside.

## 2021-09-06 NOTE — ED PROVIDER NOTE - ATTENDING CONTRIBUTION TO CARE
Attending MD Anderson:  I personally have seen and examined this patient.  Resident note reviewed and agree on plan of care and except where noted.

## 2021-09-06 NOTE — CHART NOTE - NSCHARTNOTEFT_GEN_A_CORE
TO BE COMPLETED WITHIN 6 HOURS OF INITIAL ASSESSMENT:    For use in patients that have 2 sepsis criteria and new organ dysfunction   •	New or increased oxygen requirement  •	Creatinine >2mg/dL  •	Bilirubin>2mg/dL  •	Platelet <100,000/mm3  •	INR >1.5, PTT>60  •	Lactate >2    If patient persistent hypotension (SBP<90) or any lactate >4 then provider evaluation (Physician/PA/NP) within 30 minutes of bolus completion is required.    Vital Signs Last 24 Hrs  T(C): 37.5 (06 Sep 2021 16:59), Max: 37.5 (06 Sep 2021 16:59)  T(F): 99.5 (06 Sep 2021 16:59), Max: 99.5 (06 Sep 2021 16:59)  HR: 111 (06 Sep 2021 16:59) (111 - 134)  BP: 142/85 (06 Sep 2021 16:59) (115/86 - 142/85)  BP(mean): --  RR: 18 (06 Sep 2021 16:59) (18 - 22)  SpO2: 95% (06 Sep 2021 16:59) (95% - 97%)    		  LUNGS:  [ x ] Clear bilaterally [  ] Wheeze [  ] Rhonchi [  ] Rales [  ] Crackles; Other:  HEART: [  ]RRR [  ] No murmur [ x ]  Normal S1S2 [ x ] Tachycardia;  Other:  CAPILLARY REFILL:  	Fingers: [ x ] less than 2 seconds [  ] more than 2 seconds                                           Toes: [ x ]  less than 2 seconds [  ] more than 2 seconds   PERIPHERAL PULSES:  Radial: [ x ] Palpable  [  ]  non-palpable                                         Dorsalis Pedis: [ x ] Palpable  [  ] non-palpable                                         Posterior Tibial: [  ] Palpable  [  ] non-palpable                                          Other:  SKIN:   [  ]  Diaphoretic  [  ]  Mottling  [  ]  Cold extremities  [ x ]  Warm [ x ]  Dry                      Other:    BEDSIDE ULTRASOUND FINDINGS (IF APPLICABLE):    Labs:  06 Sep 2021 17:58    139    |  106    |  20     ----------------------------<  196    5.0     |  16     |  1.01     Ca    10.3       06 Sep 2021 17:58  Phos  1.3       06 Sep 2021 17:58  Mg     1.8       06 Sep 2021 17:58    TPro  x      /  Alb  x      /  TBili  5.6    /  DBili  4.1    /  AST  x      /  ALT  x      /  AlkPhos  x      06 Sep 2021 17:58                          9.7    1.53  )-----------( 156      ( 06 Sep 2021 14:22 )             29.4     PT/INR - ( 06 Sep 2021 14:22 )   PT: 17.8 sec;   INR: 1.51 ratio         PTT - ( 06 Sep 2021 14:22 )  PTT:38.4 sec  Lactate:    [ x ] I have re-evaluated the patient's fluid status and reviewed vital signs. Clinical evaluation demonstrates an appropriate response to fluid resuscitation, with subsequent plan as follows:    The patient was not given 30ml/kg of fluid because it would be detrimental to the patient’s condition despite having hypotension, lactate = 4, and/or septic shock because:    [ ] Advanced stage heart failure (symptoms with minimal exertion or symptoms at rest)  [ ] Advanced stage kidney disease with GFR < 30 ml/min    [ x ] obesity BMI > 30, patient received 30 cc/kg according to Ideal Body Weight     Patient received : 2L LR    Plan (orders must be placed in EMR):     [ x ]  Check Repeat Lactate   [ x ]  No change in current plan  [  ]  Start Vasopressors:  [  ]  Repeat Fluid Bolus:  [  ] other:    Care Discussed with Consultants/Other Providers [ x ] YES  [ ] NO    Angel Ratliff MD PGY-3  Internal Medicine  Pager [phone] / [phone]

## 2021-09-06 NOTE — ED PROVIDER NOTE - OBJECTIVE STATEMENT
64YO female hx of left lobular breast CA on chemo w/ mets to the bone and recently dx'd mets to liver, started on new chemo on 9/1/21, presents for watery diarrhea and abd pain x4 days. Having multiple episodes daily. Feels sob as well. Bc she felt so weak, 2 days ago, slid off bed unto her buttocks. Denies head strike or LOC. Pt also noting jaudnice of skin over past few days. She denies fevers, cp, dysuria.   Pt is followed at [facility]

## 2021-09-06 NOTE — H&P ADULT - PROBLEM SELECTOR PLAN 4
R/O acute liver failure: Patient presenting with persistent elevation in transaminases but now with elevations in bilirubin, ALP, and INR. No active bleeding, hemodynamically stable. no clinical findings of ascites.  -will send ammonia level  -start lactulose  -f/u viral hepatitis panel, EBV/CMV/HIV, lipase  -CT scan reporting cirrhosis although unclear if just nodular from tumors  -GI consult

## 2021-09-06 NOTE — ED ADULT NURSE REASSESSMENT NOTE - NS ED NURSE REASSESS COMMENT FT1
Report received from Hazel Alvares RN. Pt AAOx3 with intermittent confusion/disorientation, NAD, resp nonlabored, skin warm/dry, resting in bed with family at bedside. Abx and fluids running well through IV in R hand. Pt denies headache, dizziness, chest pain, palpitations, SOB, abd pain, n/v/d, urinary symptoms at this time. Pt awaiting dispo. Safety maintained.

## 2021-09-06 NOTE — ED ADULT NURSE NOTE - OBJECTIVE STATEMENT
66 yo F presents to ED A+OX3 via EMS from home accompanied by  c/o generalized weakness. Patient has history of breast cancer with metastasis to bone and liver. As per , patient was started on a new chemotherapy on 9/1 and states since 9/2 has been feeling fatigue and having generalized weakness.  reports patient having 2 falls on Thursday where "she lowered herself down but did not hit her head." Patient reports abdominal pain. Denies fever, chills, chest pain, SOB, N/V. Patient tearful during RN assessment. Breathing spontaneous and unlabored on room air. Skin warm pink and dry. Yellow discoloration noted to bilateral eyes.  at bedside.  Comfort and safety measures in place. 64 yo F presents to ED A+OX3 via EMS from home accompanied by  c/o generalized weakness. Patient has history of breast cancer with metastasis to bone and liver. As per , patient was started on a new chemotherapy on 9/1 and states since 9/2 has been feeling fatigue and having generalized weakness.  reports patient having 2 falls on Thursday where "she lowered herself down but did not hit her head." Patient reports abdominal pain. Denies fever, chills, chest pain, SOB, N/V. Patient tearful during RN assessment. Breathing spontaneous and unlabored on room air. Skin warm pink and dry. Yellow discoloration noted to bilateral eyes.  at bedside. Patient placed on cardiac monitor-sinus tachycardia.  Comfort and safety measures in place. 66 yo F presents to ED A+OX3 but poor historian and lethargic via EMS from home accompanied by  c/o generalized weakness. Patient has history of breast cancer with metastasis to bone and liver. As per , patient was started on a new chemotherapy on 9/1 and states since 9/2 has been feeling fatigue and having generalized weakness.  reports patient having 2 falls on Thursday where "she lowered herself down but did not hit her head." Patient reports abdominal pain. Denies fever, chills, chest pain, SOB, N/V. Patient tearful during RN assessment. Breathing spontaneous and unlabored on room air. Skin warm pink and dry. Yellow discoloration noted to bilateral eyes.  at bedside. Patient placed on cardiac monitor-sinus tachycardia.  Comfort and safety measures in place.

## 2021-09-07 NOTE — PHYSICAL THERAPY INITIAL EVALUATION ADULT - ADDITIONAL COMMENTS
Pt lives in  with her , daughter and daughter's family. 2+4 steps to enter main level (+)HR, another 7 steps to bedroom and bathroom level. PTA pt was Ind with functional mobility, using RW to ambulate. Pt's daughter reports recently pt has only been able to ambulate short household distances to bathroom. Pt dresses and bathes independently, family assists with cooking and cleaning.

## 2021-09-07 NOTE — PROGRESS NOTE ADULT - PROBLEM SELECTOR PLAN 2
Likely in the setting of sepsis vs hepatic encephalopathy. No focal deficits or history of trauma. BG wnl.  -continue to monitor  -neurochecks q 4hours  -CT head if any acute change in mental status or persistent confusion  -f/u ammonia level - cholestasis with SIRS  - R/O acute cholangitis: patient has abd pain, cholestatic liver injury, direct hyperbilirubinemia and history of liver mets  -CT Abd 9/6: Cirrhotic liver with metastatic disease. Gallstones in the gallbladder without inflammatory changes.   s/p placement of a gastric lap band and left hip replacement. Diffuse bony metastatic disease.  -urgent RUQ US  -cefepime/flagyl as above  -MRCP today  -F/u GI recs

## 2021-09-07 NOTE — PROGRESS NOTE ADULT - PROBLEM SELECTOR PLAN 8
lovenox for DVT ppx  DASH/CC diet  lactulose if not making >2 BM/day DVT ppx: Lovenox   Diet: DASH/CC   lactulose if not making >2 BM/day

## 2021-09-07 NOTE — OCCUPATIONAL THERAPY INITIAL EVALUATION ADULT - ADDITIONAL COMMENTS
She has continued to have fatigue and weakness and some SOB. She went to [facility] who started trelegy ellipta - per patient too soon to tell if it's helping. More recently she started to have some confusion manifested by forgetting what she was saying, answering questions incorrectly, and repeating herself. She has also had some decreased PO intake that has worsened over last few days. Lastly she started having diarrhea this past week. She also reports continued polyuria and polydipsia but her home blood sugars have been normal.

## 2021-09-07 NOTE — PHYSICAL THERAPY INITIAL EVALUATION ADULT - PERTINENT HX OF CURRENT PROBLEM, REHAB EVAL
66 yo F with PMH metastatic breast cancer (to bone and liver), new chemo initiation pw acute encephalopathy, cholestasis, SIRS, poss liver failure concerning for sepsis 2/2 poss cholangitis. Per son, patient had been experiencing dyspnea, fatigue, and dysuria and had presented to the ED 2 weeks ago. At that time found to have a UTI, JAMAAL, and liver mets by ultrasound,

## 2021-09-07 NOTE — PHYSICAL THERAPY INITIAL EVALUATION ADULT - PLANNED THERAPY INTERVENTIONS, PT EVAL
GOAL: Pt will negotiate 6 steps with CG A within 2 weeks./balance training/bed mobility training/gait training/transfer training

## 2021-09-07 NOTE — CONSULT NOTE ADULT - ATTENDING COMMENTS
65F, obesity BMI 42, asthma, BLAKE cirrhosis, recent diagnosis of breast cancer    - elevated LFTs without abdominal pain due to extensive, diffuse liver metastases seen on US.    - CMV, EBV Ab negative  - breast cancer with biopsy-proven liver metastases, multiple osseous metastases, s/p paclitaxel recently  - oral mucositis with dysphagia - no thrush - likely due to chemotherapy  - fever, on empiric antibiotics. May have some intrahepatic cholangitis. GB distended, but wall normal. Has gallstones.  - BLAKE cirrhosis, compensated. No encephalopathy  - some diarrhea  - sepsis with mild fever 99.9 F, tachycardia, leukopenia 1.68 with left shift - myelocytes 1% and lactic acidosis 3 mmol/L    - on Cefepime empirically    -- stool PCRs GI PCR panel, C diff, f/u BCx, agree with MRCP 65F, obesity BMI 42, asthma, BLAKE cirrhosis, recent diagnosis of breast cancer    - elevated LFTs without abdominal pain due to extensive, diffuse liver metastases seen on US.    - CMV, EBV Ab negative  - breast cancer with biopsy-proven liver metastases, multiple osseous metastases, s/p paclitaxel recently  - oral mucositis with dysphagia - no thrush - likely due to chemotherapy  - fever, on empiric antibiotics. May have some intrahepatic cholangitis. GB distended, but wall normal. Has gallstones.  - BLAKE cirrhosis, compensated. No encephalopathy  - some diarrhea  - sepsis with mild fever 99.9 F, tachycardia, leukopenia 1.68 with left shift - myelocytes 1% and lactic acidosis 3 mmol/L    - on Cefepime empirically  - DM2 -elevatd HbA1c    -- stool PCRs GI PCR panel, C diff, f/u BCx, agree with MRCP  -- dysphagia: can offer liquid lidocaine

## 2021-09-07 NOTE — PROGRESS NOTE ADULT - PROBLEM SELECTOR PLAN 4
R/O acute liver failure: Patient presenting with persistent elevation in transaminases but now with elevations in bilirubin, ALP, and INR. No active bleeding, hemodynamically stable. no clinical findings of ascites.  -will send ammonia level  -start lactulose  -f/u viral hepatitis panel, EBV/CMV/HIV, lipase  -CT scan reporting cirrhosis although unclear if just nodular from tumors  -GI consult Patient with persistent elevation in transaminases and now with elevations in bilirubin, ALP, and INR.   - Ammonia and lipase wnl   - Elevated INR 1.53   - start lactulose  - f/u viral hepatitis panel  - EBV panel with either prior infection or reactivation (EBV VCA IGG AB+, EBV NA IGG AB+, EBV VCA IGM AB-, EBV EA IGG AB+)  - CMV and HIV negative  - CT scan reporting cirrhosis although unclear if just nodular from tumors  - GI consult

## 2021-09-07 NOTE — OCCUPATIONAL THERAPY INITIAL EVALUATION ADULT - PERTINENT HX OF CURRENT PROBLEM, REHAB EVAL
66 yo F PMH of breast cancer with mets to bone and liver with recent initiation of new chemotherapy - abraxane 6 days ago, lap-band presents with diarrhea, abdominal pain, jaundice, and confusion. Per son, patient had been experiencing dyspnea, fatigue, and dysuria and had presented to the ED 2 weeeks ago. At that time found to have a UTI, JAMAAL, and liver mets by ultrasound, she subsequently had a biopsy as an outpatient several days later.

## 2021-09-07 NOTE — CONSULT NOTE ADULT - ATTENDING COMMENTS
65F h/o metastatic ER/RI (+), HER2 (-) metastatic breast cancer to bone and liver, presenting to ER for diarrhea, abdominal pain, jaundice and confusion. She recently had POD in the liver and started abraxane last week. She has worsening LFT and neutropenia. CT findings noted. Would give a dose of neupogen to stimulate marrow recovery (? 2/2 recent ibrance use). Hepatology on board. Monitor LFT. C diff pending. ? metabolic encephalopathy- check brain MRI

## 2021-09-07 NOTE — PROGRESS NOTE ADULT - PROBLEM SELECTOR PLAN 1
R/O: neutropenic sepsis  -SIRS for tachycardia and low WBC  -Source: cholangitis vs infectious enteritis  -new chemo of abraxane well known to cause neutropenia  -  -s/p vanco x1 and cefepime 1g  -STAT dose of cefepime 1g followed by cefepime 2g q8hrs  -add on flagyl given intra-abdominal infection  -currently HD stable after 2L IVF, c/w maintenance IVF  -continue to trend lactate (5.3 -> 3.5)  -f/u GI PCR and C. diff  -f/u blood/urine cx  -onc consult R/O: neutropenic sepsis  -SIRS for tachycardia and low WBC  -Source: cholangitis vs infectious enteritis vs oncologic sequelae   -new chemo of abraxane well known to cause neutropenia  -  -s/p vanco x1 and cefepime 1g  -STAT dose of cefepime 1g followed by cefepime 2g q8hrs  -add on flagyl given intra-abdominal infection  -currently HD stable after 2L IVF, c/w maintenance IVF  -continue to trend lactate (5.3 -> 3.5)  -f/u GI PCR and C. diff  -f/u blood/urine cx  -onc consult R/O: neutropenic sepsis  -SIRS for tachycardia and low WBC  -Source: cholangitis vs infectious enteritis vs typhlitis vs oncologic sequelae   -new chemo of abraxane well known to cause neutropenia  - (c/f neutropenic fever if ANC <500)  -Can consider GCSF to increase neutrophil count, but patient is currently septic   -s/p vanco x1 and cefepime 1g   -Continue cefepime 2g q8h and Flagyl 500mg q8h  -Continue maintenance IVF  -Trend lactate (5.3 -> 3.5)  -f/u GI PCR and C. diff  -UA positive   -f/u blood/urine cx  -f/u oncology recs

## 2021-09-07 NOTE — PROGRESS NOTE ADULT - SUBJECTIVE AND OBJECTIVE BOX
%%%%INCOMPLETE NOTE%%%%%     Dr. Trina Collazo, PGY-1  [facility]: [id number]/ NS: [phone]  After 7pm please page [phone]    Patient is a 65y old  Female who presents with a chief complaint of sepsis (06 Sep 2021 19:41)    Subjective: No acute events overnight. Patient seen and examined at bedside. BMx1 yesterday. Denies chest pain, abdominal pain, cough, n/v, sob. Breathing comfortably on room air.    MEDICATIONS  (STANDING):  ALBUTerol    90 MICROgram(s) HFA Inhaler 1 Puff(s) Inhalation every 4 hours  cefepime   IVPB 2000 milliGRAM(s) IV Intermittent every 8 hours  cholecalciferol 1000 Unit(s) Oral daily  dextrose 40% Gel 15 Gram(s) Oral once  dextrose 5%. 1000 milliLiter(s) (50 mL/Hr) IV Continuous <Continuous>  dextrose 5%. 1000 milliLiter(s) (100 mL/Hr) IV Continuous <Continuous>  dextrose 50% Injectable 25 Gram(s) IV Push once  dextrose 50% Injectable 12.5 Gram(s) IV Push once  dextrose 50% Injectable 25 Gram(s) IV Push once  enoxaparin Injectable 40 milliGRAM(s) SubCutaneous daily  glucagon  Injectable 1 milliGRAM(s) IntraMuscular once  insulin lispro (ADMELOG) corrective regimen sliding scale   SubCutaneous three times a day before meals  insulin lispro (ADMELOG) corrective regimen sliding scale   SubCutaneous at bedtime  lactated ringers. 1000 milliLiter(s) (100 mL/Hr) IV Continuous <Continuous>  lactulose Syrup 10 Gram(s) Oral three times a day  letrozole 2.5 milliGRAM(s) Oral daily  LORazepam     Tablet 1 milliGRAM(s) Oral once  metroNIDAZOLE  IVPB 500 milliGRAM(s) IV Intermittent every 8 hours  mometasone 220 MICROgram(s) Inhaler 1 Puff(s) Inhalation daily  montelukast 10 milliGRAM(s) Oral daily  pantoprazole    Tablet 40 milliGRAM(s) Oral before breakfast  tiotropium 18 MICROgram(s) Capsule 1 Capsule(s) Inhalation daily    MEDICATIONS  (PRN):  gabapentin 300 milliGRAM(s) Oral three times a day PRN neuropathy        Objective:     Vitals: Vital Signs Last 24 Hrs  T(C): 37.4 (21 @ 05:09), Max: 37.5 (21 @ 16:59)  T(F): 99.4 (21 @ 05:09), Max: 99.5 (21 @ 16:59)  HR: 114 (21 @ 05:09) (101 - 134)  BP: 150/83 (21 @ 05:09) (115/86 - 150/88)  BP(mean): --  RR: 20 (21 @ 05:09) (18 - 22)  SpO2: 97% (21 @ 05:09) (95% - 98%)            I&O's Summary    PHYSICAL EXAM:  CONSTITUTIONAL: Well-groomed, in no apparent distress  EYES: periorbital jaundice. No conjunctival or scleral injection, non-icteric; PERRL and symmetric  ENMT: No external nasal lesions; nasal mucosa not inflamed; normal dentition; no pharyngeal injection or exudates, oral mucosa with moist membranes  RESPIRATORY: Breathing comfortably; lungs CTA without wheeze/rhonchi/rales  CARDIOVASCULAR: +S1S2, RRR, no M/G/R; pedal pulses full and symmetric; no lower extremity edema  GASTROINTESTINAL: No palpable masses or tenderness, +BS throughout, no rebound/guarding; no hepatosplenomegaly  SKIN: +periorbital jaundice  NEURO: A&O1-2, no asterixis; no focal deficits  PSYCHIATRIC: mood and affect appropriate; appropriate insight and judgment  Back: full ROM. no pain.    LABS:                        9.3    1.13  )-----------( 109      ( 07 Sep 2021 07:03 )             28.2                         9.7    1.53  )-----------( 156      ( 06 Sep 2021 14:22 )             29.4     Hgb Trend: 9.3<--, 9.7<--, 10.3<--      139  |  106  |  20  ----------------------------<  196<H>  5.0   |  16<L>  |  1.01      139  |  104  |  20  ----------------------------<  203<H>  5.5<H>   |  17<L>  |  0.99    Ca    10.3      06 Sep 2021 17:58  Ca    10.9<H>      06 Sep 2021 14:16  Phos  1.3       Mg     1.8         TPro  x   /  Alb  x   /  TBili  5.6<H>  /  DBili  4.1<H>  /  AST  x   /  ALT  x   /  AlkPhos  x     TPro  7.2  /  Alb  2.9<L>  /  TBili  5.3<H>  /  DBili  3.4<H>  /  AST  243<H>  /  ALT  104<H>  /  AlkPhos  753<H>      Creatinine Trend: 1.01<--, 0.99<--, 1.48<--, 1.21<--  PT/INR - ( 06 Sep 2021 14:22 )   PT: 17.8 sec;   INR: 1.51 ratio         PTT - ( 06 Sep 2021 14:22 )  PTT:38.4 sec              Urinalysis Basic - ( 06 Sep 2021 21:24 )    Color: Dark Yellow / Appearance: Slightly Turbid / S.024 / pH: x  Gluc: x / Ketone: Negative  / Bili: Moderate / Urobili: Negative   Blood: x / Protein: 100 mg/dL / Nitrite: Negative   Leuk Esterase: Moderate / RBC: 6 /hpf / WBC 10 /HPF   Sq Epi: x / Non Sq Epi: 2 /hpf / Bacteria: Few        CAPILLARY BLOOD GLUCOSE      POCT Blood Glucose.: 170 mg/dL (06 Sep 2021 23:26)     %%%%INCOMPLETE NOTE%%%%%     Dr. Trina Collazo, PGY-1  [facility]: [id number]/ NS: [phone]  After 7pm please page [phone] or [phone]    Patient is a 65y old  Female who presents with a chief complaint of sepsis (06 Sep 2021 19:41)    Subjective: No acute events overnight. Patient seen and examined at bedside. Pt was curled up and in severe pain. She has had multiple watery bowel movements and is wearing a diaper, and is not able to feel when the bowel movements come out. BMx1 yesterday. Denies chest pain, abdominal pain, cough, n/v, sob. Breathing comfortably on room air.    MEDICATIONS  (STANDING):  ALBUTerol    90 MICROgram(s) HFA Inhaler 1 Puff(s) Inhalation every 4 hours  cefepime   IVPB 2000 milliGRAM(s) IV Intermittent every 8 hours  cholecalciferol 1000 Unit(s) Oral daily  dextrose 40% Gel 15 Gram(s) Oral once  dextrose 5%. 1000 milliLiter(s) (50 mL/Hr) IV Continuous <Continuous>  dextrose 5%. 1000 milliLiter(s) (100 mL/Hr) IV Continuous <Continuous>  dextrose 50% Injectable 25 Gram(s) IV Push once  dextrose 50% Injectable 12.5 Gram(s) IV Push once  dextrose 50% Injectable 25 Gram(s) IV Push once  enoxaparin Injectable 40 milliGRAM(s) SubCutaneous daily  glucagon  Injectable 1 milliGRAM(s) IntraMuscular once  insulin lispro (ADMELOG) corrective regimen sliding scale   SubCutaneous three times a day before meals  insulin lispro (ADMELOG) corrective regimen sliding scale   SubCutaneous at bedtime  lactated ringers. 1000 milliLiter(s) (100 mL/Hr) IV Continuous <Continuous>  lactulose Syrup 10 Gram(s) Oral three times a day  letrozole 2.5 milliGRAM(s) Oral daily  LORazepam     Tablet 1 milliGRAM(s) Oral once  metroNIDAZOLE  IVPB 500 milliGRAM(s) IV Intermittent every 8 hours  mometasone 220 MICROgram(s) Inhaler 1 Puff(s) Inhalation daily  montelukast 10 milliGRAM(s) Oral daily  pantoprazole    Tablet 40 milliGRAM(s) Oral before breakfast  tiotropium 18 MICROgram(s) Capsule 1 Capsule(s) Inhalation daily    MEDICATIONS  (PRN):  gabapentin 300 milliGRAM(s) Oral three times a day PRN neuropathy    Objective:     Vitals: Vital Signs Last 24 Hrs  T(C): 37.4 (21 @ 05:09), Max: 37.5 (21 @ 16:59)  T(F): 99.4 (21 @ 05:09), Max: 99.5 (21 @ 16:59)  HR: 114 (21 @ 05:09) (101 - 134)  BP: 150/83 (21 @ 05:09) (115/86 - 150/88)  RR: 20 (21 @ 05:09) (18 - 22)  SpO2: 97% (21 @ 05:09) (95% - 98%)                PHYSICAL EXAM:  CONSTITUTIONAL: Well-groomed, lethargic, moderate distress secondary to pain  EYES: periorbital jaundice. No conjunctival or scleral injection, non-icteric; PERRL and symmetric  ENMT: No external nasal lesions; nasal mucosa not inflamed; normal dentition; no pharyngeal injection or exudates, oral mucosa with moist membranes  RESPIRATORY: Breathing on nasal cannula; lungs CTA without wheeze/rhonchi/rales  CARDIOVASCULAR: +S1S2, RRR, no M/G/R; pedal pulses full and symmetric; no lower extremity edema  GASTROINTESTINAL: Diffuse tenderness, +BS throughout, no rebound/guarding; no hepatosplenomegaly  SKIN: +periorbital jaundice  NEURO: A&O1-2, no asterixis; no focal deficits  PSYCHIATRIC: mood and affect appropriate; appropriate insight and judgment  Back: full ROM. no back pain.    LABS:                        9.3    1.13  )-----------( 109      ( 07 Sep 2021 07:03 )             28.2                         9.7    1.53  )-----------( 156      ( 06 Sep 2021 14:22 )             29.4     Hgb Trend: 9.3<--, 9.7<--, 10.3<--      139  |  106  |  20  ----------------------------<  196<H>  5.0   |  16<L>  |  1.01      139  |  104  |  20  ----------------------------<  203<H>  5.5<H>   |  17<L>  |  0.99    Ca    10.3      06 Sep 2021 17:58  Ca    10.9<H>      06 Sep 2021 14:16  Phos  1.3       Mg     1.8         TPro  x   /  Alb  x   /  TBili  5.6<H>  /  DBili  4.1<H>  /  AST  x   /  ALT  x   /  AlkPhos  x     TPro  7.2  /  Alb  2.9<L>  /  TBili  5.3<H>  /  DBili  3.4<H>  /  AST  243<H>  /  ALT  104<H>  /  AlkPhos  753<H>      Creatinine Trend: 1.01<--, 0.99<--, 1.48<--, 1.21<--  PT/INR - ( 06 Sep 2021 14:22 )   PT: 17.8 sec;   INR: 1.51 ratio         PTT - ( 06 Sep 2021 14:22 )  PTT:38.4 sec              Urinalysis Basic - ( 06 Sep 2021 21:24 )    Color: Dark Yellow / Appearance: Slightly Turbid / S.024 / pH: x  Gluc: x / Ketone: Negative  / Bili: Moderate / Urobili: Negative   Blood: x / Protein: 100 mg/dL / Nitrite: Negative   Leuk Esterase: Moderate / RBC: 6 /hpf / WBC 10 /HPF   Sq Epi: x / Non Sq Epi: 2 /hpf / Bacteria: Few        CAPILLARY BLOOD GLUCOSE      POCT Blood Glucose.: 170 mg/dL (06 Sep 2021 23:26)     Dr. Trina Collazo, PGY-1  [facility]: [id number]/ NS: [phone]  After 7pm please page [phone] or [phone]    Patient is a 65y old  Female who presents with a chief complaint of sepsis (06 Sep 2021 19:41)    Subjective: No acute events overnight. Patient seen and examined at bedside. Pt was curled up and in severe pain. She has had multiple watery bowel movements and is wearing a diaper, and is not able to feel when the bowel movements come out. Denies chest pain, abdominal pain, cough, n/v, sob.     MEDICATIONS  (STANDING):  ALBUTerol    90 MICROgram(s) HFA Inhaler 1 Puff(s) Inhalation every 4 hours  cefepime   IVPB 2000 milliGRAM(s) IV Intermittent every 8 hours  cholecalciferol 1000 Unit(s) Oral daily  dextrose 40% Gel 15 Gram(s) Oral once  dextrose 5%. 1000 milliLiter(s) (50 mL/Hr) IV Continuous <Continuous>  dextrose 5%. 1000 milliLiter(s) (100 mL/Hr) IV Continuous <Continuous>  dextrose 50% Injectable 25 Gram(s) IV Push once  dextrose 50% Injectable 12.5 Gram(s) IV Push once  dextrose 50% Injectable 25 Gram(s) IV Push once  enoxaparin Injectable 40 milliGRAM(s) SubCutaneous daily  glucagon  Injectable 1 milliGRAM(s) IntraMuscular once  insulin lispro (ADMELOG) corrective regimen sliding scale   SubCutaneous three times a day before meals  insulin lispro (ADMELOG) corrective regimen sliding scale   SubCutaneous at bedtime  lactated ringers. 1000 milliLiter(s) (100 mL/Hr) IV Continuous <Continuous>  lactulose Syrup 10 Gram(s) Oral three times a day  letrozole 2.5 milliGRAM(s) Oral daily  LORazepam     Tablet 1 milliGRAM(s) Oral once  metroNIDAZOLE  IVPB 500 milliGRAM(s) IV Intermittent every 8 hours  mometasone 220 MICROgram(s) Inhaler 1 Puff(s) Inhalation daily  montelukast 10 milliGRAM(s) Oral daily  pantoprazole    Tablet 40 milliGRAM(s) Oral before breakfast  tiotropium 18 MICROgram(s) Capsule 1 Capsule(s) Inhalation daily    MEDICATIONS  (PRN):  gabapentin 300 milliGRAM(s) Oral three times a day PRN neuropathy    Objective:     Vitals: Vital Signs Last 24 Hrs  T(C): 37.4 (21 @ 05:09), Max: 37.5 (21 @ 16:59)  T(F): 99.4 (21 @ 05:09), Max: 99.5 (21 @ 16:59)  HR: 114 (21 @ 05:09) (101 - 134)  BP: 150/83 (21 @ 05:09) (115/86 - 150/88)  RR: 20 (21 @ 05:09) (18 - 22)  SpO2: 97% (21 @ 05:09) (95% - 98%)                PHYSICAL EXAM:  CONSTITUTIONAL: Well-groomed, lethargic, moderate distress secondary to pain  EYES: periorbital jaundice. No conjunctival or scleral injection, non-icteric; PERRL and symmetric  ENMT: No external nasal lesions; nasal mucosa not inflamed; normal dentition; no pharyngeal injection or exudates, oral mucosa with moist membranes  RESPIRATORY: Breathing on 2L nasal cannula; lungs CTA without wheeze/rhonchi/rales  CARDIOVASCULAR: +S1S2, RRR, no M/G/R; pedal pulses full and symmetric; no lower extremity edema  GASTROINTESTINAL: Diffuse tenderness most prominently in RUQ, +BS throughout, no rebound/guarding; no hepatosplenomegaly  SKIN: Bandages on b/l knees   NEURO: A&O2, no asterixis; no focal deficits.   PSYCHIATRIC: mood and affect appropriate; appropriate insight and judgment    LABS:                        9.3    1.13  )-----------( 109      ( 07 Sep 2021 07:03 )             28.2                         9.7    1.53  )-----------( 156      ( 06 Sep 2021 14:22 )             29.4     Hgb Trend: 9.3<--, 9.7<--, 10.3<--      139  |  106  |  20  ----------------------------<  196<H>  5.0   |  16<L>  |  1.01      139  |  104  |  20  ----------------------------<  203<H>  5.5<H>   |  17<L>  |  0.99    Ca    10.3      06 Sep 2021 17:58  Ca    10.9<H>      06 Sep 2021 14:16  Phos  1.3       Mg     1.8         TPro  x   /  Alb  x   /  TBili  5.6<H>  /  DBili  4.1<H>  /  AST  x   /  ALT  x   /  AlkPhos  x     TPro  7.2  /  Alb  2.9<L>  /  TBili  5.3<H>  /  DBili  3.4<H>  /  AST  243<H>  /  ALT  104<H>  /  AlkPhos  753<H>      Creatinine Trend: 1.01<--, 0.99<--, 1.48<--, 1.21<--  PT/INR - ( 06 Sep 2021 14:22 )   PT: 17.8 sec;   INR: 1.51 ratio         PTT - ( 06 Sep 2021 14:22 )  PTT:38.4 sec              Urinalysis Basic - ( 06 Sep 2021 21:24 )    Color: Dark Yellow / Appearance: Slightly Turbid / S.024 / pH: x  Gluc: x / Ketone: Negative  / Bili: Moderate / Urobili: Negative   Blood: x / Protein: 100 mg/dL / Nitrite: Negative   Leuk Esterase: Moderate / RBC: 6 /hpf / WBC 10 /HPF   Sq Epi: x / Non Sq Epi: 2 /hpf / Bacteria: Few        CAPILLARY BLOOD GLUCOSE      POCT Blood Glucose.: 170 mg/dL (06 Sep 2021 23:26)

## 2021-09-07 NOTE — PROGRESS NOTE ADULT - PROBLEM SELECTOR PLAN 5
likely 2/2 chemotherapy. Platelet count is normal but decreased from prior.  -anemia currently stable  -check B12/folate (hyposegmented neutrophils)  -transfuse for < 7  -f/u TLS and hemolysis labs likely 2/2 chemotherapy. Platelet count is normal but decreased from prior.  - Anemia currently stable  - B12 and folate wnl   - Transfuse for < 7  - Monitor for signs/symptoms of bleeding   - F/u TLS and hemolysis labs  -  elevated, haptoglobin 251 elevated

## 2021-09-07 NOTE — PATIENT PROFILE ADULT - OVER THE PAST TWO WEEKS HAVE YOU FELT DOWN, DEPRESSED OR HOPELESS?
Immediate Postoperative Note





- Procedure Note


Procedure Date: 10/19/18


Pre-Op Diagnosis: appendicitis


Procedure: lap appy


Post-Op Diagnosis: same


Primary Surgeon: abraham


Anesthesia Type: General ET tube


Complications: No complications


Estimated Blood Loss (in cc): 10


Plan of Care: 





discharge home and follow up in 1-2 weeks in clinic. no

## 2021-09-07 NOTE — PHYSICAL THERAPY INITIAL EVALUATION ADULT - PRECAUTIONS/LIMITATIONS, REHAB EVAL
CXR: Diffuse osseous blastic metastasis. CT abdomen and pelvis: Cirrhotic liver with known metastases. CXR: Diffuse osseous blastic metastasis. CT abdomen and pelvis: Cirrhotic liver with known metastases./fall precautions

## 2021-09-07 NOTE — PROGRESS NOTE ADULT - ASSESSMENT
64 yo F with PMH metastatic breast cancer (to bone and liver), new chemo initiation presents with acute encephalopathy, cholestasis, SIRS, possible liver failure concerning for sepsis 2/2 possible cholangitis.       64 yo F with PMH metastatic breast cancer (to bone and liver), new chemo initiation presents with acute encephalopathy, cholestasis, SIRS, possible liver failure concerning for sepsis 2/2 possible cholangitis vs intra-abdominal infection secondary to oncologic sequelae       66 yo F with PMH metastatic breast cancer (to bone and liver), new chemo initiation presents with acute encephalopathy, cholestasis, SIRS, possible liver failure concerning for sepsis 2/2 possible cholangitis vs intra-abdominal infection secondary to oncologic sequelae.

## 2021-09-07 NOTE — PHYSICAL THERAPY INITIAL EVALUATION ADULT - DISCHARGE DISPOSITION, PT EVAL
Pt lives with family available to assist with daily needs and functional mobility, home PT for endurance training/home w/ assist/home w/ home PT

## 2021-09-07 NOTE — CONSULT NOTE ADULT - SUBJECTIVE AND OBJECTIVE BOX
Chief Complaint:  Patient is a 65y old  Female who presents with a chief complaint of sepsis (07 Sep 2021 07:35)      HPI: 65 year old woman with PMH of breast cancer with mets to bone and liver with recent initiation of new chemotherapy - abraxane 6 days ago, lap-band presents with diarrhea, abdominal pain, jaundice, and confusion. Per son, patient had been experiencing dyspnea, fatigue, and dysuria and had presented to the ED 2 weeeks ago. At that time found to have a UTI, JAMAAL, and liver mets by ultrasound, she subsequently had a biopsy as an outpatient several days later. She has continued to have fatigue and weakness and some SOB. She went to pul who started trelegy ellipta - per patient too soon to tell if it's helping. More recently she started to have some confusion manifested by forgetting what she was saying, answering questions incorrectly, and repeating herself. She has also had some decreased PO intake that has worsened over last few days. Lastly she started having diarrhea this past week, reporting 3-4 BMs per day, patient unable to articulate whether loose or watery. She also reports continued polyuria and polydipsia but her home blood sugars have been normal. She is denying any blood in urine/stool or melena as well as fevers/chills, chest pain, current dyspnea, N/V, and dysuria.     Allergies:  tetanus toxoid (Unknown)  Zosyn (Unknown)      Home Medications:    Hospital Medications:  ALBUTerol    90 MICROgram(s) HFA Inhaler 1 Puff(s) Inhalation every 4 hours  cefepime   IVPB 2000 milliGRAM(s) IV Intermittent every 8 hours  cholecalciferol 1000 Unit(s) Oral daily  dextrose 40% Gel 15 Gram(s) Oral once  dextrose 5%. 1000 milliLiter(s) IV Continuous <Continuous>  dextrose 5%. 1000 milliLiter(s) IV Continuous <Continuous>  dextrose 50% Injectable 25 Gram(s) IV Push once  dextrose 50% Injectable 12.5 Gram(s) IV Push once  dextrose 50% Injectable 25 Gram(s) IV Push once  enoxaparin Injectable 40 milliGRAM(s) SubCutaneous daily  gabapentin 300 milliGRAM(s) Oral three times a day PRN  glucagon  Injectable 1 milliGRAM(s) IntraMuscular once  insulin lispro (ADMELOG) corrective regimen sliding scale   SubCutaneous three times a day before meals  insulin lispro (ADMELOG) corrective regimen sliding scale   SubCutaneous at bedtime  lactated ringers. 1000 milliLiter(s) IV Continuous <Continuous>  lactulose Syrup 10 Gram(s) Oral three times a day  letrozole 2.5 milliGRAM(s) Oral daily  LORazepam     Tablet 1 milliGRAM(s) Oral once  metroNIDAZOLE  IVPB 500 milliGRAM(s) IV Intermittent every 8 hours  mometasone 220 MICROgram(s) Inhaler 1 Puff(s) Inhalation daily  montelukast 10 milliGRAM(s) Oral daily  pantoprazole    Tablet 40 milliGRAM(s) Oral before breakfast  tiotropium 18 MICROgram(s) Capsule 1 Capsule(s) Inhalation daily      PMHX/PSHX:  Diabetes mellitus type II    Hypertension    Hyperlipidemia    Osteoarthritis    Toe ulcer, right    Asthma    Glaucoma    Anxiety    Morbidly obese    Breast cancer, left    GERD (gastroesophageal reflux disease)    S/P laparoscopic surgery    S/P hip replacement    H/O:  Section    History of tonsillectomy    H/O drainage of abscess    S/P biopsy    H/O rhinoplasty     history        Family history:  No pertinent family history in first degree relatives        Social History:     ROS:     General:  No weight loss, fevers, chills, night sweats, fatigue  Eyes:  No vision changes, no yellowing of eyes   ENT:  No throat pain, runny nose  CV:  No chest pain, palpitations  Resp:  No SOB, cough, wheezing  GI:  See HPI  :  No burning with urination, no hematuria   Muscle:  No muscle pain, weakness  Neuro:  No numbness/tingling, memory problems  Psych:  No fatigue, insomnia, mood problems  Heme:  No easy bruisability  Skin:  No rash, itching       PHYSICAL EXAM:     GENERAL:  Appears stated age, well-groomed, well-nourished, no distress  HEENT:  NC/AT,  conjunctivae clear and pink,  no JVD  CHEST:  Full & symmetric excursion, no increased effort, breath sounds clear  HEART:  Regular rhythm, S1, S2, no murmur/rub/S3/S4, no abdominal bruit, no edema  ABDOMEN:  Soft, non-tender, non-distended, normoactive bowel sounds,  no masses ,  EXTREMITIES:  no cyanosis,clubbing or edema  SKIN:  No rash/erythema/ecchymoses/petechiae/wounds/abscess/warm/dry  NEURO:  Alert, oriented    Vital Signs:  Vital Signs Last 24 Hrs  T(C): 36.6 (07 Sep 2021 08:32), Max: 37.5 (06 Sep 2021 16:59)  T(F): 97.8 (07 Sep 2021 08:32), Max: 99.5 (06 Sep 2021 16:59)  HR: 112 (07 Sep 2021 08:32) (101 - 134)  BP: 138/78 (07 Sep 2021 08:32) (115/86 - 150/88)  BP(mean): --  RR: 20 (07 Sep 2021 08:32) (18 - 22)  SpO2: 95% (07 Sep 2021 08:32) (95% - 98%)  Daily Height in cm: 170.18 (06 Sep 2021 12:08)    Daily     LABS:                        9.3    1.13  )-----------( 109      ( 07 Sep 2021 07:03 )             28.2     09-07    140  |  105  |  17  ----------------------------<  215<H>  3.9   |  18<L>  |  1.02    Ca    10.0      07 Sep 2021 07:03  Phos  2.2     09-07  Mg     1.7     09-07    TPro  6.4  /  Alb  2.7<L>  /  TBili  5.4<H>  /  DBili  x   /  AST  215<H>  /  ALT  91<H>  /  AlkPhos  744<H>  09-    LIVER FUNCTIONS - ( 07 Sep 2021 07:03 )  Alb: 2.7 g/dL / Pro: 6.4 g/dL / ALK PHOS: 744 U/L / ALT: 91 U/L / AST: 215 U/L / GGT: x           PT/INR - ( 07 Sep 2021 07:03 )   PT: 18.0 sec;   INR: 1.53 ratio         PTT - ( 06 Sep 2021 14:22 )  PTT:38.4 sec  Urinalysis Basic - ( 06 Sep 2021 21:24 )    Color: Dark Yellow / Appearance: Slightly Turbid / S.024 / pH: x  Gluc: x / Ketone: Negative  / Bili: Moderate / Urobili: Negative   Blood: x / Protein: 100 mg/dL / Nitrite: Negative   Leuk Esterase: Moderate / RBC: 6 /hpf / WBC 10 /HPF   Sq Epi: x / Non Sq Epi: 2 /hpf / Bacteria: Few      Amylase Serum--      Lipase serum29       Ammonia--  Amylase Serum--      Lipase serum--       [id number]  Amylase Serum--      Lipase serum22       Ammonia--      Imaging:        EXAM:  CT ABDOMEN AND PELVIS IC                          EXAM:  CT ANGIO CHEST PULM [facility]                            PROCEDURE DATE:  2021            INTERPRETATION:  Reason for Exam: Shortness of breath. chest pain.    CTA of the chest was performed from the thoracic inlet to the level of the adrenal glands following IV contrast injection of  80 cc of Omnipaque 350. No immediate complications were reported.  MIP images were also created and reviewed.    Comparison: 2021    Tubes/Lines: None    Mediastinum and Heart: Right aorta is normal in size. Enlargement of the main and right and left pulmonary arteries suggestive of underlying pulmonary hypertension. Thyroid gland is unremarkable. No lymphadenopathy. No pericardial effusion.    Lungs, Pleura, and Airways: There is no pulmonary embolus to the level of the segmenta arteries. No consolidations, edema, effusion, or pneumothorax.      CT abdomen and pelvis:    Cirrhotic liver with small amount of perihepatic ascites. Small hepatic hypodensities in keeping with known metastatic disease. Gallstones in the gallbladder without inflammatory changes. The the pancreas and adrenal glands are unremarkable. Both kidneys enhance symmetrically without stones or hydronephrosis. Bilateral renal hypodensities are too small to characterize.    There is no retroperitoneal lymphadenopathy. There are calcifications within the aorta and its branches.    Patient is status post placement of a gastric lap band. There is no evidence of bowel obstruction.    Status post left hip replacement. Diffuse bony metastatic disease in keeping with known metastatic breast cancer.    IMPRESSION:    CTA chest:    No pulmonary embolus to the level of the segmenta arteries. No consolidations, edema, effusion, or pneumothorax.    Incidental findings as above    CT abdomen and pelvis: Cirrhotic liver with known metastases.    Diffuse bony metastases. Remaining incidentals as above.         Chief Complaint:  Patient is a 65y old  Female who presents with a chief complaint of sepsis (07 Sep 2021 07:35)      HPI: 65 year old woman with PMH of breast cancer with mets to bone and liver with recent initiation of new chemotherapy - abraxane 6 days ago, lap-band presents with diarrhea, abdominal pain, jaundice, and confusion. Per son, patient had been experiencing dyspnea, fatigue, and dysuria and had presented to the ED 2 weeeks ago. At that time found to have a UTI, JAMAAL, and liver mets by ultrasound, she subsequently had a biopsy as an outpatient several days later. She has continued to have fatigue and weakness and some SOB. She went to [facility] who started trelegy ellipta - per patient too soon to tell if it's helping. More recently she started to have some confusion manifested by forgetting what she was saying, answering questions incorrectly, and repeating herself. Since admission she now reports the confusion has resolved. She has also had some decreased PO intake x few weeks. Lastly she started having diarrhea this past week, reporting 4-5 BMs non-bloody per day. States she has never had diarrhea like this before. She is denying any blood in urine/stool or melena as well as fevers/chills, chest pain, current dyspnea, N/V, and dysuria. Denies any personal or family history of liver disease. Denies any ETOH or tobacco use. Denies any other new medications.    Allergies:  tetanus toxoid (Unknown)  Zosyn (Unknown)      Home Medications:    Hospital Medications:  ALBUTerol    90 MICROgram(s) HFA Inhaler 1 Puff(s) Inhalation every 4 hours  cefepime   IVPB 2000 milliGRAM(s) IV Intermittent every 8 hours  cholecalciferol 1000 Unit(s) Oral daily  dextrose 40% Gel 15 Gram(s) Oral once  dextrose 5%. 1000 milliLiter(s) IV Continuous <Continuous>  dextrose 5%. 1000 milliLiter(s) IV Continuous <Continuous>  dextrose 50% Injectable 25 Gram(s) IV Push once  dextrose 50% Injectable 12.5 Gram(s) IV Push once  dextrose 50% Injectable 25 Gram(s) IV Push once  enoxaparin Injectable 40 milliGRAM(s) SubCutaneous daily  gabapentin 300 milliGRAM(s) Oral three times a day PRN  glucagon  Injectable 1 milliGRAM(s) IntraMuscular once  insulin lispro (ADMELOG) corrective regimen sliding scale   SubCutaneous three times a day before meals  insulin lispro (ADMELOG) corrective regimen sliding scale   SubCutaneous at bedtime  lactated ringers. 1000 milliLiter(s) IV Continuous <Continuous>  lactulose Syrup 10 Gram(s) Oral three times a day  letrozole 2.5 milliGRAM(s) Oral daily  LORazepam     Tablet 1 milliGRAM(s) Oral once  metroNIDAZOLE  IVPB 500 milliGRAM(s) IV Intermittent every 8 hours  mometasone 220 MICROgram(s) Inhaler 1 Puff(s) Inhalation daily  montelukast 10 milliGRAM(s) Oral daily  pantoprazole    Tablet 40 milliGRAM(s) Oral before breakfast  tiotropium 18 MICROgram(s) Capsule 1 Capsule(s) Inhalation daily      PMHX/PSHX:  Diabetes mellitus type II    Hypertension    Hyperlipidemia    Osteoarthritis    Toe ulcer, right    Asthma    Glaucoma    Anxiety    Morbidly obese    Breast cancer, left    GERD (gastroesophageal reflux disease)    S/P laparoscopic surgery    S/P hip replacement    H/O:  Section    History of tonsillectomy    H/O drainage of abscess    S/P biopsy    H/O rhinoplasty     history        Family history:  No pertinent family history in first degree relatives        Social History:     ROS: 14 point ROS negative unless otherwise stated in subjective        PHYSICAL EXAM:     GENERAL:  Appears stated age, chronically ill appearing  HEENT:  NC/AT, +scleral icterus  CHEST:  Full & symmetric excursion, no increased effort, breath sounds clear  HEART:  Regular rhythm, S1, S2, no murmur/rub/S3/S4  ABDOMEN:  +obese, soft, +tender RUQ, non-distended, normoactive bowel sounds  EXTREMITIES:  no cyanosis, clubbing or edema  SKIN:  +mild jaundice  NEURO:  Alert, orientedx3, no asterixis    Vital Signs:  Vital Signs Last 24 Hrs  T(C): 36.6 (07 Sep 2021 08:32), Max: 37.5 (06 Sep 2021 16:59)  T(F): 97.8 (07 Sep 2021 08:32), Max: 99.5 (06 Sep 2021 16:59)  HR: 112 (07 Sep 2021 08:32) (101 - 134)  BP: 138/78 (07 Sep 2021 08:32) (115/86 - 150/88)  BP(mean): --  RR: 20 (07 Sep 2021 08:32) (18 - 22)  SpO2: 95% (07 Sep 2021 08:32) (95% - 98%)  Daily Height in cm: 170.18 (06 Sep 2021 12:08)    Daily     LABS:                        9.3    1.13  )-----------( 109      ( 07 Sep 2021 07:03 )             28.2     09-07    140  |  105  |  17  ----------------------------<  215<H>  3.9   |  18<L>  |  1.02    Ca    10.0      07 Sep 2021 07:03  Phos  2.2     09-  Mg     1.7         TPro  6.4  /  Alb  2.7<L>  /  TBili  5.4<H>  /  DBili  x   /  AST  215<H>  /  ALT  91<H>  /  AlkPhos  744<H>      LIVER FUNCTIONS - ( 07 Sep 2021 07:03 )  Alb: 2.7 g/dL / Pro: 6.4 g/dL / ALK PHOS: 744 U/L / ALT: 91 U/L / AST: 215 U/L / GGT: x           PT/INR - ( 07 Sep 2021 07:03 )   PT: 18.0 sec;   INR: 1.53 ratio         PTT - ( 06 Sep 2021 14:22 )  PTT:38.4 sec  Urinalysis Basic - ( 06 Sep 2021 21:24 )    Color: Dark Yellow / Appearance: Slightly Turbid / S.024 / pH: x  Gluc: x / Ketone: Negative  / Bili: Moderate / Urobili: Negative   Blood: x / Protein: 100 mg/dL / Nitrite: Negative   Leuk Esterase: Moderate / RBC: 6 /hpf / WBC 10 /HPF   Sq Epi: x / Non Sq Epi: 2 /hpf / Bacteria: Few      Amylase Serum--      Lipase serum29       Ammonia--  Amylase Serum--      Lipase serum--       [id number]  Amylase Serum--      Lipase serum22       Ammonia--      Imaging:        EXAM:  CT ABDOMEN AND PELVIS IC                          [facility]                            PROCEDURE DATE:  2021            INTERPRETATION:  Reason for Exam: Shortness of breath. chest pain.    CTA of the chest was performed from the thoracic inlet to the level of the adrenal glands following IV contrast injection of  80 cc of Omnipaque 350. No immediate complications were reported.  MIP images were also created and reviewed.    Comparison: 2021    Tubes/Lines: None    Mediastinum and Heart: Right aorta is normal in size. Enlargement of the main and right and left pulmonary arteries suggestive of underlying pulmonary hypertension. Thyroid gland is unremarkable. No lymphadenopathy. No pericardial effusion.    Lungs, Pleura, and Airways: There is no pulmonary embolus to the level of the segmenta arteries. No consolidations, edema, effusion, or pneumothorax.      CT abdomen and pelvis:    Cirrhotic liver with small amount of perihepatic ascites. Small hepatic hypodensities in keeping with known metastatic disease. Gallstones in the gallbladder without inflammatory changes. The the pancreas and adrenal glands are unremarkable. Both kidneys enhance symmetrically without stones or hydronephrosis. Bilateral renal hypodensities are too small to characterize.    There is no retroperitoneal lymphadenopathy. There are calcifications within the aorta and its branches.    Patient is status post placement of a gastric lap band. There is no evidence of bowel obstruction.    Status post left hip replacement. Diffuse bony metastatic disease in keeping with known metastatic breast cancer.    IMPRESSION:    CTA chest:    No pulmonary embolus to the level of the segmenta arteries. No consolidations, edema, effusion, or pneumothorax.    Incidental findings as above    CT abdomen and pelvis: Cirrhotic liver with known metastases.    Diffuse bony metastases. Remaining incidentals as above.         Chief Complaint:  Patient is a 65y old  Female who presents with a chief complaint of sepsis (07 Sep 2021 07:35)      HPI: 65 year old woman with PMH of breast cancer with mets to bone and liver with recent initiation of new chemotherapy - abraxane 6 days ago, lap-band presents with diarrhea, abdominal pain, jaundice, and confusion. Per son, patient had been experiencing dyspnea, fatigue, and dysuria and had presented to the ED 2 weeeks ago. At that time found to have a UTI, JAMAAL, and liver mets by ultrasound, she subsequently had a biopsy as an outpatient several days later. She has continued to have fatigue and weakness and some SOB. She went to [facility] who started trelegy ellipta - per patient too soon to tell if it's helping. More recently she started to have some confusion manifested by forgetting what she was saying, answering questions incorrectly, and repeating herself. Since admission she now reports the confusion has resolved. She has also had some decreased PO intake x few weeks. Lastly she started having diarrhea this past week, reporting 4-5 BMs non-bloody per day. States she has never had diarrhea like this before. She is denying any blood in urine/stool or melena as well as fevers/chills, chest pain, current dyspnea, N/V, and dysuria. Denies any personal or family history of liver disease. Denies any ETOH or tobacco use. Denies any other new medications. No sick contacts or recent travel.    Allergies:  tetanus toxoid (Unknown)  Zosyn (Unknown)      Home Medications:    Hospital Medications:  ALBUTerol    90 MICROgram(s) HFA Inhaler 1 Puff(s) Inhalation every 4 hours  cefepime   IVPB 2000 milliGRAM(s) IV Intermittent every 8 hours  cholecalciferol 1000 Unit(s) Oral daily  dextrose 40% Gel 15 Gram(s) Oral once  dextrose 5%. 1000 milliLiter(s) IV Continuous <Continuous>  dextrose 5%. 1000 milliLiter(s) IV Continuous <Continuous>  dextrose 50% Injectable 25 Gram(s) IV Push once  dextrose 50% Injectable 12.5 Gram(s) IV Push once  dextrose 50% Injectable 25 Gram(s) IV Push once  enoxaparin Injectable 40 milliGRAM(s) SubCutaneous daily  gabapentin 300 milliGRAM(s) Oral three times a day PRN  glucagon  Injectable 1 milliGRAM(s) IntraMuscular once  insulin lispro (ADMELOG) corrective regimen sliding scale   SubCutaneous three times a day before meals  insulin lispro (ADMELOG) corrective regimen sliding scale   SubCutaneous at bedtime  lactated ringers. 1000 milliLiter(s) IV Continuous <Continuous>  lactulose Syrup 10 Gram(s) Oral three times a day  letrozole 2.5 milliGRAM(s) Oral daily  LORazepam     Tablet 1 milliGRAM(s) Oral once  metroNIDAZOLE  IVPB 500 milliGRAM(s) IV Intermittent every 8 hours  mometasone 220 MICROgram(s) Inhaler 1 Puff(s) Inhalation daily  montelukast 10 milliGRAM(s) Oral daily  pantoprazole    Tablet 40 milliGRAM(s) Oral before breakfast  tiotropium 18 MICROgram(s) Capsule 1 Capsule(s) Inhalation daily      PMHX/PSHX:  Diabetes mellitus type II    Hypertension    Hyperlipidemia    Osteoarthritis    Toe ulcer, right    Asthma    Glaucoma    Anxiety    Morbidly obese    Breast cancer, left    GERD (gastroesophageal reflux disease)    S/P laparoscopic surgery    S/P hip replacement    H/O:  Section    History of tonsillectomy    H/O drainage of abscess    S/P biopsy    H/O rhinoplasty     history        Family history:  No pertinent family history in first degree relatives        Social History: see HPI    ROS: 14 point ROS negative unless otherwise stated in subjective        PHYSICAL EXAM:     GENERAL:  Appears stated age, chronically ill appearing  HEENT:  NC/AT, +scleral icterus, +MM dry  CHEST:  Full & symmetric excursion, no increased effort, breath sounds clear  HEART:  Regular rhythm, S1, S2, no murmur/rub/S3/S4  ABDOMEN:  +obese, soft, +tender RUQ, non-distended, normoactive bowel sounds  EXTREMITIES:  no cyanosis, clubbing or edema  SKIN:  +mild jaundice  NEURO:  Alert, orientedx3, no asterixis    Vital Signs:  Vital Signs Last 24 Hrs  T(C): 36.6 (07 Sep 2021 08:32), Max: 37.5 (06 Sep 2021 16:59)  T(F): 97.8 (07 Sep 2021 08:32), Max: 99.5 (06 Sep 2021 16:59)  HR: 112 (07 Sep 2021 08:32) (101 - 134)  BP: 138/78 (07 Sep 2021 08:32) (115/86 - 150/88)  BP(mean): --  RR: 20 (07 Sep 2021 08:32) (18 - 22)  SpO2: 95% (07 Sep 2021 08:32) (95% - 98%)  Daily Height in cm: 170.18 (06 Sep 2021 12:08)    Daily     LABS:                        9.3    1.13  )-----------( 109      ( 07 Sep 2021 07:03 )             28.2     -07    140  |  105  |  17  ----------------------------<  215<H>  3.9   |  18<L>  |  1.02    Ca    10.0      07 Sep 2021 07:03  Phos  2.2       Mg     1.7         TPro  6.4  /  Alb  2.7<L>  /  TBili  5.4<H>  /  DBili  x   /  AST  215<H>  /  ALT  91<H>  /  AlkPhos  744<H>      LIVER FUNCTIONS - ( 07 Sep 2021 07:03 )  Alb: 2.7 g/dL / Pro: 6.4 g/dL / ALK PHOS: 744 U/L / ALT: 91 U/L / AST: 215 U/L / GGT: x           PT/INR - ( 07 Sep 2021 07:03 )   PT: 18.0 sec;   INR: 1.53 ratio         PTT - ( 06 Sep 2021 14:22 )  PTT:38.4 sec  Urinalysis Basic - ( 06 Sep 2021 21:24 )    Color: Dark Yellow / Appearance: Slightly Turbid / S.024 / pH: x  Gluc: x / Ketone: Negative  / Bili: Moderate / Urobili: Negative   Blood: x / Protein: 100 mg/dL / Nitrite: Negative   Leuk Esterase: Moderate / RBC: 6 /hpf / WBC 10 /HPF   Sq Epi: x / Non Sq Epi: 2 /hpf / Bacteria: Few      Amylase Serum--      Lipase serum29       Ammonia--  Amylase Serum--      Lipase serum--       [id number]  Amylase Serum--      Lipase serum22       Ammonia--      Imaging:        EXAM:  CT ABDOMEN AND PELVIS IC                          EXAM:  CT ANGIO CHEST PULM [facility]                            PROCEDURE DATE:  2021            INTERPRETATION:  Reason for Exam: Shortness of breath. chest pain.    CTA of the chest was performed from the thoracic inlet to the level of the adrenal glands following IV contrast injection of  80 cc of Omnipaque 350. No immediate complications were reported.  MIP images were also created and reviewed.    Comparison: 2021    Tubes/Lines: None    Mediastinum and Heart: Right aorta is normal in size. Enlargement of the main and right and left pulmonary arteries suggestive of underlying pulmonary hypertension. Thyroid gland is unremarkable. No lymphadenopathy. No pericardial effusion.    Lungs, Pleura, and Airways: There is no pulmonary embolus to the level of the segmenta arteries. No consolidations, edema, effusion, or pneumothorax.      CT abdomen and pelvis:    Cirrhotic liver with small amount of perihepatic ascites. Small hepatic hypodensities in keeping with known metastatic disease. Gallstones in the gallbladder without inflammatory changes. The the pancreas and adrenal glands are unremarkable. Both kidneys enhance symmetrically without stones or hydronephrosis. Bilateral renal hypodensities are too small to characterize.    There is no retroperitoneal lymphadenopathy. There are calcifications within the aorta and its branches.    Patient is status post placement of a gastric lap band. There is no evidence of bowel obstruction.    Status post left hip replacement. Diffuse bony metastatic disease in keeping with known metastatic breast cancer.    IMPRESSION:    CTA chest:    No pulmonary embolus to the level of the segmenta arteries. No consolidations, edema, effusion, or pneumothorax.    Incidental findings as above    CT abdomen and pelvis: Cirrhotic liver with known metastases.    Diffuse bony metastases. Remaining incidentals as above.

## 2021-09-07 NOTE — PROGRESS NOTE ADULT - PROBLEM SELECTOR PLAN 7
-per allscripts: BRCA2+, was on ibrance and lestrozole (initially had neutropenia so dose was reduced). unclear if ibrance is still on based on most recent Onc note which includes instructions to both continue and d/c ibrance. patient also on zometa. confirmed by biopsy that liver mets are adenocarcinoma. echo from 5/2021 with EF 55%.  -onc consult as above  -PE ruled out with CT PA  -pain: if needed can give morphine    #chronic conditions  -HTN: home meds d/nicole several weeks ago  -asthma: c/w trelegy -> duoneb +mometasone  -gerd: c/w PPI  -anxiety: hold benzos for now, can give small dose ativan if severe anxiety or benzo withdrawal (unlikely)  -nausea: hold antinausea home meds, can restart if needed  -DM: ISS -per allscripts: BRCA2+, was on ibrance and lestrozole (initially had neutropenia so dose was reduced). unclear if ibrance is still on based on most recent Onc note which includes instructions to both continue and d/c ibrance. patient also on zometa. confirmed by biopsy that liver mets are adenocarcinoma. echo from 5/2021 with EF 55%.  -onc consult as above  -PE ruled out with CT PA  -pain: if needed can give morphine    #chronic conditions  -HTN: home meds d/nicole several weeks ago  -asthma: c/w trelegy -> duoneb +mometasone  -gerd: c/w PPI  -anxiety: hold benzos for now, can give small dose ativan if severe anxiety or benzo withdrawal (unlikely)  -nausea: hold antinausea home meds, can restart if needed  -DM: hold home metformin. ISS while inpatient

## 2021-09-07 NOTE — PROGRESS NOTE ADULT - PROBLEM SELECTOR PLAN 3
- cholestasis with SIRS  - R/O acute cholangitis: patient has abd pain, cholestatic liver injury, direct hyperbilirubinemia and history of liver mets  -CT abd without mention of biliary disease  -urgent RUQ US  -GI consult for possible MRCP vs ERCP  -cefepime/flagyl as above  - MRCP ordered. awaiting GI recs. - cholestasis with SIRS  - R/O acute cholangitis: patient has abd pain, cholestatic liver injury, direct hyperbilirubinemia and history of liver mets  -CT abd without mention of biliary disease  -urgent RUQ US  -GI consult for possible MRCP vs ERCP  -cefepime/flagyl as above  - MRCP scheduled for today. awaiting GI recs. -Likely 2/2 sepsis vs hepatic encephalopathy. No focal deficits or history of trauma. BG wnl.  -neuro checks q4h  -CT head if any acute change in mental status or persistent confusion  -Ammonia 53 wnl

## 2021-09-07 NOTE — OCCUPATIONAL THERAPY INITIAL EVALUATION ADULT - LIVES WITH, PROFILE
Pt lives w family in [place] w 2 [place]. Pt has a walk in shower w shower chair and owns a commode./children/spouse

## 2021-09-07 NOTE — PHYSICAL THERAPY INITIAL EVALUATION ADULT - GENERAL OBSERVATIONS, REHAB EVAL
Pt received in ED on stretcher, (+)2L O2NC (+)IVF (+)primifit. Daughter at bedside and OT Anay present.

## 2021-09-07 NOTE — PROGRESS NOTE ADULT - PROBLEM SELECTOR PLAN 6
Mixed acid base disorder  -AGMA likely 2/2 lactic acidosis with respiratory compensation  -most likely i/s/o sepsis although tumor production and decreased hepatic clearance is also a possibility  -trend lactate Mixed acid base disorder  - AGMA likely 2/2 lactic acidosis with respiratory compensation  - most likely i/s/o sepsis although tumor production and decreased hepatic clearance is also a possibility  - trend lactate

## 2021-09-07 NOTE — CONSULT NOTE ADULT - ASSESSMENT
65F h/o metastatic ER/OR (+), HER2 (-) metastatic breast cancer to bone and liver, presenting to ER for diarrhea, abdominal pain, jaundice and confusion:     #diarrhea, transaminitis, abn pain,   - agree with infectious w/u and broad spectrum coverage (on cefepime and flagyl) for possible cholangitis. f/u cdiff and cultures. recommend ID consult.  -CT A/p showing a cirrhotic liver with known metastasis. hepatology following and recs appreciated. transaminitis could be in part due chemotherapy as well   -pending MRCP    #metastatic breast cancer, neutropenia, encephalopathy  4/17/20 Letrozole and Ibrance started with dose reduction from 125 mg to 100 mg starting cycle 2 due to neutropenia  5/1/20 Zometa started and given monthly x 3 then continue every 3 months  9/10/20 Genetic testing with [facility] gene panel showed a pathogenic mutation in BRCA2: c.[id number] ([id number]), which is associated with an increased risk for breast cancer, ovarian cancer, pancreatic cancer, melanoma, prostate cancer and male breast cancer; and also CDH1: deletion of 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 patient not aware of liver biopsy results and will d/w her tomorrow   -started on abraxane on 9/1  -neutropenic likely from ibrance and recommend starting neupogen today to help with count recovery   -recommend MR brain to r/o mets given confusion. c/w encephalopathy w/u and management per primary team  -no plan for inpatient chemotherapy, can f/u with Dr. Blanc as outpt. due for interval imaging in October     [facility] PGY5 [phone]

## 2021-09-07 NOTE — CONSULT NOTE ADULT - SUBJECTIVE AND OBJECTIVE BOX
HPI:  65 year old woman with PMH of breast cancer with mets to bone and liver with recent initiation of new chemotherapy - abraxane 6 days ago, lap-band presents with diarrhea, abdominal pain, jaundice, and confusion. Per son, patient had been experiencing dyspnea, fatigue, and dysuria and had presented to the ED 2 weeeks ago. At that time found to have a UTI, JAMAAL, and liver mets by ultrasound, she subsequently had a biopsy as an outpatient several days later. She has continued to have fatigue and weakness and some SOB. She went to pul who started trelegy ellipta - per patient too soon to tell if it's helping. More recently she started to have some confusion manifested by forgetting what she was saying, answering questions incorrectly, and repeating herself. She has also had some decreased PO intake that has worsened over last few days. Lastly she started having diarrhea this past week, reporting 3-4 BMs per day, patient unable to articulate whether loose or watery. She also reports continued polyuria and polydipsia but her home blood sugars have been normal. She is denying any blood in urine/stool or melena as well as fevers/chills, chest pain, current dyspnea, N/V, and dysuria.     In ED: Afebrile, , /80, RR 18, Spo2 95%. Lactate 5.3, WBC 1.5 with , direct bili 4. Given 2L IVF, vanco and cefepime 1g.  (06 Sep 2021 19:41)    Allergies  tetanus toxoid (Unknown)  Zosyn (Unknown)    MEDICATIONS  (STANDING):  ALBUTerol    90 MICROgram(s) HFA Inhaler 1 Puff(s) Inhalation every 4 hours  cefepime   IVPB 2000 milliGRAM(s) IV Intermittent every 8 hours  cholecalciferol 1000 Unit(s) Oral daily  dextrose 40% Gel 15 Gram(s) Oral once  dextrose 5%. 1000 milliLiter(s) (50 mL/Hr) IV Continuous <Continuous>  dextrose 5%. 1000 milliLiter(s) (100 mL/Hr) IV Continuous <Continuous>  dextrose 50% Injectable 25 Gram(s) IV Push once  dextrose 50% Injectable 12.5 Gram(s) IV Push once  dextrose 50% Injectable 25 Gram(s) IV Push once  enoxaparin Injectable 40 milliGRAM(s) SubCutaneous daily  filgrastim-sndz (ZARXIO) Injectable 480 MICROGram(s) SubCutaneous daily  glucagon  Injectable 1 milliGRAM(s) IntraMuscular once  influenza   Vaccine 0.5 milliLiter(s) IntraMuscular once  insulin lispro (ADMELOG) corrective regimen sliding scale   SubCutaneous three times a day before meals  insulin lispro (ADMELOG) corrective regimen sliding scale   SubCutaneous at bedtime  lactated ringers. 1000 milliLiter(s) (100 mL/Hr) IV Continuous <Continuous>  lactulose Syrup 10 Gram(s) Oral three times a day  letrozole 2.5 milliGRAM(s) Oral daily  LORazepam     Tablet 1 milliGRAM(s) Oral once  metroNIDAZOLE  IVPB 500 milliGRAM(s) IV Intermittent every 8 hours  mometasone 220 MICROgram(s) Inhaler 1 Puff(s) Inhalation daily  montelukast 10 milliGRAM(s) Oral daily  pantoprazole    Tablet 40 milliGRAM(s) Oral before breakfast  tiotropium 18 MICROgram(s) Capsule 1 Capsule(s) Inhalation daily    MEDICATIONS  (PRN):  gabapentin 300 milliGRAM(s) Oral three times a day PRN neuropathy    PAST MEDICAL & SURGICAL HISTORY:  Diabetes mellitus type II    Hypertension    Hyperlipidemia    Osteoarthritis    Toe ulcer, right    Asthma    Glaucoma    Anxiety    Morbidly obese    Breast cancer, left    GERD (gastroesophageal reflux disease)    S/P laparoscopic surgery  GASTRIC LAP BAND ~     S/P hip replacement  LEFT HIP (OXINIUM) ~     H/O:  Section  x2-1991    History of tonsillectomy      H/O drainage of abscess  13    S/P biopsy  uterus-2013    H/O rhinoplasty       history      FAMILY HISTORY:  No pertinent family history in first degree relatives    SOCIAL HISTORY: No EtOH, no tobacco    REVIEW OF SYSTEMS:  CONSTITUTIONAL: no fever  EYES/ENT: No visual changes;  no throat pain   NECK: No pain or stiffness  RESPIRATORY: no sob  CARDIOVASCULAR: No chest pain or palpitations  GASTROINTESTINAL: see above hpi   GENITOURINARY: No dysuria, change in frequency or hematuria  NEUROLOGICAL: No numbness or weakness  SKIN: No itching, burning, rashes, or lesions   MSK: no joint pain  Allergy: no urticaria     T(F): 99.1 (21 @ 16:33), Max: 99.4 (21 @ 05:09)  HR: 100 (21 @ 16:33)  BP: 142/69 (21 @ 16:33)  RR: 18 (21 @ 16:33)  SpO2: 100% (21 @ 16:33)  Wt(kg): --    GENERAL: NAD  HEENT: EOMI, MMM, no oropharyngeal lesions or erythema appreciated  Pulm: no inc wob  CV: RRR  ABDOMEN: soft, nt, nd  MSK: nl ROM  EXTREMITIES:  no appreciable edema in b/l LE  Neuro:  no focal deficits  SKIN: (+) jaundice                           9.3    1.13  )-----------( 109      ( 07 Sep 2021 07:03 )             28.2           140  |  105  |  17  ----------------------------<  215<H>  3.9   |  18<L>  |  1.02    Ca    10.0      07 Sep 2021 07:03  Phos  2.2       Mg     1.7         TPro  6.4  /  Alb  2.7<L>  /  TBili  5.4<H>  /  DBili  x   /  AST  215<H>  /  ALT  91<H>  /  AlkPhos  744<H>        Uric Acid, Serum: 3.4 mg/dL ( @ 07:03)  Magnesium, Serum: 1.7 mg/dL ( @ 07:03)  Phosphorus Level, Serum: 2.2 mg/dL ( @ 07:03)  Lactate Dehydrogenase, Serum: 842 U/L ( @ 21:17)    Path:   Surgical Pathology Report:   ACCESSION No:  [id number]    JG MANDEL                        4    Addendum Report    Addendum  IMMUNOHISTOCHEMICAL ANALYSIS OF BREAST CANCER BIOMARKERS  RESULT:  ESTROGEN RECEPTOR (ER):  Positive, >95%, strong nuclear staining    PROGESTERONE RECEPTOR (PgR):  Positive, >95%, strong nuclear staining    HER-2:  Negative (0 membrane staining)    Paraffin Block Used:  1A  Specimen Fixation/Processing:  10%  neutral buffered formalin.  Total Formalin Fixation Time and Cold IschemiaTime:  Meet  ASCO/CAP Guidelines:  Yes  Controls:  External Control:  Positive batch control (ER/PgR/HER-2):  Positive (Reviewed by IHC Laboratory)  Separate built-in positive control on slides  (ER/PgR/Her2):  Positive  Internal Control:  Internal control absent  Antibody Clones: Estrogen receptor clone: [id number] ([place]);  Progesterone receptor clone: 1E2 ([place]): HER-2 clone: [id number]  ([place]).  Detection System:  Peroxidase-antiperoxidase /DAB  Technique.    Result Interpretation Criteria (as per ASCO/CAP Guideline  //2018):  Positive ER/PgR Result:  Immunoreactive tumor cells present (> or  =1%).  Negative ER/PgR Result:  < 1% Immunoreactive tumor cells  present.  Uninterpretable:  Uninterpretable for ER or PgR if  finding that no tumor nuclei are immunoreactive and that internal  epithelial elements present in the sample or separately submitted  from the same sample lack any nuclear staining.  HER2 Negative Score 0 Result:  No staining observed or  incomplete, faint or barely perceptible membrane staining in < or  = 10% of invasive tumor cells.  HER2 Negative Score 1+ Result:  Incomplete, faint or barely perceptible membrane staining in >  10% of invasive tumor cells.  HER2 Equivocal Score 2+ Result:  Incomplete and or weakto moderate circumferential membrane  staining in > 10%  of invasive tumor cells or Complete, intense,  circumferential membrane staining in < or = 10% of invasive tumor  cells.  HER2 Positive Score 3+ Result: Complete, intense,  circumferential membrane staining in > 10% of invasive tumor  cells.  Reference:    JG MANDEL                        4  Addendum Report      Arch Pathol Lab Med. 2014;138:241-256  MThao Cullen et al J of Clinical Oncology, 28 (16)  2010:[id number]    These immunohistochemical tests have been developed and their  performance characteristics determined by Immunopathology  Laboratory, Department of Pathology,  [facility], [address] [place] [address].  It has not  been cleared or approved by the U.S. Food and Drug  administration.  The FDA has determined that such clearance or  approval is not necessary.  This test is used for clinical  purposes.  The laboratory certified under the CLIA-88 as  qualified to perform high complexity clinical testing.  These  assays have not been validated on decalcified tissues.  Results  should be interpreted with caution given the likelihood of false  negativity on decalcified specimens.    Verified by: GEORGIA HILLIARD M.D.  (Electronic Signature)  Reported on: 21 14:23 EDT, [address] [place] [address]  Phone: [phone]   Fax: [phone]  _________________________________________________________________      Surgical Final Report    Final Diagnosis  1. Liver, left, 2cm lesion, biopsy  - Metastatic adenocarcinoma, compatible with breast primary,  see note.    Note: The clinical history of moderately differentiated lobular  carcinoma of left breast is noted ([id number]). The  immunohistochemical stains have been performed on block 1A. The  tumor cells are positive for AE1.3 (strong and diffuse), GATA3  (strong and diffuse) and mammoglobin (focal). ER, FL and Her2 kindra  will be performed as an addendum. The case is reviewed with  breast pathologist who concurs with the diagnosis.    Slide(s) with built in immunohistochemical study control(s) and  negative control associated with this case has/have been verified  by the sign out pathologist.  For slide(s) without built in controls positive control slides  has/have been reviewed and approved by immunohistochemistry JG Arshad                        4  Surgical Final Report  These immunohistochemical/ in-situ hybridization tests have been  developed and their performance characteristics determined by  [facility], Department of Pathology,  Division of Immunopathology, [address] [place] [address].  It has not been clearedor approved by the U.S. Food  and Drug Administration.  The FDA has determined that such  clearance or approval is not necessary.  This test is used for  clinical purposes.  The laboratory is certified under the CLIA-88  as qualified to perform high  complexity clinical testing.    Verified by: Diann Reyes MD  (Electronic Signature)  Reported on: 21 11:24 EDT, [address] [address]  Phone: [phone]   Fax: [phone]  _________________________________________________________________    Clinical History  History of breast cancer, liver metastases; left 2 cm liver  lesion 18 G core x2.    Specimen(s) Submitted  1     Left 2cm liver lesion 18 gauge x 2 core    Gross Description  Received: In formalin labeled "left liver"  Size: Two cores 1.8 x 0.1 cm and 1.3 x 0.1 cm  Description: Soft, tan-white, threadlike  Submitted: Entirely in one cassette: GAMALIEL Mello 2021 01:23 AM    Disclaimer  In addition to other data that may appear on the specimen  containers, all labels have been inspected to confirm the  presence of the patient's name and date of birth.    Histological Processing Performed at [facility], Department of Pathology, [address] [place] [address].      JG MANDEL                        4  Surgical Consult Report  Disclaimer  In addition to other data that may appear on the specimen  containers, all labels have been inspected to confirm the  presence of the patient's name and date of birth.    Histological Processing Performed at [facility], Department of Pathology, [address] [place] [address]. (21 @ 01:01)        Radiology:  < from: CT Abdomen and Pelvis w/ IV Cont (21 @ 16:52) >      INTERPRETATION:  Reason for Exam: Shortness of breath. chest pain.    CTA of the chest was performed from the thoracic inlet to the level of the adrenal glands following IV contrast injection of  80 cc of Omnipaque 350. No immediate complications were reported.  MIP images were also created and reviewed.    Comparison: 2021    Tubes/Lines: None    Mediastinum and Heart: Right aorta is normal in size. Enlargement of the main and right and left pulmonary arteries suggestive of underlying pulmonary hypertension. Thyroid gland is unremarkable. No lymphadenopathy. No pericardial effusion.    Lungs, Pleura, and Airways: There is no pulmonary embolus to the level of the segmenta arteries. No consolidations, edema, effusion, or pneumothorax.      CT abdomen and pelvis:    Cirrhotic liver with small amount of perihepatic ascites. Small hepatic hypodensities in keeping with known metastatic disease. Gallstones in the gallbladder without inflammatory changes. The the pancreas and adrenal glands are unremarkable. Both kidneys enhance symmetrically without stones or hydronephrosis. Bilateral renal hypodensities are too small to characterize.    There is no retroperitoneal lymphadenopathy. There are calcifications within the aorta and its branches.    Patient is status post placement of a gastric lap band. There is no evidence of bowel obstruction.    Status post left hip replacement. Diffuse bony metastatic disease in keeping with known metastatic breast cancer.    IMPRESSION:    CTA chest:    No pulmonary embolus to the level of the segmenta arteries. No consolidations, edema, effusion, or pneumothorax.    Incidental findings as above    CT abdomen and pelvis: Cirrhotic liver with known metastases.    Diffuse bony metastases. Remaining incidentals as above.    < end of copied text >

## 2021-09-07 NOTE — CONSULT NOTE ADULT - ASSESSMENT
Unable to examine pt as she was out of the room. Full consult note to follow.    **THIS IS AN INCOMPLETE NOTE; PLEASE AWAIT FINALIZED NOTE FOR RECS**    65 year old woman with PMH of breast cancer with mets to bone and liver with recent initiation of new chemotherapy - abraxane 6 days ago, lap-band presents with diarrhea, abdominal pain, jaundice, and confusion. Per son, patient had been experiencing dyspnea, fatigue, and dysuria and had presented to the ED 2 weeks ago. At that time found to have a UTI, JAMAAL, and liver mets by ultrasound, she subsequently had a biopsy as an outpatient several days later. She has continued to have fatigue and weakness and some SOB. She went to [facility] who started trelegy ellipta - per patient too soon to tell if it's helping. More recently she started to have some confusion manifested by forgetting what she was saying, answering questions incorrectly, and repeating herself. She has also had some decreased PO intake that has worsened over last few days. Lastly she started having diarrhea this past week, reporting 3-4 BMs per day, patient unable to articulate whether loose or watery.     **THIS IS AN INCOMPLETE NOTE; PLEASE AWAIT FINALIZED NOTE FOR RECS**    Impression:  #acute on chronic elevated liver enzymes- has been having elevated liver enzymes since Aug 2021; has known metastatic breast cancer to the liver and bone; differential includes liver infiltration of known metastatic disease vs DILI from recent paclitaxel administration (can be known to cause liver enzyme elevations in some individuals per [facility]) vs acute viral illness in the setting of immunocompromised status  #diarrhea-  reporting 3-4 BMs per day    Recommendations:  - send C diff + GI PCR  - check urine legionella (less likely cause of elevated liver enzymes, but there has been a recent outbreak of Legionella and pt also reporting diarrhea and dyspnea (though no infiltrates on imaging or hyponatremia on labs)  - check acute viral hepatitis pane, HSV 1/2 IgM + PCR, EBV IgM + PCR, CMV IgM + PCR, VZV IgM +PCR  - trend daily CMP, INR      Smiley Mack MD  GI Fellow, PGY-4  Available via [employer] Teams    NON-URGENT CONSULTS:  Please email [email or web address]  AT NIGHT AND ON WEEKENDS:  Contact on-call GI fellow via answering service ([phone]) from 5pm-8am and on weekends/holidays  MONDAY-FRIDAY 8AM-5PM:  Pager# [phone] ([facility]) or [phone] ([facility])  GI Phone# [phone] ([facility])   65 year old woman with PMH of breast cancer with mets to bone and liver with recent initiation of new chemotherapy - abraxane 6 days ago, lap-band presents with diarrhea, abdominal pain, jaundice, and confusion. Per son, patient had been experiencing dyspnea, fatigue, and dysuria and had presented to the ED 2 weeeks ago. At that time found to have a UTI, JAMAAL, and liver mets by ultrasound, she subsequently had a biopsy as an outpatient several days later. She has continued to have fatigue and weakness and some SOB. She went to pul who started trelegy ellipta - per patient too soon to tell if it's helping. More recently she started to have some confusion manifested by forgetting what she was saying, answering questions incorrectly, and repeating herself. Since admission she now reports the confusion has resolved. She has also had some decreased PO intake x few weeks. Lastly she started having diarrhea this past week, reporting 4-5 BMs non-bloody per day. States she has never had diarrhea like this before. She is denying any blood in urine/stool or melena as well as fevers/chills, chest pain, current dyspnea, N/V, and dysuria. Denies any personal or family history of liver disease. Denies any ETOH or tobacco use. Denies any other new medications.      Impression:  #acute on chronic elevated liver enzymes- has been having elevated liver enzymes since Aug 2021; has known metastatic breast cancer to the liver and bone; differential includes progressive liver infiltration of known metastatic disease vs DILI from recent paclitaxel administration (can be known to cause liver enzyme elevations in some individuals per [facility]) vs acute viral illness in the setting of immunocompromised status  #acute diarrhea-  reporting 4-5 non-bloody BMs per day    Recommendations:  - send C diff + GI PCR  - check urine legionella (less likely cause of elevated liver enzymes, but there has been a recent outbreak of Legionella and pt also reporting diarrhea and dyspnea (though no infiltrates on imaging or hyponatremia on labs)  - check acute viral hepatitis pane, HSV 1/2 IgM + PCR, EBV IgM + PCR, CMV IgM + PCR, VZV IgM +PCR  - trend daily CMP, INR  - pending MR/MRCP w wo cont     Smiley Mack MD  GI Fellow, PGY-4  Available via [employer] Teams    NON-URGENT CONSULTS:  Please email [email or web address]  AT NIGHT AND ON WEEKENDS:  Contact on-call GI fellow via answering service ([phone]) from 5pm-8am and on weekends/holidays  MONDAY-FRIDAY 8AM-5PM:  Pager# [phone] ([facility]) or [phone] ([facility])  GI Phone# [phone] ([facility])   65 year old woman with PMH of breast cancer with mets to bone and liver with recent initiation of new chemotherapy - abraxane 6 days ago, lap-band presents with diarrhea, abdominal pain, jaundice, and confusion. Per son, patient had been experiencing dyspnea, fatigue, and dysuria and had presented to the ED 2 weeeks ago. At that time found to have a UTI, JAMAAL, and liver mets by ultrasound, she subsequently had a biopsy as an outpatient several days later. She has continued to have fatigue and weakness and some SOB. She went to pul who started trelegy ellipta - per patient too soon to tell if it's helping. More recently she started to have some confusion manifested by forgetting what she was saying, answering questions incorrectly, and repeating herself. Since admission she now reports the confusion has resolved. She has also had some decreased PO intake x few weeks. Lastly she started having diarrhea this past week, reporting 4-5 BMs non-bloody per day. States she has never had diarrhea like this before. She is denying any blood in urine/stool or melena as well as fevers/chills, chest pain, current dyspnea, N/V, and dysuria. Denies any personal or family history of liver disease. Denies any ETOH or tobacco use. Denies any other new medications.      Impression:  #acute on chronic elevated liver enzymes- has been having elevated liver enzymes since Aug 2021; has known metastatic breast cancer to the liver and bone; differential includes progressive liver infiltration of known metastatic disease vs DILI from recent paclitaxel administration (can be known to cause liver enzyme elevations in some individuals per [facility]) vs acute viral illness in the setting of immunocompromised status; suspect background of BLAKE given h/o obesity and T2DM  #acute diarrhea-  reporting 4-5 non-bloody BMs per day  #obesity- BMI 42  #T2DM- A1c 7.5    Recommendations:  - send C diff + GI PCR  - check urine legionella (less likely cause of elevated liver enzymes, but there has been a recent outbreak of Legionella and pt also reporting diarrhea and dyspnea (though no infiltrates on imaging or hyponatremia on labs)  - check acute viral hepatitis panel, HSV 1/2 IgM + PCR, EBV IgM + PCR, CMV IgM + PCR, VZV IgM +PCR  - trend daily CMP, INR  - pending MR/MRCP     Smiley Mack MD  GI Fellow, PGY-4  Available via [employer] Teams    NON-URGENT CONSULTS:  Please email [email or web address]  AT NIGHT AND ON WEEKENDS:  Contact on-call GI fellow via answering service ([phone]) from 5pm-8am and on weekends/holidays  MONDAY-FRIDAY 8AM-5PM:  Pager# [phone] ([facility]) or [phone] ([facility])  GI Phone# [phone] ([facility])

## 2021-09-08 PROBLEM — E11.9 DIABETES MELLITUS, TYPE 2: Status: ACTIVE | Noted: 2020-01-01

## 2021-09-08 PROBLEM — C50.919 METASTATIC BREAST CANCER: Status: ACTIVE | Noted: 2020-04-15

## 2021-09-08 NOTE — CONSULT NOTE ADULT - ATTENDING COMMENTS
I don't think there was an indication for checking C diff.   Her diarrhea already resolved for a few days before coming in.   Her daughter brought her in for dehydration.   Only after starting lactulose here did her loose bowel movements recur.     Would stop antibiotics. Afebrile. Nontoxic. No clear infection by cultures or imaging. ANC not low enough to warrant prophylaxis which she hasn't been on anyways.     Bashir Rashid MD   Infectious Disease   Pager [phone]   After 5PM and on weekends please page fellow on call or call [phone]

## 2021-09-08 NOTE — PROGRESS NOTE ADULT - PROBLEM SELECTOR PLAN 5
likely 2/2 chemotherapy. Platelet count is normal but decreased from prior.  - Anemia currently stable  - B12 and folate wnl   - Transfuse for < 7  - Monitor for signs/symptoms of bleeding   - F/u TLS and hemolysis labs  -  elevated, haptoglobin 251 elevated - Per chart review, patient is BRCA2(+) was started on Letrozole and Ibrane with dose reduction 2/2 neutropenia. Then given Zometa [id number]. 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 Initiated on abraxane on 9/1    - PE ruled out with CT PA  - Pain: if needed, can give morphine  - Hold chemotherapy while inpatient  - F/u with Dr. Blanc as outpatient  - Oncology following

## 2021-09-08 NOTE — PROGRESS NOTE ADULT - PROBLEM SELECTOR PLAN 8
DVT ppx: Lovenox   Diet: DASH/CC   lactulose if not making >2 BM/day DVT ppx: Lovenox   Diet: DASH/CC

## 2021-09-08 NOTE — PROGRESS NOTE ADULT - PROBLEM SELECTOR PLAN 2
- cholestasis with SIRS  - R/O acute cholangitis: patient has abd pain, cholestatic liver injury, direct hyperbilirubinemia and history of liver mets  -CT Abd 9/6: Cirrhotic liver with metastatic disease. Gallstones in the gallbladder without inflammatory changes.   s/p placement of a gastric lap band and left hip replacement. Diffuse bony metastatic disease.  -urgent RUQ US  -cefepime/flagyl as above  -MRCP today  -F/u GI recs - Cholestasis with SIRS  - R/O acute cholangitis: patient has abd pain, cholestatic liver injury, direct hyperbilirubinemia and history of liver mets  - CT Abd 9/6: Cirrhotic liver with metastatic disease. Gallstones in the gallbladder without inflammatory changes.   s/p placement of a gastric lap band and left hip replacement. Diffuse bony metastatic disease.  - RUQ US 9/7: Liver metastases. Cholelithiasis. No gallbladder wall thickening. No gross biliary ductal dilatation. Normal caliber common bile duct.  - No need for MRCP/ERCP at this time   - Monitor off abx   - F/u GI recs  - MRI was recommended to r/o brain mets given confusion, however patient cannot physically fit in the MRI machine

## 2021-09-08 NOTE — PROGRESS NOTE ADULT - PROBLEM SELECTOR PLAN 1
R/O: neutropenic sepsis  -SIRS for tachycardia and low WBC  -Source: cholangitis vs infectious enteritis vs typhlitis vs oncologic sequelae   -new chemo of abraxane well known to cause neutropenia  - (c/f neutropenic fever if ANC <500)  -Can consider GCSF to increase neutrophil count, but patient is currently septic   -s/p vanco x1 and cefepime 1g   -Continue cefepime 2g q8h and Flagyl 500mg q8h  -Continue maintenance IVF  -Trend lactate (5.3 -> 3.5)  -f/u GI PCR and C. diff  -UA positive   -f/u blood/urine cx  -f/u oncology recs R/O: neutropenic sepsis  - SIRS for tachycardia and low WBC  - Source: cholangitis vs infectious enteritis vs typhlitis vs oncologic sequelae   - New chemo of abraxane well known to cause neutropenia and liver injury  -  (c/f neutropenic fever if ANC <500)  - Continue GCSF daily to increase neutrophil count, but patient is currently septic   - S/p vanco x1 (9/7), cefepime 1g q8h (9/7-9/8), flagyl 500mg q8h (9/7-9/8)   - D/c abx per ID as less concern for infection   - Continue maintenance IVF  - Trend lactate (5.3 -> 3.5 -> 3.0)  - GI PCR negative  - UA positive, Ucx negative  - F/u blood cx  - F/u C. diff 2/2 watery diarrhea

## 2021-09-08 NOTE — PROGRESS NOTE ADULT - PROBLEM SELECTOR PLAN 6
Mixed acid base disorder  - AGMA likely 2/2 lactic acidosis with respiratory compensation  - most likely i/s/o sepsis although tumor production and decreased hepatic clearance is also a possibility  - trend lactate - Likely 2/2 chemotherapy. Platelet count is normal but decreased from prior.  - Anemia currently stable  - B12 and folate wnl   - Transfuse for Hgb >7, Plt >10 or >20 if febrile or >50 if bleeding   - Monitor for signs/symptoms of bleeding   - F/u TLS and hemolysis labs  -  elevated, haptoglobin 251 elevated

## 2021-09-08 NOTE — CONSULT NOTE ADULT - SUBJECTIVE AND OBJECTIVE BOX
Chief Complaint:  Patient is a 65y old  Female who presents with a chief complaint of sepsis (08 Sep 2021 11:56)      HPI:    65 year old woman with recently diagnosed metastatic breast CA with metastasis to bone and liver on Abraxane (last treatment 21), history of lap band placement, cirrhosis presumably due to BLAKE (c/b ascites and hepatic encephalopathy), who presentedwith diarrhea, abdominal pain, jaundice and confusion admitted for sepsis. Advanced GI consulted due to concern for cholangitis. Infectious work up has been negative to date. Patient reported diarrhea starting after her last chemotherapy on 21 with abdominal pain that has since resolved. She is being ruled out for C. difficile infection.  Patient has new direct hyperbilirubinemia and interval worsening in liver enzymes elevation since 21. She denies abdominal pain, nausea, vomiting, fever but has had a very depressed appetite for the last 1 month.         Allergies:  tetanus toxoid (Unknown)  Zosyn (Unknown)      Home Medications:    · 	Xanax 0.5 mg oral tablet: Last Dose Taken:  , 1 tab(s) orally 3 times a day, As Needed  · 	omeprazole 20 mg oral delayed release capsule: 1 cap(s) orally once a day  · 	Trelegy Ellipta 100 mcg-62.5 mcg-25 mcg/inh inhalation powder: Last Dose Taken:  , 1 puff(s) inhaled once a day  · 	ferrous sulfate 325 mg (65 mg elemental iron) oral tablet: Last Dose Taken:  , 1 tab(s) orally every other day (at bedtime)  · 	Vitamin D3 5000 intl units (125 mcg) oral tablet: Last Dose Taken:  , orally once a week  · 	metFORMIN 1000 mg oral tablet: Last Dose Taken:  , 1 tab(s) orally 2 times a day  · 	letrozole 2.5 mg oral tablet: Last Dose Taken:  , 1 tab(s) orally once a day  · 	Ibrance 100 mg oral capsule: Last Dose Taken:  , 1 cap(s) orally once a day  · 	metoclopramide 10 mg oral tablet: Last Dose Taken:  , orally 3 to 4 times a day, As Needed  · 	glimepiride 2 mg oral tablet: Last Dose Taken:    · 	gabapentin 300 mg oral capsule: Last Dose Taken:  , 1 cap(s) orally 3 times a day  · 	Singulair 10 mg oral tablet: Last Dose Taken:  , 1 tab(s) orally once a day  · 	traMADol 50 mg oral tablet: Last Dose Taken:        Hospital Medications:  ALBUTerol    90 MICROgram(s) HFA Inhaler 1 Puff(s) Inhalation every 4 hours  benzocaine 15 mG/menthol 3.6 mG (Sugar-Free) Lozenge 1 Lozenge Oral every 6 hours PRN  cholecalciferol 1000 Unit(s) Oral daily  dextrose 40% Gel 15 Gram(s) Oral once  dextrose 5%. 1000 milliLiter(s) IV Continuous <Continuous>  dextrose 5%. 1000 milliLiter(s) IV Continuous <Continuous>  dextrose 50% Injectable 25 Gram(s) IV Push once  dextrose 50% Injectable 12.5 Gram(s) IV Push once  dextrose 50% Injectable 25 Gram(s) IV Push once  enoxaparin Injectable 40 milliGRAM(s) SubCutaneous daily  filgrastim-sndz (ZARXIO) Injectable 480 MICROGram(s) SubCutaneous daily  gabapentin 300 milliGRAM(s) Oral three times a day PRN  glucagon  Injectable 1 milliGRAM(s) IntraMuscular once  influenza   Vaccine 0.5 milliLiter(s) IntraMuscular once  insulin lispro (ADMELOG) corrective regimen sliding scale   SubCutaneous three times a day before meals  insulin lispro (ADMELOG) corrective regimen sliding scale   SubCutaneous at bedtime  lactated ringers. 1000 milliLiter(s) IV Continuous <Continuous>  lactulose Syrup 10 Gram(s) Oral three times a day  letrozole 2.5 milliGRAM(s) Oral daily  LORazepam     Tablet 1 milliGRAM(s) Oral once  mometasone 220 MICROgram(s) Inhaler 1 Puff(s) Inhalation daily  montelukast 10 milliGRAM(s) Oral daily  pantoprazole    Tablet 40 milliGRAM(s) Oral before breakfast  tiotropium 18 MICROgram(s) Capsule 1 Capsule(s) Inhalation daily      PMHX/PSHX:  Diabetes mellitus type II    Hypertension    Hyperlipidemia    Osteoarthritis    Toe ulcer, right    Asthma    Glaucoma    Anxiety    Morbidly obese    Breast cancer, left    GERD (gastroesophageal reflux disease)    S/P laparoscopic surgery    S/P hip replacement    H/O:  Section    History of tonsillectomy    H/O drainage of abscess    S/P biopsy    H/O rhinoplasty     history        Family history:  No pertinent family history in first degree relatives        Social History:     ROS:     General:  No, fevers, chills, night sweats, fatigue +low appetite  Eyes:  No vision changes, no yellowing of eyes   ENT:  No runny nose +throat pain  CV:  No chest pain, palpitations  Resp:  No SOB, cough, wheezing  GI:  See HPI  :  No burning with urination, no hematuria   Muscle:  No muscle pain, weakness  Neuro:  No numbness/tingling, memory problems  Psych:  No fatigue, insomnia, mood problems  Heme:  No easy bruisability  Skin:  No rash, itching       PHYSICAL EXAM:     GENERAL:  Obese, appears acutely ill, no distress  HEENT:  Conjunctivae clear and pink, no white plaques in oropharynx +erythematous oropharynx  CHEST:  Mildly increased respiratory effort +supplemental O2  HEART:  Regular rhythm & rate  ABDOMEN:  Soft, non-tender, non-distended,   EXTREMITIES:  no LE edema  SKIN:  No rash/erythema/ecchymoses/petechiae/jaundice  NEURO:  Alert, orientedx3    Vital Signs:  Vital Signs Last 24 Hrs  T(C): 37.2 (08 Sep 2021 12:49), Max: 37.4 (07 Sep 2021 20:53)  T(F): 99 (08 Sep 2021 12:49), Max: 99.3 (07 Sep 2021 20:53)  HR: 100 (08 Sep 2021 12:49) (100 - 104)  BP: 153/85 (08 Sep 2021 12:49) (101/52 - 155/78)  BP(mean): --  RR: 20 (08 Sep 2021 12:49) (18 - 20)  SpO2: 98% (08 Sep 2021 12:49) (92% - 100%)  Daily Height in cm: 170.18 (07 Sep 2021 22:11)    Daily Weight in k.7 (07 Sep 2021 22:11)    LABS:                        8.6    1.68  )-----------( 103      ( 08 Sep 2021 06:33 )             26.5     09-08    141  |  104  |  17  ----------------------------<  106<H>  3.8   |  20<L>  |  1.06    Ca    9.7      08 Sep 2021 06:33  Phos  1.8     09-08  Mg     1.6     09-08    TPro  5.9<L>  /  Alb  2.7<L>  /  TBili  5.4<H>  /  DBili  x   /  AST  229<H>  /  ALT  84<H>  /  AlkPhos  762<H>  09-08    LIVER FUNCTIONS - ( 08 Sep 2021 06:33 )  Alb: 2.7 g/dL / Pro: 5.9 g/dL / ALK PHOS: 762 U/L / ALT: 84 U/L / AST: 229 U/L / GGT: x           PT/INR - ( 07 Sep 2021 07:03 )   PT: 18.0 sec;   INR: 1.53 ratio        Urinalysis Basic - ( 06 Sep 2021 21:24 )    Color: Dark Yellow / Appearance: Slightly Turbid / S.024 / pH: x  Gluc: x / Ketone: Negative  / Bili: Moderate / Urobili: Negative   Blood: x / Protein: 100 mg/dL / Nitrite: Negative   Leuk Esterase: Moderate / RBC: 6 /hpf / WBC 10 /HPF   Sq Epi: x / Non Sq Epi: 2 /hpf / Bacteria: Few      Imaging:    < from: CT Abdomen and Pelvis w/ Oral Cont and w/ IV Cont (21 @ 14:15) >  EXAM:  CT ABDOMEN AND PELVIS OC IC        PROCEDURE DATE:  2021           INTERPRETATION:  CLINICAL INFORMATION: Metastatic breast cancer.    COMPARISON: Contrast-enhanced CT examinations of the abdomen and pelvis dated 2021 and 2020, ultrasound-guided liver biopsy dated 2021.    CONTRAST/COMPLICATIONS:  IV Contrast: IV contrast documented in associated exam  Oral Contrast: Omnipaque 300  Complications: None reported at time of study completion    PROCEDURE:  CT of the Abdomen and Pelvis was performed.  Sagittal and coronal reformats were performed.    FINDINGS:  LOWER CHEST: Within normal limits. Trace pericardial fluid.    LIVER: There are numerous hypoattenuating lesions throughout the liver, measuring up to 18 x 17 mm in the left lobe laterally. There is a subtle nodular contour of the liver, which may be related to tumor burden or compensatory hypertrophy. There is new mild perihepatic fluid tracking into the pelvis. The hepatic veins and portal vein are patent.  BILE DUCTS: Normal caliber.  GALLBLADDER: Layering small stones and sludge.  SPLEEN: There are scattered stable subtle hypoattenuating splenic lesions measuring up to 7 mm on image 25 and 10 mm inferiorly on image 34.  PANCREAS: Within normal limits.  ADRENALS: Within normal limits.  KIDNEYS/URETERS: Within normal limits.    BLADDER: Within normal limits.  REPRODUCTIVE ORGANS: Uterus and adnexa within normal limits.    BOWEL: Status post gastric banding. Evaluation of the distal small bowel and colon is suboptimal due to limited transit of oral contrast and stool. Scattered sigmoid diverticula. Otherwise, small and large bowel loops are unremarkable with no evidence of obstruction, bowel wall thickening, or inflammatory stranding of the mesenteric fat. The appendix is unremarkable. There is subtle soft tissue nodularity along the right paracolic gutter inferiorly, concerning for implants.  VESSELS: Within normal limits.  RETROPERITONEUM/LYMPH NODES: No lymphadenopathy.  ABDOMINAL WALL: There is new stranding within a fat-containing umbilical hernia, concerning for peritoneal implants.  BONES: There are extensive or blastic lesions throughout the visualized skeleton, compatible with known osseous metastatic disease. There are healing pathologic fractures of the bilateral ribs. Status post left total hip replacement.    IMPRESSION: Multiple small liver metastases measuring up to 18 mm in the left lobe laterally, corresponding to recently biopsied lesion. Mild ascites. Subtle nodularity of the right paracolic gutter, suspicious for implants.    --- End of Report ---               MARCELO MARI MD; Attending Radiologist   This document has been electronically signed. Aug 29 2021 11:54AM    < end of copied text >

## 2021-09-08 NOTE — PROGRESS NOTE ADULT - SUBJECTIVE AND OBJECTIVE BOX
%%%%INCOMPLETE NOTE%%%%%     Dr. Trina Collazo, PGY-1  EMERITA: [id number]/ NS: [phone]  After 7pm please page [phone] or [phone]    Patient is a 65y old  Female who presents with a chief complaint of sepsis (07 Sep 2021 19:05)    Subjective: No acute events overnight. Patient seen and examined at bedside. BMx1 yesterday. Denies chest pain, abdominal pain, cough, n/v, sob. Breathing comfortably on room air.    MEDICATIONS  (STANDING):  ALBUTerol    90 MICROgram(s) HFA Inhaler 1 Puff(s) Inhalation every 4 hours  cefepime   IVPB 2000 milliGRAM(s) IV Intermittent every 8 hours  cholecalciferol 1000 Unit(s) Oral daily  dextrose 40% Gel 15 Gram(s) Oral once  dextrose 5%. 1000 milliLiter(s) (50 mL/Hr) IV Continuous <Continuous>  dextrose 5%. 1000 milliLiter(s) (100 mL/Hr) IV Continuous <Continuous>  dextrose 50% Injectable 25 Gram(s) IV Push once  dextrose 50% Injectable 12.5 Gram(s) IV Push once  dextrose 50% Injectable 25 Gram(s) IV Push once  enoxaparin Injectable 40 milliGRAM(s) SubCutaneous daily  filgrastim-sndz (ZARXIO) Injectable 480 MICROGram(s) SubCutaneous daily  glucagon  Injectable 1 milliGRAM(s) IntraMuscular once  influenza   Vaccine 0.5 milliLiter(s) IntraMuscular once  insulin lispro (ADMELOG) corrective regimen sliding scale   SubCutaneous three times a day before meals  insulin lispro (ADMELOG) corrective regimen sliding scale   SubCutaneous at bedtime  lactated ringers. 1000 milliLiter(s) (100 mL/Hr) IV Continuous <Continuous>  lactulose Syrup 10 Gram(s) Oral three times a day  letrozole 2.5 milliGRAM(s) Oral daily  LORazepam     Tablet 1 milliGRAM(s) Oral once  metroNIDAZOLE  IVPB 500 milliGRAM(s) IV Intermittent every 8 hours  mometasone 220 MICROgram(s) Inhaler 1 Puff(s) Inhalation daily  montelukast 10 milliGRAM(s) Oral daily  pantoprazole    Tablet 40 milliGRAM(s) Oral before breakfast  tiotropium 18 MICROgram(s) Capsule 1 Capsule(s) Inhalation daily    MEDICATIONS  (PRN):  gabapentin 300 milliGRAM(s) Oral three times a day PRN neuropathy        Objective:     Vitals: Vital Signs Last 24 Hrs  T(C): 37.3 (21 @ 04:49), Max: 37.4 (21 @ 20:53)  T(F): 99.1 (21 @ 04:49), Max: 99.3 (21 @ 20:53)  HR: 100 (21 @ 04:49) (100 - 112)  BP: 122/75 (21 @ 04:49) (101/52 - 152/78)  BP(mean): --  RR: 19 (21 @ 04:49) (18 - 20)  SpO2: 92% (21 @ 04:49) (92% - 100%)            I&O's Summary    07 Sep 2021 07:01  -  08 Sep 2021 07:00  --------------------------------------------------------  IN: 1050 mL / OUT: 320 mL / NET: 730 mL      PHYSICAL EXAM:  CONSTITUTIONAL: Well-groomed, lethargic, moderate distress secondary to pain  EYES: periorbital jaundice. No conjunctival or scleral injection, non-icteric; PERRL and symmetric  ENMT: No external nasal lesions; nasal mucosa not inflamed; normal dentition; no pharyngeal injection or exudates, oral mucosa with moist membranes  RESPIRATORY: Breathing on 2L nasal cannula; lungs CTA without wheeze/rhonchi/rales  CARDIOVASCULAR: +S1S2, RRR, no M/G/R; pedal pulses full and symmetric; no lower extremity edema  GASTROINTESTINAL: Diffuse tenderness most prominently in RUQ, +BS throughout, no rebound/guarding; no hepatosplenomegaly  SKIN: Bandages on b/l knees   NEURO: A&O2, no asterixis; no focal deficits.   PSYCHIATRIC: mood and affect appropriate; appropriate insight and judgment    LABS:                        8.6    1.68  )-----------( 103      ( 08 Sep 2021 06:33 )             26.5                         9.3    1.13  )-----------( 109      ( 07 Sep 2021 07:03 )             28.2                         9.7    1.53  )-----------( 156      ( 06 Sep 2021 14:22 )             29.4     Hgb Trend: 8.6<--, 9.3<--, 9.7<--, 10.3<--      141  |  104  |  17  ----------------------------<  106<H>  3.8   |  20<L>  |  1.06      140  |  105  |  17  ----------------------------<  215<H>  3.9   |  18<L>  |  1.02      139  |  106  |  20  ----------------------------<  196<H>  5.0   |  16<L>  |  1.01    Ca    9.7      08 Sep 2021 06:33  Ca    10.0      07 Sep 2021 07:03  Ca    10.3      06 Sep 2021 17:58  Phos  1.8       Mg     1.6         TPro  5.9<L>  /  Alb  2.7<L>  /  TBili  5.4<H>  /  DBili  x   /  AST  229<H>  /  ALT  84<H>  /  AlkPhos  762<H>    TPro  6.4  /  Alb  2.7<L>  /  TBili  5.4<H>  /  DBili  x   /  AST  215<H>  /  ALT  91<H>  /  AlkPhos  744<H>    TPro  x   /  Alb  x   /  TBili  5.6<H>  /  DBili  4.1<H>  /  AST  x   /  ALT  x   /  AlkPhos  x       Creatinine Trend: 1.06<--, 1.02<--, 1.01<--, 0.99<--, 1.48<--, 1.21<--  PT/INR - ( 07 Sep 2021 07:03 )   PT: 18.0 sec;   INR: 1.53 ratio         PTT - ( 06 Sep 2021 14:22 )  PTT:38.4 sec              Urinalysis Basic - ( 06 Sep 2021 21:24 )    Color: Dark Yellow / Appearance: Slightly Turbid / S.024 / pH: x  Gluc: x / Ketone: Negative  / Bili: Moderate / Urobili: Negative   Blood: x / Protein: 100 mg/dL / Nitrite: Negative   Leuk Esterase: Moderate / RBC: 6 /hpf / WBC 10 /HPF   Sq Epi: x / Non Sq Epi: 2 /hpf / Bacteria: Few        CAPILLARY BLOOD GLUCOSE      POCT Blood Glucose.: 125 mg/dL (08 Sep 2021 08:49)  POCT Blood Glucose.: 145 mg/dL (07 Sep 2021 22:12)  POCT Blood Glucose.: 192 mg/dL (07 Sep 2021 18:49)  POCT Blood Glucose.: 248 mg/dL (07 Sep 2021 13:08)      GI PCR Panel, Stool (collected 21 @ 14:58)  Source: .Stool nico robles  Final Report (21 @ 18:12):    GI PCR Results: NOT detected    *******Please Note:*******    GI panel PCR evaluates for:    Campylobacter, Plesiomonas shigelloides, Salmonella,    Vibrio, Yersinia enterocolitica, Enteroaggregative    Escherichia coli (EAEC), Enteropathogenic E.coli (EPEC),    Enterotoxigenic E. coli (ETEC) lt/st, Shiga-like    toxin-producing E. coli (STEC) stx1/stx2,    Shigella/ Enteroinvasive E. coli (EIEC), Cryptosporidium,    Cyclospora cayetanensis, Entamoeba histolytica,    Giardia lamblia, Adenovirus F 40/41, Astrovirus,    Norovirus GI/GII, Rotavirus A, Sapovirus     Dr. Trina Collazo, PGY-1  LAURA: [id number]/ NS: [phone]  After 7pm please page [phone] or [phone]    Patient is a 65y old  Female who presents with a chief complaint of sepsis (07 Sep 2021 19:05)    Subjective: Patient with diarrhea last night >4BM. Patient seen and examined at bedside. Denies chest pain, abdominal pain, cough, n/v, sob. Breathing comfortably on 2L NC. Alert and oriented today.    MEDICATIONS  (STANDING):  ALBUTerol    90 MICROgram(s) HFA Inhaler 1 Puff(s) Inhalation every 4 hours  cefepime   IVPB 2000 milliGRAM(s) IV Intermittent every 8 hours  cholecalciferol 1000 Unit(s) Oral daily  dextrose 40% Gel 15 Gram(s) Oral once  dextrose 5%. 1000 milliLiter(s) (50 mL/Hr) IV Continuous <Continuous>  dextrose 5%. 1000 milliLiter(s) (100 mL/Hr) IV Continuous <Continuous>  dextrose 50% Injectable 25 Gram(s) IV Push once  dextrose 50% Injectable 12.5 Gram(s) IV Push once  dextrose 50% Injectable 25 Gram(s) IV Push once  enoxaparin Injectable 40 milliGRAM(s) SubCutaneous daily  filgrastim-sndz (ZARXIO) Injectable 480 MICROGram(s) SubCutaneous daily  glucagon  Injectable 1 milliGRAM(s) IntraMuscular once  influenza   Vaccine 0.5 milliLiter(s) IntraMuscular once  insulin lispro (ADMELOG) corrective regimen sliding scale   SubCutaneous three times a day before meals  insulin lispro (ADMELOG) corrective regimen sliding scale   SubCutaneous at bedtime  lactated ringers. 1000 milliLiter(s) (100 mL/Hr) IV Continuous <Continuous>  lactulose Syrup 10 Gram(s) Oral three times a day  letrozole 2.5 milliGRAM(s) Oral daily  LORazepam     Tablet 1 milliGRAM(s) Oral once  metroNIDAZOLE  IVPB 500 milliGRAM(s) IV Intermittent every 8 hours  mometasone 220 MICROgram(s) Inhaler 1 Puff(s) Inhalation daily  montelukast 10 milliGRAM(s) Oral daily  pantoprazole    Tablet 40 milliGRAM(s) Oral before breakfast  tiotropium 18 MICROgram(s) Capsule 1 Capsule(s) Inhalation daily    MEDICATIONS  (PRN):  gabapentin 300 milliGRAM(s) Oral three times a day PRN neuropathy        Objective:     Vitals: Vital Signs Last 24 Hrs  T(C): 37.3 (21 @ 04:49), Max: 37.4 (21 @ 20:53)  T(F): 99.1 (21 @ 04:49), Max: 99.3 (21 @ 20:53)  HR: 100 (21 @ 04:49) (100 - 112)  BP: 122/75 (21 @ 04:49) (101/52 - 152/78)  BP(mean): --  RR: 19 (21 @ 04:49) (18 - 20)  SpO2: 92% (21 @ 04:49) (92% - 100%)            I&O's Summary    07 Sep 2021 07:01  -  08 Sep 2021 07:00  --------------------------------------------------------  IN: 1050 mL / OUT: 320 mL / NET: 730 mL      PHYSICAL EXAM:  CONSTITUTIONAL: Well-groomed, lethargic, moderate distress secondary to pain  EYES: periorbital jaundice. No conjunctival or scleral injection, non-icteric; PERRL and symmetric  ENMT: No external nasal lesions; nasal mucosa not inflamed; normal dentition; no pharyngeal injection or exudates, oral mucosa with moist membranes  RESPIRATORY: Breathing on 2L nasal cannula; lungs CTA without wheeze/rhonchi/rales  CARDIOVASCULAR: +S1S2, RRR, no M/G/R; pedal pulses full and symmetric; no lower extremity edema  GASTROINTESTINAL: Diffuse tenderness most prominently in RUQ, +BS throughout, no rebound/guarding; no hepatosplenomegaly  SKIN: Bandages on b/l knees   NEURO: A&O4, no asterixis; no focal deficits.   PSYCHIATRIC: mood and affect appropriate; appropriate insight and judgment    LABS:                        8.6    1.68  )-----------( 103      ( 08 Sep 2021 06:33 )             26.5                         9.3    1.13  )-----------( 109      ( 07 Sep 2021 07:03 )             28.2                         9.7    1.53  )-----------( 156      ( 06 Sep 2021 14:22 )             29.4     Hgb Trend: 8.6<--, 9.3<--, 9.7<--, 10.3<--      141  |  104  |  17  ----------------------------<  106<H>  3.8   |  20<L>  |  1.06      140  |  105  |  17  ----------------------------<  215<H>  3.9   |  18<L>  |  1.02      139  |  106  |  20  ----------------------------<  196<H>  5.0   |  16<L>  |  1.01    Ca    9.7      08 Sep 2021 06:33  Ca    10.0      07 Sep 2021 07:03  Ca    10.3      06 Sep 2021 17:58  Phos  1.8       Mg     1.6         TPro  5.9<L>  /  Alb  2.7<L>  /  TBili  5.4<H>  /  DBili  x   /  AST  229<H>  /  ALT  84<H>  /  AlkPhos  762<H>    TPro  6.4  /  Alb  2.7<L>  /  TBili  5.4<H>  /  DBili  x   /  AST  215<H>  /  ALT  91<H>  /  AlkPhos  744<H>    TPro  x   /  Alb  x   /  TBili  5.6<H>  /  DBili  4.1<H>  /  AST  x   /  ALT  x   /  AlkPhos  x       Creatinine Trend: 1.06<--, 1.02<--, 1.01<--, 0.99<--, 1.48<--, 1.21<--  PT/INR - ( 07 Sep 2021 07:03 )   PT: 18.0 sec;   INR: 1.53 ratio         PTT - ( 06 Sep 2021 14:22 )  PTT:38.4 sec              Urinalysis Basic - ( 06 Sep 2021 21:24 )    Color: Dark Yellow / Appearance: Slightly Turbid / S.024 / pH: x  Gluc: x / Ketone: Negative  / Bili: Moderate / Urobili: Negative   Blood: x / Protein: 100 mg/dL / Nitrite: Negative   Leuk Esterase: Moderate / RBC: 6 /hpf / WBC 10 /HPF   Sq Epi: x / Non Sq Epi: 2 /hpf / Bacteria: Few        CAPILLARY BLOOD GLUCOSE      POCT Blood Glucose.: 125 mg/dL (08 Sep 2021 08:49)  POCT Blood Glucose.: 145 mg/dL (07 Sep 2021 22:12)  POCT Blood Glucose.: 192 mg/dL (07 Sep 2021 18:49)  POCT Blood Glucose.: 248 mg/dL (07 Sep 2021 13:08)      GI PCR Panel, Stool (collected 21 @ 14:58)  Source: .Stool nico robles  Final Report (21 @ 18:12):    GI PCR Results: NOT detected    *******Please Note:*******    GI panel PCR evaluates for:    Campylobacter, Plesiomonas shigelloides, Salmonella,    Vibrio, Yersinia enterocolitica, Enteroaggregative    Escherichia coli (EAEC), Enteropathogenic E.coli (EPEC),    Enterotoxigenic E. coli (ETEC) lt/st, Shiga-like    toxin-producing E. coli (STEC) stx1/stx2,    Shigella/ Enteroinvasive E. coli (EIEC), Cryptosporidium,    Cyclospora cayetanensis, Entamoeba histolytica,    Giardia lamblia, Adenovirus F 40/41, Astrovirus,    Norovirus GI/GII, Rotavirus A, Sapovirus

## 2021-09-08 NOTE — PROGRESS NOTE ADULT - SUBJECTIVE AND OBJECTIVE BOX
INTERVAL HPI/OVERNIGHT EVENTS:  Patient S&E at bedside. No complaints at this time. no F/C, CP, SOB, abn pain, N/V      VITAL SIGNS:  T(F): 99.1 (21 @ 04:49)  HR: 100 (21 @ 04:49)  BP: 122/75 (21 @ 04:49)  RR: 19 (21 @ 04:49)  SpO2: 92% (21 @ 04:49)  Wt(kg): --    PHYSICAL EXAM:    Constitutional: NAD  Eyes: EOMI, sclera non-icteric  Neck: supple  Respiratory: no inc wob  Cardiovascular: RRR,   Gastrointestinal: soft, NTND, no masses palpable,  Extremities: no c/c/e  Neurological: AAOx3    MEDICATIONS  (STANDING):  ALBUTerol    90 MICROgram(s) HFA Inhaler 1 Puff(s) Inhalation every 4 hours  cefepime   IVPB 2000 milliGRAM(s) IV Intermittent every 8 hours  cholecalciferol 1000 Unit(s) Oral daily  dextrose 40% Gel 15 Gram(s) Oral once  dextrose 5%. 1000 milliLiter(s) (50 mL/Hr) IV Continuous <Continuous>  dextrose 5%. 1000 milliLiter(s) (100 mL/Hr) IV Continuous <Continuous>  dextrose 50% Injectable 25 Gram(s) IV Push once  dextrose 50% Injectable 12.5 Gram(s) IV Push once  dextrose 50% Injectable 25 Gram(s) IV Push once  enoxaparin Injectable 40 milliGRAM(s) SubCutaneous daily  filgrastim-sndz (ZARXIO) Injectable 480 MICROGram(s) SubCutaneous daily  glucagon  Injectable 1 milliGRAM(s) IntraMuscular once  influenza   Vaccine 0.5 milliLiter(s) IntraMuscular once  insulin lispro (ADMELOG) corrective regimen sliding scale   SubCutaneous three times a day before meals  insulin lispro (ADMELOG) corrective regimen sliding scale   SubCutaneous at bedtime  lactated ringers. 1000 milliLiter(s) (100 mL/Hr) IV Continuous <Continuous>  lactulose Syrup 10 Gram(s) Oral three times a day  letrozole 2.5 milliGRAM(s) Oral daily  LORazepam     Tablet 1 milliGRAM(s) Oral once  metroNIDAZOLE  IVPB 500 milliGRAM(s) IV Intermittent every 8 hours  mometasone 220 MICROgram(s) Inhaler 1 Puff(s) Inhalation daily  montelukast 10 milliGRAM(s) Oral daily  pantoprazole    Tablet 40 milliGRAM(s) Oral before breakfast  tiotropium 18 MICROgram(s) Capsule 1 Capsule(s) Inhalation daily    MEDICATIONS  (PRN):  gabapentin 300 milliGRAM(s) Oral three times a day PRN neuropathy      LABS:                        8.6    1.68  )-----------( 103      ( 08 Sep 2021 06:33 )             26.5     -    141  |  104  |  17  ----------------------------<  106<H>  3.8   |  20<L>  |  1.06    Ca    9.7      08 Sep 2021 06:33  Phos  1.8     -  Mg     1.6         TPro  5.9<L>  /  Alb  2.7<L>  /  TBili  5.4<H>  /  DBili  x   /  AST  229<H>  /  ALT  84<H>  /  AlkPhos  762<H>  09-08    PT/INR - ( 07 Sep 2021 07:03 )   PT: 18.0 sec;   INR: 1.53 ratio         PTT - ( 06 Sep 2021 14:22 )  PTT:38.4 sec  Urinalysis Basic - ( 06 Sep 2021 21:24 )    Color: Dark Yellow / Appearance: Slightly Turbid / S.024 / pH: x  Gluc: x / Ketone: Negative  / Bili: Moderate / Urobili: Negative   Blood: x / Protein: 100 mg/dL / Nitrite: Negative   Leuk Esterase: Moderate / RBC: 6 /hpf / WBC 10 /HPF   Sq Epi: x / Non Sq Epi: 2 /hpf / Bacteria: Few        RADIOLOGY & ADDITIONAL TESTS:

## 2021-09-08 NOTE — CONSULT NOTE ADULT - SUBJECTIVE AND OBJECTIVE BOX
Patient is a 65y old  Female who presents with a chief complaint of sepsis (08 Sep 2021 11:15)    HPI:  65 year old woman with PMH of breast cancer with mets to bone and liver with recent initiation of new chemotherapy - abraxane 6 days ago, lap-band presents with diarrhea, abdominal pain, jaundice, and confusion. Per son, patient had been experiencing dyspnea, fatigue, and dysuria and had presented to the ED 2 weeeks ago. At that time found to have a UTI, JAMAAL, and liver mets by ultrasound, she subsequently had a biopsy as an outpatient several days later. She has continued to have fatigue and weakness and some SOB. She went to pulm who started trelegy ellipta - per patient too soon to tell if it's helping. More recently she started to have some confusion manifested by forgetting what she was saying, answering questions incorrectly, and repeating herself. She has also had some decreased PO intake that has worsened over last few days. Lastly she started having diarrhea this past week, reporting 3-4 BMs per day, patient unable to articulate whether loose or watery. She also reports continued polyuria and polydipsia but her home blood sugars have been normal. She is denying any blood in urine/stool or melena as well as fevers/chills, chest pain, current dyspnea, N/V, and dysuria.     In ED: Afebrile, , /80, RR 18, Spo2 95%. Lactate 5.3, WBC 1.5 with , direct bili 4. Given 2L IVF, vanco and cefepime 1g.  (06 Sep 2021 19:41)       REVIEW OF SYSTEMS  [  ] ROS unobtainable because:    [  ] All other systems negative except as noted below    Constitutional:  [ ] fever [ ] chills  [ ] weight loss  [ ]night sweat  [ ]poor appetite/PO intake [ ]fatigue   Skin:  [ ] rash [ ] phlebitis	  Eyes: [ ] icterus [ ] pain  [ ] discharge	  ENMT: [ ] sore throat  [ ] thrush [ ] ulcers [ ] exudates [ ]anosmia  Respiratory: [ ] dyspnea [ ] hemoptysis [ ] cough [ ] sputum	  Cardiovascular:  [ ] chest pain [ ] palpitations [ ] edema	  Gastrointestinal:  [ ] nausea [ ] vomiting [ ] diarrhea [ ] constipation [ ] pain	  Genitourinary:  [ ] dysuria [ ] frequency [ ] hematuria [ ] discharge [ ] flank pain  [ ] incontinence  Musculoskeletal:  [ ] myalgias [ ] arthralgias [ ] arthritis  [ ] back pain  Neurological:  [ ] headache [ ] weakness [ ] seizures  [ ] confusion/altered mental status    prior hospital charts reviewed [V]  primary team notes reviewed [V]  other consultant notes reviewed [V]    PAST MEDICAL & SURGICAL HISTORY:  Diabetes mellitus type II    Hypertension    Hyperlipidemia    Osteoarthritis    Toe ulcer, right    Asthma    Glaucoma    Anxiety    Morbidly obese    Breast cancer, left    GERD (gastroesophageal reflux disease)    S/P laparoscopic surgery  GASTRIC LAP BAND ~     S/P hip replacement  LEFT HIP (OXINIUM) ~     H/O:  Section  x2-1991    History of tonsillectomy      H/O drainage of abscess  13    S/P biopsy  uterus-2013    H/O rhinoplasty       history          SOCIAL HISTORY:  - Denied smoking/vaping/alcohol/recreational drug use    FAMILY HISTORY:  No pertinent family history in first degree relatives        Allergies  tetanus toxoid (Unknown)  Zosyn (Unknown)        ANTIMICROBIALS:  cefepime   IVPB 2000 every 8 hours  metroNIDAZOLE  IVPB 500 every 8 hours      ANTIMICROBIALS (past 90 days):  MEDICATIONS  (STANDING):  cefepime   IVPB   100 mL/Hr IV Intermittent (21 @ 18:52)    cefepime   IVPB   100 mL/Hr IV Intermittent (21 @ 23:30)    cefepime   IVPB   100 mL/Hr IV Intermittent (21 @ 05:00)   100 mL/Hr IV Intermittent (21 @ 20:52)   100 mL/Hr IV Intermittent (21 @ 12:55)   100 mL/Hr IV Intermittent (21 @ 06:38)    metroNIDAZOLE  IVPB   100 mL/Hr IV Intermittent (21 @ 05:48)   100 mL/Hr IV Intermittent (21 @ 22:59)   100 mL/Hr IV Intermittent (21 @ 13:37)   100 mL/Hr IV Intermittent (21 @ 06:28)   100 mL/Hr IV Intermittent (21 @ 23:30)    vancomycin  IVPB.   250 mL/Hr IV Intermittent (21 @ 19:58)        OTHER MEDS:   MEDICATIONS  (STANDING):  ALBUTerol    90 MICROgram(s) HFA Inhaler 1 every 4 hours  dextrose 40% Gel 15 once  dextrose 50% Injectable 25 once  dextrose 50% Injectable 12.5 once  dextrose 50% Injectable 25 once  enoxaparin Injectable 40 daily  filgrastim-sndz (ZARXIO) Injectable 480 daily  gabapentin 300 three times a day PRN  glucagon  Injectable 1 once  influenza   Vaccine 0.5 once  insulin lispro (ADMELOG) corrective regimen sliding scale  three times a day before meals  insulin lispro (ADMELOG) corrective regimen sliding scale  at bedtime  lactulose Syrup 10 three times a day  letrozole 2.5 daily  LORazepam     Tablet 1 once  mometasone 220 MICROgram(s) Inhaler 1 daily  montelukast 10 daily  pantoprazole    Tablet 40 before breakfast  tiotropium 18 MICROgram(s) Capsule 1 daily      VITALS:  Vital Signs Last 24 Hrs  T(F): 99 (21 @ 09:00), Max: 99.5 (21 @ 16:59)    Vital Signs Last 24 Hrs  HR: 100 (21 @ 09:00) (100 - 112)  BP: 155/78 (21 @ 09:00) (101/52 - 155/78)  RR: 20 (21 @ 09:00)  SpO2: 97% (21 @ 09:00) (92% - 100%)  Wt(kg): --    EXAM:  Physical Exam:  Constitutional:  well preserved, comfortable  Head/Eyes: no icterus, PERRL, EOMI  ENT:  supple; no thrush  LUNGS:  CTA  CVS:  normal S1, S2, no murmur  Abd:  soft, non-tender; non-distended  Ext:  no edema  Vascular:  IV site no erythema tenderness or discharge  MSK:  joints without swelling  Neuro: AAO X 3, non- focal    Labs:                        8.6    1.68  )-----------( 103      ( 08 Sep 2021 06:33 )             26.5     09-08    141  |  104  |  17  ----------------------------<  106<H>  3.8   |  20<L>  |  1.06    Ca    9.7      08 Sep 2021 06:33  Phos  1.8     09-08  Mg     1.6     -08    TPro  5.9<L>  /  Alb  2.7<L>  /  TBili  5.4<H>  /  DBili  x   /  AST  229<H>  /  ALT  84<H>  /  AlkPhos  762<H>        WBC Trend:  WBC Count: 1.68 (21 @ 06:33)  WBC Count: 1.13 (21 @ 07:03)  WBC Count: 1.53 (21 @ 14:22)    Auto Neutrophil #: 0.43 K/uL (21 @ 07:03)  Band Neutrophils %: 2.0 % (21 @ 07:03)  Auto Neutrophil #: 0.78 K/uL (21 @ 14:22)  Auto Neutrophil #: 2.67 K/uL (21 @ 13:29)  Auto Neutrophil #: 2.88 K/uL (21 @ 13:08)    Creatine Trend:  Creatinine, Serum: 1.06 ()  Creatinine, Serum: 1.02 ()  Creatinine, Serum: 1.01 ()  Creatinine, Serum: 0.99 ()      Liver Biochemical Testing Trend:  Alanine Aminotransferase (ALT/SGPT): 84 *H* ()  Alanine Aminotransferase (ALT/SGPT): 91 *H* ()  Alanine Aminotransferase (ALT/SGPT): 104 *H* ()  Alanine Aminotransferase (ALT/SGPT): 117 *H* ()  Alanine Aminotransferase (ALT/SGPT): 55 *H* ()  Aspartate Aminotransferase (AST/SGOT): 229 (21 @ 06:33)  Aspartate Aminotransferase (AST/SGOT): 215 (21 @ 07:03)  Aspartate Aminotransferase (AST/SGOT): 243 (21 @ 14:16)  Aspartate Aminotransferase (AST/SGOT): 118 (21 @ 16:03)  Aspartate Aminotransferase (AST/SGOT): 72 (21 @ 13:52)  Bilirubin Total, Serum: 5.4 ()  Bilirubin Total, Serum: 5.4 ()  Bilirubin Total, Serum: 5.6 ()  Bilirubin Direct, Serum: 4.1 ()  Bilirubin Total, Serum: 5.3 ()      Trend LDH  21 @ 21:17  842<H>    Urinalysis Basic - ( 06 Sep 2021 21:24 )  Color: Dark Yellow / Appearance: Slightly Turbid / S.024 / pH: x  Gluc: x / Ketone: Negative  / Bili: Moderate / Urobili: Negative   Blood: x / Protein: 100 mg/dL / Nitrite: Negative   Leuk Esterase: Moderate / RBC: 6 /hpf / WBC 10 /HPF   Sq Epi: x / Non Sq Epi: 2 /hpf / Bacteria: Few        MICROBIOLOGY:    Clostridium difficile GDH Toxins A&amp;B, EIA:   Indeterminate (21 @ 15:40)    GI PCR Panel, Stool (collected 07 Sep 2021 14:58)  Source: .Stool nico robles  Final Report:    GI PCR Results: NOT detected    *******Please Note:*******    GI panel PCR evaluates for:    Campylobacter, Plesiomonas shigelloides, Salmonella,    Vibrio, Yersinia enterocolitica, Enteroaggregative    Escherichia coli (EAEC), Enteropathogenic E.coli (EPEC),    Enterotoxigenic E. coli (ETEC) lt/st, Shiga-like    toxin-producing E. coli (STEC) stx1/stx2,    Shigella/ Enteroinvasive E. coli (EIEC), Cryptosporidium,    Cyclospora cayetanensis, Entamoeba histolytica,    Giardia lamblia, Adenovirus F 40/41, Astrovirus,    Norovirus GI/GII, Rotavirus A, Sapovirus    Culture - Urine (collected 07 Sep 2021 00:18)  Source: Clean Catch Clean Catch (Midstream)  Final Report:    <10,000 CFU/mL Normal Urogenital Grace    Culture - Blood (collected 06 Sep 2021 18:29)  Source: .Blood Blood-Peripheral  Preliminary Report:    No growth to date.    Culture - Blood (collected 06 Sep 2021 18:29)  Source: .Blood Blood-Peripheral  Preliminary Report:    No growth to date.    Culture - Urine (collected 14 Aug 2021 20:49)  Source: Clean Catch Clean Catch (Midstream)  Final Report:    <10,000 CFU/mL Normal Urogenital Grace      HIV-1/2 Combo Result: Nonreact (21 @ 08:33)  CMV IgM Interpretation: Negative (21 @ 08:33)    COVID-19 PCR: NotDetec (21 @ 14:22)  COVID-19 PCR: NotDetec (21 @ 16:12)    COVID-19 Michael Domain AB Interp: Positive (21 @ 09:30)    Procalcitonin, Serum: 1.29 ()    C-Reactive Protein, Serum: 290 ()    Ferritin, Serum: 08944 ()    Lactate Dehydrogenase, Serum: 842 ()    Ammonia, Serum: 53 umol/L (21 @ 01:02)    Serum Pro-Brain Natriuretic Peptide: 26213 ()  Serum Pro-Brain Natriuretic Peptide: 09930 ()    Troponin T, High Sensitivity Result: 38 ()  Troponin T, High Sensitivity Result: 43 ()    Lactate, Blood: 3.0 ( @ 06:33)  Blood Gas Venous - Lactate: 3.5 ( @ 17:58)  Blood Gas Venous - Lactate: 5.3 ( @ 14:02)    A1C with Estimated Average Glucose Result: 7.6 % (21 @ 12:31)    RADIOLOGY:  imaging below personally reviewed    < from: CT Abdomen and Pelvis w/ IV Cont (21 @ 16:52) >  IMPRESSION:    CTA chest:    No pulmonary embolus to the level of the segmenta arteries. No consolidations, edema, effusion, or pneumothorax.    Incidental findings as above    CT abdomen and pelvis: Cirrhotic liver with known metastases.    Diffuse bony metastases. Remaining incidentals as above.    < end of copied text >     Patient is a 65y old  Female who presents with a chief complaint of sepsis (08 Sep 2021 11:15)    HPI:  65 year old woman with PMH of breast cancer with mets to bone and liver with recent initiation of new chemotherapy - abraxane 6 days ago, lap-band presents with diarrhea, abdominal pain, jaundice, and confusion. Per son, patient had been experiencing dyspnea, fatigue, and dysuria and had presented to the ED 2 weeeks ago. At that time found to have a UTI, JAMAAL, and liver mets by ultrasound, she subsequently had a biopsy as an outpatient several days later. She has continued to have fatigue and weakness and some SOB. She went to pul who started trelegy ellipta - per patient too soon to tell if it's helping. More recently she started to have some confusion manifested by forgetting what she was saying, answering questions incorrectly, and repeating herself. She has also had some decreased PO intake that has worsened over last few days. Lastly she started having diarrhea this past week, reporting 3-4 BMs per day, patient unable to articulate whether loose or watery. She also reports continued polyuria and polydipsia but her home blood sugars have been normal. She is denying any blood in urine/stool or melena as well as fevers/chills, chest pain, current dyspnea, N/V, and dysuria.     In ED: Afebrile, , /80, RR 18, Spo2 95%. Lactate 5.3, WBC 1.5 with , direct bili 4. Given 2L IVF, vanco and cefepime 1g.  (06 Sep 2021 19:41)    On , first started on IV chemotherapy with Abraxane. Developed diarrhea from -. With decreased PO intake, daughter brought her to hospital concerning for dehydration.  She was not on lactulose at home.       REVIEW OF SYSTEMS  [  ] ROS unobtainable because:    [X  ] All other systems negative except as noted below    Constitutional:  [ ] fever [X ] chills  [ ] weight loss  [ ]night sweat  [ ]poor appetite/PO intake [ ]fatigue   Skin:  [ ] rash [ ] phlebitis	  Eyes: [ ] icterus [ ] pain  [ ] discharge	  ENMT: [ X] sore throat  [ ] thrush [ ] ulcers [ ] exudates [ ]anosmia  Respiratory: [ ] dyspnea [ ] hemoptysis [X ] cough [X ] white sputum	  Cardiovascular:  [ ] chest pain [ ] palpitations [ ] edema	  Gastrointestinal:  [ ] nausea [ ] vomiting [X ] diarrhea [ ] constipation [ ] pain	  Genitourinary:  [ ] dysuria [ ] frequency [ ] hematuria [ ] discharge [ ] flank pain  [ ] incontinence  Musculoskeletal:  [ ] myalgias [ ] arthralgias [ ] arthritis  [ ] back pain  Neurological:  [ ] headache [ ] weakness [ ] seizures  [X] confusion/altered mental status    prior hospital charts reviewed [V]  primary team notes reviewed [V]  other consultant notes reviewed [V]    PAST MEDICAL & SURGICAL HISTORY:  Diabetes mellitus type II    Hypertension    Hyperlipidemia    Osteoarthritis    Toe ulcer, right    Asthma    Glaucoma    Anxiety    Morbidly obese    Breast cancer, left    GERD (gastroesophageal reflux disease)    S/P laparoscopic surgery  GASTRIC LAP BAND ~     S/P hip replacement  LEFT HIP (OXINIUM) ~     H/O:  Section  x2-1991    History of tonsillectomy      H/O drainage of abscess  13    S/P biopsy  uterus-2013    H/O rhinoplasty       history          SOCIAL HISTORY:  - Denied smoking/vaping/alcoho  - Smoked weed 1 week ago  - Lives with daughter    FAMILY HISTORY:  Multiple cancer hx in family  Identical twin sister has stomach cancer    Allergies  tetanus toxoid (Unknown)  Zosyn - JAMAAL, not rash or SOB    ANTIMICROBIALS:  cefepime   IVPB 2000 every 8 hours  metroNIDAZOLE  IVPB 500 every 8 hours      ANTIMICROBIALS (past 90 days):  MEDICATIONS  (STANDING):  cefepime   IVPB   100 mL/Hr IV Intermittent (21 @ 18:52)    cefepime   IVPB   100 mL/Hr IV Intermittent (21 @ 23:30)    cefepime   IVPB   100 mL/Hr IV Intermittent (21 @ 05:00)   100 mL/Hr IV Intermittent (21 @ 20:52)   100 mL/Hr IV Intermittent (21 @ 12:55)   100 mL/Hr IV Intermittent (21 @ 06:38)    metroNIDAZOLE  IVPB   100 mL/Hr IV Intermittent (21 @ 05:48)   100 mL/Hr IV Intermittent (21 @ 22:59)   100 mL/Hr IV Intermittent (21 @ 13:37)   100 mL/Hr IV Intermittent (21 @ 06:28)   100 mL/Hr IV Intermittent (21 @ 23:30)    vancomycin  IVPB.   250 mL/Hr IV Intermittent (21 @ 19:58)        OTHER MEDS:   MEDICATIONS  (STANDING):  ALBUTerol    90 MICROgram(s) HFA Inhaler 1 every 4 hours  dextrose 40% Gel 15 once  dextrose 50% Injectable 25 once  dextrose 50% Injectable 12.5 once  dextrose 50% Injectable 25 once  enoxaparin Injectable 40 daily  filgrastim-sndz (ZARXIO) Injectable 480 daily  gabapentin 300 three times a day PRN  glucagon  Injectable 1 once  influenza   Vaccine 0.5 once  insulin lispro (ADMELOG) corrective regimen sliding scale  three times a day before meals  insulin lispro (ADMELOG) corrective regimen sliding scale  at bedtime  lactulose Syrup 10 three times a day  letrozole 2.5 daily  LORazepam     Tablet 1 once  mometasone 220 MICROgram(s) Inhaler 1 daily  montelukast 10 daily  pantoprazole    Tablet 40 before breakfast  tiotropium 18 MICROgram(s) Capsule 1 daily      VITALS:  Vital Signs Last 24 Hrs  T(F): 99 (21 @ 09:00), Max: 99.5 (21 @ 16:59)    Vital Signs Last 24 Hrs  HR: 100 (21 @ 09:00) (100 - 112)  BP: 155/78 (21 @ 09:00) (101/52 - 155/78)  RR: 20 (21 @ 09:00)  SpO2: 97% (21 @ 09:00) (92% - 100%)  Wt(kg): --    EXAM:  Physical Exam:  Constitutional: on NC, stable, not in distress, obese F  Head/Eyes: icterus, PERRL, EOMI  ENT:  supple; mucositis+  LUNGS:  CTA  CVS:  normal S1, S2, no murmur  Abd:  soft, non-tender; non-distended  Ext:  no edema  Vascular:  IV site no erythema tenderness or discharge  MSK:  joints without swelling  Neuro: AAO X 3, non- focal    Labs:                        8.6    1.68  )-----------( 103      ( 08 Sep 2021 06:33 )             26.5         141  |  104  |  17  ----------------------------<  106<H>  3.8   |  20<L>  |  1.06    Ca    9.7      08 Sep 2021 06:33  Phos  1.8       Mg     1.6         TPro  5.9<L>  /  Alb  2.7<L>  /  TBili  5.4<H>  /  DBili  x   /  AST  229<H>  /  ALT  84<H>  /  AlkPhos  762<H>        WBC Trend:  WBC Count: 1.68 (21 @ 06:33)  WBC Count: 1.13 (21 @ 07:03)  WBC Count: 1.53 (21 @ 14:22)    Auto Neutrophil #: 0.43 K/uL (21 @ 07:03)  Band Neutrophils %: 2.0 % (21 @ 07:03)  Auto Neutrophil #: 0.78 K/uL (21 @ 14:22)  Auto Neutrophil #: 2.67 K/uL (21 @ 13:29)  Auto Neutrophil #: 2.88 K/uL (21 @ 13:08)    Creatine Trend:  Creatinine, Serum: 1.06 ()  Creatinine, Serum: 1.02 ()  Creatinine, Serum: 1.01 ()  Creatinine, Serum: 0.99 ()      Liver Biochemical Testing Trend:  Alanine Aminotransferase (ALT/SGPT): 84 *H* ()  Alanine Aminotransferase (ALT/SGPT): 91 *H* ()  Alanine Aminotransferase (ALT/SGPT): 104 *H* ()  Alanine Aminotransferase (ALT/SGPT): 117 *H* ()  Alanine Aminotransferase (ALT/SGPT): 55 *H* ()  Aspartate Aminotransferase (AST/SGOT): 229 (21 @ 06:33)  Aspartate Aminotransferase (AST/SGOT): 215 (21 @ 07:03)  Aspartate Aminotransferase (AST/SGOT): 243 (21 @ 14:16)  Aspartate Aminotransferase (AST/SGOT): 118 (21 @ 16:03)  Aspartate Aminotransferase (AST/SGOT): 72 (21 @ 13:52)  Bilirubin Total, Serum: 5.4 ()  Bilirubin Total, Serum: 5.4 ()  Bilirubin Total, Serum: 5.6 ()  Bilirubin Direct, Serum: 4.1 ()  Bilirubin Total, Serum: 5.3 ()      Trend LDH  21 @ 21:17  842<H>    Urinalysis Basic - ( 06 Sep 2021 21:24 )  Color: Dark Yellow / Appearance: Slightly Turbid / S.024 / pH: x  Gluc: x / Ketone: Negative  / Bili: Moderate / Urobili: Negative   Blood: x / Protein: 100 mg/dL / Nitrite: Negative   Leuk Esterase: Moderate / RBC: 6 /hpf / WBC 10 /HPF   Sq Epi: x / Non Sq Epi: 2 /hpf / Bacteria: Few        MICROBIOLOGY:    Clostridium difficile GDH Toxins A&amp;B, EIA:   Indeterminate (21 @ 15:40)    GI PCR Panel, Stool (collected 07 Sep 2021 14:58)  Source: .Stool nico robles  Final Report:    GI PCR Results: NOT detected    *******Please Note:*******    GI panel PCR evaluates for:    Campylobacter, Plesiomonas shigelloides, Salmonella,    Vibrio, Yersinia enterocolitica, Enteroaggregative    Escherichia coli (EAEC), Enteropathogenic E.coli (EPEC),    Enterotoxigenic E. coli (ETEC) lt/st, Shiga-like    toxin-producing E. coli (STEC) stx1/stx2,    Shigella/ Enteroinvasive E. coli (EIEC), Cryptosporidium,    Cyclospora cayetanensis, Entamoeba histolytica,    Giardia lamblia, Adenovirus F 40/41, Astrovirus,    Norovirus GI/GII, Rotavirus A, Sapovirus    Culture - Urine (collected 07 Sep 2021 00:18)  Source: Clean Catch Clean Catch (Midstream)  Final Report:    <10,000 CFU/mL Normal Urogenital Grace    Culture - Blood (collected 06 Sep 2021 18:29)  Source: .Blood Blood-Peripheral  Preliminary Report:    No growth to date.    Culture - Blood (collected 06 Sep 2021 18:29)  Source: .Blood Blood-Peripheral  Preliminary Report:    No growth to date.    Culture - Urine (collected 14 Aug 2021 20:49)  Source: Clean Catch Clean Catch (Midstream)  Final Report:    <10,000 CFU/mL Normal Urogenital Grace      HIV-1/2 Combo Result: Nonreact (21 @ 08:33)  CMV IgM Interpretation: Negative (21 @ 08:33)    COVID-19 PCR: NotDetec (21 @ 14:22)  COVID-19 PCR: NotDetec (21 @ 16:12)    COVID-19 Michael Domain AB Interp: Positive (21 @ 09:30)    Procalcitonin, Serum: 1.29 ()    C-Reactive Protein, Serum: 290 ()    Ferritin, Serum: 24547 ()    Lactate Dehydrogenase, Serum: 842 ()    Ammonia, Serum: 53 umol/L (21 @ 01:02)    Serum Pro-Brain Natriuretic Peptide: 45260 ()  Serum Pro-Brain Natriuretic Peptide: 95024 ()    Troponin T, High Sensitivity Result: 38 ()  Troponin T, High Sensitivity Result: 43 ()    Lactate, Blood: 3.0 ( @ 06:33)  Blood Gas Venous - Lactate: 3.5 ( @ 17:58)  Blood Gas Venous - Lactate: 5.3 ( @ 14:02)    A1C with Estimated Average Glucose Result: 7.6 % (21 @ 12:31)    RADIOLOGY:  imaging below personally reviewed    < from: CT Abdomen and Pelvis w/ IV Cont (21 @ 16:52) >  IMPRESSION:    CTA chest:    No pulmonary embolus to the level of the segmenta arteries. No consolidations, edema, effusion, or pneumothorax.    Incidental findings as above    CT abdomen and pelvis: Cirrhotic liver with known metastases.    Diffuse bony metastases. Remaining incidentals as above.    < end of copied text >     Patient is a 65y old  Female who presents with a chief complaint of sepsis (08 Sep 2021 11:15)    HPI:  65 year old woman with PMH of breast cancer with mets to bone and liver with recent initiation of new chemotherapy - abraxane 6 days ago, lap-band presents with diarrhea, abdominal pain, jaundice, and confusion. Per son, patient had been experiencing dyspnea, fatigue, and dysuria and had presented to the ED 2 weeeks ago. At that time found to have a UTI, JAMAAL, and liver mets by ultrasound, she subsequently had a biopsy as an outpatient several days later. She has continued to have fatigue and weakness and some SOB. She went to pul who started trelegy ellipta - per patient too soon to tell if it's helping. More recently she started to have some confusion manifested by forgetting what she was saying, answering questions incorrectly, and repeating herself. She has also had some decreased PO intake that has worsened over last few days. Lastly she started having diarrhea this past week, reporting 3-4 BMs per day, patient unable to articulate whether loose or watery. She also reports continued polyuria and polydipsia but her home blood sugars have been normal. She is denying any blood in urine/stool or melena as well as fevers/chills, chest pain, current dyspnea, N/V, and dysuria.     In ED: Afebrile, , /80, RR 18, Spo2 95%. Lactate 5.3, WBC 1.5 with , direct bili 4. Given 2L IVF, vanco and cefepime 1g.  (06 Sep 2021 19:41)    On , first started on IV chemotherapy with Abraxane. Developed diarrhea from -. With decreased PO intake, daughter brought her to hospital concerning for dehydration.  She was not on lactulose at home.      REVIEW OF SYSTEMS  [  ] ROS unobtainable because:    [X  ] All other systems negative except as noted below    Constitutional:  [ ] fever [X ] chills  [ ] weight loss  [ ]night sweat  [ ]poor appetite/PO intake [ ]fatigue   Skin:  [ ] rash [ ] phlebitis	  Eyes: [ ] icterus [ ] pain  [ ] discharge	  ENMT: [ X] sore throat  [ ] thrush [ ] ulcers [ ] exudates [ ]anosmia  Respiratory: [ ] dyspnea [ ] hemoptysis [X ] cough [X ] white sputum	  Cardiovascular:  [ ] chest pain [ ] palpitations [ ] edema	  Gastrointestinal:  [ ] nausea [ ] vomiting [X ] diarrhea [ ] constipation [ ] pain	  Genitourinary:  [ ] dysuria [ ] frequency [ ] hematuria [ ] discharge [ ] flank pain  [ ] incontinence  Musculoskeletal:  [ ] myalgias [ ] arthralgias [ ] arthritis  [ ] back pain  Neurological:  [ ] headache [ ] weakness [ ] seizures  [X] confusion/altered mental status    prior hospital charts reviewed [V]  primary team notes reviewed [V]  other consultant notes reviewed [V]    PAST MEDICAL & SURGICAL HISTORY:  Diabetes mellitus type II    Hypertension    Hyperlipidemia    Osteoarthritis    Toe ulcer, right    Asthma    Glaucoma    Anxiety    Morbidly obese    Breast cancer, left    GERD (gastroesophageal reflux disease)    S/P laparoscopic surgery  GASTRIC LAP BAND ~     S/P hip replacement  LEFT HIP (OXINIUM) ~     H/O:  Section  x2-1991    History of tonsillectomy      H/O drainage of abscess  13    S/P biopsy  uterus-2013    H/O rhinoplasty       history          SOCIAL HISTORY:  - Denied smoking/vaping/alcoho  - Smoked weed 1 week ago  - Lives with daughter    FAMILY HISTORY:  Multiple cancer hx in family  Identical twin sister has stomach cancer    Allergies  tetanus toxoid (Unknown)  Zosyn - JAMAAL, not rash or SOB    ANTIMICROBIALS:  cefepime   IVPB 2000 every 8 hours  metroNIDAZOLE  IVPB 500 every 8 hours      ANTIMICROBIALS (past 90 days):  MEDICATIONS  (STANDING):  cefepime   IVPB   100 mL/Hr IV Intermittent (21 @ 18:52)    cefepime   IVPB   100 mL/Hr IV Intermittent (21 @ 23:30)    cefepime   IVPB   100 mL/Hr IV Intermittent (21 @ 05:00)   100 mL/Hr IV Intermittent (21 @ 20:52)   100 mL/Hr IV Intermittent (21 @ 12:55)   100 mL/Hr IV Intermittent (21 @ 06:38)    metroNIDAZOLE  IVPB   100 mL/Hr IV Intermittent (21 @ 05:48)   100 mL/Hr IV Intermittent (21 @ 22:59)   100 mL/Hr IV Intermittent (21 @ 13:37)   100 mL/Hr IV Intermittent (21 @ 06:28)   100 mL/Hr IV Intermittent (21 @ 23:30)    vancomycin  IVPB.   250 mL/Hr IV Intermittent (21 @ 19:58)        OTHER MEDS:   MEDICATIONS  (STANDING):  ALBUTerol    90 MICROgram(s) HFA Inhaler 1 every 4 hours  dextrose 40% Gel 15 once  dextrose 50% Injectable 25 once  dextrose 50% Injectable 12.5 once  dextrose 50% Injectable 25 once  enoxaparin Injectable 40 daily  filgrastim-sndz (ZARXIO) Injectable 480 daily  gabapentin 300 three times a day PRN  glucagon  Injectable 1 once  influenza   Vaccine 0.5 once  insulin lispro (ADMELOG) corrective regimen sliding scale  three times a day before meals  insulin lispro (ADMELOG) corrective regimen sliding scale  at bedtime  lactulose Syrup 10 three times a day  letrozole 2.5 daily  LORazepam     Tablet 1 once  mometasone 220 MICROgram(s) Inhaler 1 daily  montelukast 10 daily  pantoprazole    Tablet 40 before breakfast  tiotropium 18 MICROgram(s) Capsule 1 daily      VITALS:  Vital Signs Last 24 Hrs  T(F): 99 (21 @ 09:00), Max: 99.5 (21 @ 16:59)    Vital Signs Last 24 Hrs  HR: 100 (21 @ 09:00) (100 - 112)  BP: 155/78 (21 @ 09:00) (101/52 - 155/78)  RR: 20 (21 @ 09:00)  SpO2: 97% (21 @ 09:00) (92% - 100%)  Wt(kg): --    EXAM:  Physical Exam:  Constitutional: on NC, stable, not in distress, obese   Head/Eyes: icterus, PERRL, EOMI  ENT:  supple; mucositis+  LUNGS:  nonlabored on nasal cannula   CVS: regular rate  Abd:  soft, non-tender; non-distended  Ext:  no edema  Vascular:  IV site no erythema tenderness or discharge  MSK:  joints without swelling  Neuro: AAO X 3, non- focal    Labs:                        8.6    1.68  )-----------( 103      ( 08 Sep 2021 06:33 )             26.5         141  |  104  |  17  ----------------------------<  106<H>  3.8   |  20<L>  |  1.06    Ca    9.7      08 Sep 2021 06:33  Phos  1.8       Mg     1.6         TPro  5.9<L>  /  Alb  2.7<L>  /  TBili  5.4<H>  /  DBili  x   /  AST  229<H>  /  ALT  84<H>  /  AlkPhos  762<H>        WBC Trend:  WBC Count: 1.68 (21 @ 06:33)  WBC Count: 1.13 (21 @ 07:03)  WBC Count: 1.53 (21 @ 14:22)    Auto Neutrophil #: 0.43 K/uL (21 @ 07:03)  Band Neutrophils %: 2.0 % (21 @ 07:03)  Auto Neutrophil #: 0.78 K/uL (21 @ 14:22)  Auto Neutrophil #: 2.67 K/uL (21 @ 13:29)  Auto Neutrophil #: 2.88 K/uL (21 @ 13:08)    Creatine Trend:  Creatinine, Serum: 1.06 ()  Creatinine, Serum: 1.02 ()  Creatinine, Serum: 1.01 ()  Creatinine, Serum: 0.99 ()      Liver Biochemical Testing Trend:  Alanine Aminotransferase (ALT/SGPT): 84 *H* ()  Alanine Aminotransferase (ALT/SGPT): 91 *H* ()  Alanine Aminotransferase (ALT/SGPT): 104 *H* ()  Alanine Aminotransferase (ALT/SGPT): 117 *H* ()  Alanine Aminotransferase (ALT/SGPT): 55 *H* ()  Aspartate Aminotransferase (AST/SGOT): 229 (21 @ 06:33)  Aspartate Aminotransferase (AST/SGOT): 215 (21 @ 07:03)  Aspartate Aminotransferase (AST/SGOT): 243 (21 @ 14:16)  Aspartate Aminotransferase (AST/SGOT): 118 (21 @ 16:03)  Aspartate Aminotransferase (AST/SGOT): 72 (21 @ 13:52)  Bilirubin Total, Serum: 5.4 ()  Bilirubin Total, Serum: 5.4 ()  Bilirubin Total, Serum: 5.6 ()  Bilirubin Direct, Serum: 4.1 ()  Bilirubin Total, Serum: 5.3 ()      Trend LDH  21 @ 21:17  842<H>    Urinalysis Basic - ( 06 Sep 2021 21:24 )  Color: Dark Yellow / Appearance: Slightly Turbid / S.024 / pH: x  Gluc: x / Ketone: Negative  / Bili: Moderate / Urobili: Negative   Blood: x / Protein: 100 mg/dL / Nitrite: Negative   Leuk Esterase: Moderate / RBC: 6 /hpf / WBC 10 /HPF   Sq Epi: x / Non Sq Epi: 2 /hpf / Bacteria: Few        MICROBIOLOGY:    Clostridium difficile GDH Toxins A&amp;B, EIA:   Indeterminate (21 @ 15:40)    GI PCR Panel, Stool (collected 07 Sep 2021 14:58)  Source: .Stool nico robles  Final Report:    GI PCR Results: NOT detected    *******Please Note:*******    GI panel PCR evaluates for:    Campylobacter, Plesiomonas shigelloides, Salmonella,    Vibrio, Yersinia enterocolitica, Enteroaggregative    Escherichia coli (EAEC), Enteropathogenic E.coli (EPEC),    Enterotoxigenic E. coli (ETEC) lt/st, Shiga-like    toxin-producing E. coli (STEC) stx1/stx2,    Shigella/ Enteroinvasive E. coli (EIEC), Cryptosporidium,    Cyclospora cayetanensis, Entamoeba histolytica,    Giardia lamblia, Adenovirus F 40/41, Astrovirus,    Norovirus GI/GII, Rotavirus A, Sapovirus    Culture - Urine (collected 07 Sep 2021 00:18)  Source: Clean Catch Clean Catch (Midstream)  Final Report:    <10,000 CFU/mL Normal Urogenital Grace    Culture - Blood (collected 06 Sep 2021 18:29)  Source: .Blood Blood-Peripheral  Preliminary Report:    No growth to date.    Culture - Blood (collected 06 Sep 2021 18:29)  Source: .Blood Blood-Peripheral  Preliminary Report:    No growth to date.    Culture - Urine (collected 14 Aug 2021 20:49)  Source: Clean Catch Clean Catch (Midstream)  Final Report:    <10,000 CFU/mL Normal Urogenital Grace      HIV-1/2 Combo Result: Nonreact (21 @ 08:33)  CMV IgM Interpretation: Negative (21 @ 08:33)    COVID-19 PCR: NotDetec (21 @ 14:22)  COVID-19 PCR: NotDetec (21 @ 16:12)    COVID-19 Michael Domain AB Interp: Positive (21 @ 09:30)    Procalcitonin, Serum: 1.29 ()    C-Reactive Protein, Serum: 290 ()    Ferritin, Serum: 20925 ()    Lactate Dehydrogenase, Serum: 842 ()    Ammonia, Serum: 53 umol/L (21 @ 01:02)    Serum Pro-Brain Natriuretic Peptide: 41967 ()  Serum Pro-Brain Natriuretic Peptide: 68042 ()    Troponin T, High Sensitivity Result: 38 ()  Troponin T, High Sensitivity Result: 43 ()    Lactate, Blood: 3.0 ( @ 06:33)  Blood Gas Venous - Lactate: 3.5 ( @ 17:58)  Blood Gas Venous - Lactate: 5.3 ( @ 14:02)    A1C with Estimated Average Glucose Result: 7.6 % (21 @ 12:31)    RADIOLOGY:  imaging below personally reviewed  CT Abdomen and Pelvis w/ IV Cont (21 @ 16:52)   CTA chest:  No pulmonary embolus to the level of the segmenta arteries. No consolidations, edema, effusion, or pneumothorax.  Incidental findings as above  CT abdomen and pelvis: Cirrhotic liver with known metastases.  Diffuse bony metastases. Remaining incidentals as above.

## 2021-09-08 NOTE — PROGRESS NOTE ADULT - PROBLEM SELECTOR PLAN 3
-Likely 2/2 sepsis vs hepatic encephalopathy. No focal deficits or history of trauma. BG wnl.  -neuro checks q4h  -CT head if any acute change in mental status or persistent confusion  -Ammonia 53 wnl Patient with persistent elevation in transaminases and now with elevations in bilirubin, ALP, and INR.   - Hyperbilirubinemia likely 2/2 progression of disease/hepatic involvement   - Ammonia and lipase wnl   - Elevated INR 1.53   - d/c lactulose as patient having diarrhea  - f/u viral hepatitis panel  - EBV panel with either prior infection or reactivation (EBV VCA IGG AB+, EBV NA IGG AB+, EBV VCA IGM AB-, EBV EA IGG AB+)  - CMV and HIV negative  - CT scan reporting cirrhosis although unclear if just nodular from tumors  - GI/hepatology following   - If direct hyperbilirubinemia worsens, will obtain US abdomen to assess for any biliary ductal dilation

## 2021-09-08 NOTE — PROGRESS NOTE ADULT - ASSESSMENT
66 yo F with PMH metastatic breast cancer (to bone and liver), new chemo initiation presents with acute encephalopathy, cholestasis, SIRS, possible liver failure concerning for sepsis 2/2 possible cholangitis vs intra-abdominal infection secondary to oncologic sequelae.        66 yo F with PMH metastatic ER/MT(+), HER2(-) breast cancer (to bone and liver), s/p first dose abraxane on 9/1 presents with diarrhea, SIRS, acute encephalopathy, cholestasis, liver failure concerning for sepsis 2/2 oncologic sequelae.

## 2021-09-08 NOTE — PROGRESS NOTE ADULT - PROBLEM SELECTOR PLAN 4
Patient with persistent elevation in transaminases and now with elevations in bilirubin, ALP, and INR.   - Ammonia and lipase wnl   - Elevated INR 1.53   - start lactulose  - f/u viral hepatitis panel  - EBV panel with either prior infection or reactivation (EBV VCA IGG AB+, EBV NA IGG AB+, EBV VCA IGM AB-, EBV EA IGG AB+)  - CMV and HIV negative  - CT scan reporting cirrhosis although unclear if just nodular from tumors  - GI consult -Likely 2/2 sepsis vs hepatic encephalopathy. No focal deficits or history of trauma. BG wnl.  -neuro checks q4h  -Ammonia 53 wnl

## 2021-09-08 NOTE — CONSULT NOTE ADULT - ASSESSMENT
65 year old woman with recently diagnosed metastatic breast CA with metastasis to bone and liver on Abraxane (last treatment 9/1/21), history of lap band placement, cirrhotic-appearing liver with hepatic encephalopathy, who presented with diarrhea, abdominal pain, jaundice and confusion admitted for sepsis.     Impression:    # Direct hyperbilirubinemia - Associated with interval increase in aminotransferases in setting of multiple small hepatic metastases; normal bile duct caliber on CT abdomen/pelvis from 9/6/21. Suspect hyperbilirubinemia is related to progression of disease with hepatic involvement. Maintain low suspicion for cholangitis in absence of bile duct dilatation, abdominal pain or fevers.  # Diarrhea - Worsening on lactulose. GI PCR negative. Rule out C. difficile infection.   # Cirrhotic appearing liver - With hepatic encephalopathy  # Neutropenia       Recommendations:  - no indication for further abdominal imaging or ERCP at this time; additionally patient is not optimized for endoscopic procedure due to neutropenia  - if direct hyperbilirubinemia worsens, obtain US abdomen to assess for any biliary ductal dilatation  - trend total bilirubin and aminotransferases on CMP daily   - f/u stool C. diff Ag     Katie Kamara PGY-6  Gastroenterology/Hepatology Fellow  Pager #[phone]  E-mail [email or web address]  Call [phone] to speak to answering service for on-call GI fellow 5pm-7am and weekends.

## 2021-09-08 NOTE — CONSULT NOTE ADULT - ATTENDING COMMENTS
65 year old woman with recently diagnosed metastatic breast CA with metastasis to bone and liver on Abraxane (last treatment 9/1/21), history of lap band placement, cirrhotic-appearing liver with hepatic encephalopathy, who presented with diarrhea, abdominal pain, jaundice and confusion admitted for sepsis.   Advanced GI consulted for elevated liver tests and to rule out cholangitis.  Patient unable to get MRI/MRCP due to size.  CT with normal caliber ducts and no evidence of obstruction.  Elevated liver tests could be in setting of recent chemo or liver mets.  Would pursue alternate etiologies and would avoid invasive diagnostic testing (ie EUS) for now especially in setting of neutropenia.    Thank you for this interesting consult.  Please call the advanced GI service with any questions or concerns.

## 2021-09-08 NOTE — CONSULT NOTE ADULT - ASSESSMENT
65 year old woman with PMH of breast cancer with mets to bone and liver with recent initiation of new chemotherapy - abraxane 6 days ago, lap-band presents with diarrhea, abdominal pain, jaundice, and confusion.    Tmax=99.5  Pancytopenia, RMI=322  Elevated LFTs  Mtlinbfi=36404  Lactate=3  Ammonia=53  UA: 10 WBC, few bacteria  C.diff indeterminate  GI PCR neg  BCx NGTD    CT A/P: cirrhosis with liver mets    #C.diff indeterminate  - Follow up C.diff PCR  - On isolation for now  -     #Neutropenia without fever  - BCx NGTD    #Acute hepatic encephalopathy  - Management per GI  - Currently lactulose held given diarrhea    Charlette Ramachandran MD, PGY5  ID fellow  Pager: [phone]  [facility] pager ID: [phone] (would prefer to text page for any new consult or question, please include name/location and best call back number)  After 5pm/weekends call [phone]   66 yo F with PMH of breast cancer with mets to bone and liver, obesity s/p lap band. ID consulted for indeterminate C.diff.  On 9/1, first started on IV chemotherapy with Abraxane. Developed diarrhea from 9/2-4. With decreased PO intake, daughter brought her to hospital concerning for dehydration.  She was not on lactulose at home.    Tmax=99.5, chills+  Pancytopenia, CMK=625  Elevated LFTs  Uitibezz=71220  Lactate=3  Ammonia=53  UA: 10 WBC, few bacteria  C.diff indeterminate  GI PCR neg  BCx NGTD    CT A/P: cirrhosis with liver mets    #Diarrhea  - Suspect chemo related  - Follow up C.diff PCR  - On isolation for now  -     #Neutropenia without fever  - BCx NGTD  - ppx per hemonc    #Acute hepatic encephalopathy  - Management per GI  - Currently lactulose held given diarrhea    Charlette Ramachandran MD, PGY5  ID fellow  Pager: [phone]  [facility] pager ID: [phone] (would prefer to text page for any new consult or question, please include name/location and best call back number)  After 5pm/weekends call [phone]   64 yo F with PMH of breast cancer with mets to bone and liver, obesity s/p lap band. ID consulted for indeterminate C.diff.  On 9/1, first started on IV chemotherapy with Abraxane. Developed diarrhea from 9/2-4. With decreased PO intake, daughter brought her to hospital concerning for dehydration.  She was not on lactulose at home.    Tmax=99.5, chills+  Pancytopenia, LWY=637  Elevated LFTs  Jjhjatcp=29667  Lactate=3  Ammonia=53  UA: 10 WBC, few bacteria  C.diff indeterminate  GI PCR neg  BCx NGTD    CT A/P: cirrhosis with liver mets    #Diarrhea  - Suspect chemo related  - Follow up C.diff PCR  - On isolation for now  - Can DC IV abx (cefepime and flagyl)    #Neutropenia without fever  - BCx NGTD  - ppx per hemonc    #Acute hepatic encephalopathy  - Management per GI  - Currently lactulose held given diarrhea    Charlette Ramachandran MD, PGY5  ID fellow  Pager: [phone]  [facility] pager ID: [phone] (would prefer to text page for any new consult or question, please include name/location and best call back number)  After 5pm/weekends call [phone]    d/w Dr. Rashid  Recs conveyed to primary team   64 yo F with PMH of breast cancer with mets to bone and liver, obesity s/p lap band. ID consulted for indeterminate C.diff.  On 9/1, first started on IV chemotherapy with Abraxane. Developed diarrhea from 9/2-4. With decreased PO intake, daughter brought her to hospital concerning for dehydration.  She was not on lactulose at home.    Tmax=99.5, chills+  Pancytopenia, VOY=864  Elevated LFTs  Cjckaqdx=[id number]  Lactate=3  Ammonia=53  UA: 10 WBC, few bacteria  C.diff indeterminate  GI PCR neg  BCx NGTD    CT A/P: cirrhosis with liver mets    #Diarrhea  - could have been chemo related and then from lactulose   - Follow up C.diff PCR  - On isolation for now  - Can DC IV abx (cefepime and flagyl)    #Neutropenia without fever  - BCx NGTD  - ppx per hemonc    #Acute hepatic encephalopathy  - Management per GI  - Currently lactulose held given diarrhea    Charlette Ramachandran MD, PGY5  ID fellow  Pager: [phone]  [facility] pager ID: [phone] (would prefer to text page for any new consult or question, please include name/location and best call back number)  After 5pm/weekends call [phone]    d/w Dr. Rashid  Recs conveyed to primary team

## 2021-09-08 NOTE — PROGRESS NOTE ADULT - ASSESSMENT
65F h/o metastatic ER/SD (+), HER2 (-) metastatic breast cancer to bone and liver, presenting to ER for diarrhea, abdominal pain, jaundice and confusion:     #diarrhea, transaminitis, abn pain  - LFTs stable  - agree with infectious w/u and broad spectrum coverage (on cefepime and flagyl) for possible cholangitis. initial cdiff GDH toxins indeterminate pending Cdiff pcr.  f/u cultures. recommend ID consult.  -CT A/p showing a cirrhotic liver with known metastasis. hepatology following and recs appreciated. transaminitis could be in part due chemotherapy  -pending MRCP    #metastatic breast cancer, pancytopenia, encephalopathy  4/17/20 Letrozole and Ibrance started with dose reduction from 125 mg to 100 mg starting cycle 2 due to neutropenia  5/1/20 Zometa started and given monthly x 3 then continue every 3 months  9/10/20 Genetic testing with [employer] 47 gene panel showed a pathogenic mutation in BRCA2: c.5946del ([id number]), which is associated with an increased risk for breast cancer, ovarian cancer, pancreatic cancer, melanoma, prostate cancer and male breast cancer; and also CDH1: deletion of exons 15-16, which is associated with an increased risk of breast cancer and gastric cancer  8/2021 Elevated LFTs noted and sonogram revealed innumerable liver metastases  8/20/21 Sonogram guided liver biopsy revealed adenocarcinoma consistent with breast cancer primary, ER/SD (+), HER2 (-). patient not aware of liver biopsy results and will d/w her and family today   -started on abraxane on 9/1  -neutropenic likely from ibrance vs bone marrow metastasis? will review peripheral smear. started daily neupogen on 9/7 to help with count recovery. c/w transfusion support: goal hgb >7, plt >10 or >20 if febrile or >50 if bleeding   -recommend MR brain to r/o mets given confusion, although may just be toxic encephalopathy. c/w encephalopathy w/u and management per primary team  -no plan for inpatient chemotherapy, can f/u with Dr. Blanc as outpt. due for interval imaging in October     [facility] PGY5 [phone]  65F h/o metastatic ER/[place] (+), HER2 (-) metastatic breast cancer to bone and liver, presenting to ER for diarrhea, abdominal pain, jaundice and confusion:     #diarrhea, transaminitis, abn pain  - LFTs stable  - agree with infectious w/u. initial cdiff GDH toxins indeterminate pending Cdiff pcr.  f/u cultures. ID recs appreciated, watching patient off broad spectrum  -CT A/p showing a cirrhotic liver with known metastasis. hepatology following and recs appreciated, holding off on any additional abdominal imaging at this point. transaminitis could be in part due chemotherapy. continue to trend daily LFTs     #metastatic breast cancer, pancytopenia, encephalopathy  4/17/20 Letrozole and Ibrance started with dose reduction from 125 mg to 100 mg starting cycle 2 due to neutropenia  5/1/20 Zometa started and given monthly x 3 then continue every 3 months  9/10/20 Genetic testing with [employer] 47 gene panel showed a pathogenic mutation in BRCA2: c.5946del ([id number]), which is associated with an increased risk for breast cancer, ovarian cancer, pancreatic cancer, melanoma, prostate cancer and male breast cancer; and also CDH1: deletion of exons 15-16, which is associated with an increased risk of breast cancer and gastric cancer  8/2021 Elevated LFTs noted and sonogram revealed innumerable liver metastases  8/20/21 Sonogram guided liver biopsy revealed adenocarcinoma consistent with breast cancer primary, ER/[place] (+), HER2 (-). patient not aware of liver biopsy results and will d/w her and family today   -started on abraxane on 9/1. on letrozole as outpt as well. okay to hold letrozole while admitted   -neutropenic likely from ibrance vs bone marrow metastasis? will review peripheral smear. started daily neupogen on 9/7 to help with count recovery. c/w transfusion support: goal hgb >7, plt >10 or >20 if febrile or >50 if bleeding   -recommend MR brain to r/o mets given confusion, although may just be toxic encephalopathy. c/w encephalopathy w/u and management per primary team  -no plan for inpatient chemotherapy, can f/u with Dr. Blanc as outpt. due for interval imaging in October     [facility]e/onc PGY5 [phone]

## 2021-09-08 NOTE — PROGRESS NOTE ADULT - PROBLEM SELECTOR PLAN 7
-per [employer]: BRCA2+, was on ibrance and lestrozole (initially had neutropenia so dose was reduced). unclear if ibrance is still on based on most recent Onc note which includes instructions to both continue and d/c ibrance. patient also on zometa. confirmed by biopsy that liver mets are adenocarcinoma. echo from 5/2021 with EF 55%.  -onc consult as above  -PE ruled out with CT PA  -pain: if needed can give morphine    #chronic conditions  -HTN: home meds d/nicole several weeks ago  -asthma: c/w trelegy -> duoneb +mometasone  -gerd: c/w PPI  -anxiety: hold benzos for now, can give small dose ativan if severe anxiety or benzo withdrawal (unlikely)  -nausea: hold antinausea home meds, can restart if needed  -DM: hold home metformin. 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 ISS while inpatient  - Asthma: c/w trelegy -> duoneb +mometasone  - Gerd: c/w PPI  - Anxiety: hold benzos for now, can give small dose ativan if severe anxiety or benzo withdrawal (unlikely)  - Nausea: hold antinausea home meds, can restart if needed

## 2021-09-09 NOTE — PROGRESS NOTE ADULT - PROBLEM SELECTOR PLAN 1
R/O: neutropenic sepsis  - SIRS for tachycardia and low WBC  - Source: cholangitis vs infectious enteritis vs typhlitis vs oncologic sequelae   - New chemo of abraxane well known to cause neutropenia and liver injury  -  (c/f neutropenic fever if ANC <500)  - Continue GCSF daily to increase neutrophil count, but patient is currently septic   - S/p vanco x1 (9/7), cefepime 1g q8h (9/7-9/8), flagyl 500mg q8h (9/7-9/8)   - D/c abx per ID as less concern for infection   - Continue maintenance IVF  - Trend lactate (5.3 -> 3.5 -> 3.0)  - GI PCR negative  - UA positive, Ucx negative  - F/u blood cx  - F/u C. diff 2/2 watery diarrhea R/O: neutropenic sepsis  - SIRS for tachycardia and low WBC  - Source: cholangitis vs infectious enteritis vs typhlitis vs oncologic sequelae   - New chemo of abraxane well known to cause neutropenia and liver injury  -  (c/f neutropenic fever if ANC <500)  - Continue GCSF daily to increase neutrophil count, but patient is currently septic   - S/p vanco x1 (9/7), cefepime 1g q8h (9/7-9/8), flagyl 500mg q8h (9/7-9/8)   - D/c abx per ID as less concern for infection   - Continue maintenance IVF  - Trend lactate (5.3 -> 3.5 -> 3.0)  - GI PCR negative, C. diff negative   - UA positive, Ucx negative, Legionella negative   - Blood cx 9/6: NGTD

## 2021-09-09 NOTE — DISCHARGE NOTE PROVIDER - NSDCCPCAREPLAN_GEN_ALL_CORE_FT
PRINCIPAL DISCHARGE DIAGNOSIS  Diagnosis: Systemic inflammatory response syndrome (SIRS)  Assessment and Plan of Treatment: You came into the hospital with dehydration, weakness, confusion, and abdominal pain. You were found to have low blood counts and an increased heart rate, which is consistent with inflammation affecting the body. We looked for a source of infection but did not find one on body scans. You were also given a course of antibiotics. Now that you are feeling better, you are ready to go home. Please contact us if you develop a fever or if you or a loved one notices similar confusion and weakness.       PRINCIPAL DISCHARGE DIAGNOSIS  Diagnosis: Systemic inflammatory response syndrome (SIRS)  Assessment and Plan of Treatment: You came into the hospital with dehydration, weakness, confusion, and abdominal pain. You were found to have low blood counts and an increased heart rate, which is consistent with inflammation affecting the body. You were put on a course of antibiotics in case there was infection. However, there was no source of infection in your urine or blood and no obstruction was seen in the gallbladder or liver. Thus, we discontinued your antibiotics.      SECONDARY DISCHARGE DIAGNOSES  Diagnosis: HERNESTO (acute liver failure)  Assessment and Plan of Treatment: On admission to the hospital, your liver enzymes on bloodwork were elevated, indicating liver injury. Intially, we were concerned for obstruction in your gallbladder, however this was ruled out via abdominal ultrasound which was done twice. We concluded that the liver dysfunction was likely due to the new abraxane chemotherapy or the liver mestases.    Diagnosis: Throat pain  Assessment and Plan of Treatment: There was concern of throat pain during your admission. We put you on a pureed/soft diet to help with the throat pain. The ENT doctors used a camera to look inside your throat and saw redness, inflamamtion, and ulceration. This pain could have been due to the chemotherapy you received. This was managed supportively with lozenges, spray, and magic mouthwash. You were also prescribed hydromorphone for severe pain.    Diagnosis: Advance care planning  Assessment and Plan of Treatment: The pathology results of the liver biopsy were released to you during this admission. You had requested to speak with the palliative care team. The discussion resulted in a DNR/DNI and comfort care approach to your medical management. You were transferred to the palliative care unit afterwards.

## 2021-09-09 NOTE — PROGRESS NOTE ADULT - PROBLEM SELECTOR PLAN 7
Mixed acid base disorder  - AGMA likely 2/2 lactic acidosis with respiratory compensation  - most likely i/s/o sepsis although tumor production and decreased hepatic clearance is also a possibility  - trend lactate    #chronic conditions  - HTN: home meds d/nicole several weeks ago  - DM: hold home metformin. ISS while inpatient  - Asthma: c/w trelegy -> duoneb +mometasone  - Gerd: c/w PPI  - Anxiety: hold benzos for now, can give small dose ativan if severe anxiety or benzo withdrawal (unlikely)  - Nausea: hold antinausea home meds, can restart if needed

## 2021-09-09 NOTE — PROGRESS NOTE ADULT - PROBLEM SELECTOR PLAN 2
- Cholestasis with SIRS  - R/O acute cholangitis: patient has abd pain, cholestatic liver injury, direct hyperbilirubinemia and history of liver mets  - CT Abd 9/6: Cirrhotic liver with metastatic disease. Gallstones in the gallbladder without inflammatory changes.   s/p placement of a gastric lap band and left hip replacement. Diffuse bony metastatic disease.  - RUQ US 9/7: Liver metastases. Cholelithiasis. No gallbladder wall thickening. No gross biliary ductal dilatation. Normal caliber common bile duct.  - No need for MRCP/ERCP at this time   - Monitor off abx   - F/u GI recs  - MRI was recommended to r/o brain mets given confusion, however patient cannot physically fit in the MRI machine

## 2021-09-09 NOTE — DISCHARGE NOTE NURSING/CASE MANAGEMENT/SOCIAL WORK - PATIENT PORTAL LINK FT
You can access the FollowMyHealth Patient Portal offered by [facility] by registering at the following website: [email or web address]. By joining [facility]’s FollowMyHealth portal, you will also be able to view your health information using other applications (apps) compatible with our system.

## 2021-09-09 NOTE — PROGRESS NOTE ADULT - PROBLEM SELECTOR PLAN 5
- Per chart review, patient is BRCA2(+) was started on Letrozole and Ibrane with dose reduction 2/2 neutropenia. Then given Zometa [id number]. 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 Initiated on abraxane on 9/1    - PE ruled out with CT PA  - Pain: if needed, can give morphine  - Hold chemotherapy while inpatient  - F/u with Dr. Blanc as outpatient  - Oncology following

## 2021-09-09 NOTE — DISCHARGE NOTE PROVIDER - CARE PROVIDER_API CALL
Kay Blanc)  Medical Oncology  [address] [place] [address]  Phone: [phone]  Fax: [phone]  Established Patient  Follow Up Time:

## 2021-09-09 NOTE — PROGRESS NOTE ADULT - PROBLEM SELECTOR PLAN 4
-Likely 2/2 sepsis vs hepatic encephalopathy. No focal deficits or history of trauma. BG wnl.  -neuro checks q4h  -Ammonia 53 wnl

## 2021-09-09 NOTE — PROGRESS NOTE ADULT - ASSESSMENT
65 year old woman with PMH of breast cancer with mets to bone and liver with recent initiation of new chemotherapy - abraxane 6 days ago, lap-band presents with diarrhea, abdominal pain, jaundice, and confusion. Per son, patient had been experiencing dyspnea, fatigue, and dysuria and had presented to the ED 2 weeeks ago. At that time found to have a UTI, JAMAAL, and liver mets by ultrasound, she subsequently had a biopsy as an outpatient several days later. She has continued to have fatigue and weakness and some SOB. She went to pul who started trelegy ellipta - per patient too soon to tell if it's helping. More recently she started to have some confusion manifested by forgetting what she was saying, answering questions incorrectly, and repeating herself. Since admission she now reports the confusion has resolved. She has also had some decreased PO intake x few weeks. Lastly she started having diarrhea this past week, reporting 4-5 BMs non-bloody per day. States she has never had diarrhea like this before. She is denying any blood in urine/stool or melena as well as fevers/chills, chest pain, current dyspnea, N/V, and dysuria. Denies any personal or family history of liver disease. Denies any ETOH or tobacco use. Denies any other new medications.      Impression:  #acute on chronic elevated liver enzymes- has been having elevated liver enzymes since Aug 2021; has known metastatic breast cancer to the liver and bone; differential includes progressive liver infiltration of known metastatic disease vs DILI from recent paclitaxel administration (can be known to cause liver enzyme elevations in some individuals per [employer]) vs acute viral illness in the setting of immunocompromised status; suspect background of BLAKE given h/o obesity and T2DM  #acute diarrhea-  resolving; was reporting 4-5 non-bloody BMs per day but improving since admission; GI PCR and C diff negative  #obesity- BMI 42  #T2DM- A1c 7.5    Recommendations:  - check urine legionella (less likely cause of elevated liver enzymes, but there has been a recent outbreak of Legionella and pt also reporting diarrhea and dyspnea (though no infiltrates on imaging or hyponatremia on labs)  - check acute viral hepatitis panel, HSV 1/2 IgM + PCR, EBV IgM + PCR, CMV IgM + PCR, VZV IgM +PCR  - trend daily CMP, INR  - pending MR/MRCP     Smiley Mack MD  GI Fellow, PGY-4  Available via [employer] Teams    NON-URGENT CONSULTS:  Please email [email or web address]  AT NIGHT AND ON WEEKENDS:  Contact on-call GI fellow via answering service ([phone]) from 5pm-8am and on weekends/holidays  MONDAY-FRIDAY 8AM-5PM:  Pager# [phone] ([facility]) or [phone] ([facility])  GI Phone# [phone] ([facility]) 65 year old woman with PMH of breast cancer with mets to bone and liver with recent initiation of new chemotherapy - abraxane 6 days ago, lap-band presents with diarrhea, abdominal pain, jaundice, and confusion. Per son, patient had been experiencing dyspnea, fatigue, and dysuria and had presented to the ED 2 weeeks ago. At that time found to have a UTI, JAMAAL, and liver mets by ultrasound, she subsequently had a biopsy as an outpatient several days later. She has continued to have fatigue and weakness and some SOB. She went to pul who started trelegy ellipta - per patient too soon to tell if it's helping. More recently she started to have some confusion manifested by forgetting what she was saying, answering questions incorrectly, and repeating herself. Since admission she now reports the confusion has resolved. She has also had some decreased PO intake x few weeks. Lastly she started having diarrhea this past week, reporting 4-5 BMs non-bloody per day. States she has never had diarrhea like this before. She is denying any blood in urine/stool or melena as well as fevers/chills, chest pain, current dyspnea, N/V, and dysuria. Denies any personal or family history of liver disease. Denies any ETOH or tobacco use. Denies any other new medications.      Impression:  #acute on chronic elevated liver enzymes- has been having elevated liver enzymes since Aug 2021; has known metastatic breast cancer to the liver and bone; differential includes progressive liver infiltration of known metastatic disease vs DILI from recent paclitaxel administration (can be known to cause liver enzyme elevations in some individuals per [employer]); unlikely acute viral illness in the setting of immunocompromised status (acute HAV/HBV/HCV neg; HSV/CMV/EBV neg); suspect background of BLAKE given h/o obesity and T2DM  #acute diarrhea-  resolving; was reporting 4-5 non-bloody BMs per day but improving since admission; GI PCR and C diff negative; urine legionella neg  #obesity- BMI 42  #T2DM- A1c 7.5    Recommendations:  - US abd with normal CBD, neg sonographic brian's sign; suspect 2/2 progression of liver mets vs DILI (though DILI is less likely)  - no indication for further abdominal imaging or ERCP at this time; additionally patient is not optimized for endoscopic procedure due to neutropenia  - avoid hepatotoxic agents; hold paclitaxel for now  - trend daily CMP, INR  - supportive care    We will sign off at this time. Please reconsult/page if questions.      Smiley Mack MD  GI Fellow, PGY-4  Available via [employer] Teams    NON-URGENT CONSULTS:  Please email [email or web address]  [email or web address]  AT NIGHT AND ON WEEKENDS:  Contact on-call GI fellow via answering service ([phone]) from 5pm-8am and on weekends/holidays  MONDAY-FRIDAY 8AM-5PM:  Pager# [phone] ([facility]) or [phone] ([facility])  GI Phone# [phone] ([facility]) 65 year old woman with PMH of breast cancer with mets to bone and liver with recent initiation of new chemotherapy - abraxane 6 days ago, lap-band presents with diarrhea, abdominal pain, jaundice, and confusion. Per son, patient had been experiencing dyspnea, fatigue, and dysuria and had presented to the ED 2 weeeks ago. At that time found to have a UTI, JAMAAL, and liver mets by ultrasound, she subsequently had a biopsy as an outpatient several days later. She has continued to have fatigue and weakness and some SOB. She went to pul who started trelegy ellipta - per patient too soon to tell if it's helping. More recently she started to have some confusion manifested by forgetting what she was saying, answering questions incorrectly, and repeating herself. Since admission she now reports the confusion has resolved. She has also had some decreased PO intake x few weeks. Lastly she started having diarrhea this past week, reporting 4-5 BMs non-bloody per day. States she has never had diarrhea like this before. She is denying any blood in urine/stool or melena as well as fevers/chills, chest pain, current dyspnea, N/V, and dysuria. Denies any personal or family history of liver disease. Denies any ETOH or tobacco use. Denies any other new medications.      Impression:  #acute on chronic elevated liver enzymes- has been having elevated liver enzymes since Aug 2021; has known metastatic breast cancer to the liver and bone; differential includes progressive liver infiltration of known metastatic disease vs DILI from recent paclitaxel administration (can be known to cause liver enzyme elevations in some individuals per [employer]); unlikely acute viral illness in the setting of immunocompromised status (acute HAV/HBV/HCV neg; HSV/CMV/EBV neg); suspect background of BLAKE given h/o obesity and T2DM  #acute diarrhea-  resolving; was reporting 4-5 non-bloody BMs per day but improving since admission; GI PCR and C diff negative; urine legionella neg  #obesity- BMI 42  #T2DM- A1c 7.5      - US abd with normal CBD, neg sonographic brian's sign; suspect 2/2 progression of liver mets vs DILI (though DILI is less likely)  Recommendations:  - hold paclitaxel for now  - trend daily CMP, INR  - supportive care    We will sign off at this time. Please reconsult/page if questions.      Smiley Mack MD  GI Fellow, PGY-4  Available via [employer] Teams    NON-URGENT CONSULTS:  Please email [email or web address]  [email or web address]  AT NIGHT AND ON WEEKENDS:  Contact on-call GI fellow via answering service ([phone]) from 5pm-8am and on weekends/holidays  MONDAY-FRIDAY 8AM-5PM:  Pager# [phone] ([facility]) or [phone] ([facility])  GI Phone# [phone] ([facility])

## 2021-09-09 NOTE — DISCHARGE NOTE PROVIDER - NSDCFUADDINST_GEN_ALL_CORE_FT
Podiatry Discharge Instructions:  Follow up: Please follow up with Dr. Mendoza within 1 week of discharge from the hospital, please call [phone] for appointment and discuss that you recently were seen in the hospital.  Wound Care: Please apply mupirocin to L hallux followed by dry sterile dressing daily  Weight bearing: Please weight bear as tolerated in a surgical shoe to L heel

## 2021-09-09 NOTE — PROGRESS NOTE ADULT - PROBLEM SELECTOR PLAN 3
Patient with persistent elevation in transaminases and now with elevations in bilirubin, ALP, and INR.   - Hyperbilirubinemia likely 2/2 progression of disease/hepatic involvement   - Ammonia and lipase wnl   - Elevated INR 1.53   - d/c lactulose as patient having diarrhea  - f/u viral hepatitis panel  - EBV panel with either prior infection or reactivation (EBV VCA IGG AB+, EBV NA IGG AB+, EBV VCA IGM AB-, EBV EA IGG AB+)  - CMV and HIV negative  - CT scan reporting cirrhosis although unclear if just nodular from tumors  - GI/hepatology following   - If direct hyperbilirubinemia worsens, will obtain US abdomen to assess for any biliary ductal dilation Patient with persistent elevation in transaminases and now with elevations in bilirubin, ALP, and INR.   - Hyperbilirubinemia likely 2/2 progression of disease/hepatic involvement   - Ammonia and lipase wnl   - Elevated INR 1.53   - d/c lactulose as patient having diarrhea  - Viral hepatitis panel, CMV, HIV all negative  - EBV panel with either prior infection or reactivation (EBV VCA IGG AB+, EBV NA IGG AB+, EBV VCA IGM AB-, EBV EA IGG AB+)  - CT scan reporting cirrhosis although unclear if just nodular from tumors  - GI/hepatology following   - If direct hyperbilirubinemia worsens (doubles), will obtain US abdomen to assess for any biliary ductal dilation

## 2021-09-09 NOTE — PROGRESS NOTE ADULT - SUBJECTIVE AND OBJECTIVE BOX
Chief Complaint:  Patient is a 65y old  Female who presents with a chief complaint of sepsis (09 Sep 2021 08:43)      Interval Events:       Hospital Medications:  ALBUTerol    90 MICROgram(s) HFA Inhaler 1 Puff(s) Inhalation every 4 hours  benzocaine 15 mG/menthol 3.6 mG (Sugar-Free) Lozenge 1 Lozenge Oral every 6 hours PRN  cholecalciferol 1000 Unit(s) Oral daily  dextrose 40% Gel 15 Gram(s) Oral once  dextrose 5%. 1000 milliLiter(s) IV Continuous <Continuous>  dextrose 5%. 1000 milliLiter(s) IV Continuous <Continuous>  dextrose 50% Injectable 25 Gram(s) IV Push once  dextrose 50% Injectable 12.5 Gram(s) IV Push once  dextrose 50% Injectable 25 Gram(s) IV Push once  enoxaparin Injectable 40 milliGRAM(s) SubCutaneous daily  filgrastim-sndz (ZARXIO) Injectable 480 MICROGram(s) SubCutaneous daily  FIRST- Mouthwash  BLM 5 milliLiter(s) Swish and Spit every 4 hours  gabapentin 300 milliGRAM(s) Oral three times a day PRN  glucagon  Injectable 1 milliGRAM(s) IntraMuscular once  influenza   Vaccine 0.5 milliLiter(s) IntraMuscular once  insulin lispro (ADMELOG) corrective regimen sliding scale   SubCutaneous three times a day before meals  insulin lispro (ADMELOG) corrective regimen sliding scale   SubCutaneous at bedtime  lactated ringers. 1000 milliLiter(s) IV Continuous <Continuous>  letrozole 2.5 milliGRAM(s) Oral daily  LORazepam     Tablet 1 milliGRAM(s) Oral once  mometasone 220 MICROgram(s) Inhaler 1 Puff(s) Inhalation daily  montelukast 10 milliGRAM(s) Oral daily  pantoprazole    Tablet 40 milliGRAM(s) Oral before breakfast  tetracaine/benzocaine/butamben Spray 1 Spray(s) Topical every 6 hours PRN  tiotropium 18 MICROgram(s) Capsule 1 Capsule(s) Inhalation daily      PMHX/PSHX:  Diabetes mellitus type II    Hypertension    Hyperlipidemia    Osteoarthritis    Toe ulcer, right    Asthma    Glaucoma    Anxiety    Morbidly obese    Breast cancer, left    GERD (gastroesophageal reflux disease)    S/P laparoscopic surgery    S/P hip replacement    H/O:  Section    History of tonsillectomy    H/O drainage of abscess    S/P biopsy    H/O rhinoplasty     history            ROS: 14 point ROS negative unless otherwise stated in subjective      PHYSICAL EXAM:     GENERAL:  Well developed, no distress  HEENT:  NC/AT,  conjunctivae clear, sclera anicteric  CHEST:  Full & symmetric excursion, no increased effort w/ respirations  HEART:  Regular rhythm & rate  ABDOMEN:  Soft, non-tender, non-distended  EXTREMITIES:  no LE  edema  SKIN:  No rash/erythema/ecchymoses/petechiae/wounds/jaundice  NEURO:  Alert, oriented    Vital Signs:  Vital Signs Last 24 Hrs  T(C): 36.8 (09 Sep 2021 13:43), Max: 37.7 (08 Sep 2021 17:38)  T(F): 98.2 (09 Sep 2021 13:43), Max: 99.9 (08 Sep 2021 17:38)  HR: 102 (09 Sep 2021 13:43) (100 - 110)  BP: 134/82 (09 Sep 2021 13:43) (122/77 - 149/84)  BP(mean): --  RR: 20 (09 Sep 2021 13:43) (18 - 20)  SpO2: 98% (09 Sep 2021 13:43) (95% - 98%)  Daily     Daily     LABS:                        8.7    3.20  )-----------( 103      ( 09 Sep 2021 11:08 )             26.6         138  |  103  |  20  ----------------------------<  158<H>  4.0   |  18<L>  |  1.12    Ca    9.3      09 Sep 2021 11:08  Phos  1.9       Mg     1.7         TPro  5.7<L>  /  Alb  2.4<L>  /  TBili  6.4<H>  /  DBili  x   /  AST  216<H>  /  ALT  75<H>  /  AlkPhos  897<H>      LIVER FUNCTIONS - ( 09 Sep 2021 11:08 )  Alb: 2.4 g/dL / Pro: 5.7 g/dL / ALK PHOS: 897 U/L / ALT: 75 U/L / AST: 216 U/L / GGT: x                   Imaging:             Chief Complaint:  Patient is a 65y old  Female who presents with a chief complaint of sepsis (09 Sep 2021 08:43)      Interval Events: Reports she feels improved this AM. Improving diarrhea and improving sore throat with benzocaine lozenge/spray.       Hospital Medications:  ALBUTerol    90 MICROgram(s) HFA Inhaler 1 Puff(s) Inhalation every 4 hours  benzocaine 15 mG/menthol 3.6 mG (Sugar-Free) Lozenge 1 Lozenge Oral every 6 hours PRN  cholecalciferol 1000 Unit(s) Oral daily  dextrose 40% Gel 15 Gram(s) Oral once  dextrose 5%. 1000 milliLiter(s) IV Continuous <Continuous>  dextrose 5%. 1000 milliLiter(s) IV Continuous <Continuous>  dextrose 50% Injectable 25 Gram(s) IV Push once  dextrose 50% Injectable 12.5 Gram(s) IV Push once  dextrose 50% Injectable 25 Gram(s) IV Push once  enoxaparin Injectable 40 milliGRAM(s) SubCutaneous daily  filgrastim-sndz (ZARXIO) Injectable 480 MICROGram(s) SubCutaneous daily  FIRST- Mouthwash  BLM 5 milliLiter(s) Swish and Spit every 4 hours  gabapentin 300 milliGRAM(s) Oral three times a day PRN  glucagon  Injectable 1 milliGRAM(s) IntraMuscular once  influenza   Vaccine 0.5 milliLiter(s) IntraMuscular once  insulin lispro (ADMELOG) corrective regimen sliding scale   SubCutaneous three times a day before meals  insulin lispro (ADMELOG) corrective regimen sliding scale   SubCutaneous at bedtime  lactated ringers. 1000 milliLiter(s) IV Continuous <Continuous>  letrozole 2.5 milliGRAM(s) Oral daily  LORazepam     Tablet 1 milliGRAM(s) Oral once  mometasone 220 MICROgram(s) Inhaler 1 Puff(s) Inhalation daily  montelukast 10 milliGRAM(s) Oral daily  pantoprazole    Tablet 40 milliGRAM(s) Oral before breakfast  tetracaine/benzocaine/butamben Spray 1 Spray(s) Topical every 6 hours PRN  tiotropium 18 MICROgram(s) Capsule 1 Capsule(s) Inhalation daily      PMHX/PSHX:  Diabetes mellitus type II    Hypertension    Hyperlipidemia    Osteoarthritis    Toe ulcer, right    Asthma    Glaucoma    Anxiety    Morbidly obese    Breast cancer, left    GERD (gastroesophageal reflux disease)    S/P laparoscopic surgery    S/P hip replacement    H/O:  Section    History of tonsillectomy    H/O drainage of abscess    S/P biopsy    H/O rhinoplasty     history            ROS: 14 point ROS negative unless otherwise stated in subjective      PHYSICAL EXAM:     GENERAL:  Appears stated age, chronically ill appearing  HEENT:  NC/AT, +scleral icterus, MMM  CHEST:  Full & symmetric excursion, no increased effort, breath sounds clear  HEART:  Regular rhythm, S1, S2, no murmur/rub/S3/S4  ABDOMEN:  +obese, soft, +tender RUQ, non-distended, normoactive bowel sounds  EXTREMITIES:  no cyanosis, clubbing or edema  SKIN:  +mild jaundice  NEURO:  Alert, orientedx3, no asterixis      Vital Signs:  Vital Signs Last 24 Hrs  T(C): 36.8 (09 Sep 2021 13:43), Max: 37.7 (08 Sep 2021 17:38)  T(F): 98.2 (09 Sep 2021 13:43), Max: 99.9 (08 Sep 2021 17:38)  HR: 102 (09 Sep 2021 13:43) (100 - 110)  BP: 134/82 (09 Sep 2021 13:43) (122/77 - 149/84)  BP(mean): --  RR: 20 (09 Sep 2021 13:43) (18 - 20)  SpO2: 98% (09 Sep 2021 13:43) (95% - 98%)  Daily     Daily     LABS:                        8.7    3.20  )-----------( 103      ( 09 Sep 2021 11:08 )             26.6         138  |  103  |  20  ----------------------------<  158<H>  4.0   |  18<L>  |  1.12    Ca    9.3      09 Sep 2021 11:08  Phos  1.9       Mg     1.7         TPro  5.7<L>  /  Alb  2.4<L>  /  TBili  6.4<H>  /  DBili  x   /  AST  216<H>  /  ALT  75<H>  /  AlkPhos  897<H>      LIVER FUNCTIONS - ( 09 Sep 2021 11:08 )  Alb: 2.4 g/dL / Pro: 5.7 g/dL / ALK PHOS: 897 U/L / ALT: 75 U/L / AST: 216 U/L / GGT: x                   Imaging:        EXAM:  US ABDOMEN RT UPR QUADRANT                            PROCEDURE DATE:  2021            INTERPRETATION:  CLINICAL INFORMATION: Cancer with liver metastases. Fever. Abdominal pain. Elevated bilirubin.    COMPARISON: 2021. CT 2021. CT 2021  TECHNIQUE: Sonography of the right upper quadrant.    FINDINGS:    Liver: Hepatomegaly. Liver metastases.  Bile ducts: Normal caliber. Common bile duct measures 4 mm.  Gallbladder: Distended. Cholelithiasis. No sonographic Meneses's sign.  Pancreas: Visualized portions are within normal limits.  Right kidney: 9.9 cm. No hydronephrosis. Upper pole cyst measuring 8 mm x 8 mm x 8 mm.  Ascites: None.  IVC: Visualized portions are within normal limits.    IMPRESSION:    Liver metastases.    Cholelithiasis. No gallbladder wall thickening. No gross biliary ductal dilatation. Normal caliber common bile duct.

## 2021-09-09 NOTE — PROGRESS NOTE ADULT - SUBJECTIVE AND OBJECTIVE BOX
%%%%INCOMPLETE NOTE%%%%%     Dr. Trina Collazo, PGY-1  EMERITA: [id number]/ NS: [phone]  After 7pm please page [phone] or [phone]    Patient is a 65y old  Female who presents with a chief complaint of sepsis (08 Sep 2021 15:26)    Subjective: No acute events overnight. Patient seen and examined at bedside. BMx1 yesterday. Denies chest pain, abdominal pain, cough, n/v, sob. Breathing comfortably on room air.    MEDICATIONS  (STANDING):  ALBUTerol    90 MICROgram(s) HFA Inhaler 1 Puff(s) Inhalation every 4 hours  cholecalciferol 1000 Unit(s) Oral daily  dextrose 40% Gel 15 Gram(s) Oral once  dextrose 5%. 1000 milliLiter(s) (50 mL/Hr) IV Continuous <Continuous>  dextrose 5%. 1000 milliLiter(s) (100 mL/Hr) IV Continuous <Continuous>  dextrose 50% Injectable 25 Gram(s) IV Push once  dextrose 50% Injectable 12.5 Gram(s) IV Push once  dextrose 50% Injectable 25 Gram(s) IV Push once  enoxaparin Injectable 40 milliGRAM(s) SubCutaneous daily  filgrastim-sndz (ZARXIO) Injectable 480 MICROGram(s) SubCutaneous daily  glucagon  Injectable 1 milliGRAM(s) IntraMuscular once  influenza   Vaccine 0.5 milliLiter(s) IntraMuscular once  insulin lispro (ADMELOG) corrective regimen sliding scale   SubCutaneous three times a day before meals  insulin lispro (ADMELOG) corrective regimen sliding scale   SubCutaneous at bedtime  lactated ringers. 1000 milliLiter(s) (100 mL/Hr) IV Continuous <Continuous>  letrozole 2.5 milliGRAM(s) Oral daily  LORazepam     Tablet 1 milliGRAM(s) Oral once  mometasone 220 MICROgram(s) Inhaler 1 Puff(s) Inhalation daily  montelukast 10 milliGRAM(s) Oral daily  pantoprazole    Tablet 40 milliGRAM(s) Oral before breakfast  tiotropium 18 MICROgram(s) Capsule 1 Capsule(s) Inhalation daily    MEDICATIONS  (PRN):  benzocaine 15 mG/menthol 3.6 mG (Sugar-Free) Lozenge 1 Lozenge Oral every 6 hours PRN Sore Throat  gabapentin 300 milliGRAM(s) Oral three times a day PRN neuropathy  tetracaine/benzocaine/butamben Spray 1 Spray(s) Topical every 6 hours PRN sore throat        Objective:     Vitals: Vital Signs Last 24 Hrs  T(C): 36.8 (09-09-21 @ 06:03), Max: 37.7 (09-08-21 @ 17:38)  T(F): 98.2 (09-09-21 @ 06:03), Max: 99.9 (09-08-21 @ 17:38)  HR: 110 (09-09-21 @ 06:03) (100 - 110)  BP: 122/77 (09-09-21 @ 06:03) (105/75 - 155/78)  BP(mean): --  RR: 20 (09-09-21 @ 06:03) (18 - 20)  SpO2: 97% (09-09-21 @ 06:03) (95% - 98%)            I&O's Summary    08 Sep 2021 07:01  -  09 Sep 2021 07:00  --------------------------------------------------------  IN: 1340 mL / OUT: 600 mL / NET: 740 mL      PHYSICAL EXAM:  CONSTITUTIONAL: Well-groomed, lethargic, moderate distress secondary to pain  EYES: periorbital jaundice. No conjunctival or scleral injection, non-icteric; PERRL and symmetric  ENMT: No external nasal lesions; nasal mucosa not inflamed; normal dentition; no pharyngeal injection or exudates, oral mucosa with moist membranes  RESPIRATORY: Breathing on 2L nasal cannula; lungs CTA without wheeze/rhonchi/rales  CARDIOVASCULAR: +S1S2, RRR, no M/G/R; pedal pulses full and symmetric; no lower extremity edema  GASTROINTESTINAL: Diffuse tenderness most prominently in RUQ, +BS throughout, no rebound/guarding; no hepatosplenomegaly  SKIN: Bandages on b/l knees   NEURO: A&O4, no asterixis; no focal deficits.   PSYCHIATRIC: mood and affect appropriate; appropriate insight and judgment    LABS:                        8.6    1.68  )-----------( 103      ( 08 Sep 2021 06:33 )             26.5                         9.3    1.13  )-----------( 109      ( 07 Sep 2021 07:03 )             28.2                         9.7    1.53  )-----------( 156      ( 06 Sep 2021 14:22 )             29.4     Hgb Trend: 8.6<--, 9.3<--, 9.7<--  09-08    141  |  104  |  17  ----------------------------<  106<H>  3.8   |  20<L>  |  1.06  09-07    140  |  105  |  17  ----------------------------<  215<H>  3.9   |  18<L>  |  1.02  09-06    139  |  106  |  20  ----------------------------<  196<H>  5.0   |  16<L>  |  1.01    Ca    9.7      08 Sep 2021 06:33  Ca    10.0      07 Sep 2021 07:03  Ca    10.3      06 Sep 2021 17:58  Phos  1.8     09-08  Mg     1.6     09-08    TPro  5.9<L>  /  Alb  2.7<L>  /  TBili  5.4<H>  /  DBili  x   /  AST  229<H>  /  ALT  84<H>  /  AlkPhos  762<H>  09-08  TPro  6.4  /  Alb  2.7<L>  /  TBili  5.4<H>  /  DBili  x   /  AST  215<H>  /  ALT  91<H>  /  AlkPhos  744<H>  09-07  TPro  x   /  Alb  x   /  TBili  5.6<H>  /  DBili  4.1<H>  /  AST  x   /  ALT  x   /  AlkPhos  x   09-06    Creatinine Trend: 1.06<--, 1.02<--, 1.01<--, 0.99<--, 1.48<--, 1.21<--                    CAPILLARY BLOOD GLUCOSE      POCT Blood Glucose.: 262 mg/dL (08 Sep 2021 21:16)  POCT Blood Glucose.: 222 mg/dL (08 Sep 2021 16:59)  POCT Blood Glucose.: 150 mg/dL (08 Sep 2021 12:29)  POCT Blood Glucose.: 125 mg/dL (08 Sep 2021 08:49)     Dr. Trina Collazo, PGY-1  LIROSY: [id number]/ NS: [phone]  After 7pm please page [phone] or [phone]    Patient is a 65y old  Female who presents with a chief complaint of sepsis (08 Sep 2021 15:26)    Subjective: No acute events overnight. Patient seen and examined at bedside. BMx3 yesterday. Denies chest pain, abdominal pain, cough, n/v, sob. Breathing comfortably on NC.     MEDICATIONS  (STANDING):  ALBUTerol    90 MICROgram(s) HFA Inhaler 1 Puff(s) Inhalation every 4 hours  cholecalciferol 1000 Unit(s) Oral daily  dextrose 40% Gel 15 Gram(s) Oral once  dextrose 5%. 1000 milliLiter(s) (50 mL/Hr) IV Continuous <Continuous>  dextrose 5%. 1000 milliLiter(s) (100 mL/Hr) IV Continuous <Continuous>  dextrose 50% Injectable 25 Gram(s) IV Push once  dextrose 50% Injectable 12.5 Gram(s) IV Push once  dextrose 50% Injectable 25 Gram(s) IV Push once  enoxaparin Injectable 40 milliGRAM(s) SubCutaneous daily  filgrastim-sndz (ZARXIO) Injectable 480 MICROGram(s) SubCutaneous daily  glucagon  Injectable 1 milliGRAM(s) IntraMuscular once  influenza   Vaccine 0.5 milliLiter(s) IntraMuscular once  insulin lispro (ADMELOG) corrective regimen sliding scale   SubCutaneous three times a day before meals  insulin lispro (ADMELOG) corrective regimen sliding scale   SubCutaneous at bedtime  lactated ringers. 1000 milliLiter(s) (100 mL/Hr) IV Continuous <Continuous>  letrozole 2.5 milliGRAM(s) Oral daily  LORazepam     Tablet 1 milliGRAM(s) Oral once  mometasone 220 MICROgram(s) Inhaler 1 Puff(s) Inhalation daily  montelukast 10 milliGRAM(s) Oral daily  pantoprazole    Tablet 40 milliGRAM(s) Oral before breakfast  tiotropium 18 MICROgram(s) Capsule 1 Capsule(s) Inhalation daily    MEDICATIONS  (PRN):  benzocaine 15 mG/menthol 3.6 mG (Sugar-Free) Lozenge 1 Lozenge Oral every 6 hours PRN Sore Throat  gabapentin 300 milliGRAM(s) Oral three times a day PRN neuropathy  tetracaine/benzocaine/butamben Spray 1 Spray(s) Topical every 6 hours PRN sore throat        Objective:     Vitals: Vital Signs Last 24 Hrs  T(C): 36.8 (09-09-21 @ 06:03), Max: 37.7 (09-08-21 @ 17:38)  T(F): 98.2 (09-09-21 @ 06:03), Max: 99.9 (09-08-21 @ 17:38)  HR: 110 (09-09-21 @ 06:03) (100 - 110)  BP: 122/77 (09-09-21 @ 06:03) (105/75 - 155/78)  BP(mean): --  RR: 20 (09-09-21 @ 06:03) (18 - 20)  SpO2: 97% (09-09-21 @ 06:03) (95% - 98%)            I&O's Summary    08 Sep 2021 07:01  -  09 Sep 2021 07:00  --------------------------------------------------------  IN: 1340 mL / OUT: 600 mL / NET: 740 mL      PHYSICAL EXAM:  CONSTITUTIONAL: Well-groomed, lethargic, moderate distress secondary to pain  EYES: periorbital jaundice. No conjunctival or scleral injection, non-icteric; PERRL and symmetric  ENMT: No external nasal lesions; nasal mucosa not inflamed; normal dentition; no pharyngeal injection or exudates, oral mucosa with moist membranes  RESPIRATORY: Breathing on 2L nasal cannula; lungs CTA without wheeze/rhonchi/rales  CARDIOVASCULAR: +S1S2, RRR, no M/G/R; pedal pulses full and symmetric; no lower extremity edema  GASTROINTESTINAL: +BS throughout, no rebound/guarding, nontender; no hepatosplenomegaly  SKIN: Bandages on b/l knees   NEURO: A&O4, no asterixis; no focal deficits.   PSYCHIATRIC: mood and affect appropriate; appropriate insight and judgment    LABS:                        8.6    1.68  )-----------( 103      ( 08 Sep 2021 06:33 )             26.5                         9.3    1.13  )-----------( 109      ( 07 Sep 2021 07:03 )             28.2                         9.7    1.53  )-----------( 156      ( 06 Sep 2021 14:22 )             29.4     Hgb Trend: 8.6<--, 9.3<--, 9.7<--  09-08    141  |  104  |  17  ----------------------------<  106<H>  3.8   |  20<L>  |  1.06  09-07    140  |  105  |  17  ----------------------------<  215<H>  3.9   |  18<L>  |  1.02  09-06    139  |  106  |  20  ----------------------------<  196<H>  5.0   |  16<L>  |  1.01    Ca    9.7      08 Sep 2021 06:33  Ca    10.0      07 Sep 2021 07:03  Ca    10.3      06 Sep 2021 17:58  Phos  1.8     09-08  Mg     1.6     09-08    TPro  5.9<L>  /  Alb  2.7<L>  /  TBili  5.4<H>  /  DBili  x   /  AST  229<H>  /  ALT  84<H>  /  AlkPhos  762<H>  09-08  TPro  6.4  /  Alb  2.7<L>  /  TBili  5.4<H>  /  DBili  x   /  AST  215<H>  /  ALT  91<H>  /  AlkPhos  744<H>  09-07  TPro  x   /  Alb  x   /  TBili  5.6<H>  /  DBili  4.1<H>  /  AST  x   /  ALT  x   /  AlkPhos  x   09-06    Creatinine Trend: 1.06<--, 1.02<--, 1.01<--, 0.99<--, 1.48<--, 1.21<--                    CAPILLARY BLOOD GLUCOSE      POCT Blood Glucose.: 262 mg/dL (08 Sep 2021 21:16)  POCT Blood Glucose.: 222 mg/dL (08 Sep 2021 16:59)  POCT Blood Glucose.: 150 mg/dL (08 Sep 2021 12:29)  POCT Blood Glucose.: 125 mg/dL (08 Sep 2021 08:49)

## 2021-09-09 NOTE — PROGRESS NOTE ADULT - ASSESSMENT
66 yo F with PMH metastatic ER/IA(+), HER2(-) breast cancer (to bone and liver), s/p first dose abraxane on 9/1 presents with diarrhea, SIRS, acute encephalopathy, cholestasis, liver failure concerning for sepsis 2/2 oncologic sequelae.

## 2021-09-09 NOTE — DISCHARGE NOTE PROVIDER - NSDCFUSCHEDAPPT_GEN_ALL_CORE_FT
JG MANDEL ; 09/17/2021 ; [facility] [address] [facility]  JG MANDEL ; 09/23/2021 ; [facility]  JG MANDEL ; 09/23/2021 ; [facility]  JG MANDEL ; 09/24/2021 ; [facility]  JG MANDEL ; 09/30/2021 ; [facility]  JG MANDEL ; 10/15/2021 ; NP [facility]  JG MANDEL ; 11/05/2021 ; [facility]  JG MANDEL ; 11/26/2021 ; [facility] JG MANDEL ; 09/23/2021 ; [facility]  JG MANDEL ; 09/23/2021 ; [facility]  JG MANDEL ; 09/24/2021 ; [facility]  JG MANDEL ; 09/30/2021 ; [facility]  JG MANDEL ; 10/15/2021 ; [facility]  JG MANDEL ; 11/05/2021 ; [facility]  JG MANDEL ; 11/26/2021 ; [facility]

## 2021-09-09 NOTE — DISCHARGE NOTE PROVIDER - NSDCMRMEDTOKEN_GEN_ALL_CORE_FT
ferrous sulfate 325 mg (65 mg elemental iron) oral tablet: 1 tab(s) orally every other day (at bedtime)  gabapentin 300 mg oral capsule: 1 cap(s) orally 3 times a day  glimepiride 2 mg oral tablet:   Ibrance 100 mg oral capsule: 1 cap(s) orally once a day  letrozole 2.5 mg oral tablet: 1 tab(s) orally once a day  metFORMIN 1000 mg oral tablet: 1 tab(s) orally 2 times a day  metoclopramide 10 mg oral tablet: orally 3 to 4 times a day, As Needed  omeprazole 20 mg oral delayed release capsule: 1 cap(s) orally once a day  Singulair 10 mg oral tablet: 1 tab(s) orally once a day  traMADol 50 mg oral tablet:   Trelegy Ellipta 100 mcg-62.5 mcg-25 mcg/inh inhalation powder: 1 puff(s) inhaled once a day  Vitamin D3 5000 intl units (125 mcg) oral tablet: orally once a week  Xanax 0.5 mg oral tablet: 1 tab(s) orally 3 times a day, As Needed

## 2021-09-09 NOTE — DISCHARGE NOTE PROVIDER - HOSPITAL COURSE
HPI: 65 year old woman with PMH of breast cancer with mets to bone and liver with recent initiation of new chemotherapy - abraxane 6 days ago, lap-band presents with diarrhea, abdominal pain, jaundice, and confusion. Per son, patient had been experiencing dyspnea, fatigue, and dysuria and had presented to the ED 2 weeeks ago. At that time found to have a UTI, JAMAAL, and liver mets by ultrasound, she subsequently had a biopsy as an outpatient several days later. She has continued to have fatigue and weakness and some SOB. She went to pul who started trelegy ellipta - per patient too soon to tell if it's helping. More recently she started to have some confusion manifested by forgetting what she was saying, answering questions incorrectly, and repeating herself. She has also had some decreased PO intake that has worsened over last few days. Lastly she started having diarrhea this past week, reporting 3-4 BMs per day, patient unable to articulate whether loose or watery. She also reports continued polyuria and polydipsia but her home blood sugars have been normal. She is denying any blood in urine/stool or melena as well as fevers/chills, chest pain, current dyspnea, N/V, and dysuria.     In ED: Afebrile, , /80, RR 18, Spo2 95%. Lactate 5.3, WBC 1.5 with , direct bili 4. Given 2L IVF, vanco and cefepime 1g.     Hospital course:    HPI: 65 year old woman with PMH of breast cancer with mets to bone and liver with recent initiation of new chemotherapy - abraxane 6 days ago, lap-band presents with diarrhea, abdominal pain, jaundice, and confusion. Per son, patient had been experiencing dyspnea, fatigue, and dysuria and had presented to the ED 2 weeeks ago. At that time found to have a UTI, JAMAAL, and liver mets by ultrasound, she subsequently had a biopsy as an outpatient several days later. She has continued to have fatigue and weakness and some SOB. She went to pulm who started trelegy ellipta - per patient too soon to tell if it's helping. More recently she started to have some confusion manifested by forgetting what she was saying, answering questions incorrectly, and repeating herself. She has also had some decreased PO intake that has worsened over last few days. Lastly she started having diarrhea this past week, reporting 3-4 BMs per day, patient unable to articulate whether loose or watery. She also reports continued polyuria and polydipsia but her home blood sugars have been normal. She is denying any blood in urine/stool or melena as well as fevers/chills, chest pain, current dyspnea, N/V, and dysuria.     In ED: Afebrile, , /80, RR 18, Spo2 95%. Lactate 5.3, WBC 1.5 with , direct bili 4. Given 2L IVF, vanco and cefepime 1g.     Hospital course: Pt was admitted to the Medicine team on September 6th. She presented with profuse watery diarrhea and severe abdominal pain, meeting SIRS criteria with tachycardia and low WBC count. She also remained slightly confused and disoriented as per her daughter. She was started on an antibiotic regimen of cefepime 2 g q8h and Flagyl 500 mg q8h, and maintenance IVF was continued. CTAP demonstrated cirrhotic liver with known metastases. Hepatology, GI, and ID were all consulted for recommendations.     She was given a bed on 9/7 in the PICU. Stool PCR was negative, and C. diff toxin assay was indeterminate. RUQ abdominal US was not suspicious for cholangitis. Onc recommended to start filgastrim for pt's neutropenia.     On 9/8, Pt was unable to get an MRCP secondary to body habitus and was unable to fit inside the MRI machine. Per ID recommendations, antibiotics were discontinued as pt remained afebrile with no active signs of infection. Pt was also informed of her liver biopsy results on this day, although she had the biopsy done outpatient prior to admission.    On 9/9, pt's WBC count increased from 1.68 --> 3.20, lactate increased from 3.0 --> 3.6, TBili increased from 5.4 ---> 6.4, and AlkPhos increased from 762 --> 897. Of note, LFTs decreased slightly from /ALT 84 to /ALT 75. pt received 100 cc IVF over 10 hours as she had initially presented with dehydration and was losing water through her watery BMs. The pt also requested a palliative care consult.    HPI: 65 year old woman with PMH of breast cancer with mets to bone and liver with recent initiation of new chemotherapy - abraxane 6 days ago, lap-band presents with diarrhea, abdominal pain, jaundice, and confusion. Per son, patient had been experiencing dyspnea, fatigue, and dysuria and had presented to the ED 2 weeeks ago. At that time found to have a UTI, JAMAAL, and liver mets by ultrasound, she subsequently had a biopsy as an outpatient several days later. She has continued to have fatigue and weakness and some SOB. She went to pul who started trelegy ellipta - per patient too soon to tell if it's helping. More recently she started to have some confusion manifested by forgetting what she was saying, answering questions incorrectly, and repeating herself. She has also had some decreased PO intake that has worsened over last few days. Lastly she started having diarrhea this past week, reporting 3-4 BMs per day, patient unable to articulate whether loose or watery. She also reports continued polyuria and polydipsia but her home blood sugars have been normal. She is denying any blood in urine/stool or melena as well as fevers/chills, chest pain, current dyspnea, N/V, and dysuria.     In ED: Afebrile, , /80, RR 18, Spo2 95%. Lactate 5.3, WBC 1.5 with , direct bili 4. Given 2L IVF, vanco and cefepime 1g.     Hospital course: Admitted to Medicine team on 9/6/21. She presented with profuse watery diarrhea and severe abdominal pain, meeting SIRS criteria with tachycardia and low WBC count. She also remained slightly confused and disoriented, per daughter. Started on Cefepime 2g q8h and Flagyl 500 mg q8h due to concern for cholangitis (patient jaundice with RUQ tenderness on exam) and maintenance IVF was continued. CTAP demonstrated cirrhotic liver with known metastases. Stool PCR and C. diff were negative. HSV, HIV, CMV, EBV, hepatitis panel, UA, urine cx, legionella all negative. RUQ abdominal US was not suspicious for cholangitis. Oncology recommended to filgastrimx1 for pt's neutropenia, with WBC count increasing respectively. Patient unable to get MRCP 2/2 body habitus, and no additional abdominal imagining was recommended as GI had low concern for cholangitis. Blood cx NGTD. LFT abnormalities were thus suspected to be related to either chemotherapy (abraxane is known to cause liver injury) or her liver metastasis. ID recommended discontinuing antibiotics as infectious workup was negative thus far. However, patient spiked a fever on 9/10 and were placed back on antibiotics, repeat RUQ U/S negative for cholangitis. During admission, patient also complained of severe throat pain. ENT scope revealed diffuse oropharynx and laryngeal ulceration with sloughing, likely mucositis 2/2 chemotherapy. Throat cultures revealed Monica albicans, that might be a contaminant as no thrush was seen on exam. S/p diflucan x1. Patient now being managed off of antifungals, but continued on supportive care magic mouthwash and lozenges. Patient received pathology results of her liver biopsy in August showing confirmed adenocarcinoma from primary breast cancer. Patient requested to speak with palliative care and was made DNR/DNI/comfort care on 9/12 after a couple days. Patient to be transferred to PCU. Per palliative note, patient "expressed her unequivocal desire to die peacefully and comfortably. She recounts seeing her sister die a slow, miserable death from gastric cancer, and she is scared of this happening to her." Patient will not receive any further disease modifying therapy for metastatic breast cancer and was ordered for dilaudid 0.25mg IV PRN for mucositis pain.

## 2021-09-09 NOTE — DISCHARGE NOTE PROVIDER - NSRESEARCHGRANT_OVERRIDEREC_GEN_A_CORE
Active cancer (i.e. undergoing acute, in-hospital cancer treatment) Active cancer (i.e. undergoing acute, in-hospital cancer treatment)/IMPROVE-DD Application Not Available

## 2021-09-09 NOTE — PROGRESS NOTE ADULT - PROBLEM SELECTOR PLAN 6
- Likely 2/2 chemotherapy. Platelet count is normal but decreased from prior.  - Anemia currently stable  - B12 and folate wnl   - Transfuse for Hgb >7, Plt >10 or >20 if febrile or >50 if bleeding   - Monitor for signs/symptoms of bleeding   - F/u TLS and hemolysis labs  -  elevated, haptoglobin 251 elevated

## 2021-09-10 NOTE — PROGRESS NOTE ADULT - PROBLEM SELECTOR PLAN 2
- Cholestasis with SIRS  - R/O acute cholangitis: patient has abd pain, cholestatic liver injury, direct hyperbilirubinemia and history of liver mets  - CT Abd 9/6: Cirrhotic liver with metastatic disease. Gallstones in the gallbladder without inflammatory changes.   s/p placement of a gastric lap band and left hip replacement. Diffuse bony metastatic disease.  - RUQ US 9/7: Liver metastases. Cholelithiasis. No gallbladder wall thickening. No gross biliary ductal dilatation. Normal caliber common bile duct.  - No need for MRCP/ERCP at this time   - Monitor off abx   - F/u GI recs  - MRI was recommended to r/o brain mets given confusion, however patient cannot physically fit in the MRI machine - Cholestasis with SIRS  - R/O acute cholangitis: patient has abd pain, cholestatic liver injury, direct hyperbilirubinemia and history of liver mets  - CT Abd 9/6: Cirrhotic liver with metastatic disease. Gallstones in the gallbladder without inflammatory changes.   s/p placement of a gastric lap band and left hip replacement. Diffuse bony metastatic disease.  - RUQ US 9/7: Liver metastases. Cholelithiasis. No gallbladder wall thickening. No gross biliary ductal dilatation. Normal caliber common bile duct.  - No need for MRCP/ERCP at this time   - F/u GI recs  - MRI was recommended to r/o brain mets given confusion, however patient cannot physically fit in the MRI machine

## 2021-09-10 NOTE — PROGRESS NOTE ADULT - ASSESSMENT
64 yo F with PMH metastatic ER/CA(+), HER2(-) breast cancer (to bone and liver), s/p first dose abraxane on 9/1 presents with diarrhea, SIRS, acute encephalopathy, cholestasis, liver failure concerning for sepsis 2/2 oncologic sequelae.

## 2021-09-10 NOTE — PROGRESS NOTE ADULT - PROBLEM SELECTOR PLAN 6
- Likely 2/2 chemotherapy. Platelet count is normal but decreased from prior.  - Anemia currently stable  - B12 and folate wnl   - Transfuse for Hgb >7, Plt >10 or >20 if febrile or >50 if bleeding   - Monitor for signs/symptoms of bleeding   - F/u TLS and hemolysis labs  -  elevated, haptoglobin 251 elevated - Per chart review, patient is BRCA2(+) was started on Letrozole and Ibrane with dose reduction 2/2 neutropenia. Then given Zometa [id number]. 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 Initiated on abraxane on 9/1    - PE ruled out with CT PA  - Pain: if needed, can give morphine  - Hold chemotherapy while inpatient  - F/u with Dr. Blanc as outpatient  - Oncology following

## 2021-09-10 NOTE — PROGRESS NOTE ADULT - ASSESSMENT
Widely metastatic breast adenocarcinoma   Failure to thrive   Not neutropenic.   No infection identified thus far but last night with new fever, chills and rising obstructive liver enzymes.   Tumor burden could cause fever and she has no abdominal pain/tenderness but I think cholangitis should be considered.   Sore throat - looks like mucositis, not bacterial pharyngitis.     Suggest  -f/u blood cultures  -empiric Cefepime 1GM IV q8h, Flagyl 500mg q12h   -repeat RUQ US  -GI/Hepatology follow up regarding ERCP given clinical change     Discussed with medicine     Bashir Rashid MD   Infectious Disease   Pager [phone]   After 5PM and on weekends please page fellow on call or call [phone] Widely metastatic breast adenocarcinoma   Failure to thrive   Not neutropenic.   No infection identified thus far but last night with new fever, chills and rising obstructive liver enzymes.   Tumor burden could cause fever and she has no abdominal pain/tenderness but I think cholangitis should be considered.   Sore throat - looks like mucositis, not bacterial pharyngitis.   Documented Zosyn allergy but reports remote history of renal injury and not allergic symptoms.     Suggest  -f/u blood cultures  -empiric Cefepime 1GM IV q8h, Flagyl 500mg q12h   -repeat RUQ US  -GI/Hepatology follow up regarding ERCP given clinical change     Discussed with medicine     Bashir Rashid MD   Infectious Disease   Pager [phone]   After 5PM and on weekends please page fellow on call or call [phone] Widely metastatic breast adenocarcinoma.   Failure to thrive.   Not neutropenic and no infection identified thus far but last night with new fever, chills and rising obstructive liver enzymes.   Tumor burden could cause fever and she has no abdominal pain/tenderness but I think cholangitis should be considered.   Sore throat - looks like mucositis, not bacterial pharyngitis.   Documented Zosyn allergy but reports remote history of renal injury and not allergic symptoms.   Nontoxic today.     Suggest  -f/u blood cultures  -empiric Cefepime 1GM IV q8h, Flagyl 500mg q12h   -repeat RUQ US  -GI/Hepatology follow up regarding ERCP given clinical change     Discussed with medicine     Bashir Rashid MD   Infectious Disease   Pager [phone]   After 5PM and on weekends please page fellow on call or call [phone]

## 2021-09-10 NOTE — CONSULT NOTE ADULT - CONVERSATION DETAILS
Met with patient at bedside, introduced self and role.  She states that she is "dying" and "wants to be comfortable."  We reviewed current medical situation as well as the options discussed with oncology. She knows there are treatment options available and she is interested in further DMT, however, if she continues to decline, she wants her wishes known that she would want to be comfortable and not suffer.    We discussed advance directives and patient states her spouse, Gabe, is HCP. Gabe was available via phone and agrees to send copy via email for records. Patient very clear that her wishes would be DNR/DNI, no dialysis and no feeding tube. We discussed the difference between living will and MOLST form. It was explained that by filling out a MOLST form, we would be putting her wishes into place, but as they are her wishes, the form could not be void by family or HCP. Spouse upset by this and asks to "table discussion" until Monday, so they can speak as a family and make a decision together. Patient in agreement.    Oncology joined the discussion, Dr. Meneses and Dr. Scales and disucssed inclusion of hepatology team given rising bilirubin. Concern was shared with patient and family about potential for irrevesible liver injury, but will await liver team input. They also discussed potential treatment options pending LFT improvement, that could likely be done as outpatient.    contact information provided to patient. all questions answered. much emotional support provided.  Patient also requesting the following DME: Hospital bed, shower chair, wheelchair and Home PT services.  I encouraged patient/family to reach out to their LTC insurance to discuss coverage and HHA/facility options as well as discuss with social work regarding transportation to/from outpatient appts via access-a-ride

## 2021-09-10 NOTE — PROGRESS NOTE ADULT - PROBLEM SELECTOR PLAN 1
R/O: neutropenic sepsis  - SIRS for tachycardia and low WBC  - Source: cholangitis vs infectious enteritis vs typhlitis vs oncologic sequelae   - New chemo of abraxane well known to cause neutropenia and liver injury  -  (c/f neutropenic fever if ANC <500)  - Continue GCSF daily to increase neutrophil count, but patient is currently septic   - S/p vanco x1 (9/7), cefepime 1g q8h (9/7-9/8), flagyl 500mg q8h (9/7-9/8)   - D/c abx per ID as less concern for infection   - Continue maintenance IVF  - Trend lactate (5.3 -> 3.5 -> 3.0)  - GI PCR negative, C. diff negative   - UA positive, Ucx negative, Legionella negative   - Blood cx 9/6: NGTD R/O: neutropenic sepsis  - SIRS for tachycardia and low WBC  - Source: cholangitis vs infectious enteritis vs typhlitis vs oncologic sequelae   - New chemo of abraxane well known to cause neutropenia and liver injury  -  (c/f neutropenic fever if ANC <500)  - Continue GCSF daily to increase neutrophil count, but patient is currently septic   - S/p vanco x1 (9/7), cefepime 1g q8h (9/7-9/8), flagyl 500mg q8h (9/7-9/8)   - D/c abx per ID as less concern for infection   - Continue maintenance IVF  - Trend lactate (5.3 -> 3.5 -> 3.0)  - GI PCR negative, C. diff negative   - UA positive, Ucx negative, Legionella negative   - Blood cx 9/6: NGTD  - Pt spiked fever 9/9 OVN. Blood cx, urine cx, UA sent. Monitor off abx at this time. Possibly fever from filgrastim R/O: neutropenic sepsis  - SIRS for tachycardia and low WBC  - Source: cholangitis vs infectious enteritis vs typhlitis vs oncologic sequelae   - New chemo of abraxane well known to cause neutropenia and liver injury  -  (c/f neutropenic fever if ANC <500)  - Continue GCSF daily to increase neutrophil count, but patient is currently septic   - S/p vanco x1 (9/7), cefepime 1g q8h (9/7-9/8), flagyl 500mg q8h (9/7-9/8)   - D/c abx per ID as less concern for infection   - Continue maintenance IVF  - Trend lactate (5.3 -> 3.5 -> 3.0)  - GI PCR negative, C. diff negative   - UA positive, Ucx negative, Legionella negative   - Blood cx 9/6: NGTD  - Pt spiked fever 9/9 OVN. Blood cx, urine cx, UA sent. Start cefepime 1g q8h and metronidazole 500mg q12h and obtain RUQ U/S, per ID R/O: neutropenic sepsis  - SIRS for tachycardia and low WBC  - Source: cholangitis vs infectious enteritis vs typhlitis vs oncologic sequelae   - New chemo of abraxane well known to cause neutropenia and liver injury  -  (c/f neutropenic fever if ANC <500)  - s/p GCSFx1 on 9/7 to increase neutrophil count  - S/p vanco x1 (9/7), cefepime 1g q8h (9/7-9/8), flagyl 500mg q8h (9/7-9/8)   - D/c abx per ID as less concern for infection   - Continue maintenance IVF  - Trend lactate (5.3 -> 3.5 -> 3.0)  - GI PCR negative, C. diff negative   - UA positive, Ucx negative, Legionella negative   - Blood cx 9/6: NGTD  - Pt spiked fever 9/9 OVN. Blood cx, urine cx, UA sent. Start cefepime 1g q8h and metronidazole 500mg q12h (9/10- and obtain RUQ U/S, per ID

## 2021-09-10 NOTE — PROGRESS NOTE ADULT - SUBJECTIVE AND OBJECTIVE BOX
Dr. Trina Collazo, PGY-1  LIROSY: [id number]/ NS: [phone]  After 7pm please page [phone]    Patient is a 65y old  Female who presents with a chief complaint of sepsis (09 Sep 2021 16:27)    Subjective: Febrile to 100.7F last night, blood cx, UA, urine cx sent, s/p Tylenol and Tramadol. Patient seen and examined at bedside. Continues to have sore throat and pain on swallowing. Denies chest pain, abdominal pain, cough, n/v, sob. Breathing comfortably on supplemental oxygen.     MEDICATIONS  (STANDING):  ALBUTerol    90 MICROgram(s) HFA Inhaler 1 Puff(s) Inhalation every 4 hours  cefepime   IVPB 1000 milliGRAM(s) IV Intermittent every 8 hours  cefepime   IVPB      cholecalciferol 1000 Unit(s) Oral daily  dextrose 40% Gel 15 Gram(s) Oral once  dextrose 5%. 1000 milliLiter(s) (50 mL/Hr) IV Continuous <Continuous>  dextrose 5%. 1000 milliLiter(s) (100 mL/Hr) IV Continuous <Continuous>  dextrose 50% Injectable 25 Gram(s) IV Push once  dextrose 50% Injectable 12.5 Gram(s) IV Push once  dextrose 50% Injectable 25 Gram(s) IV Push once  enoxaparin Injectable 40 milliGRAM(s) SubCutaneous daily  filgrastim-sndz (ZARXIO) Injectable 480 MICROGram(s) SubCutaneous daily  FIRST- Mouthwash  BLM 5 milliLiter(s) Swish and Swallow every 4 hours  glucagon  Injectable 1 milliGRAM(s) IntraMuscular once  influenza   Vaccine 0.5 milliLiter(s) IntraMuscular once  insulin lispro (ADMELOG) corrective regimen sliding scale   SubCutaneous three times a day before meals  insulin lispro (ADMELOG) corrective regimen sliding scale   SubCutaneous at bedtime  lactated ringers. 1000 milliLiter(s) (100 mL/Hr) IV Continuous <Continuous>  letrozole 2.5 milliGRAM(s) Oral daily  LORazepam     Tablet 1 milliGRAM(s) Oral once  melatonin 3 milliGRAM(s) Oral at bedtime  metroNIDAZOLE  IVPB      metroNIDAZOLE  IVPB 500 milliGRAM(s) IV Intermittent every 8 hours  mometasone 220 MICROgram(s) Inhaler 1 Puff(s) Inhalation daily  montelukast 10 milliGRAM(s) Oral daily  pantoprazole    Tablet 40 milliGRAM(s) Oral before breakfast  tiotropium 18 MICROgram(s) Capsule 1 Capsule(s) Inhalation daily    MEDICATIONS  (PRN):  benzocaine 15 mG/menthol 3.6 mG (Sugar-Free) Lozenge 1 Lozenge Oral every 6 hours PRN Sore Throat  gabapentin 300 milliGRAM(s) Oral three times a day PRN neuropathy  tetracaine/benzocaine/butamben Spray 1 Spray(s) Topical every 6 hours PRN sore throat        Objective:     Vitals: Vital Signs Last 24 Hrs  T(C): 37.2 (09-10-21 @ 08:51), Max: 38.2 (21 @ 20:57)  T(F): 99 (09-10-21 @ 08:51), Max: 100.7 (21 @ 20:57)  HR: 109 (09-10-21 @ 08:51) (100 - 127)  BP: 119/77 (09-10-21 @ 08:51) (116/65 - 141/82)  BP(mean): --  RR: 18 (09-10-21 @ 08:51) (18 - 20)  SpO2: 94% (09-10-21 @ 08:51) (91% - 98%)            I&O's Summary    09 Sep 2021 07:01  -  10 Sep 2021 07:00  --------------------------------------------------------  IN: 2200 mL / OUT: 650 mL / NET: 1550 mL      PHYSICAL EXAM:  CONSTITUTIONAL: Well-groomed, lethargic, moderate distress secondary to pain  EYES: periorbital jaundice. No conjunctival or scleral injection, non-icteric; PERRL and symmetric  ENMT: Pharyngeal erythema without exudates. No external nasal lesions; nasal mucosa not inflamed; normal dentition.   RESPIRATORY: Breathing on 2L nasal cannula; lungs CTA without wheeze/rhonchi/rales  CARDIOVASCULAR: +S1S2, RRR, no M/G/R; pedal pulses full and symmetric; no lower extremity edema  GASTROINTESTINAL: +BS throughout, no rebound/guarding, nontender; no hepatosplenomegaly  SKIN: Bandages on b/l knees   NEURO: A&O4, no asterixis; no focal deficits.   PSYCHIATRIC: mood and affect appropriate; appropriate insight and judgment    LABS:                        8.7    3.20  )-----------( 103      ( 09 Sep 2021 11:08 )             26.6                         8.6    1.68  )-----------( 103      ( 08 Sep 2021 06:33 )             26.5     Hgb Trend: 8.7<--, 8.6<--, 9.3<--, 9.7<--  09-10    133<L>  |  99  |  26<H>  ----------------------------<  110<H>  4.1   |  19<L>  |  1.39<H>      138  |  103  |  20  ----------------------------<  158<H>  4.0   |  18<L>  |  1.12      141  |  104  |  17  ----------------------------<  106<H>  3.8   |  20<L>  |  1.06    Ca    9.1      10 Sep 2021 09:14  Ca    9.3      09 Sep 2021 11:08  Ca    9.7      08 Sep 2021 06:33  Phos  2.9     09-10  Mg     1.7     09-10    TPro  5.7<L>  /  Alb  2.4<L>  /  TBili  7.9<H>  /  DBili  x   /  AST  233<H>  /  ALT  68<H>  /  AlkPhos  x   09-10  TPro  5.7<L>  /  Alb  2.4<L>  /  TBili  6.4<H>  /  DBili  x   /  AST  216<H>  /  ALT  75<H>  /  AlkPhos  897<H>    TPro  5.9<L>  /  Alb  2.7<L>  /  TBili  5.4<H>  /  DBili  x   /  AST  229<H>  /  ALT  84<H>  /  AlkPhos  762<H>      Creatinine Trend: 1.39<--, 1.12<--, 1.06<--, 1.02<--, 1.01<--, 0.99<--                Urinalysis Basic - ( 10 Sep 2021 07:44 )    Color: Dark Yellow / Appearance: Slightly Turbid / S.018 / pH: x  Gluc: x / Ketone: Negative  / Bili: Moderate / Urobili: Negative   Blood: x / Protein: 100 / Nitrite: Negative   Leuk Esterase: Negative / RBC: 47 /hpf / WBC 6 /HPF   Sq Epi: x / Non Sq Epi: 1 /hpf / Bacteria: Negative        CAPILLARY BLOOD GLUCOSE      POCT Blood Glucose.: 95 mg/dL (10 Sep 2021 08:03)  POCT Blood Glucose.: 128 mg/dL (09 Sep 2021 21:23)  POCT Blood Glucose.: 138 mg/dL (09 Sep 2021 17:34)  POCT Blood Glucose.: 151 mg/dL (09 Sep 2021 12:13)     Dr. Trina Collazo, PGY-1  LIROSY: [id number]/ NS: [phone]  After 7pm please page [phone] or [phone]    Patient is a 65y old  Female who presents with a chief complaint of sepsis (09 Sep 2021 16:27)    Subjective: Febrile to 100.7F last night, blood cx, UA, urine cx sent, s/p Tylenol and Tramadol. Patient seen and examined at bedside. Continues to have sore throat and pain on swallowing. Diarrhea has improved. Denies chest pain, abdominal pain, cough, n/v, sob. Breathing comfortably on supplemental oxygen.     MEDICATIONS  (STANDING):  ALBUTerol    90 MICROgram(s) HFA Inhaler 1 Puff(s) Inhalation every 4 hours  cefepime   IVPB 1000 milliGRAM(s) IV Intermittent every 8 hours  cefepime   IVPB      cholecalciferol 1000 Unit(s) Oral daily  dextrose 40% Gel 15 Gram(s) Oral once  dextrose 5%. 1000 milliLiter(s) (50 mL/Hr) IV Continuous <Continuous>  dextrose 5%. 1000 milliLiter(s) (100 mL/Hr) IV Continuous <Continuous>  dextrose 50% Injectable 25 Gram(s) IV Push once  dextrose 50% Injectable 12.5 Gram(s) IV Push once  dextrose 50% Injectable 25 Gram(s) IV Push once  enoxaparin Injectable 40 milliGRAM(s) SubCutaneous daily  filgrastim-sndz (ZARXIO) Injectable 480 MICROGram(s) SubCutaneous daily  FIRST- Mouthwash  BLM 5 milliLiter(s) Swish and Swallow every 4 hours  glucagon  Injectable 1 milliGRAM(s) IntraMuscular once  influenza   Vaccine 0.5 milliLiter(s) IntraMuscular once  insulin lispro (ADMELOG) corrective regimen sliding scale   SubCutaneous three times a day before meals  insulin lispro (ADMELOG) corrective regimen sliding scale   SubCutaneous at bedtime  lactated ringers. 1000 milliLiter(s) (100 mL/Hr) IV Continuous <Continuous>  letrozole 2.5 milliGRAM(s) Oral daily  LORazepam     Tablet 1 milliGRAM(s) Oral once  melatonin 3 milliGRAM(s) Oral at bedtime  metroNIDAZOLE  IVPB      metroNIDAZOLE  IVPB 500 milliGRAM(s) IV Intermittent every 8 hours  mometasone 220 MICROgram(s) Inhaler 1 Puff(s) Inhalation daily  montelukast 10 milliGRAM(s) Oral daily  pantoprazole    Tablet 40 milliGRAM(s) Oral before breakfast  tiotropium 18 MICROgram(s) Capsule 1 Capsule(s) Inhalation daily    MEDICATIONS  (PRN):  benzocaine 15 mG/menthol 3.6 mG (Sugar-Free) Lozenge 1 Lozenge Oral every 6 hours PRN Sore Throat  gabapentin 300 milliGRAM(s) Oral three times a day PRN neuropathy  tetracaine/benzocaine/butamben Spray 1 Spray(s) Topical every 6 hours PRN sore throat        Objective:     Vitals: Vital Signs Last 24 Hrs  T(C): 37.2 (09-10-21 @ 08:51), Max: 38.2 (21 @ 20:57)  T(F): 99 (09-10-21 @ 08:51), Max: 100.7 (21 @ 20:57)  HR: 109 (09-10-21 @ 08:51) (100 - 127)  BP: 119/77 (09-10-21 @ 08:51) (116/65 - 141/82)  BP(mean): --  RR: 18 (09-10-21 @ 08:51) (18 - 20)  SpO2: 94% (09-10-21 @ 08:51) (91% - 98%)            I&O's Summary    09 Sep 2021 07:01  -  10 Sep 2021 07:00  --------------------------------------------------------  IN: 2200 mL / OUT: 650 mL / NET: 1550 mL      PHYSICAL EXAM:  CONSTITUTIONAL: Well-groomed, lethargic, moderate distress secondary to pain  EYES: periorbital jaundice. No conjunctival or scleral injection, non-icteric; PERRL and symmetric  ENMT: Pharyngeal erythema without exudates. No external nasal lesions; nasal mucosa not inflamed; normal dentition.   RESPIRATORY: Breathing on 2L nasal cannula; lungs CTA without wheeze/rhonchi/rales  CARDIOVASCULAR: +S1S2, RRR, no M/G/R; pedal pulses full and symmetric; no lower extremity edema  GASTROINTESTINAL: +BS throughout, no rebound/guarding, nontender; no hepatosplenomegaly  SKIN: Bandages on b/l knees   NEURO: A&O4, no asterixis; no focal deficits.   PSYCHIATRIC: mood and affect appropriate; appropriate insight and judgment    LABS:                        8.7    3.20  )-----------( 103      ( 09 Sep 2021 11:08 )             26.6                         8.6    1.68  )-----------( 103      ( 08 Sep 2021 06:33 )             26.5     Hgb Trend: 8.7<--, 8.6<--, 9.3<--, 9.7<--  09-10    133<L>  |  99  |  26<H>  ----------------------------<  110<H>  4.1   |  19<L>  |  1.39<H>      138  |  103  |  20  ----------------------------<  158<H>  4.0   |  18<L>  |  1.12      141  |  104  |  17  ----------------------------<  106<H>  3.8   |  20<L>  |  1.06    Ca    9.1      10 Sep 2021 09:14  Ca    9.3      09 Sep 2021 11:08  Ca    9.7      08 Sep 2021 06:33  Phos  2.9     09-10  Mg     1.7     09-10    TPro  5.7<L>  /  Alb  2.4<L>  /  TBili  7.9<H>  /  DBili  x   /  AST  233<H>  /  ALT  68<H>  /  AlkPhos  x   09-10  TPro  5.7<L>  /  Alb  2.4<L>  /  TBili  6.4<H>  /  DBili  x   /  AST  216<H>  /  ALT  75<H>  /  AlkPhos  897<H>    TPro  5.9<L>  /  Alb  2.7<L>  /  TBili  5.4<H>  /  DBili  x   /  AST  229<H>  /  ALT  84<H>  /  AlkPhos  762<H>      Creatinine Trend: 1.39<--, 1.12<--, 1.06<--, 1.02<--, 1.01<--, 0.99<--                Urinalysis Basic - ( 10 Sep 2021 07:44 )    Color: Dark Yellow / Appearance: Slightly Turbid / S.018 / pH: x  Gluc: x / Ketone: Negative  / Bili: Moderate / Urobili: Negative   Blood: x / Protein: 100 / Nitrite: Negative   Leuk Esterase: Negative / RBC: 47 /hpf / WBC 6 /HPF   Sq Epi: x / Non Sq Epi: 1 /hpf / Bacteria: Negative        CAPILLARY BLOOD GLUCOSE      POCT Blood Glucose.: 95 mg/dL (10 Sep 2021 08:03)  POCT Blood Glucose.: 128 mg/dL (09 Sep 2021 21:23)  POCT Blood Glucose.: 138 mg/dL (09 Sep 2021 17:34)  POCT Blood Glucose.: 151 mg/dL (09 Sep 2021 12:13)

## 2021-09-10 NOTE — PROGRESS NOTE ADULT - PROBLEM SELECTOR PLAN 3
Patient with persistent elevation in transaminases and now with elevations in bilirubin, ALP, and INR.   - Hyperbilirubinemia likely 2/2 progression of disease/hepatic involvement   - Ammonia and lipase wnl   - Elevated INR 1.53   - d/c lactulose as patient having diarrhea  - Viral hepatitis panel, CMV, HIV all negative  - EBV panel with either prior infection or reactivation (EBV VCA IGG AB+, EBV NA IGG AB+, EBV VCA IGM AB-, EBV EA IGG AB+)  - CT scan reporting cirrhosis although unclear if just nodular from tumors  - GI/hepatology following   - If direct hyperbilirubinemia worsens (doubles), will obtain US abdomen to assess for any biliary ductal dilation

## 2021-09-10 NOTE — CONSULT NOTE ADULT - ATTENDING COMMENTS
c/w supportive care, mouth wash etc.   f/up cultures as above and will treat accordingly if anything comes up positive.

## 2021-09-10 NOTE — CONSULT NOTE ADULT - SUBJECTIVE AND OBJECTIVE BOX
CC: odynophagia     HPI: 65 year old woman with PMH of breast cancer with mets to bone and liver with recent initiation of new chemotherapy - abraxane 6 days ago, lap-band presents with diarrhea, abdominal pain, jaundice, and confusion admitted with UTI, JAMAAL now complaining of odynophagia/dysphagia. PT complaining of throat pain a few dyas prior to admissions to both solids and liquids. Pt denies any dysphonia, sob, dyspnea, changes in voice or inability to tolerated secretions.       PAST MEDICAL & SURGICAL HISTORY:  Diabetes mellitus type II    Hypertension    Hyperlipidemia    Osteoarthritis    Toe ulcer, right    Asthma    Glaucoma    Anxiety    Morbidly obese    Breast cancer, left    GERD (gastroesophageal reflux disease)    S/P laparoscopic surgery  GASTRIC LAP BAND ~     S/P hip replacement  LEFT HIP (OXINIUM) ~     H/O:  Section  x2-1991    History of tonsillectomy      H/O drainage of abscess  13    S/P biopsy  uterus-2013    H/O rhinoplasty  1982     history        Allergies    tetanus toxoid (Unknown)  Zosyn (Unknown)    Intolerances      MEDICATIONS  (STANDING):  ALBUTerol    90 MICROgram(s) HFA Inhaler 1 Puff(s) Inhalation every 4 hours  cholecalciferol 1000 Unit(s) Oral daily  dextrose 40% Gel 15 Gram(s) Oral once  dextrose 5%. 1000 milliLiter(s) (50 mL/Hr) IV Continuous <Continuous>  dextrose 5%. 1000 milliLiter(s) (100 mL/Hr) IV Continuous <Continuous>  dextrose 50% Injectable 25 Gram(s) IV Push once  dextrose 50% Injectable 12.5 Gram(s) IV Push once  dextrose 50% Injectable 25 Gram(s) IV Push once  enoxaparin Injectable 40 milliGRAM(s) SubCutaneous daily  FIRST- Mouthwash  BLM 5 milliLiter(s) Swish and Swallow every 4 hours  glucagon  Injectable 1 milliGRAM(s) IntraMuscular once  influenza   Vaccine 0.5 milliLiter(s) IntraMuscular once  insulin lispro (ADMELOG) corrective regimen sliding scale   SubCutaneous three times a day before meals  insulin lispro (ADMELOG) corrective regimen sliding scale   SubCutaneous at bedtime  lactated ringers. 1000 milliLiter(s) (100 mL/Hr) IV Continuous <Continuous>  LORazepam     Tablet 1 milliGRAM(s) Oral once  melatonin 3 milliGRAM(s) Oral at bedtime  mometasone 220 MICROgram(s) Inhaler 1 Puff(s) Inhalation daily  montelukast 10 milliGRAM(s) Oral daily  pantoprazole    Tablet 40 milliGRAM(s) Oral before breakfast  tiotropium 18 MICROgram(s) Capsule 1 Capsule(s) Inhalation daily    MEDICATIONS  (PRN):  benzocaine 15 mG/menthol 3.6 mG (Sugar-Free) Lozenge 1 Lozenge Oral every 6 hours PRN Sore Throat  gabapentin 300 milliGRAM(s) Oral three times a day PRN neuropathy  lidocaine 2% Viscous 5 milliLiter(s) Swish and Spit every 6 hours PRN Mouth Care  tetracaine/benzocaine/butamben Spray 1 Spray(s) Topical every 6 hours PRN sore throat      Social History: no tobacco, no etoh     Family history: Pt denies any sign FHx    ROS:   ENT: all negative except as noted in HPI   CV: denies palpitations  Pulm: denies SOB, cough, hemoptysis  GI: denies change in apetite, indigestion, n/v  : denies pertinent urinary symptoms, urgency  Neuro: denies numbness/tingling, loss of sensation  Psych: denies anxiety  MS: denies muscle weakness, instability  Heme: denies easy bruising or bleeding  Endo: denies heat/cold intolerance, excessive sweating  Vascular: denies LE edema    Vital Signs Last 24 Hrs  T(C): 37.1 (10 Sep 2021 12:08), Max: 38.2 (09 Sep 2021 20:57)  T(F): 98.8 (10 Sep 2021 12:08), Max: 100.7 (09 Sep 2021 20:57)  HR: 111 (10 Sep 2021 12:57) (101 - 127)  BP: 129/79 (10 Sep 2021 12:08) (116/65 - 141/82)  BP(mean): --  RR: 18 (10 Sep 2021 12:08) (18 - 18)  SpO2: 93% (10 Sep 2021 12:57) (88% - 96%)                          9.4    8.43  )-----------( 84       ( 10 Sep 2021 09:14 )             28.6    09-10    133<L>  |  99  |  26<H>  ----------------------------<  110<H>  4.1   |  19<L>  |  1.39<H>    Ca    9.1      10 Sep 2021 09:14  Phos  2.9     09-10  Mg     1.7     09-10    TPro  5.7<L>  /  Alb  2.4<L>  /  TBili  7.9<H>  /  DBili  x   /  AST  233<H>  /  ALT  68<H>  /  AlkPhos  1148<H>  09-10       PHYSICAL EXAM:  Gen: NAD  Skin: No rashes, bruises, or lesions + jaundice   Head: Normocephalic, Atraumatic  Face: no edema, erythema, or fluctuance. Parotid glands soft without mass  Eyes: no scleral injection  Nose: Nares bilaterally patent, no discharge  Mouth: No Stridor / Drooling / Trismus.  Mucosa moist, tongue/uvula midline, oropharynx with diffuse soft palate, tonsils and uvula ulceration with sloughing culture obtained for fungal, bacterial and HSV  Neck: Flat, supple, no lymphadenopathy, trachea midline, no masses  Lymphatic: No lymphadenopathy  Resp: breathing easily, no stridor  CV: no peripheral edema/cyanosis  GI: nondistended   Peripheral vascular: no JVD or edema  Neuro: facial nerve intact, no facial droop        Fiberoptic Indirect laryngoscopy:  (Scope #2 used) Reason for Laryngoscopy:    Patient was unable to cooperate with mirror.  Nasopharynx, oropharynx, and hypopharynx also with diffuse ulceration and erythema, no bleeding. Tongue base, posterior pharyngeal wall, vallecula, epiglottis, and subglottis appear normal. + diffuse superglottic ulceration and erythema. No edema, pooling of secretions, masses  Airway patent, no foreign body visualized. No glottic/supraglottic edema. True vocal cords, arytenoids, vestibular folds, ventricles, pyriform sinuses, and aryepiglottic folds appear normal bilaterally. Vocal cords mobile with good contact b/l.              IMAGING/ADDITIONAL STUDIES:

## 2021-09-10 NOTE — PROGRESS NOTE ADULT - PROBLEM SELECTOR PLAN 8
DVT ppx: Lovenox   Diet: DASH/CC Mixed acid base disorder  - AGMA likely 2/2 lactic acidosis with respiratory compensation  - most likely i/s/o sepsis although tumor production and decreased hepatic clearance is also a possibility  - trend lactate    #chronic conditions  - HTN: home meds d/nicole several weeks ago  - DM: hold home metformin. ISS while inpatient  - Asthma: c/w trelegy -> duoneb +mometasone  - Gerd: c/w PPI  - Anxiety: hold benzos for now, can give small dose ativan if severe anxiety or benzo withdrawal (unlikely)  - Nausea: hold antinausea home meds, can restart if needed

## 2021-09-10 NOTE — CONSULT NOTE ADULT - PROBLEM SELECTOR RECOMMENDATION 2
hepatology being consulted  likely multifactorial complicated by metastatic disease, rising bilirubin  not a candidate for DMT at this time unless liver function improves

## 2021-09-10 NOTE — CONSULT NOTE ADULT - PROBLEM SELECTOR RECOMMENDATION 9
- f/u culture bacterial, HSV, fungal  - continue with supportive care, magic mouth wash, lidocaine and lozenges.  - diet as tolerated  - call ent prn  - humidify o2
PPS 40%, patient requesting various forms of DME  shower chair, wheelchair, hospital bed-> will need case management follow up  patient requesting home PT

## 2021-09-10 NOTE — PROGRESS NOTE ADULT - PROBLEM SELECTOR PLAN 5
- Per chart review, patient is BRCA2(+) was started on Letrozole and Ibrane with dose reduction 2/2 neutropenia. Then given Zometa [id number]. 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 Initiated on abraxane on 9/1    - PE ruled out with CT PA  - Pain: if needed, can give morphine  - Hold chemotherapy while inpatient  - F/u with Dr. Blanc as outpatient  - Oncology following - Pt with throat pain on admission and now pain on swallowing   - Mucositis 2/2 chemotherapy vs. infectious etiology   - CMV and HSV negative   - Given cepacol lozenge, cetacaine spray, magic mouthwash, and lidocaine 2% viscous without relief   - Speech and swallow eval   - Appreciate ENT recs - Pt with throat pain on admission and now pain on swallowing   - ENT scope on 9/10 shows diffuse oropharynx and laryngeal ulceration with sloughing  - Mucositis 2/2 chemotherapy vs. secondary superimposed fungal, bacterial and HSV infection  - CMV and HSV negative   - Given cepacol lozenge, cetacaine spray, magic mouthwash, and lidocaine 2% viscous without relief   - Speech and swallow eval, puree diet for now   - Appreciate ENT recs  - f/u throat cultures  - Humidify O2

## 2021-09-10 NOTE — CONSULT NOTE ADULT - ASSESSMENT
65 year old woman with PMH of breast cancer with mets to bone and liver with recent initiation of new chemotherapy - abraxane 6 days ago, lap-band presents with diarrhea, abdominal pain, jaundice, and confusion. Palliative consulted for goals of care and advance care planning at patients request.

## 2021-09-10 NOTE — PROGRESS NOTE ADULT - ASSESSMENT
65F h/o metastatic ER/[place] (+), HER2 (-) metastatic breast cancer to bone and liver, presenting to ER for diarrhea, abdominal pain, jaundice and confusion:     #diarrhea, transaminitis, abn pain  - AST and ALT stable, bili continues to trend up.  diarrhea improved  -c diff neg, cultures NGTD. ID recs appreciated, being monitored off abx, however patient spiked fevers last night. c/w management per primary team and ID   -CT A/p showing a cirrhotic liver with known metastasis. hepatology following and recs appreciated, holding off on any additional abdominal imaging at this point as unlikely to . suspect transaminitis, diarrhea likely 2/2 chemotherapy   -continue to trend LFTs, supportive care for diarrhea, imodium prn and IVF if needed     #metastatic breast cancer, pancytopenia, encephalopathy  4/17/20 Letrozole and Ibrance started with dose reduction from 125 mg to 100 mg starting cycle 2 due to neutropenia  5/1/20 Zometa started and given monthly x 3 then continue every 3 months  9/10/20 Genetic testing with [employer] 47 gene panel showed a pathogenic mutation in BRCA2: c.5946del ([id number]), which is associated with an increased risk for breast cancer, ovarian cancer, pancreatic cancer, melanoma, prostate cancer and male breast cancer; and also CDH1: deletion of exons 15-16, which is associated with an increased risk of breast cancer and gastric cancer  8/2021 Elevated LFTs noted and sonogram revealed innumerable liver metastases  8/20/21 Sonogram guided liver biopsy revealed adenocarcinoma consistent with breast cancer primary, ER/[place] (+), HER2 (-). patient not aware of liver biopsy results and will d/w her and family today   -started on abraxane on 9/1. on letrozole as outpt as well. okay to hold letrozole while admitted   -neutropenic likely from ibrance vs bone marrow metastasis? started daily neupogen on 9/7 to help with count recovery. WBC recovered and neupogen stopped on 9/10. no longer pancytopenic. H/h stable, plt continue to downtrend. c/w transfusion support: goal hgb >7, plt >10 or >20 if febrile or >50 if bleeding   -recommend MR brain to r/o mets given confusion, can do open MRI as outpt if patient unable to fit in MR   -no plan for inpatient chemotherapy, can f/u with Dr. Blanc as outpt. due for interval imaging in October     [facility]5 [phone]  65F h/o metastatic ER/[place] (+), HER2 (-) metastatic breast cancer to bone and liver, presenting to ER for diarrhea, abdominal pain, jaundice and confusion:     #diarrhea, transaminitis, abn pain  - AST and ALT stable, bili continues to trend up.  diarrhea improved  -c diff neg, cultures NGTD. ID recs appreciated, being monitored off abx, however patient spiked fevers last night. c/w management per primary team and ID   -CT A/p showing a cirrhotic liver with known metastasis. hepatology following and recs appreciated, holding off on any additional abdominal imaging at this point as unlikely to . suspect transaminitis, diarrhea likely 2/2 chemotherapy   -continue to trend LFTs, supportive care for diarrhea, imodium prn and IVF if needed     #metastatic breast cancer, pancytopenia, dysphagia  4/17/20 Letrozole and Ibrance started with dose reduction from 125 mg to 100 mg starting cycle 2 due to neutropenia  5/1/20 Zometa started and given monthly x 3 then continue every 3 months  9/10/20 Genetic testing with [employer] 47 gene panel showed a pathogenic mutation in BRCA2: c.5946del ([id number]), which is associated with an increased risk for breast cancer, ovarian cancer, pancreatic cancer, melanoma, prostate cancer and male breast cancer; and also CDH1: deletion of exons 15-16, which is associated with an increased risk of breast cancer and gastric cancer  8/2021 Elevated LFTs noted and sonogram revealed innumerable liver metastases  8/20/21 Sonogram guided liver biopsy revealed adenocarcinoma consistent with breast cancer primary, ER/PA (+), HER2 (-). patient not aware of liver biopsy results and will d/w her and family today   -started on abraxane on 9/1. on letrozole as outpt as well. okay to hold letrozole while admitted   -neutropenic likely from ibrance vs bone marrow metastasis? started daily neupogen on 9/7 to help with count recovery. WBC recovered and neupogen stopped on 9/10. no longer pancytopenic. H/h stable, plt continue to downtrend. c/w transfusion support: goal hgb >7, plt >10 or >20 if febrile or >50 if bleeding   -recommend GI eval for dysphagia now that counts have recovered   -recommend MR brain to r/o mets given confusion, can do open MRI as outpt if patient unable to fit in MR   -no plan for inpatient chemotherapy, can f/u with Dr. Blanc as outpt. due for interval imaging in October     [facility] PGY5 [phone]

## 2021-09-10 NOTE — PROGRESS NOTE ADULT - SUBJECTIVE AND OBJECTIVE BOX
INTERVAL HPI/OVERNIGHT EVENTS:  Patient S&E at bedside. No complaints at this time. no F/C, CP, SOB, abn pain, N/V      VITAL SIGNS:  T(F): 99 (09-10-21 @ 08:51)  HR: 109 (09-10-21 @ 08:51)  BP: 119/77 (09-10-21 @ 08:51)  RR: 18 (09-10-21 @ 08:51)  SpO2: 94% (09-10-21 @ 08:51)  Wt(kg): --    PHYSICAL EXAM:    Constitutional: NAD  Eyes: EOMI, sclera non-icteric  Neck: supple  Respiratory: no inc wob  Cardiovascular: RRR,   Gastrointestinal: soft, NTND,   Extremities: no c/c/e  Neurological: non-focal    MEDICATIONS  (STANDING):  ALBUTerol    90 MICROgram(s) HFA Inhaler 1 Puff(s) Inhalation every 4 hours  cholecalciferol 1000 Unit(s) Oral daily  dextrose 40% Gel 15 Gram(s) Oral once  dextrose 5%. 1000 milliLiter(s) (50 mL/Hr) IV Continuous <Continuous>  dextrose 5%. 1000 milliLiter(s) (100 mL/Hr) IV Continuous <Continuous>  dextrose 50% Injectable 25 Gram(s) IV Push once  dextrose 50% Injectable 12.5 Gram(s) IV Push once  dextrose 50% Injectable 25 Gram(s) IV Push once  enoxaparin Injectable 40 milliGRAM(s) SubCutaneous daily  FIRST- Mouthwash  BLM 5 milliLiter(s) Swish and Swallow every 4 hours  glucagon  Injectable 1 milliGRAM(s) IntraMuscular once  influenza   Vaccine 0.5 milliLiter(s) IntraMuscular once  insulin lispro (ADMELOG) corrective regimen sliding scale   SubCutaneous three times a day before meals  insulin lispro (ADMELOG) corrective regimen sliding scale   SubCutaneous at bedtime  lactated ringers. 1000 milliLiter(s) (100 mL/Hr) IV Continuous <Continuous>  letrozole 2.5 milliGRAM(s) Oral daily  LORazepam     Tablet 1 milliGRAM(s) Oral once  melatonin 3 milliGRAM(s) Oral at bedtime  mometasone 220 MICROgram(s) Inhaler 1 Puff(s) Inhalation daily  montelukast 10 milliGRAM(s) Oral daily  pantoprazole    Tablet 40 milliGRAM(s) Oral before breakfast  tiotropium 18 MICROgram(s) Capsule 1 Capsule(s) Inhalation daily    MEDICATIONS  (PRN):  benzocaine 15 mG/menthol 3.6 mG (Sugar-Free) Lozenge 1 Lozenge Oral every 6 hours PRN Sore Throat  gabapentin 300 milliGRAM(s) Oral three times a day PRN neuropathy  tetracaine/benzocaine/butamben Spray 1 Spray(s) Topical every 6 hours PRN sore throat      LABS:                        9.4    8.43  )-----------( 84       ( 10 Sep 2021 09:14 )             28.6     09-10    133<L>  |  99  |  26<H>  ----------------------------<  110<H>  4.1   |  19<L>  |  1.39<H>    Ca    9.1      10 Sep 2021 09:14  Phos  2.9     09-10  Mg     1.7     09-10    TPro  5.7<L>  /  Alb  2.4<L>  /  TBili  7.9<H>  /  DBili  x   /  AST  233<H>  /  ALT  68<H>  /  AlkPhos  1148<H>  09-10      Urinalysis Basic - ( 10 Sep 2021 07:44 )    Color: Dark Yellow / Appearance: Slightly Turbid / S.018 / pH: x  Gluc: x / Ketone: Negative  / Bili: Moderate / Urobili: Negative   Blood: x / Protein: 100 / Nitrite: Negative   Leuk Esterase: Negative / RBC: 47 /hpf / WBC 6 /HPF   Sq Epi: x / Non Sq Epi: 1 /hpf / Bacteria: Negative        RADIOLOGY & ADDITIONAL TESTS:

## 2021-09-10 NOTE — CONSULT NOTE ADULT - PROBLEM SELECTOR RECOMMENDATION 4
>46 minutes spent on ACP  patient with very clear wishes but needs time to speak with family who are overwhelmed  requested HCP from patients spouse, awaiting copy to be sent  will review advance directives on Monday per patient request.

## 2021-09-10 NOTE — PROGRESS NOTE ADULT - PROBLEM SELECTOR PLAN 7
Mixed acid base disorder  - AGMA likely 2/2 lactic acidosis with respiratory compensation  - most likely i/s/o sepsis although tumor production and decreased hepatic clearance is also a possibility  - trend lactate    #chronic conditions  - HTN: home meds d/nicole several weeks ago  - DM: hold home metformin. ISS while inpatient  - Asthma: c/w trelegy -> duoneb +mometasone  - Gerd: c/w PPI  - Anxiety: hold benzos for now, can give small dose ativan if severe anxiety or benzo withdrawal (unlikely)  - Nausea: hold antinausea home meds, can restart if needed - Likely 2/2 chemotherapy. Platelet count is normal but decreased from prior.  - Anemia currently stable  - B12 and folate wnl   - Transfuse for Hgb >7, Plt >10 or >20 if febrile or >50 if bleeding   - Monitor for signs/symptoms of bleeding   - F/u TLS and hemolysis labs  -  elevated, haptoglobin 251 elevated Walking

## 2021-09-10 NOTE — CONSULT NOTE ADULT - ASSESSMENT
65y with diffuse oropharynx and laryngeal ulceration with sloughing, likely 2/2 to mucositis r/o secondary superimposed fungal, bacterial and HSV infection f/u cultures

## 2021-09-10 NOTE — PROGRESS NOTE ADULT - PROBLEM SELECTOR PLAN 4
-Likely 2/2 sepsis vs hepatic encephalopathy. No focal deficits or history of trauma. BG wnl.  -neuro checks q4h  -Ammonia 53 wnl Same Histology In Subsequent Stages Text: The pattern and morphology of the tumor is as described in the first stage.

## 2021-09-10 NOTE — CONSULT NOTE ADULT - SUBJECTIVE AND OBJECTIVE BOX
HPI:  65 year old woman with PMH of breast cancer with mets to bone and liver with recent initiation of new chemotherapy - abraxane 6 days ago, lap-band presents with diarrhea, abdominal pain, jaundice, and confusion. Per son, patient had been experiencing dyspnea, fatigue, and dysuria and had presented to the ED 2 weeeks ago. At that time found to have a UTI, JAMAAL, and liver mets by ultrasound, she subsequently had a biopsy as an outpatient several days later. She has continued to have fatigue and weakness and some SOB. She went to pulm who started trelegy ellipta - per patient too soon to tell if it's helping. More recently she started to have some confusion manifested by forgetting what she was saying, answering questions incorrectly, and repeating herself. She has also had some decreased PO intake that has worsened over last few days. Lastly she started having diarrhea this past week, reporting 3-4 BMs per day, patient unable to articulate whether loose or watery. She also reports continued polyuria and polydipsia but her home blood sugars have been normal. She is denying any blood in urine/stool or melena as well as fevers/chills, chest pain, current dyspnea, N/V, and dysuria.     In ED: Afebrile, , /80, RR 18, Spo2 95%. Lactate 5.3, WBC 1.5 with , direct bili 4. Given 2L IVF, vanco and cefepime 1g.  (06 Sep 2021 19:41)    PERTINENT PM/SXH:   Diabetes mellitus type II    Hypertension    Hyperlipidemia    Osteoarthritis    Toe ulcer, right    Asthma    Glaucoma    Anxiety    Morbidly obese    Breast cancer, left    GERD (gastroesophageal reflux disease)      S/P laparoscopic surgery    S/P hip replacement    H/O:  Section    History of tonsillectomy    H/O drainage of abscess    S/P biopsy    H/O rhinoplasty     history      FAMILY HISTORY:  No pertinent family history in first degree relatives      ITEMS NOT CHECKED ARE NOT PRESENT    SOCIAL HISTORY:   Significant other/partner[ ]  Children[ ]  Scientology/Spirituality:  Substance hx:  [ ]   Tobacco hx:  [ ]   Alcohol hx: [ ]   Home Opioid hx:  [ ] I-Stop Reference No:  Living Situation: [ ]Home  [ ]Long term care  [ ]Rehab [ ]Other    ADVANCE DIRECTIVES:    DNR  MOLST  [ ]  Living Will  [ ]   DECISION MAKER(s):  [ ] Health Care Proxy(s)  [ ] Surrogate(s)  [ ] Guardian           Name(s): Phone Number(s):    BASELINE (I)ADL(s) (prior to admission):  [place]: [ ]Total  [ ] Moderate [ ]Dependent    Allergies    tetanus toxoid (Unknown)  Zosyn (Unknown)    Intolerances    MEDICATIONS  (STANDING):  ALBUTerol    90 MICROgram(s) HFA Inhaler 1 Puff(s) Inhalation every 4 hours  cholecalciferol 1000 Unit(s) Oral daily  dextrose 40% Gel 15 Gram(s) Oral once  dextrose 5%. 1000 milliLiter(s) (50 mL/Hr) IV Continuous <Continuous>  dextrose 5%. 1000 milliLiter(s) (100 mL/Hr) IV Continuous <Continuous>  dextrose 50% Injectable 25 Gram(s) IV Push once  dextrose 50% Injectable 12.5 Gram(s) IV Push once  dextrose 50% Injectable 25 Gram(s) IV Push once  enoxaparin Injectable 40 milliGRAM(s) SubCutaneous daily  FIRST- Mouthwash  BLM 5 milliLiter(s) Swish and Swallow every 4 hours  glucagon  Injectable 1 milliGRAM(s) IntraMuscular once  influenza   Vaccine 0.5 milliLiter(s) IntraMuscular once  insulin lispro (ADMELOG) corrective regimen sliding scale   SubCutaneous three times a day before meals  insulin lispro (ADMELOG) corrective regimen sliding scale   SubCutaneous at bedtime  lactated ringers. 1000 milliLiter(s) (100 mL/Hr) IV Continuous <Continuous>  LORazepam     Tablet 1 milliGRAM(s) Oral once  melatonin 3 milliGRAM(s) Oral at bedtime  mometasone 220 MICROgram(s) Inhaler 1 Puff(s) Inhalation daily  montelukast 10 milliGRAM(s) Oral daily  pantoprazole    Tablet 40 milliGRAM(s) Oral before breakfast  tiotropium 18 MICROgram(s) Capsule 1 Capsule(s) Inhalation daily    MEDICATIONS  (PRN):  benzocaine 15 mG/menthol 3.6 mG (Sugar-Free) Lozenge 1 Lozenge Oral every 6 hours PRN Sore Throat  gabapentin 300 milliGRAM(s) Oral three times a day PRN neuropathy  lidocaine 2% Viscous 5 milliLiter(s) Swish and Spit every 6 hours PRN Mouth Care  tetracaine/benzocaine/butamben Spray 1 Spray(s) Topical every 6 hours PRN sore throat    PRESENT SYMPTOMS: [ ]  Due to Covid 19 infection data obtained from nursing assessment.  Source if other than patient:  [ ]Family   [ ]Team     Pain: [ ]yes [ ]no  QOL impact -   Location -                    Aggravating factors -  Quality -  Radiation -  Timing-  Severity (0-10 scale):  Minimal acceptable level (0-10 scale):     CPOT:    https://www.[facility].org/getattachment/[email or web address]/Critical-Care-Pain-Observation-Tool-(CPOT)      PAIN AD Score:     http://geriatrictoolkit.[email or web address]/cog/painad.pdf (press ctrl +  left click to view)    Dyspnea:                           [ ]Mild [ ]Moderate [ ]Severe  Anxiety:                             [ ]Mild [ ]Moderate [ ]Severe  Fatigue:                             [ ]Mild [ ]Moderate [ ]Severe  Nausea:                             [ ]Mild [ ]Moderate [ ]Severe  Loss of appetite:              [ ]Mild [ ]Moderate [ ]Severe  Constipation:                    [ ]Mild [ ]Moderate [ ]Severe    Other Symptoms:  [ ]All other review of systems negative     Palliative Performance Status Version 2:         %    http://npcrc.org/files/news/palliative_performance_scale_ppsv2.pdf  PHYSICAL EXAM: DUE TO COVID-19 INFECTION  physical exam findings from the clinician caring for this patient directly are used.   Vital Signs Last 24 Hrs  T(C): 37.1 (10 Sep 2021 12:08), Max: 38.2 (09 Sep 2021 20:57)  T(F): 98.8 (10 Sep 2021 12:08), Max: 100.7 (09 Sep 2021 20:57)  HR: 111 (10 Sep 2021 12:57) (101 - 127)  BP: 129/79 (10 Sep 2021 12:08) (116/65 - 141/82)  BP(mean): --  RR: 18 (10 Sep 2021 12:08) (18 - 20)  SpO2: 93% (10 Sep 2021 12:57) (88% - 98%) I&O's Summary    09 Sep 2021 07:01  -  10 Sep 2021 07:00  --------------------------------------------------------  IN: 2200 mL / OUT: 650 mL / NET: 1550 mL    10 Sep 2021 07:01  -  10 Sep 2021 13:40  --------------------------------------------------------  IN: 530 mL / OUT: 0 mL / NET: 530 mL      GENERAL:  [ ]Alert  [ ]Oriented x   [ ]Lethargic  [ ]Cachexia  [ ]Unarousable  [ ]Verbal  [ ]Non-Verbal  Behavioral:   [ ] Anxiety  [ ] Delirium [ ] Agitation [ ] Other  HEENT:  [ ]Normal   [ ]Dry mouth   [ ]ET Tube/Trach  [ ]Oral lesions  PULMONARY:   [ ]Clear [ ]Tachypnea  [ ]Audible excessive secretions   [ ]Rhonchi        [ ]Right [ ]Left [ ]Bilateral  [ ]Crackles        [ ]Right [ ]Left [ ]Bilateral  [ ]Wheezing     [ ]Right [ ]Left [ ]Bilateral  [ ]Diminished breath sounds [ ]right [ ]left [ ]bilateral  CARDIOVASCULAR:    [ ]Regular [ ]Irregular [ ]Tachy  [ ]Bobby [ ]Murmur [ ]Other  GASTROINTESTINAL:  [ ]Soft  [ ]Distended   [ ]+BS  [ ]Non tender [ ]Tender  [ ]PEG [ ]OGT/ NGT  Last BM:   GENITOURINARY:  [ ]Normal [ ] Incontinent   [ ]Oliguria/Anuria   [ ]Hebert  MUSCULOSKELETAL:   [ ]Normal   [ ]Weakness  [ ]Bed/Wheelchair bound [ ]Edema  NEUROLOGIC:   [ ]No focal deficits  [ ]Cognitive impairment  [ ]Dysphagia [ ]Dysarthria [ ]Paresis [ ]Other   SKIN:   [ ]Normal    [ ]Rash  [ ]Pressure ulcer(s)       Present on admission [ ]y [ ]n    CRITICAL CARE:  [ ] Shock Present  [ ]Septic [ ]Cardiogenic [ ]Neurologic [ ]Hypovolemic  [ ]  Vasopressors [ ]  Inotropes   [ ]Respiratory failure present [ ]Mechanical ventilation [ ]Non-invasive ventilatory support [ ]High flow     [ ]Acute  [ ]Chronic [ ]Hypoxic  [ ]Hypercarbic [ ]Other  [ ]Other organ failure     LABS:                        9.4    8.43  )-----------( 84       ( 10 Sep 2021 09:14 )             28.6   09-10    133<L>  |  99  |  26<H>  ----------------------------<  110<H>  4.1   |  19<L>  |  1.39<H>    Ca    9.1      10 Sep 2021 09:14  Phos  2.9     09-10  Mg     1.7     09-10    TPro  5.7<L>  /  Alb  2.4<L>  /  TBili  7.9<H>  /  DBili  x   /  AST  233<H>  /  ALT  68<H>  /  AlkPhos  1148<H>  09-10      Urinalysis Basic - ( 10 Sep 2021 07:44 )    Color: Dark Yellow / Appearance: Slightly Turbid / S.018 / pH: x  Gluc: x / Ketone: Negative  / Bili: Moderate / Urobili: Negative   Blood: x / Protein: 100 / Nitrite: Negative   Leuk Esterase: Negative / RBC: 47 /hpf / WBC 6 /HPF   Sq Epi: x / Non Sq Epi: 1 /hpf / Bacteria: Negative        Ferritin, Serum: 28284 ng/mL (21 @ 23:16)    Procalcitonin, Serum: 1.29 ng/mL (21 @ 01:02)    C-Reactive Protein, Serum: 290 mg/L (21 @ 21:17)    RADIOLOGY & ADDITIONAL STUDIES:    PROTEIN CALORIE MALNUTRITION PRESENT: [ ]mild [ ]moderate [ ]severe [ ]underweight [ ]morbid obesity  https://www.andeal.org/vault/2440/web/files/ONC/Table_Clinical%20Characteristics%20to%20Document%20Malnutrition-White%20JV%20et%20al%2020.pdf    Height (cm): 170.2 (21 @ 22:11), 175.3 (21 @ 11:12)  Weight (kg): 121.7 (21 @ 22:11), 120.7 (21 @ 13:47), 120 (21 @ 12:11)  BMI (kg/m2): 42 (21 @ 22:11), 39.3 (21 @ 13:47), 39 (21 @ 12:11)    [ ]PPSV2 < or = to 30% [ ]significant weight loss  [ ]poor nutritional intake  [ ]anasarca      [ ]Artificial Nutrition      REFERRALS:   [ ]Chaplaincy  [ ]Hospice  [ ]Child Life  [ ]Social Work  [ ]Case management [ ]Holistic Therapy     Goals of Care Document:  HPI:  65 year old woman with PMH of breast cancer with mets to bone and liver with recent initiation of new chemotherapy - abraxane 6 days ago, lap-band presents with diarrhea, abdominal pain, jaundice, and confusion. Per son, patient had been experiencing dyspnea, fatigue, and dysuria and had presented to the ED 2 weeeks ago. At that time found to have a UTI, JAMAAL, and liver mets by ultrasound, she subsequently had a biopsy as an outpatient several days later. She has continued to have fatigue and weakness and some SOB. She went to pulm who started trelegy ellipta - per patient too soon to tell if it's helping. More recently she started to have some confusion manifested by forgetting what she was saying, answering questions incorrectly, and repeating herself. She has also had some decreased PO intake that has worsened over last few days. Lastly she started having diarrhea this past week, reporting 3-4 BMs per day, patient unable to articulate whether loose or watery. She also reports continued polyuria and polydipsia but her home blood sugars have been normal. She is denying any blood in urine/stool or melena as well as fevers/chills, chest pain, current dyspnea, N/V, and dysuria.     In ED: Afebrile, , /80, RR 18, Spo2 95%. Lactate 5.3, WBC 1.5 with , direct bili 4. Given 2L IVF, vanco and cefepime 1g.  (06 Sep 2021 19:41)    Palliative consulted for GOC  PERTINENT PM/SXH:   Diabetes mellitus type II    Hypertension    Hyperlipidemia    Osteoarthritis    Toe ulcer, right    Asthma    Glaucoma    Anxiety    Morbidly obese    Breast cancer, left    GERD (gastroesophageal reflux disease)      S/P laparoscopic surgery    S/P hip replacement    H/O:  Section    History of tonsillectomy    H/O drainage of abscess    S/P biopsy    H/O rhinoplasty     history      FAMILY HISTORY:  No pertinent family history in first degree relatives      ITEMS NOT CHECKED ARE NOT PRESENT    SOCIAL HISTORY:   Significant other/partner[x ]  Children[x ]  Zoroastrian/Spirituality:  Substance hx:  [ ]   Tobacco hx:  [ ]   Alcohol hx: [ ]   Home Opioid hx:  [ ] I-Stop Reference No:  Living Situation: [x ]Home  [ ]Long term care  [ ]Rehab [ ]Other    ADVANCE DIRECTIVES:    DNR  MOLST  [ ]  Living Will  [ ]   DECISION MAKER(s):  [ x] Health Care Proxy(s)  [ ] Surrogate(s)  [ ] Guardian           Name(s): Phone Number(s): , Gabe, number per EMR    BASELINE (I)ADL(s) (prior to admission):  [place]: [ ]Total  [ x] Moderate [ ]Dependent    Allergies    tetanus toxoid (Unknown)  Zosyn (Unknown)    Intolerances    MEDICATIONS  (STANDING):  ALBUTerol    90 MICROgram(s) HFA Inhaler 1 Puff(s) Inhalation every 4 hours  cholecalciferol 1000 Unit(s) Oral daily  dextrose 40% Gel 15 Gram(s) Oral once  dextrose 5%. 1000 milliLiter(s) (50 mL/Hr) IV Continuous <Continuous>  dextrose 5%. 1000 milliLiter(s) (100 mL/Hr) IV Continuous <Continuous>  dextrose 50% Injectable 25 Gram(s) IV Push once  dextrose 50% Injectable 12.5 Gram(s) IV Push once  dextrose 50% Injectable 25 Gram(s) IV Push once  enoxaparin Injectable 40 milliGRAM(s) SubCutaneous daily  FIRST- Mouthwash  BLM 5 milliLiter(s) Swish and Swallow every 4 hours  glucagon  Injectable 1 milliGRAM(s) IntraMuscular once  influenza   Vaccine 0.5 milliLiter(s) IntraMuscular once  insulin lispro (ADMELOG) corrective regimen sliding scale   SubCutaneous three times a day before meals  insulin lispro (ADMELOG) corrective regimen sliding scale   SubCutaneous at bedtime  lactated ringers. 1000 milliLiter(s) (100 mL/Hr) IV Continuous <Continuous>  LORazepam     Tablet 1 milliGRAM(s) Oral once  melatonin 3 milliGRAM(s) Oral at bedtime  mometasone 220 MICROgram(s) Inhaler 1 Puff(s) Inhalation daily  montelukast 10 milliGRAM(s) Oral daily  pantoprazole    Tablet 40 milliGRAM(s) Oral before breakfast  tiotropium 18 MICROgram(s) Capsule 1 Capsule(s) Inhalation daily    MEDICATIONS  (PRN):  benzocaine 15 mG/menthol 3.6 mG (Sugar-Free) Lozenge 1 Lozenge Oral every 6 hours PRN Sore Throat  gabapentin 300 milliGRAM(s) Oral three times a day PRN neuropathy  lidocaine 2% Viscous 5 milliLiter(s) Swish and Spit every 6 hours PRN Mouth Care  tetracaine/benzocaine/butamben Spray 1 Spray(s) Topical every 6 hours PRN sore throat    PRESENT SYMPTOMS: [ ]  Due to Covid 19 infection data obtained from nursing assessment.  Source if other than patient:  [ ]Family   [ ]Team     Pain: [ ]yes [ x]no  QOL impact -   Location -                    Aggravating factors -  Quality -  Radiation -  Timing-  Severity (0-10 scale):  Minimal acceptable level (0-10 scale):     CPOT:    https://www.[facility].org/getattachment/[email or web address]/Critical-Care-Pain-Observation-Tool-(CPOT)      PAIN AD Score:     http://geriatrictoolkit.[email or web address]/cog/painad.pdf (press ctrl +  left click to view)    Dyspnea:                           [ ]Mild [ ]Moderate [ ]Severe  Anxiety:                             [ ]Mild [ ]Moderate [ ]Severe  Fatigue:                             [ ]Mild [ x]Moderate [ ]Severe  Nausea:                             [ ]Mild [ ]Moderate [ ]Severe  Loss of appetite:              [ ]Mild [ ]Moderate [ ]Severe  Constipation:                    [ ]Mild [ ]Moderate [ ]Severe    Other Symptoms:  [x ]All other review of systems negative     Palliative Performance Status Version 2:      50   %    http://[facility].org/files/news/palliative_performance_scale_ppsv2.pdf  PHYSICAL EXAM: DUE TO COVID-19 INFECTION  physical exam findings from the clinician caring for this patient directly are used.   Vital Signs Last 24 Hrs  T(C): 37.1 (10 Sep 2021 12:08), Max: 38.2 (09 Sep 2021 20:57)  T(F): 98.8 (10 Sep 2021 12:08), Max: 100.7 (09 Sep 2021 20:57)  HR: 111 (10 Sep 2021 12:57) (101 - 127)  BP: 129/79 (10 Sep 2021 12:08) (116/65 - 141/82)  BP(mean): --  RR: 18 (10 Sep 2021 12:08) (18 - 20)  SpO2: 93% (10 Sep 2021 12:57) (88% - 98%) I&O's Summary    09 Sep 2021 07:01  -  10 Sep 2021 07:00  --------------------------------------------------------  IN: 2200 mL / OUT: 650 mL / NET: 1550 mL    10 Sep 2021 07:01  -  10 Sep 2021 13:40  --------------------------------------------------------  IN: 530 mL / OUT: 0 mL / NET: 530 mL      GENERAL:  [x ]Alert  [x ]Oriented x3   [ ]Lethargic  [ ]Cachexia  [ ]Unarousable  [ ]Verbal  [ ]Non-Verbal  Behavioral:   [ ] Anxiety  [ ] Delirium [ ] Agitation [ ] Other  HEENT:  [ ]Normal   [ ]Dry mouth   [ ]ET Tube/Trach  [ ]Oral lesions  PULMONARY:   x[ ]Clear [ ]Tachypnea  [ ]Audible excessive secretions   [ ]Rhonchi        [ ]Right [ ]Left [ ]Bilateral  [ ]Crackles        [ ]Right [ ]Left [ ]Bilateral  [ ]Wheezing     [ ]Right [ ]Left [ ]Bilateral  [ ]Diminished breath sounds [ ]right [ ]left [ ]bilateral  CARDIOVASCULAR:    [x ]Regular [ ]Irregular [ ]Tachy  [ ]Bobby [ ]Murmur [ ]Other  GASTROINTESTINAL:  [x ]Soft  [ ]Distended   [ ]+BS  [x ]Non tender [ ]Tender  [ ]PEG [ ]OGT/ NGT  Last BM: 9/10  GENITOURINARY:  x[ ]Normal [ ] Incontinent   [ ]Oliguria/Anuria   [ ]Hebert  MUSCULOSKELETAL:   [ ]Normal   [x ]Weakness  [ ]Bed/Wheelchair bound [ ]Edema  NEUROLOGIC:   [x ]No focal deficits  [ ]Cognitive impairment  [ ]Dysphagia [ ]Dysarthria [ ]Paresis [ ]Other   SKIN: +jaundice  [ ]Normal    [ ]Rash  [ ]Pressure ulcer(s)       Present on admission [ ]y [ ]n    CRITICAL CARE:  [ ] Shock Present  [ ]Septic [ ]Cardiogenic [ ]Neurologic [ ]Hypovolemic  [ ]  Vasopressors [ ]  Inotropes   [ ]Respiratory failure present [ ]Mechanical ventilation [ ]Non-invasive ventilatory support [ ]High flow     [ ]Acute  [ ]Chronic [ ]Hypoxic  [ ]Hypercarbic [ ]Other  [x ]Other organ failure - liver dysfunction    LABS:                        9.4    8.43  )-----------( 84       ( 10 Sep 2021 09:14 )             28.6   09-10    133<L>  |  99  |  26<H>  ----------------------------<  110<H>  4.1   |  19<L>  |  1.39<H>    Ca    9.1      10 Sep 2021 09:14  Phos  2.9     09-10  Mg     1.7     10    TPro  5.7<L>  /  Alb  2.4<L>  /  TBili  7.9<H>  /  DBili  x   /  AST  233<H>  /  ALT  68<H>  /  AlkPhos  1148<H>  09-10      Urinalysis Basic - ( 10 Sep 2021 07:44 )    Color: Dark Yellow / Appearance: Slightly Turbid / S.018 / pH: x  Gluc: x / Ketone: Negative  / Bili: Moderate / Urobili: Negative   Blood: x / Protein: 100 / Nitrite: Negative   Leuk Esterase: Negative / RBC: 47 /hpf / WBC 6 /HPF   Sq Epi: x / Non Sq Epi: 1 /hpf / Bacteria: Negative        Ferritin, Serum: 19945 ng/mL (21 @ 23:16)    Procalcitonin, Serum: 1.29 ng/mL (21 @ 01:02)    C-Reactive Protein, Serum: 290 mg/L (21 @ 21:17)    RADIOLOGY & ADDITIONAL STUDIES:  < from: CT Abdomen and Pelvis w/ IV Cont (21 @ 16:52) >    EXAM:  CT ABDOMEN AND PELVIS IC                          EXAM:  CT ANGIO CHEST PULM [facility]                            PROCEDURE DATE:  2021            INTERPRETATION:  Reason for Exam: Shortness of breath. chest pain.    CTA of the chest was performed from the thoracic inlet to the level of the adrenal glands following IV contrast injection of  80 cc of Omnipaque 350. No immediate complications were reported.  MIP images were also created and reviewed.    Comparison: 2021    Tubes/Lines: None    Mediastinum and Heart: Right aorta is normal in size. Enlargement of the main and right and left pulmonary arteries suggestive of underlying pulmonary hypertension. Thyroid gland is unremarkable. No lymphadenopathy. No pericardial effusion.    Lungs, Pleura, and Airways: There is no pulmonary embolus to the level of the segmenta arteries. No consolidations, edema, effusion, or pneumothorax.      CT abdomen and pelvis:    Cirrhotic liver with small amount of perihepatic ascites. Small hepatic hypodensities in keeping with known metastatic disease. Gallstones in the gallbladder without inflammatory changes. The the pancreas and adrenal glands are unremarkable. Both kidneys enhance symmetrically without stones or hydronephrosis. Bilateral renal hypodensities are too small to characterize.    There is no retroperitoneal lymphadenopathy. There are calcifications within the aorta and its branches.    Patient is status post placement of a gastric lap band. There is no evidence of bowel obstruction.    Status post left hip replacement. Diffuse bony metastatic disease in keeping with known metastatic breast cancer.    IMPRESSION:    CTA chest:    No pulmonary embolus to the level of the segmenta arteries. No consolidations, edema, effusion, or pneumothorax.    Incidental findings as above    CT abdomen and pelvis: Cirrhotic liver with known metastases.    Diffuse bony metastases. Remaining incidentals as above.      --- End of Report ---                IGOR FORBES MD;Attending Radiologist  This document has been electronically signed. Sep  6 2021  5:08PM    < end of copied text >      PROTEIN CALORIE MALNUTRITION PRESENT: [ ]mild [ ]moderate [ ]severe [ ]underweight [ ]morbid obesity  https://www.andeal.org/vault/2440/web/files/ONC/Table_Clinical%20Characteristics%20to%20Document%20Malnutrition-White%20JV%20et%20al%2020.pdf    Height (cm): 170.2 (21 @ 22:11), 175.3 (21 @ 11:12)  Weight (kg): 121.7 (21 @ :11), 120.7 (21 @ 13:47), 120 (21 @ 12:11)  BMI (kg/m2): 42 (21 @ 22:11), 39.3 (21 @ 13:47), 39 (21 @ 12:11)    [ ]PPSV2 < or = to 30% [ ]significant weight loss  [ ]poor nutritional intake  [ ]anasarca      [ ]Artificial Nutrition      REFERRALS:   [ ]Chaplaincy  [ ]Hospice  [ ]Child Life  [ x]Social Work  [x ]Case management [ ]Holistic Therapy     Goals of Care Document:

## 2021-09-11 NOTE — CONSULT NOTE ADULT - SUBJECTIVE AND OBJECTIVE BOX
Podiatry pager #: [phone]/ [phone]    Patient is a 65y old  Female who presents with a chief complaint of sepsis (11 Sep 2021 10:11)      HPI:  65 year old woman with PMH of breast cancer with mets to bone and liver with recent initiation of new chemotherapy - abraxane 6 days ago, lap-band presents with diarrhea, abdominal pain, jaundice, and confusion. Per son, patient had been experiencing dyspnea, fatigue, and dysuria and had presented to the ED 2 weeeks ago. At that time found to have a UTI, JAMAAL, and liver mets by ultrasound, she subsequently had a biopsy as an outpatient several days later. She has continued to have fatigue and weakness and some SOB. She went to pulm who started trelegy ellipta - per patient too soon to tell if it's helping. More recently she started to have some confusion manifested by forgetting what she was saying, answering questions incorrectly, and repeating herself. She has also had some decreased PO intake that has worsened over last few days. Lastly she started having diarrhea this past week, reporting 3-4 BMs per day, patient unable to articulate whether loose or watery. She also reports continued polyuria and polydipsia but her home blood sugars have been normal. She is denying any blood in urine/stool or melena as well as fevers/chills, chest pain, current dyspnea, N/V, and dysuria.     In ED: Afebrile, , /80, RR 18, Spo2 95%. Lactate 5.3, WBC 1.5 with , direct bili 4. Given 2L IVF, vanco and cefepime 1g.  (06 Sep 2021 19:41)      PAST MEDICAL & SURGICAL HISTORY:  Diabetes mellitus type II    Hypertension    Hyperlipidemia    Osteoarthritis    Toe ulcer, right    Asthma    Glaucoma    Anxiety    Morbidly obese    Breast cancer, left    GERD (gastroesophageal reflux disease)    S/P laparoscopic surgery  GASTRIC LAP BAND ~     S/P hip replacement  LEFT HIP (OXINIUM) ~     H/O:  Section  x2-,     History of tonsillectomy      H/O drainage of abscess  13    S/P biopsy  uterus-2013    H/O rhinoplasty  1982     history          MEDICATIONS  (STANDING):  ALBUTerol    90 MICROgram(s) HFA Inhaler 1 Puff(s) Inhalation every 4 hours  cefepime   IVPB 1000 milliGRAM(s) IV Intermittent every 8 hours  cholecalciferol 1000 Unit(s) Oral daily  dextrose 40% Gel 15 Gram(s) Oral once  dextrose 5%. 1000 milliLiter(s) (50 mL/Hr) IV Continuous <Continuous>  dextrose 5%. 1000 milliLiter(s) (100 mL/Hr) IV Continuous <Continuous>  dextrose 50% Injectable 25 Gram(s) IV Push once  dextrose 50% Injectable 12.5 Gram(s) IV Push once  dextrose 50% Injectable 25 Gram(s) IV Push once  enoxaparin Injectable 40 milliGRAM(s) SubCutaneous daily  FIRST- Mouthwash  BLM 5 milliLiter(s) Swish and Swallow every 4 hours  glucagon  Injectable 1 milliGRAM(s) IntraMuscular once  influenza   Vaccine 0.5 milliLiter(s) IntraMuscular once  insulin lispro (ADMELOG) corrective regimen sliding scale   SubCutaneous three times a day before meals  insulin lispro (ADMELOG) corrective regimen sliding scale   SubCutaneous at bedtime  lactated ringers. 1000 milliLiter(s) (75 mL/Hr) IV Continuous <Continuous>  LORazepam     Tablet 1 milliGRAM(s) Oral once  melatonin 3 milliGRAM(s) Oral at bedtime  metroNIDAZOLE  IVPB 500 milliGRAM(s) IV Intermittent every 12 hours  mometasone 220 MICROgram(s) Inhaler 1 Puff(s) Inhalation daily  montelukast 10 milliGRAM(s) Oral daily  nystatin Powder 1 Application(s) Topical two times a day  pantoprazole    Tablet 40 milliGRAM(s) Oral before breakfast  sodium chloride 0.9%. 1000 milliLiter(s) (75 mL/Hr) IV Continuous <Continuous>  tiotropium 18 MICROgram(s) Capsule 1 Capsule(s) Inhalation daily    MEDICATIONS  (PRN):  benzocaine 15 mG/menthol 3.6 mG (Sugar-Free) Lozenge 1 Lozenge Oral every 6 hours PRN Sore Throat  gabapentin 300 milliGRAM(s) Oral three times a day PRN neuropathy  lidocaine 2% Viscous 5 milliLiter(s) Swish and Spit every 6 hours PRN Mouth Care  tetracaine/benzocaine/butamben Spray 1 Spray(s) Topical every 6 hours PRN sore throat      Allergies    tetanus toxoid (Unknown)  Zosyn (Unknown)    Intolerances        VITALS:    Vital Signs Last 24 Hrs  T(C): 36.7 (11 Sep 2021 12:23), Max: 37.5 (10 Sep 2021 19:22)  T(F): 98.1 (11 Sep 2021 12:23), Max: 99.5 (10 Sep 2021 19:22)  HR: 115 (11 Sep 2021 12:23) (101 - 120)  BP: 119/75 (11 Sep 2021 12:) (105/57 - 134/64)  BP(mean): --  RR: 18 (11 Sep 2021 12:23) (18 - 18)  SpO2: 91% (11 Sep 2021 12:) (88% - 95%)    LABS:                          10.0   13.11 )-----------( 81       ( 11 Sep 2021 06:32 )             30.0       09-11    131<L>  |  99  |  36<H>  ----------------------------<  137<H>  4.1   |  19<L>  |  1.80<H>    Ca    8.8      11 Sep 2021 06:32  Phos  2.9     09-11  Mg     1.8     09-11    TPro  5.5<L>  /  Alb  2.2<L>  /  TBili  9.5<H>  /  DBili  x   /  AST  248<H>  /  ALT  64<H>  /  AlkPhos  1285<H>  09-11      CAPILLARY BLOOD GLUCOSE      POCT Blood Glucose.: 153 mg/dL (11 Sep 2021 12:06)  POCT Blood Glucose.: 139 mg/dL (11 Sep 2021 07:51)  POCT Blood Glucose.: 148 mg/dL (10 Sep 2021 22:08)  POCT Blood Glucose.: 153 mg/dL (10 Sep 2021 17:53)          LOWER EXTREMITY PHYSICAL EXAM:    Vasular: DP/PT 2/4, B/L, CFT <3 seconds B/L, Temperature gradient WNL, B/L.   Neuro: Epicritic sensation diminished to the level of digits, B/L.  Musculoskeletal/Ortho: unremarkable    L distal hallux wound to subQ w/ periwound hyperkeratosis, no local signs if infection, no drainage. S/P excisional debridement of callus to level of epidermis and not beyond using 15 blade.     RADIOLOGY & ADDITIONAL STUDIES:

## 2021-09-11 NOTE — PROGRESS NOTE ADULT - PROBLEM SELECTOR PLAN 3
Patient with persistent elevation in transaminases and now with elevations in bilirubin, ALP, and INR.   - Hyperbilirubinemia likely 2/2 progression of disease/hepatic involvement   - Ammonia and lipase wnl   - Elevated INR 1.53   - d/c lactulose as patient having diarrhea  - Viral hepatitis panel, CMV, HIV all negative  - EBV panel with either prior infection or reactivation (EBV VCA IGG AB+, EBV NA IGG AB+, EBV VCA IGM AB-, EBV EA IGG AB+)  - CT scan reporting cirrhosis although unclear if just nodular from tumors  - GI/hepatology following   - If direct hyperbilirubinemia worsens (doubles), will obtain US abdomen to assess for any biliary ductal dilation Patient with persistent elevation in transaminases and now with elevations in bilirubin, ALP, and INR.   - Hyperbilirubinemia likely 2/2 progression of disease/hepatic involvement   - Ammonia and lipase wnl   - Elevated INR 1.53   - d/c lactulose as patient having diarrhea  - Viral hepatitis panel, CMV, HIV all negative  - EBV panel with either prior infection or reactivation (EBV VCA IGG AB+, EBV NA IGG AB+, EBV VCA IGM AB-, EBV EA IGG AB+)  - CT scan reporting cirrhosis although unclear if just nodular from tumors  - GI/hepatology following   - If direct hyperbilirubinemia worsens (doubles), will obtain US abdomen to assess for any biliary ductal dilation  - Liver enzymes are increasing

## 2021-09-11 NOTE — PROGRESS NOTE ADULT - ASSESSMENT
65F with multiple medical problems including widely metastatic breast adenocarcinoma. Here with Failure to thrive. Mild leukocytosis and fever with worsening liver tests. For MRCP to evaluate for cholangitis. zosyn-allergic.  currently tolerating cephalosporins    Fever  - f/u all cultures  - continue empiric Cefepime 1GM IV q8h, Flagyl 500mg q12h   - for MRCP?  - if not, would at least repeat RUQ US    Elevated liver tests  - GI/Hepatology follow up regarding ERCP given clinical change     Please call the ID service [phone] with questions or concerns over the weekend

## 2021-09-11 NOTE — CONSULT NOTE ADULT - CONSULT REASON
h/o metastatic breast cancer
L hallux wound to subQ
Suspicion for cholangitis
cirrhotic morphology on imaging
C.diff indeterminate
goals of care, advance care planning, metastatic breast cancer
odynophagia

## 2021-09-11 NOTE — PROGRESS NOTE ADULT - PROBLEM SELECTOR PLAN 6
- Per chart review, patient is BRCA2(+) was started on Letrozole and Ibrane with dose reduction 2/2 neutropenia. Then given Zometa [id number]. 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 Initiated on abraxane on 9/1    - PE ruled out with CT PA  - Pain: if needed, can give morphine  - Hold chemotherapy while inpatient. F/u with Dr. Blanc as outpatient  - Oncology following - Cr 1.01 on admission  - Uptrending to 1.8  - Giving NS for fluids as LR may not be metabolized successfully in pt with liver dysfunction   - F/u FeNa

## 2021-09-11 NOTE — PROGRESS NOTE ADULT - PROBLEM SELECTOR PLAN 2
- Cholestasis with SIRS  - R/O acute cholangitis: patient has abd pain, cholestatic liver injury, direct hyperbilirubinemia and history of liver mets  - CT Abd 9/6: Cirrhotic liver with metastatic disease. Gallstones in the gallbladder without inflammatory changes.   s/p placement of a gastric lap band and left hip replacement. Diffuse bony metastatic disease.  - RUQ US 9/7: Liver metastases. Cholelithiasis. No gallbladder wall thickening. No gross biliary ductal dilatation. Normal caliber common bile duct.  - No need for MRCP/ERCP at this time   - F/u GI recs  - MRI was recommended to r/o brain mets given confusion, however patient cannot physically fit in the MRI machine - Cholestasis with SIRS  - R/O acute cholangitis: patient has abd pain, cholestatic liver injury, direct hyperbilirubinemia and history of liver mets  - CT Abd 9/6: Cirrhotic liver with metastatic disease. Gallstones in the gallbladder without inflammatory changes.   s/p placement of a gastric lap band and left hip replacement. Diffuse bony metastatic disease.  - RUQ US 9/7: Liver metastases. Cholelithiasis. No gallbladder wall thickening. No gross biliary ductal dilatation. Normal caliber common bile duct.  - No need for MRCP/ERCP at this time, per GI   - MRI was recommended to r/o brain mets given confusion, however patient cannot physically fit in the MRI machine

## 2021-09-11 NOTE — PROGRESS NOTE ADULT - PROBLEM SELECTOR PLAN 8
Mixed acid base disorder  - AGMA likely 2/2 lactic acidosis with respiratory compensation  - most likely i/s/o sepsis although tumor production and decreased hepatic clearance is also a possibility  - trend lactate    #chronic conditions  - HTN: home meds d/nicole several weeks ago  - DM: hold home metformin. ISS while inpatient  - Asthma: c/w trelegy -> duoneb +mometasone  - Gerd: c/w PPI  - Anxiety: hold benzos for now, can give small dose ativan if severe anxiety or benzo withdrawal (unlikely)  - Nausea: hold antinausea home meds, can restart if needed - Likely 2/2 chemotherapy. Platelet count is normal but decreased from prior.  - Anemia currently stable  - B12 and folate wnl   - Transfuse for Hgb >7, Plt >10 or >20 if febrile or >50 if bleeding   - Monitor for signs/symptoms of bleeding   - F/u TLS and hemolysis labs  -  elevated, haptoglobin 251 elevated

## 2021-09-11 NOTE — PROGRESS NOTE ADULT - SUBJECTIVE AND OBJECTIVE BOX
Follow Up:  Fever    Interval History/ROS:  low grade temps.  no further diarrhea.  mild abd pain. no n/v.  no dysuria. Remainder of ROS otherwise negative.    PMHX/PSHX:    Diabetes mellitus type II  Hypertension  Hyperlipidemia  Osteoarthritis  Toe ulcer, right  Asthma  Glaucoma  Anxiety  Morbidly obese  Breast cancer, left  GERD (gastroesophageal reflux disease)  S/P laparoscopic surgery  S/P hip replacement  H/O:  Section  History of tonsillectomy  H/O drainage of abscess  S/P biopsy  H/O rhinoplasty   history    Allergies  tetanus toxoid (Unknown)  Zosyn (Unknown)    ANTIMICROBIALS:    cefepime   IVPB 1000 every 8 hours  metroNIDAZOLE  IVPB 500 every 12 hours    MEDICATIONS  (STANDING):  ALBUTerol    90 MICROgram(s) HFA Inhaler 1 every 4 hours  enoxaparin Injectable 40 daily  insulin lispro (ADMELOG) corrective regimen sliding scale  three times a day before meals  insulin lispro (ADMELOG) corrective regimen sliding scale  at bedtime  LORazepam     Tablet 1 once  melatonin 3 at bedtime  mometasone 220 MICROgram(s) Inhaler 1 daily  montelukast 10 daily  pantoprazole    Tablet 40 before breakfast  tiotropium 18 MICROgram(s) Capsule 1 daily    Vital Signs Last 24 Hrs  T(F): 98.9 (21 @ 09:25), Max: 99.5 (09-10-21 @ 19:22)  HR: 106 (21 @ 09:25)  BP: 134/64 (21 @ 09:25)  RR: 18 (21 @ 09:25)  SpO2: 96% (21 @ 09:25) (88% - 96%)    PHYSICAL EXAM:  Constitutional: non-toxic  HEAD/EYES: icteric  ENT:  supple  Cardiovascular:   normal S1, S2  Respiratory:  clear BS bilaterally  GI:  soft, normal bowel sounds, mild RUQ tenderness with deep palpation  :  no ron  Musculoskeletal:  no synovitis  Neurologic: awake and alert, normal strength, no focal findings  Skin:  jaundiced  Psychiatric:  awake, alert, appropriate mood                        10.0   13.11 )-----------( 81       ( 11 Sep 2021 06:32 )             30.0 09-11    131  |  99  |  36  ----------------------------<  137  4.1   |  19  |  1.80  Ca    8.8      11 Sep 2021 06:32Phos  2.9     Mg     1.8       TPro  5.5  /  Alb  2.2  /  TBili  9.5  /  DBili  x   /  AST  248  /  ALT  64  /  AlkPhos  1285      WBC Count: 13.11 (21 @ 06:32)  WBC Count: 8.43 (09-10-21 @ 09:14)  WBC Count: 3.20 (21 @ 11:08)  WBC Count: 1.68 (21 @ 06:33)  WBC Count: 1.13 (21 @ 07:03)  WBC Count: 1.53 (21 @ 14:22)    Bilirubin Total, Serum: 9.5 mg/dL (21 @ 06:32)  Bilirubin Total, Serum: 7.9 mg/dL (09-10-21 @ 09:14)  Bilirubin Total, Serum: 6.4 mg/dL (21 @ 11:08)  Bilirubin Total, Serum: 5.4 mg/dL (21 @ 06:33)  Bilirubin Total, Serum: 5.4 mg/dL (21 @ 07:03)  Bilirubin Total, Serum: 5.6 mg/dL (21 @ 17:58)  Bilirubin Total, Serum: 5.3 mg/dL (21 @ 14:16)    Alkaline Phosphatase, Serum: 1285 U/L (21 @ 06:32)  Alkaline Phosphatase, Serum: 1148 U/L (09-10-21 @ 09:14)  Alkaline Phosphatase, Serum: 897 U/L (21 @ 11:08)  Alkaline Phosphatase, Serum: 762 U/L (21 @ 06:33)  Alkaline Phosphatase, Serum: 744 U/L (21 @ 07:03)  Alkaline Phosphatase, Serum: 753 U/L (21 @ 14:16)  Alkaline Phosphatase, Serum: 359 U/L (21 @ 16:03)  Alkaline Phosphatase, Serum: 336 U/L (21 @ 13:52)    Urinalysis Basic - ( 10 Sep 2021 07:44 )  Color: Dark Yellow / Appearance: Slightly Turbid / S.018 / pH: x  Gluc: x / Ketone: Negative  / Bili: Moderate / Urobili: Negative   Blood: x / Protein: 100 / Nitrite: Negative   Leuk Esterase: Negative / RBC: 47 /hpf / WBC 6 /HPF   Sq Epi: x / Non Sq Epi: 1 /hpf / Bacteria: Negative    MICROBIOLOGY:  Culture - Blood (collected 09-10-21 @ 01:18)  Source: .Blood Blood-Peripheral  Preliminary Report (21 @ 02:01):    No growth to date.    Culture - Blood (collected 09-10-21 @ 01:18)  Source: .Blood Blood-Peripheral  Preliminary Report (21 @ 02:01):    No growth to date.    GI PCR Panel, Stool (collected 21 @ 14:58)  Source: .Stool nico margarte  Final Report (21 @ 18:12):    GI PCR Results: NOT detected    Culture - Urine (collected 21 @ 00:18)  Source: Clean Catch Clean Catch (Midstream)  Final Report (21 @ 18:51):    <10,000 CFU/mL Normal Urogenital Grace    Culture - Blood (collected 21 @ 18:29)  Source: .Blood Blood-Peripheral  Preliminary Report (21 @ 19:01):    No growth to date.    Culture - Blood (collected 21 @ 18:29)  Source: .Blood Blood-Peripheral  Preliminary Report (21 @ 19:01):    No growth to date.    C Diff by PCR Result: NotDetec ( @ 05:02)    RADIOLOGY:  Images below reviewed personally    US Abdomen Upper Quadrant Right (21 @ 17:44)   Liver metastases.  Cholelithiasis. No gallbladder wall thickening. No gross biliary ductal dilatation. Normal caliber common bile duct.    CT Abdomen and Pelvis w/ IV Cont (21 @ 16:52) >  IMPRESSION:  No pulmonary embolus to the level of the segmenta arteries. No consolidations, edema, effusion, or pneumothorax.  CT abdomen and pelvis: Cirrhotic liver with known metastases.  Diffuse bony metastases. Remaining incidentals as above.

## 2021-09-11 NOTE — SWALLOW BEDSIDE ASSESSMENT ADULT - SLP PERTINENT HISTORY OF CURRENT PROBLEM
66 yo F with PMH metastatic ER/MT(+), HER2(-) breast cancer with metastasis to bone and liver; Pt is s/p first dose Abraxane on 9/1/21 who presents with diarrhea, SIRS, acute encephalopathy, cholestasis, liver failure concerning for sepsis 2/2 oncologic sequelae. In addition, Pt presented to the ED 2 weeks ago and was found to have a UTI, JAMAAL, and liver mets by ultrasound, with subsequent biopsy as an outpatient several days later. She has continued to have fatigue and weakness and some SOB. Pt went to pul who started trelegy ellipta-per patient too soon to tell if it's helping. +Diarrhea this past week, reporting 3-4 BMs per day.  Pt w/ recent reports of some confusion manifested by forgetting what she was saying, answering questions incorrectly, and repeating herself. Pt also had some decreased PO intake that has worsened over last few days.  9/10/21 Palliative care initiated GOC discussion with plan for f/u discussions with Pt/family on 9/13/21.

## 2021-09-11 NOTE — PROGRESS NOTE ADULT - SUBJECTIVE AND OBJECTIVE BOX
%%%%INCOMPLETE NOTE%%%%%     Dr. Trina Collazo, PGY-1  [facility]: [id number]/ NS: [phone]  After 7pm please page [phone] or [phone]    Patient is a 65y old  Female who presents with a chief complaint of sepsis (10 Sep 2021 15:53)    Subjective: No acute events overnight. Patient seen and examined at bedside. BMx1 yesterday. Denies chest pain, abdominal pain, cough, n/v, sob. Breathing comfortably on room air.    MEDICATIONS  (STANDING):  ALBUTerol    90 MICROgram(s) HFA Inhaler 1 Puff(s) Inhalation every 4 hours  cefepime   IVPB 1000 milliGRAM(s) IV Intermittent every 8 hours  cholecalciferol 1000 Unit(s) Oral daily  dextrose 40% Gel 15 Gram(s) Oral once  dextrose 5%. 1000 milliLiter(s) (50 mL/Hr) IV Continuous <Continuous>  dextrose 5%. 1000 milliLiter(s) (100 mL/Hr) IV Continuous <Continuous>  dextrose 50% Injectable 25 Gram(s) IV Push once  dextrose 50% Injectable 12.5 Gram(s) IV Push once  dextrose 50% Injectable 25 Gram(s) IV Push once  enoxaparin Injectable 40 milliGRAM(s) SubCutaneous daily  FIRST- Mouthwash  BLM 5 milliLiter(s) Swish and Swallow every 4 hours  glucagon  Injectable 1 milliGRAM(s) IntraMuscular once  influenza   Vaccine 0.5 milliLiter(s) IntraMuscular once  insulin lispro (ADMELOG) corrective regimen sliding scale   SubCutaneous three times a day before meals  insulin lispro (ADMELOG) corrective regimen sliding scale   SubCutaneous at bedtime  lactated ringers. 1000 milliLiter(s) (75 mL/Hr) IV Continuous <Continuous>  LORazepam     Tablet 1 milliGRAM(s) Oral once  melatonin 3 milliGRAM(s) Oral at bedtime  metroNIDAZOLE  IVPB 500 milliGRAM(s) IV Intermittent every 12 hours  mometasone 220 MICROgram(s) Inhaler 1 Puff(s) Inhalation daily  montelukast 10 milliGRAM(s) Oral daily  nystatin Powder 1 Application(s) Topical two times a day  pantoprazole    Tablet 40 milliGRAM(s) Oral before breakfast  tiotropium 18 MICROgram(s) Capsule 1 Capsule(s) Inhalation daily    MEDICATIONS  (PRN):  benzocaine 15 mG/menthol 3.6 mG (Sugar-Free) Lozenge 1 Lozenge Oral every 6 hours PRN Sore Throat  gabapentin 300 milliGRAM(s) Oral three times a day PRN neuropathy  lidocaine 2% Viscous 5 milliLiter(s) Swish and Spit every 6 hours PRN Mouth Care  tetracaine/benzocaine/butamben Spray 1 Spray(s) Topical every 6 hours PRN sore throat        Objective:     Vitals: Vital Signs Last 24 Hrs  T(C): 37.2 (21 @ 04:30), Max: 37.5 (09-10-21 @ 19:22)  T(F): 99 (21 @ 04:30), Max: 99.5 (09-10-21 @ 19:22)  HR: 110 (21 @ 04:30) (101 - 120)  BP: 118/80 (21 @ 04:30) (105/57 - 129/79)  BP(mean): --  RR: 18 (21 @ 04:30) (18 - 18)  SpO2: 93% (21 @ 04:30) (88% - 96%)            I&O's Summary    10 Sep 2021 07:01  -  11 Sep 2021 07:00  --------------------------------------------------------  IN: 1505 mL / OUT: 200 mL / NET: 1305 mL        PHYSICAL EXAM:  CONSTITUTIONAL: Well-groomed, lethargic, moderate distress secondary to pain  EYES: periorbital jaundice. No conjunctival or scleral injection, non-icteric; PERRL and symmetric  ENMT: Pharyngeal erythema without exudates. No external nasal lesions; nasal mucosa not inflamed; normal dentition.   RESPIRATORY: Breathing on 2L nasal cannula; lungs CTA without wheeze/rhonchi/rales  CARDIOVASCULAR: +S1S2, RRR, no M/G/R; pedal pulses full and symmetric; no lower extremity edema  GASTROINTESTINAL: +BS throughout, no rebound/guarding, nontender; no hepatosplenomegaly  SKIN: Bandages on b/l knees   NEURO: A&O4, no asterixis; no focal deficits.   PSYCHIATRIC: mood and affect appropriate; appropriate insight and judgment      LABS:                        10.0   13.11 )-----------( 81       ( 11 Sep 2021 06:32 )             30.0                         9.4    8.43  )-----------( 84       ( 10 Sep 2021 09:14 )             28.6                         8.7    3.20  )-----------( 103      ( 09 Sep 2021 11:08 )             26.6     Hgb Trend: 10.0<--, 9.4<--, 8.7<--, 8.6<--, 9.3<--      131<L>  |  99  |  36<H>  ----------------------------<  137<H>  4.1   |  19<L>  |  1.80<H>  09-10    133<L>  |  99  |  26<H>  ----------------------------<  110<H>  4.1   |  19<L>  |  1.39<H>      138  |  103  |  20  ----------------------------<  158<H>  4.0   |  18<L>  |  1.12    Ca    8.8      11 Sep 2021 06:32  Ca    9.1      10 Sep 2021 09:14  Ca    9.3      09 Sep 2021 11:08  Phos  2.9       Mg     1.8         TPro  5.5<L>  /  Alb  2.2<L>  /  TBili  9.5<H>  /  DBili  x   /  AST  248<H>  /  ALT  64<H>  /  AlkPhos  1285<H>    TPro  5.7<L>  /  Alb  2.4<L>  /  TBili  7.9<H>  /  DBili  x   /  AST  233<H>  /  ALT  68<H>  /  AlkPhos  1148<H>  09-10  TPro  5.7<L>  /  Alb  2.4<L>  /  TBili  6.4<H>  /  DBili  x   /  AST  216<H>  /  ALT  75<H>  /  AlkPhos  897<H>  -    Creatinine Trend: 1.80<--, 1.39<--, 1.12<--, 1.06<--, 1.02<--, 1.01<--                Urinalysis Basic - ( 10 Sep 2021 07:44 )    Color: Dark Yellow / Appearance: Slightly Turbid / S.018 / pH: x  Gluc: x / Ketone: Negative  / Bili: Moderate / Urobili: Negative   Blood: x / Protein: 100 / Nitrite: Negative   Leuk Esterase: Negative / RBC: 47 /hpf / WBC 6 /HPF   Sq Epi: x / Non Sq Epi: 1 /hpf / Bacteria: Negative        CAPILLARY BLOOD GLUCOSE      POCT Blood Glucose.: 139 mg/dL (11 Sep 2021 07:51)  POCT Blood Glucose.: 148 mg/dL (10 Sep 2021 22:08)  POCT Blood Glucose.: 153 mg/dL (10 Sep 2021 17:53)  POCT Blood Glucose.: 135 mg/dL (10 Sep 2021 11:58)     Dr. Trina Collazo, PGY-1  [facility]: [id number]/ NS: [phone]  After 7pm please page [phone] or [phone]    Patient is a 65y old  Female who presents with a chief complaint of sepsis (10 Sep 2021 15:53)    Subjective: No acute events overnight. Patient seen and examined at bedside. BMx1 yesterday (no diarrhea for 2 days now). Denies chest pain, abdominal pain, cough, n/v, sob. Breathing comfortably on supplemental oxygen. Requests to see podiatry because of her "abscess" on her Left toe.     MEDICATIONS  (STANDING):  ALBUTerol    90 MICROgram(s) HFA Inhaler 1 Puff(s) Inhalation every 4 hours  cefepime   IVPB 1000 milliGRAM(s) IV Intermittent every 8 hours  cholecalciferol 1000 Unit(s) Oral daily  dextrose 40% Gel 15 Gram(s) Oral once  dextrose 5%. 1000 milliLiter(s) (50 mL/Hr) IV Continuous <Continuous>  dextrose 5%. 1000 milliLiter(s) (100 mL/Hr) IV Continuous <Continuous>  dextrose 50% Injectable 25 Gram(s) IV Push once  dextrose 50% Injectable 12.5 Gram(s) IV Push once  dextrose 50% Injectable 25 Gram(s) IV Push once  enoxaparin Injectable 40 milliGRAM(s) SubCutaneous daily  FIRST- Mouthwash  BLM 5 milliLiter(s) Swish and Swallow every 4 hours  glucagon  Injectable 1 milliGRAM(s) IntraMuscular once  influenza   Vaccine 0.5 milliLiter(s) IntraMuscular once  insulin lispro (ADMELOG) corrective regimen sliding scale   SubCutaneous three times a day before meals  insulin lispro (ADMELOG) corrective regimen sliding scale   SubCutaneous at bedtime  lactated ringers. 1000 milliLiter(s) (75 mL/Hr) IV Continuous <Continuous>  LORazepam     Tablet 1 milliGRAM(s) Oral once  melatonin 3 milliGRAM(s) Oral at bedtime  metroNIDAZOLE  IVPB 500 milliGRAM(s) IV Intermittent every 12 hours  mometasone 220 MICROgram(s) Inhaler 1 Puff(s) Inhalation daily  montelukast 10 milliGRAM(s) Oral daily  nystatin Powder 1 Application(s) Topical two times a day  pantoprazole    Tablet 40 milliGRAM(s) Oral before breakfast  tiotropium 18 MICROgram(s) Capsule 1 Capsule(s) Inhalation daily    MEDICATIONS  (PRN):  benzocaine 15 mG/menthol 3.6 mG (Sugar-Free) Lozenge 1 Lozenge Oral every 6 hours PRN Sore Throat  gabapentin 300 milliGRAM(s) Oral three times a day PRN neuropathy  lidocaine 2% Viscous 5 milliLiter(s) Swish and Spit every 6 hours PRN Mouth Care  tetracaine/benzocaine/butamben Spray 1 Spray(s) Topical every 6 hours PRN sore throat        Objective:     Vitals: Vital Signs Last 24 Hrs  T(C): 37.2 (21 @ 04:30), Max: 37.5 (09-10-21 @ 19:22)  T(F): 99 (21 @ 04:30), Max: 99.5 (09-10-21 @ 19:22)  HR: 110 (21 @ 04:30) (101 - 120)  BP: 118/80 (21 @ 04:30) (105/57 - 129/79)  BP(mean): --  RR: 18 (21 @ 04:30) (18 - 18)  SpO2: 93% (21 @ 04:30) (88% - 96%)            I&O's Summary    10 Sep 2021 07:01  -  11 Sep 2021 07:00  --------------------------------------------------------  IN: 1505 mL / OUT: 200 mL / NET: 1305 mL        PHYSICAL EXAM:  CONSTITUTIONAL: Well-groomed, lethargic, moderate distress secondary to pain  EYES: periorbital jaundice. No conjunctival or scleral injection, non-icteric; PERRL and symmetric  ENMT: Pharyngeal erythema without exudates. No external nasal lesions; nasal mucosa not inflamed; normal dentition.   RESPIRATORY: Breathing on 2L nasal cannula; lungs CTA without wheeze/rhonchi/rales  CARDIOVASCULAR: +S1S2, RRR, no M/G/R; pedal pulses full and symmetric; no lower extremity edema  GASTROINTESTINAL: +BS throughout, no rebound/guarding, nontender; no hepatosplenomegaly  SKIN: Bandages on b/l knees. Superficial skin ulcerations on first 3 toes of Left toe.   NEURO: A&O4, no asterixis; no focal deficits.   PSYCHIATRIC: mood and affect appropriate; appropriate insight and judgment      LABS:                        10.0   13.11 )-----------( 81       ( 11 Sep 2021 06:32 )             30.0                         9.4    8.43  )-----------( 84       ( 10 Sep 2021 09:14 )             28.6                         8.7    3.20  )-----------( 103      ( 09 Sep 2021 11:08 )             26.6     Hgb Trend: 10.0<--, 9.4<--, 8.7<--, 8.6<--, 9.3<--  11    131<L>  |  99  |  36<H>  ----------------------------<  137<H>  4.1   |  19<L>  |  1.80<H>  09-10    133<L>  |  99  |  26<H>  ----------------------------<  110<H>  4.1   |  19<L>  |  1.39<H>      138  |  103  |  20  ----------------------------<  158<H>  4.0   |  18<L>  |  1.12    Ca    8.8      11 Sep 2021 06:32  Ca    9.1      10 Sep 2021 09:14  Ca    9.3      09 Sep 2021 11:08  Phos  2.9       Mg     1.8         TPro  5.5<L>  /  Alb  2.2<L>  /  TBili  9.5<H>  /  DBili  x   /  AST  248<H>  /  ALT  64<H>  /  AlkPhos  1285<H>    TPro  5.7<L>  /  Alb  2.4<L>  /  TBili  7.9<H>  /  DBili  x   /  AST  233<H>  /  ALT  68<H>  /  AlkPhos  1148<H>  09-10  TPro  5.7<L>  /  Alb  2.4<L>  /  TBili  6.4<H>  /  DBili  x   /  AST  216<H>  /  ALT  75<H>  /  AlkPhos  897<H>      Creatinine Trend: 1.80<--, 1.39<--, 1.12<--, 1.06<--, 1.02<--, 1.01<--                Urinalysis Basic - ( 10 Sep 2021 07:44 )    Color: Dark Yellow / Appearance: Slightly Turbid / S.018 / pH: x  Gluc: x / Ketone: Negative  / Bili: Moderate / Urobili: Negative   Blood: x / Protein: 100 / Nitrite: Negative   Leuk Esterase: Negative / RBC: 47 /hpf / WBC 6 /HPF   Sq Epi: x / Non Sq Epi: 1 /hpf / Bacteria: Negative        CAPILLARY BLOOD GLUCOSE      POCT Blood Glucose.: 139 mg/dL (11 Sep 2021 07:51)  POCT Blood Glucose.: 148 mg/dL (10 Sep 2021 22:08)  POCT Blood Glucose.: 153 mg/dL (10 Sep 2021 17:53)  POCT Blood Glucose.: 135 mg/dL (10 Sep 2021 11:58)

## 2021-09-11 NOTE — SWALLOW BEDSIDE ASSESSMENT ADULT - COMMENTS
Hx cont:   9/8/21 Per Hem/Onc: Pt remains neutropenic; off ibrance for 3 weeks- Continue neupogen until ANC > 1000.  Per GI: Suspect hyperbilirubinemia is related to progression of disease with hepatic involvement. Maintain low suspicion for cholangitis in absence of bile duct dilatation, abdominal pain or fevers.: if direct hyperbilirubinemia worsens, obtain US abdomen to assess for any biliary ductal dilatation; - trend total bilirubin and aminotransferases on CMP daily .    9/10/11 Per ENT:  Diffuse oropharynx and laryngeal ulceration with sloughing:  Nasopharynx, oropharynx, and hypopharynx also with diffuse ulceration and erythema, no bleeding. Tongue base, posterior pharyngeal wall, vallecula, epiglottis, and subglottis appear normal. + diffuse superglottic ulceration and erythema. No edema, pooling of secretions, masses  Airway patent, no foreign body visualized. No glottic/supraglottic edema. True vocal cords, arytenoids, vestibular folds, ventricles, pyriform sinuses, and aryepiglottic folds appear normal bilaterally. Vocal cords mobile with good contact b/l.  Impressions: Mucositis. Recd: - f/u culture bacterial, HSV, fungal: - continue with supportive care, magic mouth wash, lidocaine and lozenges. - diet as tolerated- call ent prn- humidify o2.  Per ID: Not neutropenic and no infection identified thus far but last night with new fever, chills and rising obstructive liver enzymes.  Tumor burden could cause fever and she has no abdominal pain/tenderness but I think cholangitis should be considered.  Sore throat - looks like mucositis, not bacterial pharyngitis. Documented Zosyn allergy but reports remote history of renal injury and not allergic symptoms.  Nontoxic today.  -f/u blood cultures -empiric Cefepime 1GM IV q8h, Flagyl 500mg q12h -repeat RUQ US -GI/Hepatology follow up regarding ERCP given clinical change.

## 2021-09-11 NOTE — PROGRESS NOTE ADULT - PROBLEM SELECTOR PLAN 1
R/O: neutropenic sepsis  - SIRS for tachycardia and low WBC  - Source: cholangitis vs infectious enteritis vs typhlitis vs oncologic sequelae   - New chemo of abraxane well known to cause neutropenia and liver injury  -  (c/f neutropenic fever if ANC <500)  - s/p GCSFx1 on 9/7 to increase neutrophil count  - S/p vanco x1 (9/7), cefepime 1g q8h (9/7-9/8), flagyl 500mg q8h (9/7-9/8)   - D/c abx per ID as less concern for infection   - Continue maintenance IVF  - Trend lactate (5.3 -> 3.5 -> 3.0)  - GI PCR negative, C. diff negative   - UA positive, Ucx negative, Legionella negative   - Blood cx 9/6: NGTD  - Pt spiked fever 9/9 OVN. Blood cx, urine cx, UA sent. Start cefepime 1g q8h and metronidazole 500mg q12h (9/10- and obtain RUQ U/S, per ID R/O: neutropenic sepsis  - SIRS for tachycardia and low WBC; improving   - Source: cholangitis vs infectious enteritis vs typhlitis vs oncologic sequelae   - New chemo of abraxane well known to cause neutropenia and liver injury  -  (c/f neutropenic fever if ANC <500)   - s/p GCSFx1 on 9/7, now with WBC improved   - S/p vanco x1 (9/7), cefepime 1g q8h (9/7-9/8), flagyl 500mg q8h (9/7-9/8)   - Continue maintenance IVF  - Trend lactate (5.3 -> 3.5 -> 3.0 -> 2.6)  - GI PCR negative, C. diff negative   - UA positive, Ucx negative, Legionella negative   - Blood cx 9/6: NGTD  - Pt spiked fever 9/9 OVN. Blood cx, urine cx, UA sent. Start cefepime 1g q8h and metronidazole 500mg q12h (9/10- and obtain RUQ U/S, per ID

## 2021-09-11 NOTE — PROGRESS NOTE ADULT - PROBLEM SELECTOR PLAN 9
DVT ppx: Lovenox   Diet: DASH/CC Mixed acid base disorder  - AGMA likely 2/2 lactic acidosis with respiratory compensation  - most likely i/s/o sepsis although tumor production and decreased hepatic clearance is also a possibility  - trend lactate    #chronic conditions  - HTN: home meds d/nicole several weeks ago  - DM: hold home metformin. ISS while inpatient  - Asthma: c/w trelegy -> duoneb +mometasone  - Gerd: c/w PPI  - Podiatry following for superficial skin lesions on first three left toes   - Anxiety: hold benzos for now, can give small dose ativan if severe anxiety or benzo withdrawal (unlikely)  - Nausea: hold antinausea home meds, can restart if needed

## 2021-09-11 NOTE — PROGRESS NOTE ADULT - ASSESSMENT
64 yo F with PMH metastatic ER/RI(+), HER2(-) breast cancer (to bone and liver), s/p first dose abraxane on 9/1 presents with diarrhea, SIRS, acute encephalopathy, cholestasis, liver failure concerning for sepsis 2/2 oncologic sequelae.        64 yo F with PMH metastatic ER/TN(+), HER2(-) breast cancer (to bone and liver), s/p first dose abraxane on 9/1 presents with diarrhea, SIRS, acute encephalopathy, cholestasis, liver failure concerning for sepsis 2/2 oncologic sequelae. Also found to have oropharynx and laryngeal ulceration possibly 2/2 chemotherapy.

## 2021-09-11 NOTE — SWALLOW BEDSIDE ASSESSMENT ADULT - SWALLOW EVAL: DIAGNOSIS
Consult order received; chart reviewed.  Pt currently NPO for Abdominal Ultrasound per d/w Nsg therefore swallowing evaluation deferred at this time.  SLP spoke with Pt and daughter at b/s.  Pt reports pain is less upon swallow; reports overall pain 8/10.   SLP to f/u as schedule permits. Consult order received; chart reviewed.  Pt currently NPO for Abdominal Ultrasound per d/w Nsg therefore swallowing evaluation deferred at this time.  SLP spoke with Pt and daughter at b/s.  Pt reports pain is less upon swallow; reports overall pain 8/10.   SLP to f/u at a later date, as goals of care discussion pending for 9/13/21. Consult order received; chart reviewed.  Swallowing evaluation attempted in am and pm.  Pt currently NPO for Abdominal Ultrasound per d/w Nsg therefore swallowing evaluation deferred at this time.  SLP spoke with Pt and daughter at b/s.  Pt reports pain is less upon swallow; reports overall pain 8/10.   SLP to f/u at a later date, as goals of care discussion pending for 9/13/21, Pt in agreement.

## 2021-09-11 NOTE — PROGRESS NOTE ADULT - PROBLEM SELECTOR PLAN 7
- Likely 2/2 chemotherapy. Platelet count is normal but decreased from prior.  - Anemia currently stable  - B12 and folate wnl   - Transfuse for Hgb >7, Plt >10 or >20 if febrile or >50 if bleeding   - Monitor for signs/symptoms of bleeding   - F/u TLS and hemolysis labs  -  elevated, haptoglobin 251 elevated - Per chart review, patient is BRCA2(+) was started on Letrozole and Ibrane with dose reduction 2/2 neutropenia. Then given Zometa [id number]. 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 Initiated on abraxane on 9/1    - PE ruled out with CT PA  - Pain: if needed, can give morphine  - Hold chemotherapy while inpatient. F/u with Dr. Blanc as outpatient  - Oncology following

## 2021-09-11 NOTE — PROGRESS NOTE ADULT - PROBLEM SELECTOR PLAN 5
- Pt with throat pain on admission and now pain on swallowing   - ENT scope on 9/10 shows diffuse oropharynx and laryngeal ulceration with sloughing  - Mucositis 2/2 chemotherapy vs. secondary superimposed fungal, bacterial and HSV infection  - CMV and HSV negative   - Given cepacol lozenge, cetacaine spray, magic mouthwash, and lidocaine 2% viscous without relief   - Speech and swallow eval, puree diet for now   - Appreciate ENT recs  - f/u throat cultures  - Humidify O2 - Pt with throat pain on admission and now pain on swallowing   - ENT scope on 9/10 shows diffuse oropharynx and laryngeal ulceration with sloughing  - Mucositis 2/2 chemotherapy vs. secondary superimposed fungal, bacterial and HSV infection  - CMV and HSV negative   - Continue cepacol lozenge, cetacaine spray, magic mouthwash, lidocaine 2% viscous, nystatin powder  - Speech and swallow eval, puree diet for now   - Appreciate ENT recs  - f/u throat cultures  - Humidify O2

## 2021-09-11 NOTE — CONSULT NOTE ADULT - ASSESSMENT
Pt is 66yo who presents w/ L hallux wound to subQ  -pt seen and examined  -L distal hallux wound to subQ w/ periwound hyperkeratosis, no local signs if infection, no drainage. S/P excisional debridement of callus to level of epidermis and not beyond using 15 blade.   -Pt refusing xrays to L foot at this time  -Pod plan for local wound care w/ mupirocin  -Pt to follow up as outpt w/ Dr. Mendoza  -Discussed w/ attending

## 2021-09-11 NOTE — CONSULT NOTE ADULT - CONSULT REQUESTED DATE/TIME
07-Sep-2021 12:04
08-Sep-2021 11:57
10-Sep-2021
10-Sep-2021 14:22
08-Sep-2021 15:26
11-Sep-2021 12:35
07-Sep-2021 19:05

## 2021-09-12 NOTE — PROGRESS NOTE ADULT - PROBLEM SELECTOR PLAN 10
DVT ppx: Lovenox   Diet: DASH/CC DVT ppx: Lovenox   Diet: DASH/CC  DNR/DNI, comfort measures. Pending transfer to PCU once bed available

## 2021-09-12 NOTE — PROGRESS NOTE ADULT - SUBJECTIVE AND OBJECTIVE BOX
Miya Alcocer, PGY-3  Internal Medicine  Pager: [phone], [phone]    PROGRESS NOTE:     Patient is a 66y old  Female who presents with a chief complaint of sepsis (11 Sep 2021 12:35)      SUBJECTIVE / OVERNIGHT EVENTS:    ADDITIONAL REVIEW OF SYSTEMS:    MEDICATIONS  (STANDING):  ALBUTerol    90 MICROgram(s) HFA Inhaler 1 Puff(s) Inhalation every 4 hours  cefepime   IVPB 1000 milliGRAM(s) IV Intermittent every 8 hours  cholecalciferol 1000 Unit(s) Oral daily  dextrose 40% Gel 15 Gram(s) Oral once  dextrose 5%. 1000 milliLiter(s) (50 mL/Hr) IV Continuous <Continuous>  dextrose 5%. 1000 milliLiter(s) (100 mL/Hr) IV Continuous <Continuous>  dextrose 50% Injectable 25 Gram(s) IV Push once  dextrose 50% Injectable 12.5 Gram(s) IV Push once  dextrose 50% Injectable 25 Gram(s) IV Push once  enoxaparin Injectable 40 milliGRAM(s) SubCutaneous daily  FIRST- Mouthwash  BLM 5 milliLiter(s) Swish and Swallow every 4 hours  glucagon  Injectable 1 milliGRAM(s) IntraMuscular once  influenza   Vaccine 0.5 milliLiter(s) IntraMuscular once  insulin lispro (ADMELOG) corrective regimen sliding scale   SubCutaneous three times a day before meals  insulin lispro (ADMELOG) corrective regimen sliding scale   SubCutaneous at bedtime  lactated ringers. 1000 milliLiter(s) (75 mL/Hr) IV Continuous <Continuous>  LORazepam     Tablet 1 milliGRAM(s) Oral once  melatonin 3 milliGRAM(s) Oral at bedtime  metroNIDAZOLE  IVPB 500 milliGRAM(s) IV Intermittent every 12 hours  mometasone 220 MICROgram(s) Inhaler 1 Puff(s) Inhalation daily  montelukast 10 milliGRAM(s) Oral daily  mupirocin 2% Ointment 1 Application(s) Topical two times a day  nystatin Powder 1 Application(s) Topical two times a day  pantoprazole    Tablet 40 milliGRAM(s) Oral before breakfast  sodium chloride 0.9%. 1000 milliLiter(s) (75 mL/Hr) IV Continuous <Continuous>  tiotropium 18 MICROgram(s) Capsule 1 Capsule(s) Inhalation daily    MEDICATIONS  (PRN):  benzocaine 15 mG/menthol 3.6 mG (Sugar-Free) Lozenge 1 Lozenge Oral every 6 hours PRN Sore Throat  gabapentin 300 milliGRAM(s) Oral three times a day PRN neuropathy  HYDROmorphone  Injectable 0.5 milliGRAM(s) IV Push every 4 hours PRN Severe Pain (7 - 10)  HYDROmorphone  Injectable 0.25 milliGRAM(s) IV Push every 4 hours PRN Moderate Pain (4 - 6)  lidocaine 2% Viscous 5 milliLiter(s) Swish and Spit every 6 hours PRN Mouth Care  tetracaine/benzocaine/butamben Spray 1 Spray(s) Topical every 6 hours PRN sore throat      CAPILLARY BLOOD GLUCOSE      POCT Blood Glucose.: 157 mg/dL (12 Sep 2021 07:50)  POCT Blood Glucose.: 142 mg/dL (11 Sep 2021 21:52)  POCT Blood Glucose.: 174 mg/dL (11 Sep 2021 16:48)  POCT Blood Glucose.: 153 mg/dL (11 Sep 2021 12:06)    I&O's Summary    11 Sep 2021 07:01  -  12 Sep 2021 07:00  --------------------------------------------------------  IN: 240 mL / OUT: 700 mL / NET: -460 mL        PHYSICAL EXAM:  Vital Signs Last 24 Hrs  T(C): 36.7 (12 Sep 2021 05:28), Max: 37.4 (12 Sep 2021 00:05)  T(F): 98 (12 Sep 2021 05:28), Max: 99.4 (12 Sep 2021 00:05)  HR: 112 (12 Sep 2021 05:28) (99 - 115)  BP: 132/75 (12 Sep 2021 05:28) (110/71 - 137/75)  BP(mean): --  RR: 20 (12 Sep 2021 05:28) (18 - 20)  SpO2: 93% (12 Sep 2021 05:28) (91% - 94%)    CONSTITUTIONAL: NAD, well-developed  RESPIRATORY: Normal respiratory effort; lungs are clear to auscultation bilaterally  CARDIOVASCULAR: Regular rate and rhythm, normal S1 and S2, no murmur/rub/gallop; No lower extremity edema; Peripheral pulses are 2+ bilaterally  ABDOMEN: Nontender to palpation, normoactive bowel sounds, no rebound/guarding; No hepatosplenomegaly  MUSCLOSKELETAL: no clubbing or cyanosis of digits; no joint swelling or tenderness to palpation  PSYCH: A+O to person, place, and time; affect appropriate    LABS:                        10.0   13.11 )-----------( 81       ( 11 Sep 2021 06:32 )             30.0     09-12    134<L>  |  100  |  41<H>  ----------------------------<  139<H>  4.2   |  16<L>  |  1.80<H>    Ca    9.0      12 Sep 2021 06:59  Phos  2.9     09-12  Mg     1.9     09-12    TPro  5.5<L>  /  Alb  2.2<L>  /  TBili  10.0<H>  /  DBili  7.9<H>  /  AST  243<H>  /  ALT  57<H>  /  AlkPhos  1295<H>  09-12              Culture - Other (collected 10 Sep 2021 18:46)  Source: .Other throat  Final Report (12 Sep 2021 08:53):    Numerous Candida albicans Susceptibilites not performed.    Numerous Coag Negative Staphylococcus Susceptibilites not performed.    Culture - Blood (collected 10 Sep 2021 01:18)  Source: .Blood Blood-Peripheral  Preliminary Report (11 Sep 2021 02:01):    No growth to date.    Culture - Blood (collected 10 Sep 2021 01:18)  Source: .Blood Blood-Peripheral  Preliminary Report (11 Sep 2021 02:01):    No growth to date.        RADIOLOGY & ADDITIONAL TESTS:  Results Reviewed:   Imaging Personally Reviewed:  Electrocardiogram Personally Reviewed:    COORDINATION OF CARE:  Care Discussed with Consultants/Other Providers [Y/N]:  Prior or Outpatient Records Reviewed [Y/N]:   Miya Alcocer, PGY-3  Internal Medicine  Pager: [phone], [phone]    PROGRESS NOTE:     Patient is a 66y old  Female who presents with a chief complaint of sepsis (11 Sep 2021 12:35)      SUBJECTIVE / OVERNIGHT EVENTS: No acute events overnight. Patient seen and examined this AM, extensive goals of care decision also held. Patient states her diarrhea is getting better, but her abdominal pain is shifting. She clearly expressed desire to die with dignity and not pursue any more treatment. Per pt, her parents and sister passed away and she does not want to suffer like her sister did. Overall has clear comprehension of her medical condition, expressed desire to be DNR/DNI and fill out MOLST once  came to visit. Showed interest in home hospice, conveyed to palliative team.     ADDITIONAL REVIEW OF SYSTEMS: neg     MEDICATIONS  (STANDING):  ALBUTerol    90 MICROgram(s) HFA Inhaler 1 Puff(s) Inhalation every 4 hours  cefepime   IVPB 1000 milliGRAM(s) IV Intermittent every 8 hours  cholecalciferol 1000 Unit(s) Oral daily  dextrose 40% Gel 15 Gram(s) Oral once  dextrose 5%. 1000 milliLiter(s) (50 mL/Hr) IV Continuous <Continuous>  dextrose 5%. 1000 milliLiter(s) (100 mL/Hr) IV Continuous <Continuous>  dextrose 50% Injectable 25 Gram(s) IV Push once  dextrose 50% Injectable 12.5 Gram(s) IV Push once  dextrose 50% Injectable 25 Gram(s) IV Push once  enoxaparin Injectable 40 milliGRAM(s) SubCutaneous daily  FIRST- Mouthwash  BLM 5 milliLiter(s) Swish and Swallow every 4 hours  glucagon  Injectable 1 milliGRAM(s) IntraMuscular once  influenza   Vaccine 0.5 milliLiter(s) IntraMuscular once  insulin lispro (ADMELOG) corrective regimen sliding scale   SubCutaneous three times a day before meals  insulin lispro (ADMELOG) corrective regimen sliding scale   SubCutaneous at bedtime  lactated ringers. 1000 milliLiter(s) (75 mL/Hr) IV Continuous <Continuous>  LORazepam     Tablet 1 milliGRAM(s) Oral once  melatonin 3 milliGRAM(s) Oral at bedtime  metroNIDAZOLE  IVPB 500 milliGRAM(s) IV Intermittent every 12 hours  mometasone 220 MICROgram(s) Inhaler 1 Puff(s) Inhalation daily  montelukast 10 milliGRAM(s) Oral daily  mupirocin 2% Ointment 1 Application(s) Topical two times a day  nystatin Powder 1 Application(s) Topical two times a day  pantoprazole    Tablet 40 milliGRAM(s) Oral before breakfast  sodium chloride 0.9%. 1000 milliLiter(s) (75 mL/Hr) IV Continuous <Continuous>  tiotropium 18 MICROgram(s) Capsule 1 Capsule(s) Inhalation daily    MEDICATIONS  (PRN):  benzocaine 15 mG/menthol 3.6 mG (Sugar-Free) Lozenge 1 Lozenge Oral every 6 hours PRN Sore Throat  gabapentin 300 milliGRAM(s) Oral three times a day PRN neuropathy  HYDROmorphone  Injectable 0.5 milliGRAM(s) IV Push every 4 hours PRN Severe Pain (7 - 10)  HYDROmorphone  Injectable 0.25 milliGRAM(s) IV Push every 4 hours PRN Moderate Pain (4 - 6)  lidocaine 2% Viscous 5 milliLiter(s) Swish and Spit every 6 hours PRN Mouth Care  tetracaine/benzocaine/butamben Spray 1 Spray(s) Topical every 6 hours PRN sore throat      CAPILLARY BLOOD GLUCOSE      POCT Blood Glucose.: 157 mg/dL (12 Sep 2021 07:50)  POCT Blood Glucose.: 142 mg/dL (11 Sep 2021 21:52)  POCT Blood Glucose.: 174 mg/dL (11 Sep 2021 16:48)  POCT Blood Glucose.: 153 mg/dL (11 Sep 2021 12:06)    I&O's Summary    11 Sep 2021 07:01  -  12 Sep 2021 07:00  --------------------------------------------------------  IN: 240 mL / OUT: 700 mL / NET: -460 mL        PHYSICAL EXAM:  Vital Signs Last 24 Hrs  T(C): 36.7 (12 Sep 2021 05:28), Max: 37.4 (12 Sep 2021 00:05)  T(F): 98 (12 Sep 2021 05:28), Max: 99.4 (12 Sep 2021 00:05)  HR: 112 (12 Sep 2021 05:28) (99 - 115)  BP: 132/75 (12 Sep 2021 05:28) (110/71 - 137/75)  BP(mean): --  RR: 20 (12 Sep 2021 05:28) (18 - 20)  SpO2: 93% (12 Sep 2021 05:28) (91% - 94%)    CONSTITUTIONAL: Well-groomed, lethargic, moderate distress secondary to pain  EYES: periorbital jaundice. No conjunctival or scleral injection, non-icteric; PERRL and symmetric  ENMT: Pharyngeal erythema without exudates. No external nasal lesions; nasal mucosa not inflamed; normal dentition.   RESPIRATORY: Breathing on 2L nasal cannula; lungs CTA without wheeze/rhonchi/rales  CARDIOVASCULAR: +S1S2, RRR, no M/G/R; pedal pulses full and symmetric; no lower extremity edema  GASTROINTESTINAL: +BS throughout, no rebound/guarding, nontender; no hepatosplenomegaly  SKIN: Bandages on b/l knees. Superficial skin ulcerations on first 3 toes of Left toe.   NEURO: A&O4, no asterixis; no focal deficits.   PSYCHIATRIC: mood and affect appropriate; appropriate insight and judgment    LABS:                        10.0   13.11 )-----------( 81       ( 11 Sep 2021 06:32 )             30.0     09-12    134<L>  |  100  |  41<H>  ----------------------------<  139<H>  4.2   |  16<L>  |  1.80<H>    Ca    9.0      12 Sep 2021 06:59  Phos  2.9     09-12  Mg     1.9     09-12    TPro  5.5<L>  /  Alb  2.2<L>  /  TBili  10.0<H>  /  DBili  7.9<H>  /  AST  243<H>  /  ALT  57<H>  /  AlkPhos  1295<H>  09-12              Culture - Other (collected 10 Sep 2021 18:46)  Source: .Other throat  Final Report (12 Sep 2021 08:53):    Numerous Candida albicans Susceptibilites not performed.    Numerous Coag Negative Staphylococcus Susceptibilites not performed.    Culture - Blood (collected 10 Sep 2021 01:18)  Source: .Blood Blood-Peripheral  Preliminary Report (11 Sep 2021 02:01):    No growth to date.    Culture - Blood (collected 10 Sep 2021 01:18)  Source: .Blood Blood-Peripheral  Preliminary Report (11 Sep 2021 02:01):    No growth to date.        RADIOLOGY & ADDITIONAL TESTS:  Results Reviewed:   Imaging Personally Reviewed:  Electrocardiogram Personally Reviewed:    COORDINATION OF CARE:  Care Discussed with Consultants/Other Providers [Y/N]:  Prior or Outpatient Records Reviewed [Y/N]:

## 2021-09-12 NOTE — PROGRESS NOTE ADULT - PROBLEM SELECTOR PLAN 5
Discussion held with patient, her  and daughter in which patient expressed her unequivocal desire to die peacefully and comfortably.  She recounts seeing her sister die a slow, miserable death from gastric cancer and she is scared of this happening to her.  Discussed completion of MOLST form and an order for DNR.  Patient elects for DNR/DNI and comfort care.  Her family is in agreement with her wishes.  MOLST form completed and put in patient's chart.

## 2021-09-12 NOTE — PROGRESS NOTE ADULT - PROBLEM SELECTOR PLAN 5
- Pt with throat pain on admission and now pain on swallowing   - ENT scope on 9/10 shows diffuse oropharynx and laryngeal ulceration with sloughing  - Mucositis 2/2 chemotherapy vs. secondary superimposed fungal, bacterial and HSV infection  - CMV and HSV negative   - Continue cepacol lozenge, cetacaine spray, magic mouthwash, lidocaine 2% viscous, nystatin powder  - Speech and swallow eval, puree diet for now   - Appreciate ENT recs  - f/u throat cultures  - Humidify O2

## 2021-09-12 NOTE — PROGRESS NOTE ADULT - PROBLEM SELECTOR PLAN 3
Patient with persistent elevation in transaminases and now with elevations in bilirubin, ALP, and INR.   - Hyperbilirubinemia likely 2/2 progression of disease/hepatic involvement   - Ammonia and lipase wnl   - Elevated INR 1.53   - d/c lactulose as patient having diarrhea  - Viral hepatitis panel, CMV, HIV all negative  - EBV panel with either prior infection or reactivation (EBV VCA IGG AB+, EBV NA IGG AB+, EBV VCA IGM AB-, EBV EA IGG AB+)  - CT scan reporting cirrhosis although unclear if just nodular from tumors  - GI/hepatology following   - If direct hyperbilirubinemia worsens (doubles), will obtain US abdomen to assess for any biliary ductal dilation  - Liver enzymes are increasing Patient with persistent elevation in transaminases and now with elevations in bilirubin, ALP, and INR.   - Hyperbilirubinemia likely 2/2 progression of disease/hepatic involvement   - Ammonia and lipase wnl   - Elevated INR 1.53   - d/c lactulose as patient having diarrhea  - Viral hepatitis panel, CMV, HIV all negative  - EBV panel with either prior infection or reactivation (EBV VCA IGG AB+, EBV NA IGG AB+, EBV VCA IGM AB-, EBV EA IGG AB+)  - CT scan reporting cirrhosis although unclear if just nodular from tumors  - GI/hepatology following   - Liver enzymes are increasing

## 2021-09-12 NOTE — PROGRESS NOTE ADULT - SUBJECTIVE AND OBJECTIVE BOX
HPI:  65 year old woman with PMH of breast cancer with mets to bone and liver with recent initiation of new chemotherapy - abraxane 6 days ago, lap-band presents with diarrhea, abdominal pain, jaundice, and confusion. Per son, patient had been experiencing dyspnea, fatigue, and dysuria and had presented to the ED 2 weeeks ago. At that time found to have a UTI, JAMAAL, and liver mets by ultrasound, she subsequently had a biopsy as an outpatient several days later. She has continued to have fatigue and weakness and some SOB. She went to pulm who started trelegy ellipta - per patient too soon to tell if it's helping. More recently she started to have some confusion manifested by forgetting what she was saying, answering questions incorrectly, and repeating herself. She has also had some decreased PO intake that has worsened over last few days. Lastly she started having diarrhea this past week, reporting 3-4 BMs per day, patient unable to articulate whether loose or watery. She also reports continued polyuria and polydipsia but her home blood sugars have been normal. She is denying any blood in urine/stool or melena as well as fevers/chills, chest pain, current dyspnea, N/V, and dysuria.     In ED: Afebrile, , /80, RR 18, Spo2 95%. Lactate 5.3, WBC 1.5 with , direct bili 4. Given 2L IVF, vanco and cefepime 1g.  (06 Sep 2021 19:41)      PERTINENT PM/SXH:   Diabetes mellitus type II    Hypertension    Hyperlipidemia    Osteoarthritis    Toe ulcer, right    Asthma    Glaucoma    Anxiety    Morbidly obese    Breast cancer, left    GERD (gastroesophageal reflux disease)      S/P laparoscopic surgery    S/P hip replacement    H/O:  Section    History of tonsillectomy    H/O drainage of abscess    S/P biopsy    H/O rhinoplasty     history      SOCIAL HISTORY:   Significant other/partner:  [ ]YES  [ ]NO*  Children:  [ ]YES  [ ]NO*  Worship/Spirituality:  Substance hx:  [ ]YES  [ ]NO*  Tobacco hx:  [ ]YES  [ ]NO*  Alcohol hx: [ ]YES  [ ]NO*    Home Opioid hx:  [ ]YES  [ ]NO   Living Situation: [ ]Home  [ ]Long term care  [ ]Rehab [ ]Other    FAMILY HISTORY:  No pertinent family history in first degree relatives      [ ]Family history non-contributory     BASELINE (I)ADLs (prior to admission):  [place]: [ ]total  [ ] moderate [ ]dependent    ADVANCE DIRECTIVES:    DNR   MOLST  [ ]YES [ ]NO                      [ ]Completed  Health Care Proxy [ ]YES  [ ]NO                   [ ]Completed  Living Will  [ ]YES [ ]NO           [ ]Surrogate  [ ]HCP  [ ]Guardian:  Phone#:    Allergies    tetanus toxoid (Unknown)  Zosyn (Unknown)    Intolerances      MEDICATIONS  (STANDING):  ALBUTerol    90 MICROgram(s) HFA Inhaler 1 Puff(s) Inhalation every 4 hours  cefepime   IVPB 1000 milliGRAM(s) IV Intermittent every 8 hours  cholecalciferol 1000 Unit(s) Oral daily  dextrose 40% Gel 15 Gram(s) Oral once  dextrose 5%. 1000 milliLiter(s) (50 mL/Hr) IV Continuous <Continuous>  dextrose 5%. 1000 milliLiter(s) (100 mL/Hr) IV Continuous <Continuous>  dextrose 50% Injectable 25 Gram(s) IV Push once  dextrose 50% Injectable 12.5 Gram(s) IV Push once  dextrose 50% Injectable 25 Gram(s) IV Push once  enoxaparin Injectable 40 milliGRAM(s) SubCutaneous daily  FIRST- Mouthwash  BLM 5 milliLiter(s) Swish and Swallow every 4 hours  glucagon  Injectable 1 milliGRAM(s) IntraMuscular once  influenza   Vaccine 0.5 milliLiter(s) IntraMuscular once  insulin lispro (ADMELOG) corrective regimen sliding scale   SubCutaneous three times a day before meals  insulin lispro (ADMELOG) corrective regimen sliding scale   SubCutaneous at bedtime  lactated ringers. 1000 milliLiter(s) (75 mL/Hr) IV Continuous <Continuous>  LORazepam     Tablet 1 milliGRAM(s) Oral once  melatonin 3 milliGRAM(s) Oral at bedtime  metroNIDAZOLE  IVPB 500 milliGRAM(s) IV Intermittent every 12 hours  mometasone 220 MICROgram(s) Inhaler 1 Puff(s) Inhalation daily  montelukast 10 milliGRAM(s) Oral daily  mupirocin 2% Ointment 1 Application(s) Topical two times a day  nystatin Powder 1 Application(s) Topical two times a day  pantoprazole    Tablet 40 milliGRAM(s) Oral before breakfast  sodium chloride 0.9%. 1000 milliLiter(s) (75 mL/Hr) IV Continuous <Continuous>  tiotropium 18 MICROgram(s) Capsule 1 Capsule(s) Inhalation daily    MEDICATIONS  (PRN):  benzocaine 15 mG/menthol 3.6 mG (Sugar-Free) Lozenge 1 Lozenge Oral every 6 hours PRN Sore Throat  gabapentin 300 milliGRAM(s) Oral three times a day PRN neuropathy  HYDROmorphone  Injectable 0.5 milliGRAM(s) IV Push every 4 hours PRN Severe Pain (7 - 10)  HYDROmorphone  Injectable 0.25 milliGRAM(s) IV Push every 4 hours PRN Moderate Pain (4 - 6)  lidocaine 2% Viscous 5 milliLiter(s) Swish and Spit every 6 hours PRN Mouth Care  tetracaine/benzocaine/butamben Spray 1 Spray(s) Topical every 6 hours PRN sore throat      PRESENT SYMPTOMS:  Source: [ ]Patient   [ ]Family   [ ]Team     Pain:                        [ ]No [ ]Yes             [ ]Mild [ ]Moderate [ ]Severe  Onset -  Location -  Duration -  Character -  Alleviating/Aggravating -  Radiation -  Timing -    Dyspnea:                [ ]No [ ]Yes             [ ]Mild [ ]Moderate [ ]Severe  Anxiety:                  [ ]No [ ]Yes             [ ]Mild [ ]Moderate [ ]Severe  Fatigue:                  [ ]No [ ]Yes             [ ]Mild [ ]Moderate [ ]Severe  Nausea:                  [ ]No [ ]Yes             [ ]Mild [ ]Moderate [ ]Severe  Loss of appetite:   [ ]No [ ]Yes             [ ]Mild [ ]Moderate [ ]Severe  Constipation:        [ ]No [ ]Yes             [ ]Mild [ ]Moderate [ ]Severe    Other Symptoms:  [ ]All other review of systems negative   [ ]Unable to obtain due to poor mentation     Karnofsky Performance Score/Palliative Performance Status Version 2:         %  PHYSICAL EXAM:  Vital Signs Last 24 Hrs  T(C): 36.8 (12 Sep 2021 12:55), Max: 37.4 (12 Sep 2021 00:05)  T(F): 98.2 (12 Sep 2021 12:55), Max: 99.4 (12 Sep 2021 00:05)  HR: 99 (12 Sep 2021 12:55) (99 - 112)  BP: 136/73 (12 Sep 2021 12:55) (110/71 - 137/75)  BP(mean): --  RR: 20 (12 Sep 2021 12:55) (18 - 20)  SpO2: 99% (12 Sep 2021 12:55) (91% - 99%) I&O's Summary    11 Sep 2021 07:01  -  12 Sep 2021 07:00  --------------------------------------------------------  IN: 240 mL / OUT: 700 mL / NET: -460 mL      General:  [ ]Alert  [ ]Oriented x   [ ]Lethargic  [ ]Agitated   [ ]Cachexia   [ ]Unarousable  [ ]Verbal  [ ]Non-Verbal    HEENT:  [ ]Normal   [ ]Dry mouth   [ ]ET Tube/Trach  [ ]Oral lesions    Lungs:   [ ]Clear [ ]Tachypnea  [ ]Audible excessive secretions   [ ]Rhonchi        [ ]Right [ ]Left [ ]Bilateral  [ ]Crackles        [ ]Right [ ]Left [ ]Bilateral  [ ]Wheezing     [ ]Right [ ]Left [ ]Bilateral    Cardiovascular:  [ ]Regular [ ]Irregular [ ]Tachycardia  [ ]Bradycardia  [ ]Murmur [ ]Other  Abdomen: [ ]Soft  [ ]Distended   [ ]+BS  [ ]Non tender [ ]Tender  [ ]PEG/]OGT/ NGT   Last BM:     Genitourinary: [ ]Normal [  Incontinent   [ ]Oliguria/Anuria   [ ]Hebert  Musculoskeletal:  [ ]Normal   [ ]Weakness  [ ]Bedbound/Wheelchair bound [ ]Edema  Neurological: [ ]No focal deficits  [ ] Cognitive impairment  [ ] Dysphagia [ ]Dysarthria [ ] Paresis [ ]Other     Skin: [ ]Normal   [ ]Pressure ulcer(s)  [ ]Rash    LABS:                        9.6    18.19 )-----------( 69       ( 12 Sep 2021 07:00 )             28.7     09-12    134<L>  |  100  |  41<H>  ----------------------------<  139<H>  4.2   |  16<L>  |  1.80<H>    Ca    9.0      12 Sep 2021 06:59  Phos  2.9     09-12  Mg     1.9     -12    TPro  5.5<L>  /  Alb  2.2<L>  /  TBili  10.0<H>  /  DBili  7.9<H>  /  AST  243<H>  /  ALT  57<H>  /  AlkPhos  1295<H>            Shock: [ ]Septic [ ]Cardiogenic [ ]Neurologic [ ]Hypovolemic  Vasopressors x   Inotrops x     Protein Calorie Malnutrition: [ ]Mild [ ]Moderatw [ ]Severe    Oral Intake: [ ]Unable/mouth care only [ ]Minimal [ ]Moderate [ ]Full Capability  Diet: [ ]NPO [ ]Tube feeds [ ]TPN [ ]Other     RADIOLOGY & ADDITIONAL STUDIES:    REFERRALS:   [ ]Chaplaincy  [  Hospice  [ ]Child Life  [ ]Social Work  [ ]Case management [ ]Holistic Therapy  HPI:  65 year old woman with PMH of breast cancer with mets to bone and liver with recent initiation of new chemotherapy - abraxane 6 days ago, lap-band presents with diarrhea, abdominal pain, jaundice, and confusion. Per son, patient had been experiencing dyspnea, fatigue, and dysuria and had presented to the ED 2 weeeks ago. At that time found to have a UTI, JAMAAL, and liver mets by ultrasound, she subsequently had a biopsy as an outpatient several days later. She has continued to have fatigue and weakness and some SOB. She went to pulm who started trelegy ellipta - per patient too soon to tell if it's helping. More recently she started to have some confusion manifested by forgetting what she was saying, answering questions incorrectly, and repeating herself. She has also had some decreased PO intake that has worsened over last few days. Lastly she started having diarrhea this past week, reporting 3-4 BMs per day, patient unable to articulate whether loose or watery. She also reports continued polyuria and polydipsia but her home blood sugars have been normal. She is denying any blood in urine/stool or melena as well as fevers/chills, chest pain, current dyspnea, N/V, and dysuria.     In ED: Afebrile, , /80, RR 18, Spo2 95%. Lactate 5.3, WBC 1.5 with , direct bili 4. Given 2L IVF, vanco and cefepime 1g.  (06 Sep 2021 19:41)      PERTINENT PM/SXH:   Diabetes mellitus type II    Hypertension  Hyperlipidemia  Osteoarthritis  Toe ulcer, right  Asthma  Glaucoma  Anxiety  Morbidly obese  Breast cancer, left  GERD (gastroesophageal reflux disease)      S/P laparoscopic surgery    S/P hip replacement    H/O:  Section    History of tonsillectomy    H/O drainage of abscess  H/O rhinoplasty    SOCIAL HISTORY:   Significant other/partner[x ]  Children[x ]  Alevism/Spirituality:  Substance hx:  [ ]   Tobacco hx:  [ ]   Alcohol hx: [ ]   Home Opioid hx:  [ ] I-Stop Reference No:  Living Situation: [x ]Home  [ ]Long term care  [ ]Rehab [ ]Other    ADVANCE DIRECTIVES:    DNR  MOLST  [ ]  Living Will  [ ]   DECISION MAKER(s):  [ x] Health Care Proxy(s)  [ ] Surrogate(s)  [ ] Guardian           Name(s): Phone Number(s): , Gabe, number per EMR      FAMILY HISTORY:  identical twin sister  of gastric cancer      Allergies  tetanus toxoid (Unknown)  Zosyn (Unknown)    Intolerances      MEDICATIONS  (STANDING):  ALBUTerol    90 MICROgram(s) HFA Inhaler 1 Puff(s) Inhalation every 4 hours  cefepime   IVPB 1000 milliGRAM(s) IV Intermittent every 8 hours  cholecalciferol 1000 Unit(s) Oral daily  dextrose 40% Gel 15 Gram(s) Oral once  dextrose 5%. 1000 milliLiter(s) (50 mL/Hr) IV Continuous <Continuous>  dextrose 5%. 1000 milliLiter(s) (100 mL/Hr) IV Continuous <Continuous>  dextrose 50% Injectable 25 Gram(s) IV Push once  dextrose 50% Injectable 12.5 Gram(s) IV Push once  dextrose 50% Injectable 25 Gram(s) IV Push once  enoxaparin Injectable 40 milliGRAM(s) SubCutaneous daily  FIRST- Mouthwash  BLM 5 milliLiter(s) Swish and Swallow every 4 hours  glucagon  Injectable 1 milliGRAM(s) IntraMuscular once  influenza   Vaccine 0.5 milliLiter(s) IntraMuscular once  insulin lispro (ADMELOG) corrective regimen sliding scale   SubCutaneous three times a day before meals  insulin lispro (ADMELOG) corrective regimen sliding scale   SubCutaneous at bedtime  lactated ringers. 1000 milliLiter(s) (75 mL/Hr) IV Continuous <Continuous>  LORazepam     Tablet 1 milliGRAM(s) Oral once  melatonin 3 milliGRAM(s) Oral at bedtime  metroNIDAZOLE  IVPB 500 milliGRAM(s) IV Intermittent every 12 hours  mometasone 220 MICROgram(s) Inhaler 1 Puff(s) Inhalation daily  montelukast 10 milliGRAM(s) Oral daily  mupirocin 2% Ointment 1 Application(s) Topical two times a day  nystatin Powder 1 Application(s) Topical two times a day  pantoprazole    Tablet 40 milliGRAM(s) Oral before breakfast  sodium chloride 0.9%. 1000 milliLiter(s) (75 mL/Hr) IV Continuous <Continuous>  tiotropium 18 MICROgram(s) Capsule 1 Capsule(s) Inhalation daily    MEDICATIONS  (PRN):  benzocaine 15 mG/menthol 3.6 mG (Sugar-Free) Lozenge 1 Lozenge Oral every 6 hours PRN Sore Throat  gabapentin 300 milliGRAM(s) Oral three times a day PRN neuropathy  HYDROmorphone  Injectable 0.5 milliGRAM(s) IV Push every 4 hours PRN Severe Pain (7 - 10)  HYDROmorphone  Injectable 0.25 milliGRAM(s) IV Push every 4 hours PRN Moderate Pain (4 - 6)  lidocaine 2% Viscous 5 milliLiter(s) Swish and Spit every 6 hours PRN Mouth Care  tetracaine/benzocaine/butamben Spray 1 Spray(s) Topical every 6 hours PRN sore throat      PRESENT SYMPTOMS:  Source: [ ]Patient   [ ]Family   [ ]Team     Pain:                        [ ]No [ ]Yes             [ ]Mild [ ]Moderate [ ]Severe  Onset -  Location -  Duration -  Character -  Alleviating/Aggravating -  Radiation -  Timing -    Dyspnea:                [ ]No [ ]Yes             [ ]Mild [ ]Moderate [ ]Severe  Anxiety:                  [ ]No [ ]Yes             [ ]Mild [ ]Moderate [ ]Severe  Fatigue:                  [ ]No [ ]Yes             [ ]Mild [ ]Moderate [ ]Severe  Nausea:                  [ ]No [ ]Yes             [ ]Mild [ ]Moderate [ ]Severe  Loss of appetite:   [ ]No [ ]Yes             [ ]Mild [ ]Moderate [ ]Severe  Constipation:        [ ]No [ ]Yes             [ ]Mild [ ]Moderate [ ]Severe    Other Symptoms:  [ ]All other review of systems negative   [ ]Unable to obtain due to poor mentation     Karnofsky Performance Score/Palliative Performance Status Version 2:         %  PHYSICAL EXAM:  Vital Signs Last 24 Hrs  T(C): 36.8 (12 Sep 2021 12:55), Max: 37.4 (12 Sep 2021 00:05)  T(F): 98.2 (12 Sep 2021 12:55), Max: 99.4 (12 Sep 2021 00:05)  HR: 99 (12 Sep 2021 12:55) (99 - 112)  BP: 136/73 (12 Sep 2021 12:55) (110/71 - 137/75)  BP(mean): --  RR: 20 (12 Sep 2021 12:55) (18 - 20)  SpO2: 99% (12 Sep 2021 12:55) (91% - 99%) I&O's Summary    11 Sep 2021 07:01  -  12 Sep 2021 07:00  --------------------------------------------------------  IN: 240 mL / OUT: 700 mL / NET: -460 mL      GENERAL:  [x ]Alert  [x ]Oriented x3   [ ]Lethargic  [ ]Cachexia  [ ]Unarousable  [ ]Verbal  [ ]Non-Verbal  Behavioral:   [ ] Anxiety  [ ] Delirium [ ] Agitation [ ] Other  HEENT:  [ ]Normal   [ ]Dry mouth   [ ]ET Tube/Trach  [ ]Oral lesions  PULMONARY:   x[ ]Clear [ ]Tachypnea  [ ]Audible excessive secretions   [ ]Rhonchi        [ ]Right [ ]Left [ ]Bilateral  [ ]Crackles        [ ]Right [ ]Left [ ]Bilateral  [ ]Wheezing     [ ]Right [ ]Left [ ]Bilateral  [ ]Diminished breath sounds [ ]right [ ]left [ ]bilateral  CARDIOVASCULAR:    [x ]Regular [ ]Irregular [ ]Tachy  [ ]Bobby [ ]Murmur [ ]Other  GASTROINTESTINAL:  [x ]Soft  [ ]Distended   [ ]+BS  [x ]Non tender [ ]Tender  [ ]PEG [ ]OGT/ NGT  Last BM: 9/10  GENITOURINARY:  x[ ]Normal [ ] Incontinent   [ ]Oliguria/Anuria   [ ]Hebert  MUSCULOSKELETAL:   [ ]Normal   [x ]Weakness  [ ]Bed/Wheelchair bound [ ]Edema  NEUROLOGIC:   [x ]No focal deficits  [ ]Cognitive impairment  [ ]Dysphagia [ ]Dysarthria [ ]Paresis [ ]Other   SKIN: +jaundice  [ ]Normal    [ ]Rash  [ ]Pressure ulcer(s)       Present on admission [ ]y [ ]n    LABS:                        9.6    18.19 )-----------( 69       ( 12 Sep 2021 07:00 )             28.7     09-12    134<L>  |  100  |  41<H>  ----------------------------<  139<H>  4.2   |  16<L>  |  1.80<H>    Ca    9.0      12 Sep 2021 06:59  Phos  2.9     09-12  Mg     1.9     09-12    TPro  5.5<L>  /  Alb  2.2<L>  /  TBili  10.0<H>  /  DBili  7.9<H>  /  AST  243<H>  /  ALT  57<H>  /  AlkPhos  1295<H>  09-12      Shock: [ ]Septic [ ]Cardiogenic [ ]Neurologic [ ]Hypovolemic  Vasopressors x   Inotrops x     Protein Calorie Malnutrition: [ ]Mild [ ]Moderatw [ ]Severe    Oral Intake: [ ]Unable/mouth care only [ ]Minimal [ ]Moderate [ ]Full Capability  Diet: [ ]NPO [ ]Tube feeds [ ]TPN [ ]Other     RADIOLOGY & ADDITIONAL STUDIES:    REFERRALS:   [ ]Chaplaincy  [  Hospice  [ ]Child Life  [ ]Social Work  [ ]Case management [ ]Holistic Therapy

## 2021-09-12 NOTE — PROGRESS NOTE ADULT - ASSESSMENT
66 yo F with PMH metastatic ER/TN(+), HER2(-) breast cancer (to bone and liver), s/p first dose abraxane on 9/1 presents with diarrhea, SIRS, acute encephalopathy, cholestasis, liver failure concerning for sepsis 2/2 oncologic sequelae. Also found to have oropharynx and laryngeal ulceration possibly 2/2 chemotherapy.

## 2021-09-12 NOTE — PROGRESS NOTE ADULT - PROBLEM SELECTOR PLAN 6
Emotional support provided to patient and family.  Patient will be transferred to PCU for end of life care once a bed becomes available.

## 2021-09-12 NOTE — PROGRESS NOTE ADULT - PROBLEM SELECTOR PLAN 1
R/O: neutropenic sepsis  - SIRS for tachycardia and low WBC; improving   - Source: cholangitis vs infectious enteritis vs typhlitis vs oncologic sequelae   - New chemo of abraxane well known to cause neutropenia and liver injury  -  (c/f neutropenic fever if ANC <500)   - s/p GCSFx1 on 9/7, now with WBC improved   - S/p vanco x1 (9/7), cefepime 1g q8h (9/7-9/8), flagyl 500mg q8h (9/7-9/8)   - Continue maintenance IVF  - Trend lactate (5.3 -> 3.5 -> 3.0 -> 2.6)  - GI PCR negative, C. diff negative   - UA positive, Ucx negative, Legionella negative   - Blood cx 9/6: NGTD  - Pt spiked fever 9/9 OVN. Blood cx, urine cx, UA sent. Start cefepime 1g q8h and metronidazole 500mg q12h (9/10- and obtain RUQ U/S, per ID R/O: neutropenic sepsis  - SIRS for tachycardia and low WBC; improving   - Source: cholangitis vs infectious enteritis vs typhlitis vs oncologic sequelae   - New chemo of abraxane well known to cause neutropenia and liver injury  -  (c/f neutropenic fever if ANC <500)   - s/p GCSFx1 on 9/7, now with WBC improved   - S/p vanco x1 (9/7), cefepime 1g q8h (9/7-9/8), flagyl 500mg q8h (9/7-9/8)   - Continue maintenance IVF  - Trend lactate (5.3 -> 3.5 -> 3.0 -> 2.6)  - GI PCR negative, C. diff negative   - UA positive, Ucx negative, Legionella negative   - Blood cx 9/6: NGTD  - Pt spiked fever 9/9 OVN. Blood cx, urine cx, UA sent. Start cefepime 1g q8h and metronidazole 500mg q12h (9/10- and obtain RUQ U/S, per ID showed no biliary dilatation.

## 2021-09-12 NOTE — PROGRESS NOTE ADULT - PROBLEM SELECTOR PLAN 6
- Cr 1.01 on admission  - Uptrending to 1.8  - Giving NS for fluids as LR may not be metabolized successfully in pt with liver dysfunction   - F/u FeNa

## 2021-09-12 NOTE — PROGRESS NOTE ADULT - PROBLEM SELECTOR PLAN 9
Mixed acid base disorder  - AGMA likely 2/2 lactic acidosis with respiratory compensation  - most likely i/s/o sepsis although tumor production and decreased hepatic clearance is also a possibility  - trend lactate    #chronic conditions  - HTN: home meds d/nicole several weeks ago  - DM: hold home metformin. ISS while inpatient  - Asthma: c/w trelegy -> duoneb +mometasone  - Gerd: c/w PPI  - Podiatry following for superficial skin lesions on first three left toes   - Anxiety: hold benzos for now, can give small dose ativan if severe anxiety or benzo withdrawal (unlikely)  - Nausea: hold antinausea home meds, can restart if needed

## 2021-09-12 NOTE — PROGRESS NOTE ADULT - PROBLEM SELECTOR PLAN 2
- Cholestasis with SIRS  - R/O acute cholangitis: patient has abd pain, cholestatic liver injury, direct hyperbilirubinemia and history of liver mets  - CT Abd 9/6: Cirrhotic liver with metastatic disease. Gallstones in the gallbladder without inflammatory changes.   s/p placement of a gastric lap band and left hip replacement. Diffuse bony metastatic disease.  - RUQ US 9/7: Liver metastases. Cholelithiasis. No gallbladder wall thickening. No gross biliary ductal dilatation. Normal caliber common bile duct.  - No need for MRCP/ERCP at this time, per GI   - MRI was recommended to r/o brain mets given confusion, however patient cannot physically fit in the MRI machine

## 2021-09-12 NOTE — PROGRESS NOTE ADULT - PROBLEM SELECTOR PLAN 7
- Per chart review, patient is BRCA2(+) was started on Letrozole and Ibrane with dose reduction 2/2 neutropenia. Then given Zometa [id number]. 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 Initiated on abraxane on 9/1    - PE ruled out with CT PA  - Pain: if needed, can give morphine  - Hold chemotherapy while inpatient. F/u with Dr. Blanc as outpatient  - Oncology following - Per chart review, patient is BRCA2(+) was started on Letrozole and Ibrane with dose reduction 2/2 neutropenia. Then given Zometa [id number]. 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 Initiated on abraxane on 9/1    - PE ruled out with CT PA  - Pain: if needed, can give morphine  - Hold chemotherapy while inpatient. F/u with Dr. Blanc as outpatient  - Oncology following  - Pt no longer wishes to pursue treatment and is DNR/DNI w/ comfort measures. Palliative following, to be transferred to PCU pending bed

## 2021-09-12 NOTE — CHART NOTE - NSCHARTNOTEFT_GEN_A_CORE
Other Culture: throat  Culture Results:   Numerous Candida albicans Susceptibilites not performed.  Numerous Coag Negative Staphylococcus Susceptibilites not performed. (09.10.21 @ 18:46)      Recommend diflucan  200 mg once  then 100 mg x6 days

## 2021-09-13 NOTE — PROGRESS NOTE ADULT - SUBJECTIVE AND OBJECTIVE BOX
%%%%INCOMPLETE NOTE%%%%%     Dr. Trina Collazo, PGY-1  [facility]: [id number]/ [place]: [phone]  After 7pm please page [phone] or [phone]    Patient is a 66y old  Female who presents with a chief complaint of sepsis (12 Sep 2021 13:06)    Subjective: No acute events overnight. Patient seen and examined at bedside. BMx1 yesterday. Denies chest pain, abdominal pain, cough, n/v, sob. Breathing comfortably on room air.    MEDICATIONS  (STANDING):  ALBUTerol    90 MICROgram(s) HFA Inhaler 1 Puff(s) Inhalation every 4 hours  cefepime   IVPB 1000 milliGRAM(s) IV Intermittent every 8 hours  cholecalciferol 1000 Unit(s) Oral daily  dextrose 40% Gel 15 Gram(s) Oral once  dextrose 5%. 1000 milliLiter(s) (50 mL/Hr) IV Continuous <Continuous>  dextrose 5%. 1000 milliLiter(s) (100 mL/Hr) IV Continuous <Continuous>  dextrose 50% Injectable 25 Gram(s) IV Push once  dextrose 50% Injectable 12.5 Gram(s) IV Push once  dextrose 50% Injectable 25 Gram(s) IV Push once  enoxaparin Injectable 40 milliGRAM(s) SubCutaneous daily  FIRST- Mouthwash  BLM 5 milliLiter(s) Swish and Swallow every 4 hours  glucagon  Injectable 1 milliGRAM(s) IntraMuscular once  HYDROmorphone  Injectable 0.2 milliGRAM(s) IV Push every 6 hours  influenza   Vaccine 0.5 milliLiter(s) IntraMuscular once  insulin lispro (ADMELOG) corrective regimen sliding scale   SubCutaneous three times a day before meals  insulin lispro (ADMELOG) corrective regimen sliding scale   SubCutaneous at bedtime  lactated ringers. 1000 milliLiter(s) (75 mL/Hr) IV Continuous <Continuous>  LORazepam     Tablet 1 milliGRAM(s) Oral once  melatonin 3 milliGRAM(s) Oral at bedtime  metroNIDAZOLE  IVPB 500 milliGRAM(s) IV Intermittent every 12 hours  mometasone 220 MICROgram(s) Inhaler 1 Puff(s) Inhalation daily  montelukast 10 milliGRAM(s) Oral daily  mupirocin 2% Ointment 1 Application(s) Topical two times a day  nystatin Powder 1 Application(s) Topical two times a day  pantoprazole    Tablet 40 milliGRAM(s) Oral before breakfast  sodium chloride 0.9%. 1000 milliLiter(s) (75 mL/Hr) IV Continuous <Continuous>  tiotropium 18 MICROgram(s) Capsule 1 Capsule(s) Inhalation daily    MEDICATIONS  (PRN):  benzocaine 15 mG/menthol 3.6 mG (Sugar-Free) Lozenge 1 Lozenge Oral every 6 hours PRN Sore Throat  gabapentin 300 milliGRAM(s) Oral three times a day PRN neuropathy  HYDROmorphone  Injectable 0.5 milliGRAM(s) IV Push every 4 hours PRN Severe Pain (7 - 10)  HYDROmorphone  Injectable 0.25 milliGRAM(s) IV Push every 4 hours PRN Moderate Pain (4 - 6)  lidocaine 2% Viscous 5 milliLiter(s) Swish and Spit every 6 hours PRN Mouth Care  tetracaine/benzocaine/butamben Spray 1 Spray(s) Topical every 6 hours PRN sore throat        Objective:     Vitals: Vital Signs Last 24 Hrs  T(C): 36.9 (09-13-21 @ 06:16), Max: 37.5 (09-12-21 @ 21:06)  T(F): 98.5 (09-13-21 @ 06:16), Max: 99.5 (09-12-21 @ 21:06)  HR: 109 (09-13-21 @ 06:16) (99 - 109)  BP: 145/75 (09-13-21 @ 06:16) (118/72 - 145/75)  BP(mean): --  RR: 20 (09-13-21 @ 06:16) (20 - 20)  SpO2: 92% (09-13-21 @ 06:16) (92% - 99%)            I&O's Summary    12 Sep 2021 07:01  -  13 Sep 2021 07:00  --------------------------------------------------------  IN: 240 mL / OUT: 1000 mL / NET: -760 mL        PHYSICAL EXAM:  CONSTITUTIONAL: Well-groomed, lethargic, moderate distress secondary to pain  EYES: periorbital jaundice. No conjunctival or scleral injection, non-icteric; PERRL and symmetric  ENMT: Pharyngeal erythema without exudates. No external nasal lesions; nasal mucosa not inflamed; normal dentition.   RESPIRATORY: Breathing on 2L nasal cannula; lungs CTA without wheeze/rhonchi/rales  CARDIOVASCULAR: +S1S2, RRR, no M/G/R; pedal pulses full and symmetric; no lower extremity edema  GASTROINTESTINAL: +BS throughout, no rebound/guarding, nontender; no hepatosplenomegaly  SKIN: Bandages on b/l knees   NEURO: A&O4, no asterixis; no focal deficits.   PSYCHIATRIC: mood and affect appropriate; appropriate insight and judgment    LABS:                        9.6    18.19 )-----------( 69       ( 12 Sep 2021 07:00 )             28.7                         10.0   13.11 )-----------( 81       ( 11 Sep 2021 06:32 )             30.0                         9.4    8.43  )-----------( 84       ( 10 Sep 2021 09:14 )             28.6     Hgb Trend: 9.6<--, 10.0<--, 9.4<--, 8.7<--, 8.6<--  09-12    134<L>  |  100  |  41<H>  ----------------------------<  139<H>  4.2   |  16<L>  |  1.80<H>  09-11    131<L>  |  99  |  36<H>  ----------------------------<  137<H>  4.1   |  19<L>  |  1.80<H>  09-10    133<L>  |  99  |  26<H>  ----------------------------<  110<H>  4.1   |  19<L>  |  1.39<H>    Ca    9.0      12 Sep 2021 06:59  Ca    8.8      11 Sep 2021 06:32  Ca    9.1      10 Sep 2021 09:14  Phos  2.9     09-12  Mg     1.9     09-12    TPro  5.5<L>  /  Alb  2.2<L>  /  TBili  10.0<H>  /  DBili  7.9<H>  /  AST  243<H>  /  ALT  57<H>  /  AlkPhos  1295<H>  09-12  TPro  5.5<L>  /  Alb  2.2<L>  /  TBili  9.5<H>  /  DBili  x   /  AST  248<H>  /  ALT  64<H>  /  AlkPhos  1285<H>  09-11  TPro  5.7<L>  /  Alb  2.4<L>  /  TBili  7.9<H>  /  DBili  x   /  AST  233<H>  /  ALT  68<H>  /  AlkPhos  1148<H>  09-10    Creatinine Trend: 1.80<--, 1.80<--, 1.39<--, 1.12<--, 1.06<--, 1.02<--                    CAPILLARY BLOOD GLUCOSE      POCT Blood Glucose.: 137 mg/dL (12 Sep 2021 21:29)  POCT Blood Glucose.: 163 mg/dL (12 Sep 2021 16:26)  POCT Blood Glucose.: 187 mg/dL (12 Sep 2021 12:14)  POCT Blood Glucose.: 157 mg/dL (12 Sep 2021 07:50)     Dr. Trina Collazo, PGY-1  [facility]: [id number]/ NS: [phone]  After 7pm please page [phone] or [phone]    Patient is a 66y old  Female who presents with a chief complaint of sepsis (12 Sep 2021 13:06)    Subjective: No acute events overnight. Patient seen and examined at bedside. BMx1 yesterday. Feels tired and slightly SOB at rest. Denies chest pain, abdominal pain, cough, n/v.     MEDICATIONS  (STANDING):  ALBUTerol    90 MICROgram(s) HFA Inhaler 1 Puff(s) Inhalation every 4 hours  cefepime   IVPB 1000 milliGRAM(s) IV Intermittent every 8 hours  cholecalciferol 1000 Unit(s) Oral daily  dextrose 40% Gel 15 Gram(s) Oral once  dextrose 5%. 1000 milliLiter(s) (50 mL/Hr) IV Continuous <Continuous>  dextrose 5%. 1000 milliLiter(s) (100 mL/Hr) IV Continuous <Continuous>  dextrose 50% Injectable 25 Gram(s) IV Push once  dextrose 50% Injectable 12.5 Gram(s) IV Push once  dextrose 50% Injectable 25 Gram(s) IV Push once  enoxaparin Injectable 40 milliGRAM(s) SubCutaneous daily  FIRST- Mouthwash  BLM 5 milliLiter(s) Swish and Swallow every 4 hours  glucagon  Injectable 1 milliGRAM(s) IntraMuscular once  HYDROmorphone  Injectable 0.2 milliGRAM(s) IV Push every 6 hours  influenza   Vaccine 0.5 milliLiter(s) IntraMuscular once  insulin lispro (ADMELOG) corrective regimen sliding scale   SubCutaneous three times a day before meals  insulin lispro (ADMELOG) corrective regimen sliding scale   SubCutaneous at bedtime  lactated ringers. 1000 milliLiter(s) (75 mL/Hr) IV Continuous <Continuous>  LORazepam     Tablet 1 milliGRAM(s) Oral once  melatonin 3 milliGRAM(s) Oral at bedtime  metroNIDAZOLE  IVPB 500 milliGRAM(s) IV Intermittent every 12 hours  mometasone 220 MICROgram(s) Inhaler 1 Puff(s) Inhalation daily  montelukast 10 milliGRAM(s) Oral daily  mupirocin 2% Ointment 1 Application(s) Topical two times a day  nystatin Powder 1 Application(s) Topical two times a day  pantoprazole    Tablet 40 milliGRAM(s) Oral before breakfast  sodium chloride 0.9%. 1000 milliLiter(s) (75 mL/Hr) IV Continuous <Continuous>  tiotropium 18 MICROgram(s) Capsule 1 Capsule(s) Inhalation daily    MEDICATIONS  (PRN):  benzocaine 15 mG/menthol 3.6 mG (Sugar-Free) Lozenge 1 Lozenge Oral every 6 hours PRN Sore Throat  gabapentin 300 milliGRAM(s) Oral three times a day PRN neuropathy  HYDROmorphone  Injectable 0.5 milliGRAM(s) IV Push every 4 hours PRN Severe Pain (7 - 10)  HYDROmorphone  Injectable 0.25 milliGRAM(s) IV Push every 4 hours PRN Moderate Pain (4 - 6)  lidocaine 2% Viscous 5 milliLiter(s) Swish and Spit every 6 hours PRN Mouth Care  tetracaine/benzocaine/butamben Spray 1 Spray(s) Topical every 6 hours PRN sore throat        Objective:     Vitals: Vital Signs Last 24 Hrs  T(C): 36.9 (09-13-21 @ 06:16), Max: 37.5 (09-12-21 @ 21:06)  T(F): 98.5 (09-13-21 @ 06:16), Max: 99.5 (09-12-21 @ 21:06)  HR: 109 (09-13-21 @ 06:16) (99 - 109)  BP: 145/75 (09-13-21 @ 06:16) (118/72 - 145/75)  BP(mean): --  RR: 20 (09-13-21 @ 06:16) (20 - 20)  SpO2: 92% (09-13-21 @ 06:16) (92% - 99%)            I&O's Summary    12 Sep 2021 07:01  -  13 Sep 2021 07:00  --------------------------------------------------------  IN: 240 mL / OUT: 1000 mL / NET: -760 mL        PHYSICAL EXAM:  CONSTITUTIONAL: Well-groomed, lethargic, moderate distress secondary to pain  EYES: periorbital jaundice. No conjunctival or scleral injection, non-icteric; PERRL and symmetric  ENMT: Pharyngeal erythema without exudates. No external nasal lesions; nasal mucosa not inflamed; normal dentition.   RESPIRATORY: Breathing on 2L nasal cannula; lungs CTA without wheeze/rhonchi/rales  CARDIOVASCULAR: +S1S2, RRR, no M/G/R; pedal pulses full and symmetric; no lower extremity edema  GASTROINTESTINAL: +BS throughout, no rebound/guarding, nontender; no hepatosplenomegaly  SKIN: Bandages on b/l knees. L distal hallux wound, does not appear to be infected  NEURO: A&O4, no asterixis; no focal deficits.   PSYCHIATRIC: mood and affect appropriate; appropriate insight and judgment    LABS:                        9.6    18.19 )-----------( 69       ( 12 Sep 2021 07:00 )             28.7                         10.0   13.11 )-----------( 81       ( 11 Sep 2021 06:32 )             30.0                         9.4    8.43  )-----------( 84       ( 10 Sep 2021 09:14 )             28.6     Hgb Trend: 9.6<--, 10.0<--, 9.4<--, 8.7<--, 8.6<--  09-12    134<L>  |  100  |  41<H>  ----------------------------<  139<H>  4.2   |  16<L>  |  1.80<H>  09-11    131<L>  |  99  |  36<H>  ----------------------------<  137<H>  4.1   |  19<L>  |  1.80<H>  09-10    133<L>  |  99  |  26<H>  ----------------------------<  110<H>  4.1   |  19<L>  |  1.39<H>    Ca    9.0      12 Sep 2021 06:59  Ca    8.8      11 Sep 2021 06:32  Ca    9.1      10 Sep 2021 09:14  Phos  2.9     09-12  Mg     1.9     09-12    TPro  5.5<L>  /  Alb  2.2<L>  /  TBili  10.0<H>  /  DBili  7.9<H>  /  AST  243<H>  /  ALT  57<H>  /  AlkPhos  1295<H>  09-12  TPro  5.5<L>  /  Alb  2.2<L>  /  TBili  9.5<H>  /  DBili  x   /  AST  248<H>  /  ALT  64<H>  /  AlkPhos  1285<H>  09-11  TPro  5.7<L>  /  Alb  2.4<L>  /  TBili  7.9<H>  /  DBili  x   /  AST  233<H>  /  ALT  68<H>  /  AlkPhos  1148<H>  09-10    Creatinine Trend: 1.80<--, 1.80<--, 1.39<--, 1.12<--, 1.06<--, 1.02<--                    CAPILLARY BLOOD GLUCOSE      POCT Blood Glucose.: 137 mg/dL (12 Sep 2021 21:29)  POCT Blood Glucose.: 163 mg/dL (12 Sep 2021 16:26)  POCT Blood Glucose.: 187 mg/dL (12 Sep 2021 12:14)  POCT Blood Glucose.: 157 mg/dL (12 Sep 2021 07:50)

## 2021-09-13 NOTE — PROGRESS NOTE ADULT - PROBLEM SELECTOR PLAN 3
no further DMT  patient wants to focus on comfort, understands poor overall prognosis and limited life expectancy  dilaudid PRN for pain/dyspnea, mucositis- on topical rx

## 2021-09-13 NOTE — PROGRESS NOTE ADULT - PROBLEM SELECTOR PLAN 7
- Cr 1.01 on admission; uptrending   - Giving NS for fluids as LR may not be metabolized successfully in pt with liver dysfunction   - F/u FeNa

## 2021-09-13 NOTE — PROGRESS NOTE ADULT - ASSESSMENT
Pt is 64yo who presents w/ L hallux wound to subQ  -pt seen and examined  -L distal hallux wound to subQ w/ periwound hyperkeratosis, no local signs if infection, no drainage. S/P excisional debridement of callus to level of epidermis and not beyond using 15 blade.   -Pt refusing xrays to L foot at this time  -Pod plan for local wound care w/ mupirocin  -change dressing to left foot daily with mupirocin and gauze  -will monitor during this admission

## 2021-09-13 NOTE — PROGRESS NOTE ADULT - PROBLEM SELECTOR PLAN 5
awaiting bed in PCU. discussed with PGY1. Please page if acute issues.  Currently PCU is full with waiting list. primary team aware.  [phone]

## 2021-09-13 NOTE — PROGRESS NOTE ADULT - ASSESSMENT
66 yo F with PMH metastatic ER/MI(+), HER2(-) breast cancer (to bone and liver), s/p first dose abraxane on 9/1 presents with diarrhea, SIRS, acute encephalopathy, cholestasis, liver failure concerning for sepsis 2/2 oncologic sequelae. Also found to have oropharynx and laryngeal ulceration possibly 2/2 chemotherapy.        64 yo F with PMH metastatic ER/MI(+), HER2(-) breast cancer (to bone and liver), s/p first dose abraxane on 9/1 presents with diarrhea, SIRS, acute encephalopathy, cholestasis, liver failure concerning for sepsis 2/2 oncologic sequelae. Also found to have oropharynx and laryngeal ulceration possibly 2/2 chemotherapy. Now made DNR/DNI/comfort care; pending transfer to PCU.

## 2021-09-13 NOTE — PROGRESS NOTE ADULT - SUBJECTIVE AND OBJECTIVE BOX
Patient is a 66y old  Female who presents with a chief complaint of sepsis (13 Sep 2021 17:07)       INTERVAL HPI/OVERNIGHT EVENTS:  Patient seen and evaluated at bedside.  Pt is resting comfortable in NAD.     Allergies    tetanus toxoid (Unknown)  Zosyn (Unknown)    Intolerances        Vital Signs Last 24 Hrs  T(C): 36.9 (13 Sep 2021 16:58), Max: 37.5 (12 Sep 2021 21:06)  T(F): 98.4 (13 Sep 2021 16:58), Max: 99.5 (12 Sep 2021 21:06)  HR: 101 (13 Sep 2021 16:58) (101 - 109)  BP: 130/78 (13 Sep 2021 16:58) (122/80 - 156/75)  BP(mean): --  RR: 20 (13 Sep 2021 16:58) (20 - 20)  SpO2: 95% (13 Sep 2021 16:58) (92% - 95%)    LABS:                        9.7    25.38 )-----------( 62       ( 13 Sep 2021 10:30 )             29.8     09-13    137  |  103  |  40<H>  ----------------------------<  139<H>  4.1   |  16<L>  |  1.45<H>    Ca    9.2      13 Sep 2021 10:30  Phos  2.7     09-13  Mg     1.9     09-13    TPro  5.6<L>  /  Alb  2.2<L>  /  TBili  11.5<H>  /  DBili  9.2<H>  /  AST  244<H>  /  ALT  54<H>  /  AlkPhos  1304<H>  09-13        CAPILLARY BLOOD GLUCOSE      POCT Blood Glucose.: 139 mg/dL (13 Sep 2021 17:06)  POCT Blood Glucose.: 139 mg/dL (13 Sep 2021 12:07)  POCT Blood Glucose.: 142 mg/dL (13 Sep 2021 08:09)  POCT Blood Glucose.: 137 mg/dL (12 Sep 2021 21:29)      Lower Extremity Physical Exam:  Vasular: DP/PT 2/4, B/L, CFT <3 seconds B/L, Temperature gradient WNL, B/L.   Neuro: Epicritic sensation diminished to the level of digits, B/L.  Musculoskeletal/Ortho: unremarkable    L distal hallux wound to subQ w/ periwound hyperkeratosis, no local signs if infection, no drainage. S/P excisional debridement of callus to level of epidermis and not beyond using 15 blade.

## 2021-09-13 NOTE — PROGRESS NOTE ADULT - PROBLEM SELECTOR PLAN 5
- Palliative following, will transfer patient to PCU once bed becomes available   - No further disease modifying therapies for metastatic breast cancer   - Mucositis: continue supportive care mouthwash and dilaudid 0.25mg IV PRN   - Per palliative note: Discussion held with patient, her  and daughter in which patient expressed her unequivocal desire to die peacefully and comfortably.  She recounts seeing her sister die a slow, miserable death from gastric cancer and she is scared of this happening to her.  Discussed completion of MOLST form and an order for DNR.  Patient elects for DNR/DNI and comfort care.  Her family is in agreement with her wishes.  MOLST form completed and put in patient's chart.

## 2021-09-13 NOTE — PROGRESS NOTE ADULT - ASSESSMENT
Widely metastatic breast adenocarcinoma, admitted 9/6 for failure to thrive.     Fever 9/9 without clear infection.   Not neutropenic.   Blood cultures negative.   Considered cholangitis but none clinically or radiographically.   Mucositis with Candida on oral swab but it's normal jordan anyways and there's no thrush.     Documented Zosyn allergy but reports remote history of renal injury and not allergic symptoms.     Now comfort measures, transferring to PCU.     Suggest  -monitor blood cultures  -favor stopping caspofungin and antibiotics - no clear indication     Discussed with medicine     Bashir Rashid MD   Infectious Disease   Pager [phone]   After 5PM and on weekends please page fellow on call or call [phone]

## 2021-09-13 NOTE — PROGRESS NOTE ADULT - SUBJECTIVE AND OBJECTIVE BOX
SUBJECTIVE AND OBJECTIVE: Patient seen and evaluated, started on ATC dilaudid over last 24 hours with PRNs available. awaiting bed in PCU. Patient with some confusion today but interactive and denies any symptoms. is comfortable and satisfied with her decision, discussed that her  understands her wishes.     INTERVAL HPI/OVERNIGHT EVENTS:no acute overnight events. awaiting bed in PCU    DNR on chart:   Allergies    tetanus toxoid (Unknown)  Zosyn (Unknown)    Intolerances    MEDICATIONS  (STANDING):  ALBUTerol    90 MICROgram(s) HFA Inhaler 1 Puff(s) Inhalation every 4 hours  cholecalciferol 1000 Unit(s) Oral daily  dextrose 40% Gel 15 Gram(s) Oral once  dextrose 5%. 1000 milliLiter(s) (100 mL/Hr) IV Continuous <Continuous>  dextrose 5%. 1000 milliLiter(s) (50 mL/Hr) IV Continuous <Continuous>  dextrose 50% Injectable 25 Gram(s) IV Push once  dextrose 50% Injectable 12.5 Gram(s) IV Push once  dextrose 50% Injectable 25 Gram(s) IV Push once  enoxaparin Injectable 40 milliGRAM(s) SubCutaneous daily  FIRST- Mouthwash  BLM 5 milliLiter(s) Swish and Swallow every 4 hours  glucagon  Injectable 1 milliGRAM(s) IntraMuscular once  HYDROmorphone  Injectable 0.2 milliGRAM(s) IV Push every 6 hours  influenza   Vaccine 0.5 milliLiter(s) IntraMuscular once  insulin lispro (ADMELOG) corrective regimen sliding scale   SubCutaneous three times a day before meals  insulin lispro (ADMELOG) corrective regimen sliding scale   SubCutaneous at bedtime  lactated ringers. 1000 milliLiter(s) (75 mL/Hr) IV Continuous <Continuous>  LORazepam     Tablet 1 milliGRAM(s) Oral once  melatonin 3 milliGRAM(s) Oral at bedtime  mometasone 220 MICROgram(s) Inhaler 1 Puff(s) Inhalation daily  montelukast 10 milliGRAM(s) Oral daily  mupirocin 2% Ointment 1 Application(s) Topical two times a day  nystatin Powder 1 Application(s) Topical two times a day  pantoprazole    Tablet 40 milliGRAM(s) Oral before breakfast  sodium chloride 0.9%. 1000 milliLiter(s) (75 mL/Hr) IV Continuous <Continuous>  tiotropium 18 MICROgram(s) Capsule 1 Capsule(s) Inhalation daily    MEDICATIONS  (PRN):  benzocaine 15 mG/menthol 3.6 mG (Sugar-Free) Lozenge 1 Lozenge Oral every 6 hours PRN Sore Throat  gabapentin 300 milliGRAM(s) Oral three times a day PRN neuropathy  HYDROmorphone  Injectable 0.5 milliGRAM(s) IV Push every 4 hours PRN Severe Pain (7 - 10)  HYDROmorphone  Injectable 0.25 milliGRAM(s) IV Push every 4 hours PRN Moderate Pain (4 - 6)  lidocaine 2% Viscous 5 milliLiter(s) Swish and Spit every 6 hours PRN Mouth Care  tetracaine/benzocaine/butamben Spray 1 Spray(s) Topical every 6 hours PRN sore throat      ITEMS UNCHECKED ARE NOT PRESENT    PRESENT SYMPTOMS: [ ]Unable to obtain due to poor mentation   Source if other than patient:  [ ]Family   [ ]Team     Pain:  [ ]yes [ x]no  QOL impact -   Location -                    Aggravating factors -  Quality -  Radiation -  Timing-  Severity (0-10 scale):  Minimal acceptable level (0-10 scale):     Dyspnea:                           [ ]Mild [ ]Moderate [ ]Severe  Anxiety:                             [ ]Mild [ ]Moderate [ ]Severe  Fatigue:                             [ ]Mild [ ]Moderate [ ]Severe  Nausea:                             [ ]Mild [ ]Moderate [ ]Severe  Loss of appetite:              [ ]Mild [ ]Moderate [ ]Severe  Constipation:                    [ ]Mild [ ]Moderate [ ]Severe    CPOT:    https://www.[facility].org/getattachment/[email or web address]/Critical-Care-Pain-Observation-Tool-(CPOT)    PAIN AD Score:	  http://geriatrictoolkit.[facility]/cog/painad.pdf (Ctrl + left click to view)    Other Symptoms:  [ x]All other review of systems negative     Palliative Performance Status Version 2:       30  %      http://npcrc.org/files/news/palliative_performance_scale_ppsv2.pdf  PHYSICAL EXAM:  Vital Signs Last 24 Hrs  T(C): 36.9 (13 Sep 2021 16:58), Max: 37.5 (12 Sep 2021 21:06)  T(F): 98.4 (13 Sep 2021 16:58), Max: 99.5 (12 Sep 2021 21:06)  HR: 101 (13 Sep 2021 16:58) (101 - 109)  BP: 130/78 (13 Sep 2021 16:58) (122/80 - 156/75)  BP(mean): --  RR: 20 (13 Sep 2021 16:58) (20 - 20)  SpO2: 95% (13 Sep 2021 16:58) (92% - 95%) I&O's Summary    12 Sep 2021 07:01  -  13 Sep 2021 07:00  --------------------------------------------------------  IN: 240 mL / OUT: 1000 mL / NET: -760 mL       GENERAL:  [x ]Alert  [x ]Oriented x3   [ ]Lethargic  [ ]Cachexia  [ ]Unarousable  [ ]Verbal  [ ]Non-Verbal  Behavioral:   [ ]Anxiety  [ ]Delirium [ ]Agitation [ ]Other  HEENT:  [ ]Normal   [x ]Dry mouth-mucositis  [ ]ET Tube/Trach  [ ]Oral lesions  PULMONARY:   [x ]Clear [ ]Tachypnea  [ ]Audible excessive secretions   [ ]Rhonchi        [ ]Right [ ]Left [ ]Bilateral  [ ]Crackles        [ ]Right [ ]Left [ ]Bilateral  [ ]Wheezing     [ ]Right [ ]Left [ ]Bilateral  [ ]Diminished BS [ ] Right [ ]Left [ ]Bilateral  CARDIOVASCULAR:    [x ]Regular [ ]Irregular [ ]Tachy  [ ]Bobby [ ]Murmur [ ]Other  GASTROINTESTINAL:  [ ]Soft  [ ]Distended   [x ]+BS  [ x]Non tender [ ]Tender  [ ]PEG [ ]OGT/ NGT   Last BM:  9/13- loose stool  GENITOURINARY:  [ ]Normal [ x]Incontinent   [ ]Oliguria/Anuria   [ ]Hebert  MUSCULOSKELETAL:   [ ]Normal   [ x]Weakness  [ ]Bed/Wheelchair bound [ ]Edema  NEUROLOGIC:   [x ]No focal deficits  [ ] Cognitive impairment  [ ] Dysphagia [ ]Dysarthria [ ] Paresis [ ]Other   SKIN: +jaundice  [ ]Normal  [ ]Rash   [ ]Pressure ulcer(s) [ ]y [ ]n present on admission    CRITICAL CARE:  [ ]Shock Present  [ ]Septic [ ]Cardiogenic [ ]Neurologic [ ]Hypovolemic  [ ]Vasopressors [ ]Inotropes  [ ]Respiratory failure present [ ]Mechanical Ventilation [ ]Non-invasive ventilatory support [ ]High-Flow   [ ]Acute  [ ]Chronic [ ]Hypoxic  [ ]Hypercarbic [ ]Other  [x ]Other organ failure - liver    LABS:                        9.7    25.38 )-----------( 62       ( 13 Sep 2021 10:30 )             29.8   09-13    137  |  103  |  40<H>  ----------------------------<  139<H>  4.1   |  16<L>  |  1.45<H>    Ca    9.2      13 Sep 2021 10:30  Phos  2.7     09-13  Mg     1.9     09-13    TPro  5.6<L>  /  Alb  2.2<L>  /  TBili  11.5<H>  /  DBili  9.2<H>  /  AST  244<H>  /  ALT  54<H>  /  AlkPhos  1304<H>  09-13    RADIOLOGY & ADDITIONAL STUDIES: no new imaging for review    Protein Calorie Malnutrition Present: [ ]mild [ ]moderate [ ]severe [ ]underweight [ ]morbid obesity  https://www.andeal.org/vault/2440/web/files/ONC/Table_Clinical%20Characteristics%20to%20Document%20Malnutrition-White%20JV%20et%20al%202012.pdf    Height (cm): 170.2 (09-07-21 @ 22:11), 175.3 (08-14-21 @ 11:12)  Weight (kg): 121.7 (09-07-21 @ 22:11), 120.7 (09-01-21 @ 13:47), 120 (08-30-21 @ 12:11)  BMI (kg/m2): 42 (09-07-21 @ 22:11), 39.3 (09-01-21 @ 13:47), 39 (08-30-21 @ 12:11)    [x ]PPSV2 < or = 30%  [ ]significant weight loss [ x]poor nutritional intake [ ]anasarca    [ ]Artificial Nutrition    REFERRALS:   [ ]Chaplaincy  [ ]Hospice  [ ]Child Life  [ ]Social Work  [ x]Case management [ ]Holistic Therapy     Goals of Care Document:

## 2021-09-13 NOTE — PROGRESS NOTE ADULT - PROBLEM SELECTOR PLAN 8
- Per chart review, patient is BRCA2(+) was started on Letrozole and Ibrane with dose reduction 2/2 neutropenia. Then given Zometa [id number]. 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 Initiated on abraxane on 9/1    - PE ruled out with CT PA  - Pain: if needed, can give morphine  - Hold chemotherapy while inpatient. F/u with Dr. Blanc as outpatient  - Oncology following  - Pt no longer wishes to pursue treatment and is DNR/DNI w/ comfort measures. Palliative following, to be transferred to PCU pending bed

## 2021-09-13 NOTE — PROGRESS NOTE ADULT - PROBLEM SELECTOR PLAN 4
MOLST completed over weekend for patients wishes, DNR/DNI  awaiting bed in PCU  symptom focused care. Would not do further blood draws unless clinically/therapeutically applicable and no escalation of care based on conversation with patient.

## 2021-09-13 NOTE — PROGRESS NOTE ADULT - PROBLEM SELECTOR PLAN 11
DVT ppx: Lovenox   Diet: DASH/CC  DNR/DNI, comfort measures. Pending transfer to PCU once bed available DVT ppx: Lovenox   Diet: DASH/CC  DNR/DNI, comfort measures. Pending transfer to PCU once bed available    Called patient's daughter on 9/13 3:50PM to give updates and explained causes of pt's liver dysfunction. Relayed messaged that patient will likely not be transferred to PCU today and that we will continue to take care of her.

## 2021-09-13 NOTE — PROGRESS NOTE ADULT - PROBLEM SELECTOR PLAN 6
-Likely 2/2 sepsis vs hepatic encephalopathy. No focal deficits or history of trauma. BG wnl.  - Neuro checks q4h  - Ammonia 53 wnl  - RUQ x2 negative

## 2021-09-13 NOTE — PROGRESS NOTE ADULT - SUBJECTIVE AND OBJECTIVE BOX
Follow Up: Fevers    Interval History/ROS: Afebrile. Everything hurts. Her throat still bother hers. She says she's ready to die.     Allergies  tetanus toxoid (Unknown)  Zosyn (Unknown)      ANTIMICROBIALS:      OTHER MEDS:  ALBUTerol    90 MICROgram(s) HFA Inhaler 1 Puff(s) Inhalation every 4 hours  benzocaine 15 mG/menthol 3.6 mG (Sugar-Free) Lozenge 1 Lozenge Oral every 6 hours PRN  cholecalciferol 1000 Unit(s) Oral daily  dextrose 40% Gel 15 Gram(s) Oral once  dextrose 5%. 1000 milliLiter(s) IV Continuous <Continuous>  dextrose 5%. 1000 milliLiter(s) IV Continuous <Continuous>  dextrose 50% Injectable 25 Gram(s) IV Push once  dextrose 50% Injectable 12.5 Gram(s) IV Push once  dextrose 50% Injectable 25 Gram(s) IV Push once  enoxaparin Injectable 40 milliGRAM(s) SubCutaneous daily  FIRST- Mouthwash  BLM 5 milliLiter(s) Swish and Swallow every 4 hours  gabapentin 300 milliGRAM(s) Oral three times a day PRN  glucagon  Injectable 1 milliGRAM(s) IntraMuscular once  HYDROmorphone  Injectable 0.5 milliGRAM(s) IV Push every 4 hours PRN  HYDROmorphone  Injectable 0.25 milliGRAM(s) IV Push every 4 hours PRN  HYDROmorphone  Injectable 0.2 milliGRAM(s) IV Push every 6 hours  influenza   Vaccine 0.5 milliLiter(s) IntraMuscular once  insulin lispro (ADMELOG) corrective regimen sliding scale   SubCutaneous three times a day before meals  insulin lispro (ADMELOG) corrective regimen sliding scale   SubCutaneous at bedtime  lactated ringers. 1000 milliLiter(s) IV Continuous <Continuous>  lidocaine 2% Viscous 5 milliLiter(s) Swish and Spit every 6 hours PRN  LORazepam     Tablet 1 milliGRAM(s) Oral once  melatonin 3 milliGRAM(s) Oral at bedtime  mometasone 220 MICROgram(s) Inhaler 1 Puff(s) Inhalation daily  montelukast 10 milliGRAM(s) Oral daily  mupirocin 2% Ointment 1 Application(s) Topical two times a day  nystatin Powder 1 Application(s) Topical two times a day  pantoprazole    Tablet 40 milliGRAM(s) Oral before breakfast  sodium chloride 0.9%. 1000 milliLiter(s) IV Continuous <Continuous>  tetracaine/benzocaine/butamben Spray 1 Spray(s) Topical every 6 hours PRN  tiotropium 18 MICROgram(s) Capsule 1 Capsule(s) Inhalation daily      Vital Signs Last 24 Hrs  T(C): 36.9 (13 Sep 2021 16:58), Max: 37.5 (12 Sep 2021 21:06)  T(F): 98.4 (13 Sep 2021 16:58), Max: 99.5 (12 Sep 2021 21:06)  HR: 101 (13 Sep 2021 16:58) (101 - 109)  BP: 130/78 (13 Sep 2021 16:58) (122/80 - 156/75)  BP(mean): --  RR: 20 (13 Sep 2021 16:58) (20 - 20)  SpO2: 95% (13 Sep 2021 16:58) (92% - 95%)    Physical Exam:  General: awake, alert, non toxic, obese  Head: atraumatic, normocephalic  Eye: icteric sclera   ENT: mild erythema soft palate. no thrush   Cardio: tachycardic   Respiratory: nonlabored on nasal cannula   abd: soft, mild tenderness, no guarding   Musculoskeletal: no focal joint swelling  Neurologic: no focal deficit  psych: normal affect                          9.7    25.38 )-----------( 62       ( 13 Sep 2021 10:30 )             29.8       09-13    137  |  103  |  40<H>  ----------------------------<  139<H>  4.1   |  16<L>  |  1.45<H>    Ca    9.2      13 Sep 2021 10:30  Phos  2.7     09-13  Mg     1.9     09-13    TPro  5.6<L>  /  Alb  2.2<L>  /  TBili  11.5<H>  /  DBili  9.2<H>  /  AST  244<H>  /  ALT  54<H>  /  AlkPhos  1304<H>  09-13          MICROBIOLOGY:  Culture - Other (collected 09-10-21 @ 18:46)  Source: .Other throat  Final Report (09-12-21 @ 08:53):    Numerous Candida albicans Susceptibilites not performed.    Numerous Coag Negative Staphylococcus Susceptibilites not performed.    Culture - Fungal, Other (collected 09-10-21 @ 18:46)  Source: .Other Other  Preliminary Report (09-13-21 @ 10:49):    Numerous Yeast    Culture - Blood (collected 09-10-21 @ 01:18)  Source: .Blood Blood-Peripheral  Preliminary Report (09-11-21 @ 02:01):    No growth to date.    Culture - Blood (collected 09-10-21 @ 01:18)  Source: .Blood Blood-Peripheral  Preliminary Report (09-11-21 @ 02:01):    No growth to date.    GI PCR Panel, Stool (collected 09-07-21 @ 14:58)  Source: .Stool nico margaret  Final Report (09-07-21 @ 18:12):    GI PCR Results: NOT detected    *******Please Note:*******    GI panel PCR evaluates for:    Campylobacter, Plesiomonas shigelloides, Salmonella,    Vibrio, Yersinia enterocolitica, Enteroaggregative    Escherichia coli (EAEC), Enteropathogenic E.coli (EPEC),    Enterotoxigenic E. coli (ETEC) lt/st, Shiga-like    toxin-producing E. coli (STEC) stx1/stx2,    Shigella/ Enteroinvasive E. coli (EIEC), Cryptosporidium,    Cyclospora cayetanensis, Entamoeba histolytica,    Giardia lamblia, Adenovirus F 40/41, Astrovirus,    Norovirus GI/GII, Rotavirus A, Sapovirus    Culture - Urine (collected 09-07-21 @ 00:18)  Source: Clean Catch Clean Catch (Midstream)  Final Report (09-07-21 @ 18:51):    <10,000 CFU/mL Normal Urogenital Grace    Culture - Blood (collected 09-06-21 @ 18:29)  Source: .Blood Blood-Peripheral  Final Report (09-11-21 @ 19:00):    No Growth Final    Culture - Blood (collected 09-06-21 @ 18:29)  Source: .Blood Blood-Peripheral  Final Report (09-11-21 @ 19:00):    No Growth Final    C Diff by PCR Result: NotDetec (09-08 @ 05:02)    C Diff by PCR Result: NotDetec (09-08-21 @ 05:02)  Clostridium difficile GDH Toxins A&amp;B, EIA:   Indeterminate (09-07-21 @ 15:40)  Clostridium difficile GDH Interpretation: This specimen is positive for C. Difficile glutamate dehydrogenase (GDH)  antigen and negative for C. Difficile Toxins A & B, by EIA.  GDH is a  highly sensitive screening marker for C. Difficile that is produced in  large amounts by all C. Difficilestrains, both toxigenic and  nontoxigenic.  Specimens that are GDH positive and toxin negative will be  tested further by PCR to detect toxin gene sequences associated with  toxin producing C. Difficle. (09-07-21 @ 15:40)      RADIOLOGY:  Images below reviewed personally  US Abdomen Upper Quadrant Right (09.11.21 @ 17:39)   Cholelithiasis. No evidence of bile duct dilatation. No significant interval change noted.

## 2021-09-13 NOTE — PROGRESS NOTE ADULT - PROBLEM SELECTOR PLAN 4
- Pt with throat pain on admission and now pain on swallowing   - Likely Mucositis 2/2 chemotherapy vs. secondary superimposed fungal, bacterial and HSV infection  - ENT scope on 9/10 shows diffuse oropharynx and laryngeal ulceration with sloughing  - CMV and HSV negative   - Continue cepacol lozenge, cetacaine spray, magic mouthwash, lidocaine 2% viscous, nystatin powder  - Speech and swallow eval, puree diet for now   - Appreciate ENT recs  - Throat cultures growing candida albicans   - Diflucan 200mg x1 for 1 days and then 100mg daily for 6 days (9/12  - Humidify O2 - Pt with throat pain on admission and now pain on swallowing   - Likely Mucositis 2/2 chemotherapy vs. secondary superimposed fungal, bacterial and HSV infection  - ENT scope on 9/10 shows diffuse oropharynx and laryngeal ulceration with sloughing  - CMV and HSV negative   - Continue cepacol lozenge, cetacaine spray, magic mouthwash, lidocaine 2% viscous, nystatin powder  - Speech and swallow eval, puree diet for now   - Appreciate ENT recs  - Throat cultures growing candida albicans   - Diflucan 200mg x1 for 1 days per ENT rec, however will switch to caspofungin 50mg IVPB daily for 14 days (9/14-9/27) as there is less liver side effects   - Humidify O2

## 2021-09-13 NOTE — PROGRESS NOTE ADULT - PROBLEM SELECTOR PLAN 2
worsening, goals per patient are to prioritize comfort  dilaudid PRN for pain, already on ATC dosing

## 2021-09-13 NOTE — PROGRESS NOTE ADULT - PROBLEM SELECTOR PLAN 1
R/O: neutropenic sepsis  - SIRS for tachycardia and low WBC; improving   - Source: cholangitis vs infectious enteritis vs typhlitis vs oncologic sequelae   - New chemo of abraxane well known to cause neutropenia and liver injury  -  (c/f neutropenic fever if ANC <500)   - s/p GCSFx1 on 9/7, now with WBC improved   - S/p vanco x1 (9/7), cefepime 1g q8h (9/7-9/8), flagyl 500mg q8h (9/7-9/8)   - Continue maintenance IVF  - Trend lactate (5.3 -> 3.5 -> 3.0 -> 2.6)  - GI PCR negative, C. diff negative   - UA positive, Ucx negative, Legionella negative   - Blood cx 9/6: NGTD  - Pt spiked fever 9/9 OVN. Blood cx, urine cx, UA sent. Start cefepime 1g q8h and metronidazole 500mg q12h (9/10- and obtain RUQ U/S, per ID showed no biliary dilatation. R/O: neutropenic sepsis  - SIRS for tachycardia and low WBC; improving   - Source: cholangitis vs infectious enteritis vs typhlitis vs oncologic sequelae   - New chemo of abraxane well known to cause neutropenia and liver injury  -  (c/f neutropenic fever if ANC <500)   - s/p GCSFx1 on 9/7, now with WBC improved   - S/p vanco x1 (9/7), cefepime 1g q8h (9/7-9/8), flagyl 500mg q8h (9/7-9/8)   - Continue maintenance IVF  - Trend lactate (5.3 -> 3.5 -> 3.0 -> 2.6)  - GI PCR negative, C. diff negative   - UA positive, Ucx negative, Legionella negative   - Blood cx 9/6: NGTD  - Pt spiked fever 9/9 OVN. Blood cx, urine cx, UA sent. Start cefepime 1g q8h and metronidazole 500mg q12h (9/10- and obtain RUQ U/S which showed no biliary dilatation. Will stop abx on 9/13, per ID as there is no source of infection.

## 2021-09-13 NOTE — PROGRESS NOTE ADULT - PROBLEM SELECTOR PLAN 3
Patient with persistent elevation in transaminases and now with elevations in bilirubin, ALP, and INR.   - Hyperbilirubinemia likely 2/2 progression of disease/hepatic involvement   - Ammonia and lipase wnl   - Elevated INR 1.53   - d/c lactulose as patient having diarrhea  - Viral hepatitis panel, CMV, HIV all negative  - EBV panel with either prior infection or reactivation (EBV VCA IGG AB+, EBV NA IGG AB+, EBV VCA IGM AB-, EBV EA IGG AB+)  - CT scan reporting cirrhosis although unclear if just nodular from tumors  - GI/hepatology following   - Liver enzymes are increasing

## 2021-09-14 NOTE — PROGRESS NOTE ADULT - ASSESSMENT
65F h/o metastatic ER/NC (+), HER2 (-) metastatic breast cancer to bone and liver (BRCA2 mutation), presenting to ER for diarrhea, abdominal pain, jaundice and confusion:     #diarrhea, transaminitis, metastatic breast cancer  -04/08/20 Left breast 1 o'clock axis 5 cm FN sonoguided core biopsy showed invasive lobular carcinoma, SBR 6/9, ER >90%, NC >90% and HER-2 negative.  -Left axillary sonoguided core biopsy showed a lymph node with metastatic carcinoma with similar histology to the core biopsy of the breast. patient with diffuse osseous lytic lesions in chest   -4/17/20 Letrozole and Ibrance started with dose reduction from 125 mg to 100 mg starting cycle 2 due to neutropenia  -developed transaminitis and POD to liver found 8/2021, s/p 8/20/21 Sonogram guided liver biopsy revealed adenocarcinoma consistent with breast cancer primary, ER/NC (+), HER2 (-)  -started on abraxane on 9/1. presenting with diarrhea, confusion, SIRS eval in setting of neutropenia   -treated with neupogen until ANC recovered   -c diff neg, cultures NGTD. diarrhea improved and likely 2/2 chemotherapy   - AST and ALT stable, bili continuing to trend up. CT A/p showing a cirrhotic liver with known metastasis.  -suspect rising bili a result of liver metastasis vs AE 2/2 chemo.   -GOC discussion held with patient and family over the weekend with palliative, patient has elected to pursue comfort measures, and is now DNR/DNI   -transferred to PCU   -c/w supportive care per PCU team. Dr. Blanc updated on patients clinical status   -onc team available for assitance, please call with any questions    [facility] PGY5 [phone]

## 2021-09-14 NOTE — PROGRESS NOTE ADULT - SUBJECTIVE AND OBJECTIVE BOX
INTERVAL HPI/OVERNIGHT EVENTS:  Patient S&E at bedside. pain with swallowing, otherwise pain controlled.  no F/C, CP, SOB, abn pain, N/V      VITAL SIGNS:  T(F): 98 (09-14-21 @ 08:20)  HR: 104 (09-14-21 @ 08:20)  BP: 122/58 (09-14-21 @ 08:20)  RR: 20 (09-14-21 @ 08:20)  SpO2: 96% (09-14-21 @ 08:20)  Wt(kg): --    PHYSICAL EXAM:    Constitutional: NAD  Eyes: EOMI, sclera icteric  Neck: supple  Respiratory: no inc wob  Cardiovascular: RRR,   Gastrointestinal: soft, NTND,   Extremities: no c/c/e  Neurological: AAOx3    MEDICATIONS  (STANDING):  ALBUTerol    90 MICROgram(s) HFA Inhaler 1 Puff(s) Inhalation every 4 hours  caspofungin IVPB 50 milliGRAM(s) IV Intermittent every 24 hours  HYDROmorphone  Injectable 0.2 milliGRAM(s) IV Push every 6 hours  influenza   Vaccine 0.5 milliLiter(s) IntraMuscular once  LORazepam     Tablet 1 milliGRAM(s) Oral once  melatonin 3 milliGRAM(s) Oral at bedtime  mometasone 220 MICROgram(s) Inhaler 1 Puff(s) Inhalation daily  montelukast 10 milliGRAM(s) Oral daily  mupirocin 2% Ointment 1 Application(s) Topical two times a day  nystatin Powder 1 Application(s) Topical two times a day  pantoprazole    Tablet 40 milliGRAM(s) Oral before breakfast  tiotropium 18 MICROgram(s) Capsule 1 Capsule(s) Inhalation daily    MEDICATIONS  (PRN):  benzocaine 15 mG/menthol 3.6 mG (Sugar-Free) Lozenge 1 Lozenge Oral every 6 hours PRN Sore Throat  bisacodyl Suppository 10 milliGRAM(s) Rectal at bedtime PRN Constipation  gabapentin 300 milliGRAM(s) Oral three times a day PRN neuropathy  HYDROmorphone  Injectable 0.5 milliGRAM(s) IV Push every 4 hours PRN Severe Pain (7 - 10)  HYDROmorphone  Injectable 0.25 milliGRAM(s) IV Push every 4 hours PRN Moderate Pain (4 - 6)  LORazepam   Injectable 0.25 milliGRAM(s) IV Push every 1 hour PRN agitation and anxiety      LABS:                        9.7    25.38 )-----------( 62       ( 13 Sep 2021 10:30 )             29.8     09-13    137  |  103  |  40<H>  ----------------------------<  139<H>  4.1   |  16<L>  |  1.45<H>    Ca    9.2      13 Sep 2021 10:30  Phos  2.7     09-13  Mg     1.9     09-13    TPro  5.6<L>  /  Alb  2.2<L>  /  TBili  11.5<H>  /  DBili  9.2<H>  /  AST  244<H>  /  ALT  54<H>  /  AlkPhos  1304<H>  09-13          RADIOLOGY & ADDITIONAL TESTS:

## 2021-09-14 NOTE — PROGRESS NOTE ADULT - SUBJECTIVE AND OBJECTIVE BOX
GAP TEAM PALLIATIVE CARE UNIT PROGRESS NOTE:      [  ] Patient on hospice program.    INDICATION FOR PALLIATIVE CARE UNIT SERVICES: comfort care    INTERVAL HPI/OVERNIGHT EVENTS: Pt transferred to PCU yesterday. Over the past 24 hrs, Pt received Dilaudid total of 1.3mg for pain management. Today, Pt was AOx1 to self consistent with baseline during this admission. Pt was quite dysphoric in the morning, states "please let me die", unreliably endorsing vague pain then stating pain was controlled. Later in the morning, Pt was reassessed with family (daughter and son) at bedtime, states she is "happy physically, emotionally, and spiritually", appropriately anxious and reactive. Pt states pain is well controlled at this time. She agrees with plan to include PRN for anxiety.     DNR on chart: yes, DNR/DNI  Allergies    tetanus toxoid (Unknown)  Zosyn (Unknown)    Intolerances    MEDICATIONS  (STANDING):  ALBUTerol    90 MICROgram(s) HFA Inhaler 1 Puff(s) Inhalation every 4 hours  caspofungin IVPB 50 milliGRAM(s) IV Intermittent every 24 hours  HYDROmorphone  Injectable 0.2 milliGRAM(s) IV Push every 6 hours  influenza   Vaccine 0.5 milliLiter(s) IntraMuscular once  LORazepam     Tablet 1 milliGRAM(s) Oral once  melatonin 3 milliGRAM(s) Oral at bedtime  mometasone 220 MICROgram(s) Inhaler 1 Puff(s) Inhalation daily  montelukast 10 milliGRAM(s) Oral daily  mupirocin 2% Ointment 1 Application(s) Topical two times a day  nystatin Powder 1 Application(s) Topical two times a day  pantoprazole    Tablet 40 milliGRAM(s) Oral before breakfast  tiotropium 18 MICROgram(s) Capsule 1 Capsule(s) Inhalation daily    MEDICATIONS  (PRN):  benzocaine 15 mG/menthol 3.6 mG (Sugar-Free) Lozenge 1 Lozenge Oral every 6 hours PRN Sore Throat  bisacodyl Suppository 10 milliGRAM(s) Rectal at bedtime PRN Constipation  gabapentin 300 milliGRAM(s) Oral three times a day PRN neuropathy  HYDROmorphone  Injectable 0.5 milliGRAM(s) IV Push every 4 hours PRN Severe Pain (7 - 10)  HYDROmorphone  Injectable 0.25 milliGRAM(s) IV Push every 4 hours PRN Moderate Pain (4 - 6)  LORazepam   Injectable 0.25 milliGRAM(s) IV Push every 1 hour PRN agitation and anxiety    ITEMS UNCHECKED ARE NOT PRESENT    PRESENT SYMPTOMS: [ ]Unable to obtain due to poor mentation   Source if other than patient:  [ ]Family   [ ]Team     Pain: [x] yes [ ] no -- Pt unable to characterize pain but states well controlled with medications.  QOL impact -   Location -                    Aggravating factors -  Quality -  Radiation -  Timing-  Severity (0-10 scale):  Minimal acceptable level (0-10 scale):     Dyspnea:                           [ ]Mild [ ]Moderate [ ]Severe  Anxiety:                             [ ]Mild [x]Moderate [ ]Severe  Fatigue:                             [ ]Mild [ ]Moderate [ ]Severe  Nausea:                             [ ]Mild [ ]Moderate [ ]Severe  Loss of appetite:              [ ]Mild [ ]Moderate [ ]Severe  Constipation:                    [ ]Mild [ ]Moderate [ ]Severe    PAINAD Score:     http://geriatrictoolkit.[facility]/cog/[email or web address] (Ctrl +  left click to view)  		  Other Symptoms:  [x]All other review of systems negative     Palliative Performance Status Version 2:         30%         http://npcrc.org/files/news/palliative_performance_scale_ppsv2.pdf  PHYSICAL EXAM:  Vital Signs Last 24 Hrs  T(C): 36.7 (14 Sep 2021 08:20), Max: 37.1 (13 Sep 2021 20:15)  T(F): 98 (14 Sep 2021 08:20), Max: 98.8 (13 Sep 2021 20:15)  HR: 104 (14 Sep 2021 08:20) (101 - 135)  BP: 122/58 (14 Sep 2021 08:20) (116/77 - 156/75)  BP(mean): --  RR: 20 (14 Sep 2021 08:20) (20 - 24)  SpO2: 96% (14 Sep 2021 08:20) (92% - 96%) I&O's Summary    13 Sep 2021 07:01  -  14 Sep 2021 07:00  --------------------------------------------------------  IN: 0 mL / OUT: 100 mL / NET: -100 mL    GENERAL:  [x]Alert  [x]Oriented x 1 self only   [ ]Lethargic  [ ]Cachexia  [ ]Unarousable  [x]Verbal  [ ]Non-Verbal  Behavioral:   [x] Anxiety  [ ] Delirium [ ] Agitation [ ] Other  HEENT:  [ ]Normal   [ ]Dry mouth   [ ]ET Tube/Trach  [x]Oral lesions  PULMONARY:   [x]Clear [ ]Tachypnea  [ ]Audible excessive secretions   [ ]Rhonchi        [ ]Right [ ]Left [ ]Bilateral  [ ]Crackles        [ ]Right [ ]Left [ ]Bilateral  [ ]Wheezing     [ ]Right [ ]Left [ ]Bilateral  [ ]Diminshed BS [ ]Right [ ]Left [ ]Bilateral    CARDIOVASCULAR:    [ ]Regular [ ]Irregular [x]Tachy  [ ]Bobby [ ]Murmur [ ]Other  GASTROINTESTINAL:  [ ]Soft  [x]Distended   [ ]+BS  [ ]Non tender [x]Tender  [ ]PEG [ ]OGT/ NGT   Last BM:   9/12/21  GENITOURINARY:  [x]Normal [ ] Incontinent   [ ]Oliguria/Anuria   [ ]Hebert  MUSCULOSKELETAL:   [ ]Normal   [x]Weakness  [ ]Bed/Wheelchair bound [ ]Edema  NEUROLOGIC:   [x]No focal deficits  [ ] Cognitive impairment  [ ] Dysphagia [ ]Dysarthria [ ] Paresis [ ]Other   SKIN:   [ ]Normal  [x]Rash     [ ]Pressure ulcer(s)  [ ]y [ ]n  Present on admission      CRITICAL CARE:  [ ] Shock Present  [ ]Septic [ ]Cardiogenic [ ]Neurologic [ ]Hypovolemic  [ ]  Vasopressors [ ]  Inotropes   [ ] Respiratory failure present [ ] Mechanical Ventilation [ ] Non-invasive ventilatory support [ ] High-Flow  [ ] Acute  [ ] Chronic [ ] Hypoxic  [ ] Hypercarbic [ ] Other  [ ] Other organ failure     LABS:                        9.7    25.38 )-----------( 62       ( 13 Sep 2021 10:30 )             29.8   09-13    137  |  103  |  40<H>  ----------------------------<  139<H>  4.1   |  16<L>  |  1.45<H>    Ca    9.2      13 Sep 2021 10:30  Phos  2.7     09-13  Mg     1.9     09-13    TPro  5.6<L>  /  Alb  2.2<L>  /  TBili  11.5<H>  /  DBili  9.2<H>  /  AST  244<H>  /  ALT  54<H>  /  AlkPhos  1304<H>  09-13        RADIOLOGY & ADDITIONAL STUDIES: none    PROTEIN CALORIE MALNUTRITION: [ ] mild [ ] moderate [ ] severe  [ ] underweight [ ] morbid obesity    https://www.andeal.org/vault/2440/web/files/ONC/Table_Clinical%20Characteristics%20to%20Document%20Malnutrition-White%20JV%20et%20al%202012.pdf    Height (cm): 170.2 (09-07-21 @ 22:11), 175.3 (08-14-21 @ 11:12)  Weight (kg): 121.7 (09-07-21 @ 22:11), 120.7 (09-01-21 @ 13:47), 120 (08-30-21 @ 12:11)  BMI (kg/m2): 42 (09-07-21 @ 22:11), 39.3 (09-01-21 @ 13:47), 39 (08-30-21 @ 12:11)    [ ] PPSV2 < or = 30% [ ] significant weight loss [ ] poor nutritional intake [ ] anasarca   Artificial Nutrition [ ]     REFERRALS:   [ ]Chaplaincy  [ ] Hospice  [ ]Child Life  [ ]Social Work  [ ]Case management [ ]Holistic Therapy [ ] Physical Therapy [ ] Dietary   Goals of Care Document:

## 2021-09-14 NOTE — PROGRESS NOTE ADULT - PROBLEM SELECTOR PLAN 6
-per ID recommendations, low suspicion for candidiasis esophagitis as oral swab consistent with normal jordan  -team will dc caspofungin IV to focus on comfort measure

## 2021-09-14 NOTE — PROGRESS NOTE ADULT - PROBLEM SELECTOR PLAN 7
-continue to offer reassurance  -added Ativan 0.25mg IV PRN anxiety - start with low dose as Pt is benzo naive

## 2021-09-15 NOTE — DIETITIAN INITIAL EVALUATION ADULT. - PROBLEM SELECTOR PLAN 2
Likely in the setting of sepsis vs hepatic encephalopathy. No focal deficits or history of trauma. BG wnl.  -continue to monitor  -neurochecks q 4hours  -CT head if any acute change in mental status or persistent confusion  -f/u ammonia level

## 2021-09-15 NOTE — PROGRESS NOTE ADULT - PROBLEM SELECTOR PLAN 7
-per ID recommendations, low suspicion for candidiasis esophagitis as oral swab consistent with normal jordan  -caspofungin KULWINDER'd

## 2021-09-15 NOTE — DIETITIAN INITIAL EVALUATION ADULT. - REASON INDICATOR FOR ASSESSMENT
As per MD, pt inappropriate for nutrition assessment at this time.   RD remains available.   Bibiana Henderson MS RD CDN [facility],  #[phone]

## 2021-09-15 NOTE — PROGRESS NOTE ADULT - SUBJECTIVE AND OBJECTIVE BOX
GAP TEAM PALLIATIVE CARE UNIT PROGRESS NOTE:      [  ] Patient on hospice program.    INDICATION FOR PALLIATIVE CARE UNIT SERVICES: symptom management in setting of metastatic breast cancer    INTERVAL HPI/OVERNIGHT EVENTS: Required PRN 3 doses dilaudid 0.5 mg IV. Received 3 doses of PRN ativan 0.25 mg IV.     DNR on chart:   Allergies    tetanus toxoid (Unknown)  Zosyn (Unknown)    Intolerances    MEDICATIONS  (STANDING):  HYDROmorphone  Injectable 1 milliGRAM(s) IV Push every 6 hours  influenza   Vaccine 0.5 milliLiter(s) IntraMuscular once  LORazepam   Injectable 0.25 milliGRAM(s) IV Push every 8 hours  mupirocin 2% Ointment 1 Application(s) Topical two times a day  nystatin Powder 1 Application(s) Topical two times a day    MEDICATIONS  (PRN):  bisacodyl Suppository 10 milliGRAM(s) Rectal at bedtime PRN Constipation  gabapentin 300 milliGRAM(s) Oral three times a day PRN neuropathy  HYDROmorphone  Injectable 1.5 milliGRAM(s) IV Push every 1 hour PRN Severe Pain (7 - 10)  HYDROmorphone  Injectable 1 milliGRAM(s) IV Push every 1 hour PRN Moderate Pain (4 - 6)  LORazepam   Injectable 0.5 milliGRAM(s) IV Push every 1 hour PRN agitation and anxiety    ITEMS UNCHECKED ARE NOT PRESENT    PRESENT SYMPTOMS: [ ]Unable to obtain due to poor mentation   Source if other than patient:  [ ]Family   [ ]Team     Pain: [x ] yes [ ] no  QOL impact - yes  Location -     generalized, mostly hands and feet               Aggravating factors - movement  Quality - sharp  Radiation - none  Timing- constant  Severity (0-10 scale): 10/10  Minimal acceptable level (0-10 scale): 5/10    Dyspnea:                           [ ]Mild [ ]Moderate [ ]Severe  Anxiety:                             [ ]Mild [ ]Moderate [ ]Severe  Fatigue:                             [ ]Mild [ ]Moderate [ ]Severe  Nausea:                             [ ]Mild [ ]Moderate [ ]Severe  Loss of appetite:              [ ]Mild [ ]Moderate [ ]Severe  Constipation:                    [ ]Mild [ ]Moderate [ ]Severe    PAINAD Score:    [email or web address] (Ctrl +  left click to view)  		  Other Symptoms:  [ ]All other review of systems negative     Palliative Performance Status Version 2:    30     %         http://npcrc.org/files/news/palliative_performance_scale_[email or web address].pdf  PHYSICAL EXAM:  Vital Signs Last 24 Hrs  T(C): 36.6 (15 Sep 2021 09:10), Max: 36.6 (15 Sep 2021 09:10)  T(F): 97.9 (15 Sep 2021 09:10), Max: 97.9 (15 Sep 2021 09:10)  HR: 96 (15 Sep 2021 09:10) (96 - 96)  BP: 90/61 (15 Sep 2021 09:10) (90/61 - 90/61)  BP(mean): --  RR: 20 (15 Sep 2021 09:10) (20 - 20)  SpO2: 92% (15 Sep 2021 09:10) (92% - 92%) I&O's Summary  GENERAL:  [ x]Alert  [ ]Oriented x   [ ]Lethargic  [ ]Cachexia  [ ]Unarousable  [ ]Verbal  [ ]Non-Verbal  Behavioral:   [x ] Anxiety  [ ] Delirium [ ] Agitation [ ] Other  HEENT:  [x ]Normal   [ ]Dry mouth   [ ]ET Tube/Trach  [ ]Oral lesions  PULMONARY:   [x ]Clear [ ]Tachypnea  [ ]Audible excessive secretions   [ ]Rhonchi        [ ]Right [ ]Left [ ]Bilateral  [ ]Crackles        [ ]Right [ ]Left [ ]Bilateral  [ ]Wheezing     [ ]Right [ ]Left [ ]Bilateral  [ ]Diminished BS [ ]Right [ ]Left [ ]Bilateral    CARDIOVASCULAR:    [ x]Regular [ ]Irregular [ ]Tachy  [ ]Bobby [ ]Murmur [ ]Other  GASTROINTESTINAL:  [x ]Soft  [ ]Distended   [ ]+BS  [ ]Non tender [ ]Tender  [ ]PEG [ ]OGT/ NGT   Last BM:   9/10  GENITOURINARY:  [ ]Normal [ ] Incontinent   [ ]Oliguria/Anuria   [x ]Hebert  MUSCULOSKELETAL:   [ ]Normal   [ ]Weakness  [x ]Bed/Wheelchair bound [ ]Edema  NEUROLOGIC:   [x ]No focal deficits  [ ] Cognitive impairment  [ ] Dysphagia [ ]Dysarthria [ ] Paresis [ ]Other   SKIN:   [ ]Normal  [ ]Rash     [x ]Pressure ulcer(s)  [ ]y [ ]n  Present on admission      CRITICAL CARE:  [ ] Shock Present  [ ]Septic [ ]Cardiogenic [ ]Neurologic [ ]Hypovolemic  [ ]  Vasopressors [ ]  Inotropes   [ ] Respiratory failure present [ ] Mechanical Ventilation [ ] Non-invasive ventilatory support [ ] High-Flow  [ ] Acute  [ ] Chronic [ ] Hypoxic  [ ] Hypercarbic [ ] Other  [ ] Other organ failure     LABS:  no new labs    RADIOLOGY & ADDITIONAL STUDIES:  no new imaging    PROTEIN CALORIE MALNUTRITION: [ ] mild [x ] moderate [ ] severe  [ ] underweight [ ] morbid obesity    https://www.andeal.org/vault/5570/web/files/ONC/Table_Clinical%20Characteristics%20to%20Document%20Malnutrition-White%20JV%20et%20al%291069.pdf    Height (cm): 170.2 (09-07-21 @ 22:11), 175.3 (08-14-21 @ 11:12)  Weight (kg): 121.7 (09-07-21 @ 22:11), 120.7 (09-01-21 @ 13:47), 120 (08-30-21 @ 12:11)  BMI (kg/m2): 42 (09-07-21 @ 22:11), 39.3 (09-01-21 @ 13:47), 39 (08-30-21 @ 12:11)    [ ] PPSV2 < or = 30% [ ] significant weight loss [ ] poor nutritional intake [ ] anasarca   Artificial Nutrition [ ]     REFERRALS:   [ ]Chaplaincy  [ ] Hospice  [ ]Child Life  [ ]Social Work  [ ]Case management [ ]Holistic Therapy [ ] Physical Therapy [ ] Dietary   Goals of Care Document:

## 2021-09-15 NOTE — PROGRESS NOTE ADULT - PROBLEM SELECTOR PLAN 6
MOLST completed over weekend for patients wishes, DNR/DNI  symptom focused care.   No further blood draws  dispo pending clinical course

## 2021-09-15 NOTE — DIETITIAN INITIAL EVALUATION ADULT. - PROBLEM SELECTOR PLAN 3
- cholestasis with SIRS  - R/O acute cholangitis: patient has abd pain, cholestatic liver injury, direct hyperbilirubinemia and history of liver mets  -CT abd without mention of biliary disease  -urgent RUQ US  -GI consult for possible MRCP vs ERCP  -cefepime/flagyl as above  - MRCP ordered. awaiting GI recs.

## 2021-09-15 NOTE — PROGRESS NOTE ADULT - PROBLEM SELECTOR PLAN 2
-continue to offer reassurance  - patient very anxious about death  - ativan changed to IV 0.25 mg q8h ATC  - ativan IV 0.5 mg q1h PRN

## 2021-09-16 NOTE — PROGRESS NOTE ADULT - SUBJECTIVE AND OBJECTIVE BOX
GAP TEAM PALLIATIVE CARE UNIT PROGRESS NOTE:      [  ] Patient on hospice program.    INDICATION FOR PALLIATIVE CARE UNIT SERVICES: symptom management in setting of metastatic breast cancer    INTERVAL HPI/OVERNIGHT EVENTS: Over the past 24 hrs, Pt received Dilaudid total of 6mg (PRN Dilaudid 0.5 and 1.5mg yesterday morning, in addition to ATC doses). Pt did not request any Dilaudid 1.5mg PRN. Per team and family, Pt has been AOx 0-1 and unreliably endorsing pain. Pt also received Ativan 1mg total for anxiety. Son was at bedside.    DNR on chart:   Allergies    tetanus toxoid (Unknown)  Zosyn (Unknown)    Intolerances    MEDICATIONS  (STANDING):  HYDROmorphone  Injectable 1 milliGRAM(s) IV Push every 6 hours  LORazepam   Injectable 0.25 milliGRAM(s) IV Push every 8 hours  mupirocin 2% Ointment 1 Application(s) Topical two times a day  nystatin Powder 1 Application(s) Topical two times a day    MEDICATIONS  (PRN):  acetaminophen  Suppository .. 650 milliGRAM(s) Rectal every 6 hours PRN Temp greater or equal to 38C (100.4F), Mild Pain (1 - 3)  bisacodyl Suppository 10 milliGRAM(s) Rectal at bedtime PRN Constipation  HYDROmorphone  Injectable 1.5 milliGRAM(s) IV Push every 1 hour PRN dyspnea  HYDROmorphone  Injectable 1.5 milliGRAM(s) IV Push every 1 hour PRN moderate to severe pain  LORazepam   Injectable 0.5 milliGRAM(s) IV Push every 1 hour PRN agitation and anxiety    ITEMS UNCHECKED ARE NOT PRESENT    PRESENT SYMPTOMS: [x]Unable to obtain due to poor mentation   Source if other than patient:  [x]Family - son at bedside   [x]Team     Pain: [x] yes [ ] no -- unable to specify pain due to AMS  QOL impact -   Location -                    Aggravating factors -  Quality -  Radiation -  Timing-  Severity (0-10 scale):  Minimal acceptable level (0-10 scale):     Dyspnea:                           [ ]Mild [ ]Moderate [ ]Severe  Anxiety:                             [ ]Mild [ ]Moderate [ ]Severe  Fatigue:                             [ ]Mild [ ]Moderate [ ]Severe  Nausea:                             [ ]Mild [ ]Moderate [ ]Severe  Loss of appetite:              [ ]Mild [ ]Moderate [ ]Severe  Constipation:                    [ ]Mild [ ]Moderate [ ]Severe    PAINAD Score:  7     [email or web address] (Ctrl +  left click to view)  		  Other Symptoms:  [ ]All other review of systems negative     Palliative Performance Status Version 2:       20%         http://[email or web address]/files/news/palliative_performance_scale_[email or web address].pdf  PHYSICAL EXAM:  Vital Signs Last 24 Hrs  T(C): 36.1 (16 Sep 2021 08:32), Max: 36.1 (16 Sep 2021 08:32)  T(F): 97 (16 Sep 2021 08:32), Max: 97 (16 Sep 2021 08:32)  HR: 109 (16 Sep 2021 08:32) (109 - 109)  BP: 118/67 (16 Sep 2021 08:32) (118/67 - 118/67)  BP(mean): --  RR: 20 (16 Sep 2021 08:32) (20 - 20)  SpO2: 94% (16 Sep 2021 08:32) (94% - 94%) I&O's Summary  GENERAL:  [x]Alert  [x]Oriented x 0  [x]Lethargic  [ ]Cachexia  [ ]Unarousable  [x]Verbal  [ ]Non-Verbal  Behavioral:   [x] Anxiety  [ ] Delirium [ ] Agitation [ ] Other - AMS  HEENT:  [ ]Normal   [x]Dry mouth   [ ]ET Tube/Trach  [ ]Oral lesions  PULMONARY:   [x]Clear [ ]Tachypnea  [ ]Audible excessive secretions   [ ]Rhonchi        [ ]Right [ ]Left [ ]Bilateral  [ ]Crackles        [ ]Right [ ]Left [ ]Bilateral  [ ]Wheezing     [ ]Right [ ]Left [ ]Bilateral  [ ]Diminshed BS [ ]Right [ ]Left [ ]Bilateral    CARDIOVASCULAR:    [ ]Regular [ ]Irregular [x]Tachy  [ ]Bobby [ ]Murmur [ ]Other  GASTROINTESTINAL:  [ ]Soft  [x]Distended   [ ]+BS  [ ]Non tender [x]Tender  [ ]PEG [ ]OGT/ NGT   Last BM:   9/12/21  GENITOURINARY:  [x]Normal [ ] Incontinent   [ ]Oliguria/Anuria   [ ]Hebert  MUSCULOSKELETAL:   [ ]Normal   [x]Weakness  [ ]Bed/Wheelchair bound [ ]Edema  NEUROLOGIC:   [ ]No focal deficits  [ ] Cognitive impairment  [ ] Dysphagia [ ]Dysarthria [ ] Paresis [x]Other - AMS  SKIN:   [ ]Normal  [x]Rash  +ecchymoses in extremities and scars healing at various stages  [ ]Pressure ulcer(s)  [ ]y [ ]n  Present on admission      CRITICAL CARE:  [ ] Shock Present  [ ]Septic [ ]Cardiogenic [ ]Neurologic [ ]Hypovolemic  [ ]  Vasopressors [ ]  Inotropes   [ ] Respiratory failure present [ ] Mechanical Ventilation [ ] Non-invasive ventilatory support [ ] High-Flow  [ ] Acute  [ ] Chronic [ ] Hypoxic  [ ] Hypercarbic [ ] Other  [ ] Other organ failure     LABS:            RADIOLOGY & ADDITIONAL STUDIES:    PROTEIN CALORIE MALNUTRITION: [ ] mild [ ] moderate [ ] severe  [ ] underweight [x] morbid obesity - BMI 42    https://www.andeal.org/vault/2440/web/files/ONC/Table_Clinical%20Characteristics%20to%20Document%20Malnutrition-White%20JV%20et%20al%944090.pdf    Height (cm): 170.2 (09-07-21 @ 22:11), 175.3 (08-14-21 @ 11:12)  Weight (kg): 121.7 (09-07-21 @ 22:11), 120.7 (09-01-21 @ 13:47), 120 (08-30-21 @ 12:11)  BMI (kg/m2): 42 (09-07-21 @ 22:11), 39.3 (09-01-21 @ 13:47), 39 (08-30-21 @ 12:11)    [x] PPSV2 < or = 30% [ ] significant weight loss [ ] poor nutritional intake [ ] anasarca   Artificial Nutrition [ ]     REFERRALS:   [ ]Chaplaincy  [ ] Hospice  [ ]Child Life  [ ]Social Work  [ ]Case management [ ]Holistic Therapy [ ] Physical Therapy [ ] Dietary   Goals of Care Document:    GAP TEAM PALLIATIVE CARE UNIT PROGRESS NOTE:      [  ] Patient on hospice program.    INDICATION FOR PALLIATIVE CARE UNIT SERVICES: comfort care    INTERVAL HPI/OVERNIGHT EVENTS: Over the past 24 hrs, Pt received Dilaudid total of 6mg (PRN Dilaudid 0.5 and 1.5mg yesterday morning, in addition to ATC doses). Pt did not request any Dilaudid 1.5mg PRN. Per team and family, Pt has been AOx 0-1 and unreliably endorsing pain. Pt also received Ativan 1mg total for anxiety. Son was at bedside.    DNR on chart:   Allergies    tetanus toxoid (Unknown)  Zosyn (Unknown)    Intolerances    MEDICATIONS  (STANDING):  HYDROmorphone  Injectable 1 milliGRAM(s) IV Push every 6 hours  LORazepam   Injectable 0.25 milliGRAM(s) IV Push every 8 hours  mupirocin 2% Ointment 1 Application(s) Topical two times a day  nystatin Powder 1 Application(s) Topical two times a day    MEDICATIONS  (PRN):  acetaminophen  Suppository .. 650 milliGRAM(s) Rectal every 6 hours PRN Temp greater or equal to 38C (100.4F), Mild Pain (1 - 3)  bisacodyl Suppository 10 milliGRAM(s) Rectal at bedtime PRN Constipation  HYDROmorphone  Injectable 1.5 milliGRAM(s) IV Push every 1 hour PRN dyspnea  HYDROmorphone  Injectable 1.5 milliGRAM(s) IV Push every 1 hour PRN moderate to severe pain  LORazepam   Injectable 0.5 milliGRAM(s) IV Push every 1 hour PRN agitation and anxiety    ITEMS UNCHECKED ARE NOT PRESENT    PRESENT SYMPTOMS: [x]Unable to obtain due to poor mentation   Source if other than patient:  [x]Family - son at bedside   [x]Team     Pain: [x] yes [ ] no -- unable to specify pain due to AMS  QOL impact -   Location -                    Aggravating factors -  Quality -  Radiation -  Timing-  Severity (0-10 scale):  Minimal acceptable level (0-10 scale):     Dyspnea:                           [ ]Mild [ ]Moderate [ ]Severe  Anxiety:                             [ ]Mild [ ]Moderate [ ]Severe  Fatigue:                             [ ]Mild [ ]Moderate [ ]Severe  Nausea:                             [ ]Mild [ ]Moderate [ ]Severe  Loss of appetite:              [ ]Mild [ ]Moderate [ ]Severe  Constipation:                    [ ]Mild [ ]Moderate [ ]Severe    PAINAD Score:  7     [email or web address] (Ctrl +  left click to view)  		  Other Symptoms:  [ ]All other review of systems negative     Palliative Performance Status Version 2:       20%         http://[email or web address]/files/news/palliative_performance_scale_[email or web address].pdf  PHYSICAL EXAM:  Vital Signs Last 24 Hrs  T(C): 36.1 (16 Sep 2021 08:32), Max: 36.1 (16 Sep 2021 08:32)  T(F): 97 (16 Sep 2021 08:32), Max: 97 (16 Sep 2021 08:32)  HR: 109 (16 Sep 2021 08:32) (109 - 109)  BP: 118/67 (16 Sep 2021 08:32) (118/67 - 118/67)  BP(mean): --  RR: 20 (16 Sep 2021 08:32) (20 - 20)  SpO2: 94% (16 Sep 2021 08:32) (94% - 94%) I&O's Summary  GENERAL:  [x]Alert  [x]Oriented x 0  [x]Lethargic  [ ]Cachexia  [ ]Unarousable  [x]Verbal  [ ]Non-Verbal  Behavioral:   [x] Anxiety  [ ] Delirium [ ] Agitation [ ] Other - AMS  HEENT:  [ ]Normal   [x]Dry mouth   [ ]ET Tube/Trach  [ ]Oral lesions  PULMONARY:   [x]Clear [ ]Tachypnea  [ ]Audible excessive secretions   [ ]Rhonchi        [ ]Right [ ]Left [ ]Bilateral  [ ]Crackles        [ ]Right [ ]Left [ ]Bilateral  [ ]Wheezing     [ ]Right [ ]Left [ ]Bilateral  [ ]Diminshed BS [ ]Right [ ]Left [ ]Bilateral    CARDIOVASCULAR:    [ ]Regular [ ]Irregular [x]Tachy  [ ]Bobby [ ]Murmur [ ]Other  GASTROINTESTINAL:  [ ]Soft  [x]Distended   [ ]+BS  [ ]Non tender [x]Tender  [ ]PEG [ ]OGT/ NGT   Last BM:   9/12/21  GENITOURINARY:  [x]Normal [ ] Incontinent   [ ]Oliguria/Anuria   [ ]Hebert  MUSCULOSKELETAL:   [ ]Normal   [x]Weakness  [ ]Bed/Wheelchair bound [ ]Edema  NEUROLOGIC:   [ ]No focal deficits  [ ] Cognitive impairment  [ ] Dysphagia [ ]Dysarthria [ ] Paresis [x]Other - AMS  SKIN:   [ ]Normal  [x]Rash  +ecchymoses in extremities and scars healing at various stages  [ ]Pressure ulcer(s)  [ ]y [ ]n  Present on admission      CRITICAL CARE:  [ ] Shock Present  [ ]Septic [ ]Cardiogenic [ ]Neurologic [ ]Hypovolemic  [ ]  Vasopressors [ ]  Inotropes   [ ] Respiratory failure present [ ] Mechanical Ventilation [ ] Non-invasive ventilatory support [ ] High-Flow  [ ] Acute  [ ] Chronic [ ] Hypoxic  [ ] Hypercarbic [ ] Other  [ ] Other organ failure     LABS: no new labs            RADIOLOGY & ADDITIONAL STUDIES: no new studies    PROTEIN CALORIE MALNUTRITION: [ ] mild [ ] moderate [ ] severe  [ ] underweight [x] morbid obesity - BMI 42    https://www.andeal.org/vault/2440/web/files/ONC/Table_Clinical%20Characteristics%20to%20Document%20Malnutrition-White%20JV%20et%20al%733530.pdf    Height (cm): 170.2 (09-07-21 @ 22:11), 175.3 (08-14-21 @ 11:12)  Weight (kg): 121.7 (09-07-21 @ 22:11), 120.7 (09-01-21 @ 13:47), 120 (08-30-21 @ 12:11)  BMI (kg/m2): 42 (09-07-21 @ 22:11), 39.3 (09-01-21 @ 13:47), 39 (08-30-21 @ 12:11)    [x] PPSV2 < or = 30% [ ] significant weight loss [ ] poor nutritional intake [ ] anasarca   Artificial Nutrition [ ]     REFERRALS:   [ ]Chaplaincy  [ ] Hospice  [ ]Child Life  [ ]Social Work  [ ]Case management [ ]Holistic Therapy [ ] Physical Therapy [ ] Dietary   Goals of Care Document:

## 2021-09-16 NOTE — PROGRESS NOTE ADULT - PROBLEM SELECTOR PLAN 1
- continue dilaudid to 1 mg IV q6h ATC - consider starting Pt on drip to improve pain control as Pt is not able to request pain meds secondary to AMS  - continue dilaudid 1.5 mg IV q1h PRN for breakthrough pain - start Dilaudid infusion 0.3mg/hr to improve pain control as Pt is not able to request pain meds secondary to AMS, family consented  - d/c Dilaudid 1 mg IV q6h ATC   - continue Dilaudid 1.5 mg IV q1h PRN for breakthrough pain    -last BM 9/12, continue to monitor stool count - start Dilaudid infusion 0.3mg/hr to improve pain control based on PRN use and pt inability to request pain meds secondary to AMS, family consented  - d/c Dilaudid 1 mg IV q6h ATC   - continue Dilaudid 1.5 mg IV q1h PRN for breakthrough pain  -last BM 9/12, continue bowel regimen

## 2021-09-16 NOTE — PROGRESS NOTE ADULT - PROBLEM SELECTOR PLAN 2
-continue to offer reassurance  - patient very anxious about death  - continue ativan IV 0.25 mg q8h ATC  - ativan IV 0.5 mg q1h PRN agitation, anxiety

## 2021-09-17 NOTE — PROVIDER CONTACT NOTE (OTHER) - ASSESSMENT
No response to external stimuli  No spontaneous respirations  No apical heart rate  Negative papillary response to light
Pt A&Ox4, temp 100.7. Pt reports feeling chills. Pt denies pain.
Pt currently a&ox1 and tachycardic 133 bpm

## 2021-09-17 NOTE — PROGRESS NOTE ADULT - PROBLEM SELECTOR PLAN 1
- continue Dilaudid infusion 0.3mg/hr to improve pain control based on PRN use and pt inability to request pain meds secondary to AMS, family consented  - continue Dilaudid 1.5 mg IV q1h PRN for breakthrough pain  -last BM 9/12, continue bowel regimen, offer Dulcolax suppository today - continue Dilaudid infusion 0.3mg/hr to improve pain control based on PRN use and pt inability to request pain meds secondary to AMS, family consented  - continue Dilaudid 1.5 mg IV q1h PRN for breakthrough pain  -last BM 9/12, continue bowel regimen

## 2021-09-17 NOTE — PROGRESS NOTE ADULT - ATTENDING SUPERVISION STATEMENT
Fellow
Fellow
Resident/Fellow
Fellow
Fellow
Resident
Fellow
Fellow
Resident
Fellow
Resident

## 2021-09-17 NOTE — PROGRESS NOTE ADULT - ATTENDING COMMENTS
65F h/o metastatic ER/CT (+), HER2 (-) metastatic breast cancer (BRCA, CDH1+) to bone and liver, presenting to ER with neutropenia and liver failure. Neutropenia resolved with growth factor support. LFT are worsening. Imaging reviewed and d/w hepatology attending. Likely 2/2 disease progression and unlikely to be reversed by any intervention. Pt had a long discussion re code status with pall team. She doesn't want aggressive interventions but /dtr were very emotional and didn't want to sign DNR. Pt wanted to think over the weekend. She inquired about chemo. We discussed she has recently started 2nd line therapy and would likely have more treatment options if liver issue can be stabilized or reversed. MRCP was rec by hepatology but pt doesn't want it. Will f/u LFT over the weekend.
65F h/o metastatic ER/HI (+), HER2 (-) metastatic breast cancer to bone and liver, presenting to ER for diarrhea, abdominal pain, jaundice and confusion. CT A/p showing a cirrhotic liver with known metastasis. LFT still elevated- likely 2/2 chemo and intra hepatic disease. She is still neutropenic. Has been off ibrance for 3 weeks. Continue neupogen until ANC > 1000. C diff prelim +, f/u PCR. MRI brain pending. Plan d/w pt and dtr at bedside
74 y.o female with end stage metastatic breast cancer on palliative care.  Patient comfortable and pain under control.  Management as per PCU
LFT elevation likely due to extensive liver metastases. Not a candidate for chemotherapy currently. Neutropenia precludes possible ERCP, hence MRI would not  currently. Pt. was too big for MRI, reportedly. Very poor prognosis.
65 year old woman with PMH of breast cancer with mets to bone and liver with recent initiation of new chemotherapy - abraxane 6 days ago, lap-band presents with diarrhea, abdominal pain, jaundice, and confusio. Found to be in acute liver failure. Pt transitioned to comfort directed care based on her own wishes. Pt anxious in anticipation of dying. Emotional support provided. Started IV ativan 0.25mg q1h PRN. Dispo planning ongoing.
65F w/ PMHX of metastatic breast ca on chemo pw diffuse abdominal pain diarrhea and new acute onset cholestasis  She is less distended and tender on exam today  Initially concerning for cholangitis, however CT and US w/o evidence of biliary ductal dilatation and no fevers.  At this point she may have intrahepatic cholestasis related to chemo/malignancy  Heme onc consult appreciated  C/w cefepime/flagyl for empiric abdominal coverage pending ID input  F/u final C diff  Hepatology assistance appreciated.
65 year old woman with PMH of breast cancer with mets to bone and liver with recent initiation of new chemotherapy - abraxane 6 days ago, lap-band presents with diarrhea, abdominal pain, jaundice, and confusion. Found to be in acute liver failure. Pt transitioned to comfort directed care based on her own wishes. Pt with worsening encephalopathy and continues to be anxious in anticipation of dying. Requiring escalating doses of IV dilaudid and ativan for symptom management. Will consider starting continuous dilaudid infusion based on PRN use in next 24 hours. Patient's daughter present at bedside. All questions answered and emotional support provided.
65F w/ PMHX of metastatic breast ca on chemo pw diffuse abdominal pain diarrhea and new acute onset cholestasis  She is less distended and tender on exam today  Initially concerning for cholangitis, however CT and US w/o evidence of biliary ductal dilatation and no fevers.  At this point suspect she may have intrahepatic cholestasis related to chemo/malignancy  Heme onc consult appreciated  Monitoring off abx  F/u final C diff  Hepatology assistance appreciated.
65 year old woman with PMH of breast cancer with mets to bone and liver with recent initiation of new chemotherapy - abraxane 6 days ago, lap-band presents with diarrhea, abdominal pain, jaundice, and confusion. Found to be in acute liver failure. Pt transitioned to comfort directed care based on her own wishes. Symptoms adequately controlled on current regimen. Pt hypotensive and tachycardiac this morning. Appears to be entering dying process. Family updated and emotional support provided.
65 year old woman with PMH of breast cancer with mets to bone and liver with recent initiation of new chemotherapy - abraxane 6 days ago, lap-band presents with diarrhea, abdominal pain, jaundice, and confusion. Found to be in acute liver failure. Pt transitioned to comfort directed care based on her own wishes. Pt with worsening encephalopathy and continues to be anxious in anticipation of dying. Requiring escalating doses of IV dilaudid. Dilaudid continuous infusion started at 0.3mg/hr. Will continued to titrate as needed. Family updated. Emotional support provided.
65F w/ PMHX of metastatic breast ca on chemo pw diffuse abdominal pain diarrhea and new acute onset cholestasis    Initially concerning for cholangitis, however CT and US w/o evidence of biliary ductal dilatation  At this point suspect she may have intrahepatic cholestasis related to chemo/malignancy  Had fever o/n and bili still rising so covering w/ abx per ID for possible cholangitis  pt states she would like to start focusing on comfort and a palliative approach to her illness.   Pt with c/o generalized pain. Will c/w low dose dilaudid and up titrate as needed for comfort.  plan to transfer patient to the PCU when a bed is available. Pt goal is to be home with her family and loved one during the end of her life  Daughter at bedside, all concerns were addressed.
-Palliative recs appreciated. Patient DNR and pending a bed in the PCU to focus on comfort.   -Caspofungin to help treat candida in throat, which may also aide in comfort.   -Consider liberalizing diet.   -Limit blood draws.
65F w/ PMHX of metastatic breast ca on chemo pw diffuse abdominal pain diarrhea and new acute onset cholestasis  She is less distended and tender on exam today  Initially concerning for cholangitis, however CT and US w/o evidence of biliary ductal dilatation  At this point suspect she may have intrahepatic cholestasis related to chemo/malignancy  Had fever o/n and bili still rising so covering w/ abx per ID  Exam w/ mucositis-ENT consulted  Heme onc consult appreciated  C diff negative  Hepatology assistance appreciated.
65F w/ PMHX of metastatic breast ca on chemo pw diffuse abdominal pain diarrhea and new acute onset cholestasis    Initially concerning for cholangitis, however CT and US w/o evidence of biliary ductal dilatation  At this point suspect she may have intrahepatic cholestasis related to chemo/malignancy  Had fever o/n and bili still rising so covering w/ abx per ID for possible cholangitis  check a Liver US to eval for treatable cause of bilirary obstruction  Heme onc consult appreciated  Hepatology assistance appreciated.  pt states she would like to start focusing on comfort and a palliative approach to her illness. Pt with c/o generalized pain. Will start low dose dilaudid and up titrate as needed for comfort.
65F w/ PMHX of metastatic breast ca on chemo pw diffuse abdominal pain diarrhea and new acute onset cholestasis  She is distended on exam and diffusely tender but focally worst in RUQ  Clinical picture most c/w neutropenic sepsis due to cholangitis. R/o c diff given diarrhea  Heme onc consult -- ?neupogen although she is currently very inflamed, ferritin >10K  C/w cefepime/flagyl for empiric abdominal coverage  GI consultation  ID consultation

## 2021-09-17 NOTE — PROVIDER CONTACT NOTE (OTHER) - ACTION/TREATMENT ORDERED:
MD notified. Metronidazole IVPB, Cefepime IVPB, acetaminophen 650 mg, blood cultures, urinalysis, urine cultures ordered.
Provider notified and aware. Pain medication to be given for comfort. Will continue to monitor
See Patient Expiration Note

## 2021-09-17 NOTE — PROVIDER CONTACT NOTE (OTHER) - BACKGROUND
67 yo female admitted for sepsis. Pt is DNR with comfort measures
Patient has a DNR order
Pt admitted for sepsis

## 2021-09-17 NOTE — PROGRESS NOTE ADULT - NUTRITIONAL ASSESSMENT
Diet, Pureed:   Consistent Carbohydrate {No Snacks} (CSTCHO)  DASH/TLC {Sodium & Cholesterol Restricted} (DASH)  Supplement Feeding Modality:  Oral  Ensure Clear Cans or Servings Per Day:  3       Frequency:  Three Times a day (09-11-21 @ 12:14) [Active]

## 2021-09-17 NOTE — PROGRESS NOTE ADULT - SUBJECTIVE AND OBJECTIVE BOX
GAP TEAM PALLIATIVE CARE UNIT PROGRESS NOTE:      [  ] Patient on hospice program.    INDICATION FOR PALLIATIVE CARE UNIT SERVICES: symptom management    INTERVAL HPI/OVERNIGHT EVENTS: over the past 24 hrs (8a-8a), Pt received Dilaudid 1.5mg x2 for dyspnea as well as Dilaudid 0.3mg/hr infusion. Pt received Ativan 0.25mg x2 for anxiety (family received first dose). Pt seen resting in bed this morning, opened eyes briefly to touch. Non-labored shallow regular breaths, no grimacing or negative vocalizations observed.    DNR on chart:   Allergies    tetanus toxoid (Unknown)  Zosyn (Unknown)    Intolerances    MEDICATIONS  (STANDING):  HYDROmorphone Infusion 0.3 mG/Hr (0.3 mL/Hr) IV Continuous <Continuous>  LORazepam   Injectable 0.25 milliGRAM(s) IV Push every 8 hours  mupirocin 2% Ointment 1 Application(s) Topical two times a day  nystatin Powder 1 Application(s) Topical two times a day  sodium chloride 0.9%. 1000 milliLiter(s) (10 mL/Hr) IV Continuous <Continuous>    MEDICATIONS  (PRN):  acetaminophen  Suppository .. 650 milliGRAM(s) Rectal every 6 hours PRN Temp greater or equal to 38C (100.4F), Mild Pain (1 - 3)  bisacodyl Suppository 10 milliGRAM(s) Rectal at bedtime PRN Constipation  HYDROmorphone  Injectable 1.5 milliGRAM(s) IV Push every 1 hour PRN dyspnea  HYDROmorphone  Injectable 1.5 milliGRAM(s) IV Push every 1 hour PRN moderate to severe pain  LORazepam   Injectable 0.5 milliGRAM(s) IV Push every 1 hour PRN agitation and anxiety    ITEMS UNCHECKED ARE NOT PRESENT    PRESENT SYMPTOMS: [x]Unable to obtain due to poor mentation   Source if other than patient:  [ ]Family   [x]Team     Pain: [x] yes [ ] no -- unable to specify due to lethargy and sedation  QOL impact -   Location -                    Aggravating factors -  Quality -  Radiation -  Timing-  Severity (0-10 scale):  Minimal acceptable level (0-10 scale):     Dyspnea:                           [ ]Mild [ ]Moderate [ ]Severe  Anxiety:                             [ ]Mild [ ]Moderate [ ]Severe  Fatigue:                             [ ]Mild [ ]Moderate [ ]Severe  Nausea:                             [ ]Mild [ ]Moderate [ ]Severe  Loss of appetite:              [ ]Mild [ ]Moderate [ ]Severe  Constipation:                    [ ]Mild [ ]Moderate [ ]Severe    PAINAD Score:  1     [email or web address] [email or web address]/[email or web address] (Ctrl +  left click to view)  		  Other Symptoms:  [ ]All other review of systems negative     Palliative Performance Status Version 2:         20%         [email or web address]  PHYSICAL EXAM:  Vital Signs Last 24 Hrs  T(C): 37 (17 Sep 2021 07:48), Max: 37 (17 Sep 2021 07:48)  T(F): 98.6 (17 Sep 2021 07:48), Max: 98.6 (17 Sep 2021 07:48)  HR: 114 (17 Sep 2021 07:48) (114 - 114)  BP: 68/51 (17 Sep 2021 07:48) (68/51 - 68/51)  BP(mean): --  RR: 20 (17 Sep 2021 07:48) (20 - 20)  SpO2: 92% (17 Sep 2021 07:48) (92% - 92%) I&O's Summary  GENERAL:  [ ]Alert  [ ]Oriented x   [x]Lethargic  [ ]Cachexia  [ ]Unarousable  [ ]Verbal  [x]Non-Verbal  Behavioral:   [ ] Anxiety  [ ] Delirium [ ] Agitation [ ] Other  HEENT:  [ ]Normal   [x]Dry mouth   [ ]ET Tube/Trach  [ ]Oral lesions  PULMONARY:   [x]Clear [ ]Tachypnea  [ ]Audible excessive secretions   [ ]Rhonchi        [ ]Right [ ]Left [ ]Bilateral  [ ]Crackles        [ ]Right [ ]Left [ ]Bilateral  [ ]Wheezing     [ ]Right [ ]Left [ ]Bilateral  [ ]Diminshed BS [ ]Right [ ]Left [ ]Bilateral    CARDIOVASCULAR:    [ ]Regular [ ]Irregular [x]Tachy  [ ]Bobby [ ]Murmur [ ]Other  GASTROINTESTINAL:  [x]Soft  [ ]Distended   [ ]+BS  [x]Non tender [ ]Tender  [ ]PEG [ ]OGT/ NGT   Last BM:   9/12/21  GENITOURINARY:  [ ]Normal [ ] Incontinent   [x]Oliguria/Anuria   [ ]Hebert  MUSCULOSKELETAL:   [ ]Normal   [ ]Weakness  [x]Bed/Wheelchair bound [ ]Edema  NEUROLOGIC:   [ ]No focal deficits  [ ] Cognitive impairment  [ ] Dysphagia [ ]Dysarthria [ ] Paresis [x]Other - unable to assess due to lethargy  SKIN:   [ ]Normal  [x]Rash   - ecchymosis and scars on knees; jaundiced  [ ]Pressure ulcer(s)  [ ]y [ ]n  Present on admission      CRITICAL CARE:  [ ] Shock Present  [ ]Septic [ ]Cardiogenic [ ]Neurologic [ ]Hypovolemic  [ ]  Vasopressors [ ]  Inotropes   [ ] Respiratory failure present [ ] Mechanical Ventilation [ ] Non-invasive ventilatory support [ ] High-Flow  [ ] Acute  [ ] Chronic [ ] Hypoxic  [ ] Hypercarbic [ ] Other  [ ] Other organ failure     LABS: no new labs            RADIOLOGY & ADDITIONAL STUDIES: no new studies    PROTEIN CALORIE MALNUTRITION: [ ] mild [ ] moderate [ ] severe  [ ] underweight [x] morbid obesity - BMI 42    https://www.andeal.org/vault/2440/web/files/ONC/Table_Clinical%20Characteristics%20to%20Document%20Malnutrition-White%20JV%20et%20al%168741.pdf    Height (cm): 170.2 (09-07-21 @ 22:11), 175.3 (08-14-21 @ 11:12)  Weight (kg): 121.7 (09-07-21 @ 22:11), 120.7 (09-01-21 @ 13:47), 120 (08-30-21 @ 12:11)  BMI (kg/m2): 42 (09-07-21 @ 22:11), 39.3 (09-01-21 @ 13:47), 39 (08-30-21 @ 12:11)    [x] PPSV2 < or = 30% [ ] significant weight loss [ ] poor nutritional intake [ ] anasarca   Artificial Nutrition [ ]     REFERRALS:   [ ]Chaplaincy  [ ] Hospice  [ ]Child Life  [ ]Social Work  [ ]Case management [ ]Holistic Therapy [ ] Physical Therapy [ ] Dietary   Goals of Care Document:    GAP TEAM PALLIATIVE CARE UNIT PROGRESS NOTE:      [  ] Patient on hospice program.    INDICATION FOR PALLIATIVE CARE UNIT SERVICES: symptom management    INTERVAL HPI/OVERNIGHT EVENTS:   Over the past 24 hrs (8a-8a), Pt received Dilaudid 1.5mg x2 for dyspnea and Dilaudid 0.3mg/hr infusion.   Received Ativan 0.25mg x2 for anxiety (family refused one dose).     DNR on chart: DNR/DNI  Allergies    tetanus toxoid (Unknown)  Zosyn (Unknown)    Intolerances    MEDICATIONS  (STANDING):  HYDROmorphone Infusion 0.3 mG/Hr (0.3 mL/Hr) IV Continuous <Continuous>  LORazepam   Injectable 0.25 milliGRAM(s) IV Push every 8 hours  mupirocin 2% Ointment 1 Application(s) Topical two times a day  nystatin Powder 1 Application(s) Topical two times a day  sodium chloride 0.9%. 1000 milliLiter(s) (10 mL/Hr) IV Continuous <Continuous>    MEDICATIONS  (PRN):  acetaminophen  Suppository .. 650 milliGRAM(s) Rectal every 6 hours PRN Temp greater or equal to 38C (100.4F), Mild Pain (1 - 3)  bisacodyl Suppository 10 milliGRAM(s) Rectal at bedtime PRN Constipation  HYDROmorphone  Injectable 1.5 milliGRAM(s) IV Push every 1 hour PRN dyspnea  HYDROmorphone  Injectable 1.5 milliGRAM(s) IV Push every 1 hour PRN moderate to severe pain  LORazepam   Injectable 0.5 milliGRAM(s) IV Push every 1 hour PRN agitation and anxiety    ITEMS UNCHECKED ARE NOT PRESENT    PRESENT SYMPTOMS: [x]Unable to obtain due to poor mentation   Source if other than patient:  [ ]Family   [x]Team     Pain: [x] yes [ ] no -- unable to specify due to lethargy and sedation  QOL impact -   Location -                    Aggravating factors -  Quality -  Radiation -  Timing-  Severity (0-10 scale):  Minimal acceptable level (0-10 scale):     Dyspnea:                           [ ]Mild [ ]Moderate [ ]Severe  Anxiety:                             [ ]Mild [ ]Moderate [ ]Severe  Fatigue:                             [ ]Mild [ ]Moderate [ ]Severe  Nausea:                             [ ]Mild [ ]Moderate [ ]Severe  Loss of appetite:              [ ]Mild [ ]Moderate [ ]Severe  Constipation:                    [ ]Mild [ ]Moderate [ ]Severe    PAINAD Score:  1     [email or web address] (Ctrl +  left click to view)  		  Other Symptoms:  [ ]All other review of systems negative     Palliative Performance Status Version 2:         20%         [email or web address]  PHYSICAL EXAM:  Vital Signs Last 24 Hrs  T(C): 37 (17 Sep 2021 07:48), Max: 37 (17 Sep 2021 07:48)  T(F): 98.6 (17 Sep 2021 07:48), Max: 98.6 (17 Sep 2021 07:48)  HR: 114 (17 Sep 2021 07:48) (114 - 114)  BP: 68/51 (17 Sep 2021 07:48) (68/51 - 68/51)  BP(mean): --  RR: 20 (17 Sep 2021 07:48) (20 - 20)  SpO2: 92% (17 Sep 2021 07:48) (92% - 92%) I&O's Summary  GENERAL:  [ ]Alert  [ ]Oriented x   [x]Lethargic  [ ]Cachexia  [ ]Unarousable  [ ]Verbal  [x]Non-Verbal  Behavioral:   [ ] Anxiety  [ ] Delirium [ ] Agitation [ ] Other  HEENT:  [ ]Normal   [x]Dry mouth   [ ]ET Tube/Trach  [ ]Oral lesions  PULMONARY:   [x]Clear [ ]Tachypnea  [ ]Audible excessive secretions   [ ]Rhonchi        [ ]Right [ ]Left [ ]Bilateral  [ ]Crackles        [ ]Right [ ]Left [ ]Bilateral  [ ]Wheezing     [ ]Right [ ]Left [ ]Bilateral  [ ]Diminshed BS [ ]Right [ ]Left [ ]Bilateral    CARDIOVASCULAR:    [ ]Regular [ ]Irregular [x]Tachy  [ ]Bobby [ ]Murmur [ ]Other  GASTROINTESTINAL:  [x]Soft  [ ]Distended   [ ]+BS  [x]Non tender [ ]Tender  [ ]PEG [ ]OGT/ NGT   Last BM:   9/12/21  GENITOURINARY:  [ ]Normal [ ] Incontinent   [x]Oliguria/Anuria   [ ]Hebert  MUSCULOSKELETAL:   [ ]Normal   [ ]Weakness  [x]Bed/Wheelchair bound [ ]Edema  NEUROLOGIC:   [ ]No focal deficits  [ ] Cognitive impairment  [ ] Dysphagia [ ]Dysarthria [ ] Paresis [x]Other - unable to assess due to lethargy  SKIN:   [ ]Normal  [x]Rash   - ecchymosis and scars on knees; jaundiced  [ ]Pressure ulcer(s)  [ ]y [ ]n  Present on admission      CRITICAL CARE:  [ ] Shock Present  [ ]Septic [ ]Cardiogenic [ ]Neurologic [ ]Hypovolemic  [ ]  Vasopressors [ ]  Inotropes   [ ] Respiratory failure present [ ] Mechanical Ventilation [ ] Non-invasive ventilatory support [ ] High-Flow  [ ] Acute  [ ] Chronic [ ] Hypoxic  [ ] Hypercarbic [ ] Other  [ ] Other organ failure     LABS: no new labs            RADIOLOGY & ADDITIONAL STUDIES: no new studies    PROTEIN CALORIE MALNUTRITION: [ ] mild [ ] moderate [ ] severe  [ ] underweight [x] morbid obesity - BMI 42    https://www.andeal.org/vault/2440/web/files/ONC/Table_Clinical%20Characteristics%20to%20Document%20Malnutrition-White%20JV%20et%20al%091847.pdf    Height (cm): 170.2 (09-07-21 @ 22:11), 175.3 (08-14-21 @ 11:12)  Weight (kg): 121.7 (09-07-21 @ 22:11), 120.7 (09-01-21 @ 13:47), 120 (08-30-21 @ 12:11)  BMI (kg/m2): 42 (09-07-21 @ 22:11), 39.3 (09-01-21 @ 13:47), 39 (08-30-21 @ 12:11)    [x] PPSV2 < or = 30% [ ] significant weight loss [ ] poor nutritional intake [ ] anasarca   Artificial Nutrition [ ]     REFERRALS:   [ ]Chaplaincy  [ ] Hospice  [ ]Child Life  [ ]Social Work  [ ]Case management [ ]Holistic Therapy [ ] Physical Therapy [ ] Dietary   Goals of Care Document:    GAP TEAM PALLIATIVE CARE UNIT PROGRESS NOTE:      [  ] Patient on hospice program.    INDICATION FOR PALLIATIVE CARE UNIT SERVICES: symptom management    INTERVAL HPI/OVERNIGHT EVENTS:   Over the past 24 hrs (8a-8a), Pt received Dilaudid 1.5mg x2 for dyspnea and Dilaudid 0.3mg/hr infusion.   Received Ativan 0.25mg x2 for anxiety (family refused one dose).   This morning, Pt is resting comfortably, only responsive to touch. Shallow, nonlabored breathing and no grimacing observed.     DNR on chart: DNR/DNI  Allergies    tetanus toxoid (Unknown)  Zosyn (Unknown)    Intolerances    MEDICATIONS  (STANDING):  HYDROmorphone Infusion 0.3 mG/Hr (0.3 mL/Hr) IV Continuous <Continuous>  LORazepam   Injectable 0.25 milliGRAM(s) IV Push every 8 hours  mupirocin 2% Ointment 1 Application(s) Topical two times a day  nystatin Powder 1 Application(s) Topical two times a day  sodium chloride 0.9%. 1000 milliLiter(s) (10 mL/Hr) IV Continuous <Continuous>    MEDICATIONS  (PRN):  acetaminophen  Suppository .. 650 milliGRAM(s) Rectal every 6 hours PRN Temp greater or equal to 38C (100.4F), Mild Pain (1 - 3)  bisacodyl Suppository 10 milliGRAM(s) Rectal at bedtime PRN Constipation  HYDROmorphone  Injectable 1.5 milliGRAM(s) IV Push every 1 hour PRN dyspnea  HYDROmorphone  Injectable 1.5 milliGRAM(s) IV Push every 1 hour PRN moderate to severe pain  LORazepam   Injectable 0.5 milliGRAM(s) IV Push every 1 hour PRN agitation and anxiety    ITEMS UNCHECKED ARE NOT PRESENT    PRESENT SYMPTOMS: [x]Unable to obtain due to poor mentation   Source if other than patient:  [ ]Family   [x]Team     Pain: [x] yes [ ] no -- unable to specify due to lethargy and sedation  QOL impact -   Location -                    Aggravating factors -  Quality -  Radiation -  Timing-  Severity (0-10 scale):  Minimal acceptable level (0-10 scale):     Dyspnea:                           [ ]Mild [ ]Moderate [ ]Severe  Anxiety:                             [ ]Mild [ ]Moderate [ ]Severe  Fatigue:                             [ ]Mild [ ]Moderate [ ]Severe  Nausea:                             [ ]Mild [ ]Moderate [ ]Severe  Loss of appetite:              [ ]Mild [ ]Moderate [ ]Severe  Constipation:                    [ ]Mild [ ]Moderate [ ]Severe    PAINAD Score:  1     [email or web address] (Ctrl +  left click to view)  		  Other Symptoms:  [ ]All other review of systems negative     Palliative Performance Status Version 2:         20%         [email or web address]  PHYSICAL EXAM:  Vital Signs Last 24 Hrs  T(C): 37 (17 Sep 2021 07:48), Max: 37 (17 Sep 2021 07:48)  T(F): 98.6 (17 Sep 2021 07:48), Max: 98.6 (17 Sep 2021 07:48)  HR: 114 (17 Sep 2021 07:48) (114 - 114)  BP: 68/51 (17 Sep 2021 07:48) (68/51 - 68/51)  BP(mean): --  RR: 20 (17 Sep 2021 07:48) (20 - 20)  SpO2: 92% (17 Sep 2021 07:48) (92% - 92%) I&O's Summary  GENERAL:  [ ]Alert  [ ]Oriented x   [x]Lethargic  [ ]Cachexia  [ ]Unarousable  [ ]Verbal  [x]Non-Verbal  Behavioral:   [ ] Anxiety  [ ] Delirium [ ] Agitation [ ] Other  HEENT:  [ ]Normal   [x]Dry mouth   [ ]ET Tube/Trach  [ ]Oral lesions  PULMONARY:   [x]Clear [ ]Tachypnea  [ ]Audible excessive secretions   [ ]Rhonchi        [ ]Right [ ]Left [ ]Bilateral  [ ]Crackles        [ ]Right [ ]Left [ ]Bilateral  [ ]Wheezing     [ ]Right [ ]Left [ ]Bilateral  [ ]Diminshed BS [ ]Right [ ]Left [ ]Bilateral    CARDIOVASCULAR:    [ ]Regular [ ]Irregular [x]Tachy  [ ]Bobby [ ]Murmur [ ]Other  GASTROINTESTINAL:  [x]Soft  [ ]Distended   [ ]+BS  [x]Non tender [ ]Tender  [ ]PEG [ ]OGT/ NGT   Last BM:   9/12/21  GENITOURINARY:  [ ]Normal [ ] Incontinent   [x]Oliguria/Anuria   [ ]Hebert  MUSCULOSKELETAL:   [ ]Normal   [ ]Weakness  [x]Bed/Wheelchair bound [ ]Edema  NEUROLOGIC:   [ ]No focal deficits  [ ] Cognitive impairment  [ ] Dysphagia [ ]Dysarthria [ ] Paresis [x]Other - unable to assess due to lethargy  SKIN:   [ ]Normal  [x]Rash   - ecchymosis and scars on knees; jaundiced  [ ]Pressure ulcer(s)  [ ]y [ ]n  Present on admission      CRITICAL CARE:  [ ] Shock Present  [ ]Septic [ ]Cardiogenic [ ]Neurologic [ ]Hypovolemic  [ ]  Vasopressors [ ]  Inotropes   [ ] Respiratory failure present [ ] Mechanical Ventilation [ ] Non-invasive ventilatory support [ ] High-Flow  [ ] Acute  [ ] Chronic [ ] Hypoxic  [ ] Hypercarbic [ ] Other  [ ] Other organ failure     LABS: no new labs            RADIOLOGY & ADDITIONAL STUDIES: no new studies    PROTEIN CALORIE MALNUTRITION: [ ] mild [ ] moderate [ ] severe  [ ] underweight [x] morbid obesity - BMI 42    https://www.andeal.org/vault/2440/web/files/ONC/Table_Clinical%20Characteristics%20to%20Document%20Malnutrition-White%20JV%20et%20al%556419.pdf    Height (cm): 170.2 (09-07-21 @ 22:11), 175.3 (08-14-21 @ 11:12)  Weight (kg): 121.7 (09-07-21 @ 22:11), 120.7 (09-01-21 @ 13:47), 120 (08-30-21 @ 12:11)  BMI (kg/m2): 42 (09-07-21 @ 22:11), 39.3 (09-01-21 @ 13:47), 39 (08-30-21 @ 12:11)    [x] PPSV2 < or = 30% [ ] significant weight loss [ ] poor nutritional intake [ ] anasarca   Artificial Nutrition [ ]     REFERRALS:   [ ]Chaplaincy  [ ] Hospice  [ ]Child Life  [ ]Social Work  [ ]Case management [ ]Holistic Therapy [ ] Physical Therapy [ ] Dietary   Goals of Care Document:

## 2021-09-17 NOTE — PROGRESS NOTE ADULT - PROVIDER SPECIALTY LIST ADULT
Heme/Onc
Heme/Onc
Infectious Disease
Infectious Disease
Heme/Onc
Hepatology
Infectious Disease
Podiatry
Palliative Care
Palliative Care
Internal Medicine
Palliative Care
Internal Medicine
Palliative Care
Internal Medicine
Palliative Care
Palliative Care
Internal Medicine
Internal Medicine

## 2021-09-17 NOTE — PROGRESS NOTE ADULT - REASON FOR ADMISSION
sepsis

## 2021-09-17 NOTE — DISCHARGE NOTE FOR THE EXPIRED PATIENT - HOSPITAL COURSE
65 year old woman with PMH of breast cancer with mets to bone and liver with recent initiation of new chemotherapy - abraxane 6 days prior to admission, lap-band presents with diarrhea, abdominal pain, jaundice, and confusion. Per son, patient had been experiencing dyspnea, fatigue, and dysuria and had presented to the ED 2 weeeks ago. At that time found to have a UTI, JAMAAL, and liver mets by ultrasound, she subsequently had a biopsy as an outpatient several days later. She has continued to have fatigue and weakness and some SOB. She went to pul who started trelegy ellipta - per patient too soon to tell if it's helping. More recently she started to have some confusion manifested by forgetting what she was saying, answering questions incorrectly, and repeating herself. She has also had some decreased PO intake that has worsened over last few days. Lastly she started having diarrhea this past week, reporting 3-4 BMs per day, patient unable to articulate whether loose or watery. She also reported continued polyuria and polydipsia but her home blood sugars have been normal. She is denying any blood in urine/stool or melena as well as fevers/chills, chest pain, current dyspnea, N/V, and dysuria.     In ED: Afebrile, , /80, RR 18, Spo2 95%. Lactate 5.3, WBC 1.5 with , direct bili 4. Given 2L IVF, vanco and cefepime 1g.     During hospital course, patient received antibiotics and fluids. She requested herself to be comfort measures only. She was transferred to PCU for further management. She was DNR/DNI and  on 2021 at 12:00 PM.

## 2021-09-23 ENCOUNTER — APPOINTMENT (OUTPATIENT)
Dept: INFUSION THERAPY | Facility: HOSPITAL | Age: 66
End: 2021-09-23

## 2021-09-23 ENCOUNTER — APPOINTMENT (OUTPATIENT)
Dept: HEMATOLOGY ONCOLOGY | Facility: CLINIC | Age: 66
End: 2021-09-23

## 2021-09-24 ENCOUNTER — APPOINTMENT (OUTPATIENT)
Dept: HEMATOLOGY ONCOLOGY | Facility: CLINIC | Age: 66
End: 2021-09-24

## 2021-09-30 ENCOUNTER — APPOINTMENT (OUTPATIENT)
Dept: INFUSION THERAPY | Facility: HOSPITAL | Age: 66
End: 2021-09-30

## 2021-10-08 ENCOUNTER — APPOINTMENT (OUTPATIENT)
Dept: INFUSION THERAPY | Facility: HOSPITAL | Age: 66
End: 2021-10-08

## 2021-10-15 ENCOUNTER — APPOINTMENT (OUTPATIENT)
Dept: HEMATOLOGY ONCOLOGY | Facility: CLINIC | Age: 66
End: 2021-10-15

## 2021-11-05 ENCOUNTER — APPOINTMENT (OUTPATIENT)
Dept: HEMATOLOGY ONCOLOGY | Facility: CLINIC | Age: 66
End: 2021-11-05

## 2021-11-19 NOTE — PROGRESS NOTE ADULT - PROBLEM SELECTOR PROBLEM 3
"We can't \"put in an order\" to insurance.  We can type a brief letter and she can submit it to insurance, but that is all we can do.  " HERNESTO (acute liver failure)

## 2021-11-26 ENCOUNTER — APPOINTMENT (OUTPATIENT)
Dept: HEMATOLOGY ONCOLOGY | Facility: CLINIC | Age: 66
End: 2021-11-26

## 2021-12-30 LAB
CANCER AG27-29 SERPL-ACNC: 194.1 U/ML
CANCER AG27-29 SERPL-ACNC: 245.2 U/ML

## 2022-03-21 NOTE — OB HISTORY
[Menopause Age: ____] : age at menopause was [unfilled] [Menarche Age: ____] : age at menarche was [unfilled] [___] :  Induced: [unfilled] Advancement Flap (Single) Text: Given the location of the defect and the proximity to free margins a single advancement flap was deemed most appropriate.  Using a sterile surgical marker, an appropriate advancement flap was drawn incorporating the defect and placing the expected incisions within the relaxed skin tension lines where possible.    The area thus outlined was incised deep to adipose tissue with a #15c scalpel blade.  The skin margins were undermined to an appropriate distance in all directions utilizing iris scissors.

## 2022-10-11 NOTE — ED ADULT NURSE NOTE - NSICDXPASTSURGICALHX_GEN_ALL_CORE_FT
PAST SURGICAL HISTORY:   history     H/O drainage of abscess 13    H/O rhinoplasty     H/O:  Section x2-1991    History of tonsillectomy     S/P biopsy uterus-2013    S/P hip replacement LEFT HIP (OXINIUM) ~     S/P laparoscopic surgery GASTRIC LAP BAND ~     
no concerns

## 2023-08-31 NOTE — DISCHARGE NOTE PROVIDER - NSDCHHATTENDCERT_GEN_ALL_CORE
99.5 My signature below certifies that the above stated patient is homebound and upon completion of the Face-To-Face encounter, has the need for intermittent skilled nursing, physical therapy and/or speech or occupational therapy services in their home for their current diagnosis as outlined in their initial plan of care. These services will continue to be monitored by myself or another physician.

## 2023-11-28 NOTE — PATIENT PROFILE ADULT - NSASFALLWHENOCCURRED_GEN_A_NUR
to pattern and CGA with a right sided railing on 2/3 trials to increase independence throughout her home. Progressing: performed with CGA 50% of the time. 11/6/23-3/24/24    Descend 4 steps with step to pattern and CGA with a right sided railing on 2/3 trials to increase independence throughout her home. Progressing: mIn A provided on the LUE to step down with the R sided railing and a step too pattern. 11/6/23-3/24/24   Ambulate over grass for 30ft without a LOB to improve dynamic gait skills outside on 2/3 trials. Progressing: have not spent much time outside due to weather constraints. 11/6/23-3/24/24   Ambulate 100ft with supervision to improve independence with gait in the house on 2/3. Progressing: can walk through the gym without a LOB. 11/6/23-3/24/24   Improve LLE ROM to achieve +5 of dorsiflexion via serial casting.   Progressing  11/17/23-3/24/24                  PLAN  Yes  Continue plan of care  Re-Cert Due: 95/8/32-76/0/87     []  Upgrade activities as tolerated  []  Discharge due to :  []  Other:    Kia Johnson, PT       11/28/2023       1:00 PM
last six months/unable to determine

## 2024-06-06 NOTE — ED PROVIDER NOTE - ADMIT DISPOSITION PRESENT ON ADMISSION SEPSIS Q1 - RE-EVALUATED PATIENT FLUID AND VITAL SIGNS
I have re-evaluated the patient's fluid status and reviewed vital signs. Clinical perfusion assessment was performed. none

## 2025-01-17 NOTE — PHYSICAL EXAM
Pt has been notified . Zeynep Reviewed her scan. Doing better. Use OTC Zyrtec 1 hour before the Neulasta.     Pt v/u       [Fully active, able to carry on all pre-disease performance without restriction] : Status 0 - Fully active, able to carry on all pre-disease performance without restriction [Obese] : obese [Normal] : affect appropriate [de-identified] : + Right inframammary fold fungal infection with some skin peeling.